# Patient Record
Sex: FEMALE | Race: ASIAN | NOT HISPANIC OR LATINO
[De-identification: names, ages, dates, MRNs, and addresses within clinical notes are randomized per-mention and may not be internally consistent; named-entity substitution may affect disease eponyms.]

---

## 2024-01-17 ENCOUNTER — NON-APPOINTMENT (OUTPATIENT)
Age: 74
End: 2024-01-17

## 2024-01-18 ENCOUNTER — APPOINTMENT (OUTPATIENT)
Dept: NEUROLOGY | Facility: CLINIC | Age: 74
End: 2024-01-18
Payer: MEDICARE

## 2024-01-18 VITALS
WEIGHT: 96 LBS | BODY MASS INDEX: 18.85 KG/M2 | SYSTOLIC BLOOD PRESSURE: 137 MMHG | HEIGHT: 60 IN | DIASTOLIC BLOOD PRESSURE: 79 MMHG | HEART RATE: 72 BPM

## 2024-01-18 DIAGNOSIS — I67.1 CEREBRAL ANEURYSM, NONRUPTURED: ICD-10-CM

## 2024-01-18 PROBLEM — Z00.00 ENCOUNTER FOR PREVENTIVE HEALTH EXAMINATION: Status: ACTIVE | Noted: 2024-01-18

## 2024-01-18 PROCEDURE — 99204 OFFICE O/P NEW MOD 45 MIN: CPT

## 2024-01-18 NOTE — HISTORY OF PRESENT ILLNESS
[FreeTextEntry1] : 73 year old woman with acom aneurysm, recently discovered, not yet secured, HLD, and osteoporosis, presenting to stroke neurology for initial evaluation after recent discovery of the cerebral aneurysm.    Patient had a workup performed for numbness in the L arm, which prompted imaging, including MRI brain, MRA head and neck.  The discovery of the acomm aneurysm prompted a cerebral angiogram, performed in December 2023 by Dr. José Manuel Villaseñor, at [facility].  The aneurysm is described as a 5.5x4.6x3.6mm wide necked acom aneurysm that projects medio-superiorly.  The plan was to undergo repeat angio with intervention on 1/22.  Patient was instructed to start taking a 'blood thinner' this week (?planning for pipeline stent), but she does not wish to have the procedure performed in [place], as she and her family live in [place].    Patient denies any ongoing neurological deficits, including visual disturbance, headache (although she does have occasional unilateral headache, treated conservatively with rest), no weakness of arms or legs, occasionally, she does have sensory paresthesias of the L arm, no gait instability or imbalance.    No recent medical illness, including chest pain, shortness of breath, or fevers, chills, weight loss.  No history of smoking.  NO family history of aneurysm.    PMHx HLD: takes a statin, cannot give the name Osteoporosis: receives monthly injections  SHx:  Non smoker, no ETOH, no drugs.  Lives with .   INdependent  ON exam:  Awake, alert, attending, normal speech fluency.  Pupils 3-2mm, symemtric, full VF's ,normal EOM, conjugate gaze, no facial weakness, no dysarthria.  MOTOR: normal bulk and tone, no drift.  COORDINATION: no ataxia.   MRI brain report reviewed: white matter disease.  Cerebral angiogram report reviewed: acom aneurysm, superomedially projecting, measuring 5.5x4.7x3.6mm, wide neck.

## 2024-01-18 NOTE — ASSESSMENT
[FreeTextEntry1] : 73 year old woman with acom aneurysm, HLD, osteoporosis, presenting for evaluation after discovery of the aneurysm.  No focal neurological complaints at this time.  No findings on neuro exam.  Imaging showing the aneurysm, also has white matter disease.   Requires neurovascular evaluation, possible repeat diagnostic angio, and intervention.   Patient was instructed to retrieve imaging CD's from [employer].  Discussed with Dr. Dowd.  Will schedule her for consultation.

## 2024-01-23 ENCOUNTER — APPOINTMENT (OUTPATIENT)
Dept: NEUROLOGY | Facility: CLINIC | Age: 74
End: 2024-01-23
Payer: MEDICARE

## 2024-01-23 VITALS
WEIGHT: 96 LBS | HEART RATE: 75 BPM | HEIGHT: 60 IN | SYSTOLIC BLOOD PRESSURE: 153 MMHG | DIASTOLIC BLOOD PRESSURE: 74 MMHG | BODY MASS INDEX: 18.85 KG/M2

## 2024-01-23 DIAGNOSIS — Z78.9 OTHER SPECIFIED HEALTH STATUS: ICD-10-CM

## 2024-01-23 PROCEDURE — 99205 OFFICE O/P NEW HI 60 MIN: CPT

## 2024-01-23 RX ORDER — DENOSUMAB 60 MG/ML
60 INJECTION SUBCUTANEOUS
Qty: 1 | Refills: 0 | Status: ACTIVE | COMMUNITY
Start: 2023-12-27

## 2024-01-23 NOTE — CONSULT LETTER
[Dear  ___] : Dear  [unfilled], [Consult Letter:] : I had the pleasure of evaluating your patient, [unfilled]. [Consult Closing:] : Thank you very much for allowing me to participate in the care of this patient.  If you have any questions, please do not hesitate to contact me. [Please see my note below.] : Please see my note below. [Sincerely,] : Sincerely, [FreeTextEntry3] : Tommy Dowd MD Chief, Vascular Neurology and Neurology Service , NeuroEndovascular Surgery Professor of Neurology and Radiology [employer] at [facility] Director, NeuroEndovascular Surgery [facility]

## 2024-01-23 NOTE — REVIEW OF SYSTEMS
[Feeling Poorly] : not feeling poorly [Feeling Tired] : not feeling tired [Confused or Disoriented] : no confusion [Memory Lapses or Loss] : no memory loss [Decr. Concentrating Ability] : no decrease in concentrating ability [Difficulty with Language] : no ~M difficulty with language [Changed Thought Patterns] : no change in thought patterns [Repeating Questions] : no repeated questioning about recent events [Facial Weakness] : no facial weakness [Arm Weakness] : no arm weakness [Hand Weakness] : no hand weakness [Leg Weakness] : no leg weakness [Poor Coordination] : good coordination [Difficulty Writing] : no difficulty writing [Difficulties in Speech] : no speech difficulties [Numbness] : no numbness [Dizziness] : no dizziness [Seizures] : no convulsions [Fainting] : no fainting [Lightheadedness] : no lightheadedness [Vertigo] : no vertigo [Tension Headache] : no tension-type headache [Difficulty Walking] : no difficulty walking [Anxiety] : no anxiety [Depression] : no depression [Eyesight Problems] : no eyesight problems [Loss Of Hearing] : no hearing loss [Chest Pain] : no chest pain [Palpitations] : no palpitations [Shortness Of Breath] : no shortness of breath [Wheezing] : no wheezing [Vomiting] : no vomiting [Easy Bleeding] : no tendency for easy bleeding [Easy Bruising] : no tendency for easy bruising

## 2024-01-23 NOTE — DISCUSSION/SUMMARY
[FreeTextEntry1] : Damian Gonzalez is a 73 year old woman with a PMHX of HLD, osteoporosis referred by Dr. Deleon for an incidental aneurysm found while being worked up for left arm pain. She underwent a cerebral angiogram at [facility] which showed a 5 mm ACOMM aneurysm. No imaging was brought in for me to review. She does not want to have the aneurysm treated at [facility]. As I explained, if I am going to treat her aneurysm, then I need to perform my own angiogram to make sure that I have all the projections and imaging I need to make the best decision on treatment technique. Given her age, endovascular treatment will be preferred over surgery. Both options were discussed and a final decision will be made after the angiogram. The procedure, risks, benefits, and alternatives were discussed with the patient and family in detail and they would like to proceed. All of their questions and concerns were addressed.

## 2024-01-23 NOTE — REASON FOR VISIT
[Initial Evaluation] : an initial evaluation [[employer] ] : provided by [employer]   [Interpreters_IDNumber] : [id number] [[id number]] : Jorge

## 2024-01-23 NOTE — PHYSICAL EXAM
[General Appearance - Alert] : alert [General Appearance - Well Nourished] : well nourished [General Appearance - Well Developed] : well developed [Oriented To Time, Place, And Person] : oriented to person, place, and time [Affect] : the affect was normal [Mood] : the mood was normal [Person] : oriented to person [Place] : oriented to place [Time] : oriented to time [Concentration Intact] : normal concentrating ability [Visual Intact] : visual attention was ~T not ~L decreased [Naming Objects] : no difficulty naming common objects [Repeating Phrases] : no difficulty repeating a phrase [Writing A Sentence] : no difficulty writing a sentence [Fluency] : fluency intact [Comprehension] : comprehension intact [Reading] : reading intact [Past History] : adequate knowledge of personal past history [Cranial Nerves Optic (II)] : visual acuity intact bilaterally,  visual fields full to confrontation, pupils equal round and reactive to light [Cranial Nerves Oculomotor (III)] : extraocular motion intact [Cranial Nerves Trigeminal (V)] : facial sensation intact symmetrically [Cranial Nerves Facial (VII)] : face symmetrical [Cranial Nerves Vestibulocochlear (VIII)] : hearing was intact bilaterally [Cranial Nerves Glossopharyngeal (IX)] : tongue and palate midline [Cranial Nerves Accessory (XI - Cranial And Spinal)] : head turning and shoulder shrug symmetric [Cranial Nerves Hypoglossal (XII)] : there was no tongue deviation with protrusion [Motor Tone] : muscle tone was normal in all four extremities [Motor Strength] : muscle strength was normal in all four extremities [No Muscle Atrophy] : normal bulk in all four extremities [Sensation Tactile Decrease] : light touch was intact [Balance] : balance was intact [Full Visual Field] : full visual field [Hearing Threshold Finger Rub Not Terrell] : hearing was normal [Neck Appearance] : the appearance of the neck was normal [Heart Rate And Rhythm] : heart rate was normal and rhythm regular [Edema] : there was no peripheral edema [Abnormal Walk] : normal gait

## 2024-01-23 NOTE — HISTORY OF PRESENT ILLNESS
[FreeTextEntry1] : Damian Gonzalez is a 73 year old woman with a PMHX of HLD, osteoporosis referred by Dr. Deleon for an incidental aneurysm. She had imaging done for numbness in the L arm including MRI brain, MRA head and neck. MRA showed an ACOMM aneurysm. She underwent a cerebral angiogram by José Manuel Villaseñor at [facility] on December 7th, 2023, with the intent to treat this month. The aneurysm is described as a 5.5x4.6x3.6mm wide necked acomm aneurysm that projects medio-superiorly. No imaging was brought in for me to review.

## 2024-02-09 ENCOUNTER — RESULT REVIEW (OUTPATIENT)
Age: 74
End: 2024-02-09

## 2024-02-09 ENCOUNTER — OUTPATIENT (OUTPATIENT)
Dept: OUTPATIENT SERVICES | Facility: HOSPITAL | Age: 74
LOS: 1 days | End: 2024-02-09
Payer: COMMERCIAL

## 2024-02-09 VITALS
RESPIRATION RATE: 18 BRPM | TEMPERATURE: 97 F | WEIGHT: 99.65 LBS | SYSTOLIC BLOOD PRESSURE: 120 MMHG | HEIGHT: 60 IN | DIASTOLIC BLOOD PRESSURE: 70 MMHG | HEART RATE: 62 BPM | OXYGEN SATURATION: 98 %

## 2024-02-09 DIAGNOSIS — I72.9 ANEURYSM OF UNSPECIFIED SITE: ICD-10-CM

## 2024-02-09 DIAGNOSIS — I67.1 CEREBRAL ANEURYSM, NONRUPTURED: ICD-10-CM

## 2024-02-09 DIAGNOSIS — Z91.89 OTHER SPECIFIED PERSONAL RISK FACTORS, NOT ELSEWHERE CLASSIFIED: ICD-10-CM

## 2024-02-09 DIAGNOSIS — Z01.818 ENCOUNTER FOR OTHER PREPROCEDURAL EXAMINATION: ICD-10-CM

## 2024-02-09 DIAGNOSIS — Z98.890 OTHER SPECIFIED POSTPROCEDURAL STATES: Chronic | ICD-10-CM

## 2024-02-09 LAB
A1C WITH ESTIMATED AVERAGE GLUCOSE RESULT: 5.8 % — HIGH (ref 4–5.6)
ALBUMIN SERPL ELPH-MCNC: 4.4 G/DL — SIGNIFICANT CHANGE UP (ref 3.3–5.2)
ALP SERPL-CCNC: 34 U/L — LOW (ref 40–120)
ALT FLD-CCNC: 15 U/L — SIGNIFICANT CHANGE UP
ANION GAP SERPL CALC-SCNC: 8 MMOL/L — SIGNIFICANT CHANGE UP (ref 5–17)
APTT BLD: 33 SEC — SIGNIFICANT CHANGE UP (ref 24.5–35.6)
AST SERPL-CCNC: 30 U/L — SIGNIFICANT CHANGE UP
BASOPHILS # BLD AUTO: 0.03 K/UL — SIGNIFICANT CHANGE UP (ref 0–0.2)
BASOPHILS NFR BLD AUTO: 0.5 % — SIGNIFICANT CHANGE UP (ref 0–2)
BILIRUB SERPL-MCNC: 0.3 MG/DL — LOW (ref 0.4–2)
BLD GP AB SCN SERPL QL: SIGNIFICANT CHANGE UP
BUN SERPL-MCNC: 12.6 MG/DL — SIGNIFICANT CHANGE UP (ref 8–20)
CALCIUM SERPL-MCNC: 9.3 MG/DL — SIGNIFICANT CHANGE UP (ref 8.4–10.5)
CHLORIDE SERPL-SCNC: 105 MMOL/L — SIGNIFICANT CHANGE UP (ref 96–108)
CO2 SERPL-SCNC: 28 MMOL/L — SIGNIFICANT CHANGE UP (ref 22–29)
CREAT SERPL-MCNC: 0.71 MG/DL — SIGNIFICANT CHANGE UP (ref 0.5–1.3)
EGFR: 91 ML/MIN/1.73M2 — SIGNIFICANT CHANGE UP
EOSINOPHIL # BLD AUTO: 0.07 K/UL — SIGNIFICANT CHANGE UP (ref 0–0.5)
EOSINOPHIL NFR BLD AUTO: 1.2 % — SIGNIFICANT CHANGE UP (ref 0–6)
ESTIMATED AVERAGE GLUCOSE: 120 MG/DL — HIGH (ref 68–114)
GLUCOSE SERPL-MCNC: 83 MG/DL — SIGNIFICANT CHANGE UP (ref 70–99)
HCT VFR BLD CALC: 42 % — SIGNIFICANT CHANGE UP (ref 34.5–45)
HGB BLD-MCNC: 13.6 G/DL — SIGNIFICANT CHANGE UP (ref 11.5–15.5)
IMM GRANULOCYTES NFR BLD AUTO: 0.2 % — SIGNIFICANT CHANGE UP (ref 0–0.9)
INR BLD: 1 RATIO — SIGNIFICANT CHANGE UP (ref 0.85–1.18)
LYMPHOCYTES # BLD AUTO: 2.57 K/UL — SIGNIFICANT CHANGE UP (ref 1–3.3)
LYMPHOCYTES # BLD AUTO: 42.3 % — SIGNIFICANT CHANGE UP (ref 13–44)
MAGNESIUM SERPL-MCNC: 2.3 MG/DL — SIGNIFICANT CHANGE UP (ref 1.6–2.6)
MCHC RBC-ENTMCNC: 30.4 PG — SIGNIFICANT CHANGE UP (ref 27–34)
MCHC RBC-ENTMCNC: 32.4 GM/DL — SIGNIFICANT CHANGE UP (ref 32–36)
MCV RBC AUTO: 93.8 FL — SIGNIFICANT CHANGE UP (ref 80–100)
MONOCYTES # BLD AUTO: 0.46 K/UL — SIGNIFICANT CHANGE UP (ref 0–0.9)
MONOCYTES NFR BLD AUTO: 7.6 % — SIGNIFICANT CHANGE UP (ref 2–14)
MRSA PCR RESULT.: SIGNIFICANT CHANGE UP
NEUTROPHILS # BLD AUTO: 2.93 K/UL — SIGNIFICANT CHANGE UP (ref 1.8–7.4)
NEUTROPHILS NFR BLD AUTO: 48.2 % — SIGNIFICANT CHANGE UP (ref 43–77)
PLATELET # BLD AUTO: 256 K/UL — SIGNIFICANT CHANGE UP (ref 150–400)
POTASSIUM SERPL-MCNC: 4.1 MMOL/L — SIGNIFICANT CHANGE UP (ref 3.5–5.3)
POTASSIUM SERPL-SCNC: 4.1 MMOL/L — SIGNIFICANT CHANGE UP (ref 3.5–5.3)
PROT SERPL-MCNC: 7.5 G/DL — SIGNIFICANT CHANGE UP (ref 6.6–8.7)
PROTHROM AB SERPL-ACNC: 11.1 SEC — SIGNIFICANT CHANGE UP (ref 9.5–13)
RBC # BLD: 4.48 M/UL — SIGNIFICANT CHANGE UP (ref 3.8–5.2)
RBC # FLD: 12.2 % — SIGNIFICANT CHANGE UP (ref 10.3–14.5)
S AUREUS DNA NOSE QL NAA+PROBE: SIGNIFICANT CHANGE UP
SODIUM SERPL-SCNC: 141 MMOL/L — SIGNIFICANT CHANGE UP (ref 135–145)
WBC # BLD: 6.07 K/UL — SIGNIFICANT CHANGE UP (ref 3.8–10.5)
WBC # FLD AUTO: 6.07 K/UL — SIGNIFICANT CHANGE UP (ref 3.8–10.5)

## 2024-02-09 PROCEDURE — 71046 X-RAY EXAM CHEST 2 VIEWS: CPT | Mod: 26

## 2024-02-09 PROCEDURE — 93010 ELECTROCARDIOGRAM REPORT: CPT

## 2024-02-09 PROCEDURE — 93005 ELECTROCARDIOGRAM TRACING: CPT

## 2024-02-09 PROCEDURE — 71046 X-RAY EXAM CHEST 2 VIEWS: CPT

## 2024-02-09 PROCEDURE — G0463: CPT

## 2024-02-09 RX ORDER — SODIUM CHLORIDE 9 MG/ML
3 INJECTION INTRAMUSCULAR; INTRAVENOUS; SUBCUTANEOUS ONCE
Refills: 0 | Status: DISCONTINUED | OUTPATIENT
Start: 2024-02-14 | End: 2024-02-28

## 2024-02-09 NOTE — H&P PST ADULT - HISTORY OF PRESENT ILLNESS
74 y/o mandarin speaking female with PMH of     Patient is scheduled for cerebral angiogram on 2/14/2024 with Dr. Dowd.  72 y/o mandarin speaking female with PMH of HLD and osteoporosis presents to [facility] with complaints of having cerebral aneurysm. States in 10/2023 she started to have B/L hand numbness.   Reports occasional headaches, described as numbness, 5/10 in severity, worse with laying down, relieved with sitting up or standing.   Denies chest pain, shortness of breath, visual changes or stroke like symptoms.     Patient is scheduled for cerebral angiogram on 2/14/2024 with Dr. Dowd.  74 y/o mandarin speaking female with PMH of HLD and osteoporosis presents to [facility] with complaints of having cerebral aneurysm. States in 10/2023 she started to have B/L hand numbness. At that time she had MRI brain, MRA head and neck. MRA showed an ACOMM aneurysm. She underwent a cerebral angiogram by José Manuel Villaseñor at [facility] on December 7th, 2023, with the intent to treat this month. The aneurysm is described as a 5.5x4.6x3.6mm wide necked acomm aneurysm that projects medio-superiorly. Reports occasional headaches, described as numbness, 5/10 in severity, worse with laying down, relieved with sitting up or standing. Denies chest pain, shortness of breath, visual changes or stroke like symptoms. Patient is scheduled for cerebral angiogram on 2/14/2024 with Dr. Dowd.

## 2024-02-09 NOTE — H&P PST ADULT - ASSESSMENT
Patient educated on surgical scrub, preadmission instructions, medical clearance and day of procedure medications, verbalizes understanding. Pt instructed to stop vitamins/supplements/herbal medications/ASA/NSAIDS for one week prior to surgery and discuss with PMD.      CAPRINI SCORE    AGE RELATED RISK FACTORS                                                             [ ] Age 41-60 years                                            (1 Point)  [ ] Age: 61-74 years                                           (2 Points)                 [ ] Age= 75 years                                                (3 Points)             DISEASE RELATED RISK FACTORS                                                       [ ] Edema in the lower extremities                 (1 Point)                     [ ] Varicose veins                                               (1 Point)                                 [ ] BMI > 25 Kg/m2                                            (1 Point)                                  [ ] Serious infection (ie PNA)                            (1 Point)                     [ ] Lung disease ( COPD, Emphysema)            (1 Point)                                                                          [ ] Acute myocardial infarction                         (1 Point)                  [ ] Congestive heart failure (in the previous month)  (1 Point)         [ ] Inflammatory bowel disease                            (1 Point)                  [ ] Central venous access, PICC or Port               (2 points)       (within the last month)                                                                [ ] Stroke (in the previous month)                        (5 Points)    [ ] Previous or present malignancy                       (2 points)                                                                                                                                                         HEMATOLOGY RELATED FACTORS                                                         [ ] Prior episodes of VTE                                     (3 Points)                     [ ] Positive family history for VTE                      (3 Points)                  [ ] Prothrombin 99692 A                                     (3 Points)                     [ ] Factor V Leiden                                                (3 Points)                        [ ] Lupus anticoagulants                                      (3 Points)                                                           [ ] Anticardiolipin antibodies                              (3 Points)                                                       [ ] High homocysteine in the blood                   (3 Points)                                             [ ] Other congenital or acquired thrombophilia      (3 Points)                                                [ ] Heparin induced thrombocytopenia                  (3 Points)                                        MOBILITY RELATED FACTORS  [ ] Bed rest                                                         (1 Point)  [ ] Plaster cast                                                    (2 points)  [ ] Bed bound for more than 72 hours           (2 Points)    GENDER SPECIFIC FACTORS  [ ] Pregnancy or had a baby within the last month   (1 Point)  [ ] Post-partum < 6 weeks                                   (1 Point)  [ ] Hormonal therapy  or oral contraception   (1 Point)  [ ] History of pregnancy complications              (1 point)  [ ] Unexplained or recurrent              (1 Point)    OTHER RISK FACTORS                                           (1 Point)  [ ] BMI >40, smoking, diabetes requiring insulin, chemotherapy  blood transfusions and length of surgery over 2 hours    SURGERY RELATED RISK FACTORS  [ ]  Section within the last month     (1 Point)  [ ] Minor surgery                                                  (1 Point)  [ ] Arthroscopic surgery                                       (2 Points)  [ ] Planned major surgery lasting more            (2 Points)      than 45 minutes     [ ] Elective hip or knee joint replacement       (5 points)       surgery                                                TRAUMA RELATED RISK FACTORS  [ ] Fracture of the hip, pelvis, or leg                       (5 Points)  [ ] Spinal cord injury resulting in paralysis             (5 points)       (in the previous month)    [ ] Paralysis  (less than 1 month)                             (5 Points)  [ ] Multiple Trauma within 1 month                        (5 Points)    Total Score [        ]    Caprini Score 0-2: Low Risk, NO VTE prophylaxis required for most patients, encourage ambulation  Caprini Score 3-6: Moderate Risk , pharmacologic VTE prophylaxis is indicated for most patients (in the absence of contraindications)  Caprini Score Greater than or =7: High risk, pharmocologic VTE prophylaxis indicated for most patients (in the absence of contraindications)    OPIOID RISK TOOL    TRISHA EACH BOX THAT APPLIES AND ADD TOTALS AT THE END    FAMILY HISTORY OF SUBSTANCE ABUSE                 FEMALE         MALE                                                Alcohol                             [  ]1 pt          [  ]3pts                                               Illegal Durgs                     [  ]2 pts        [  ]3pts                                               Rx Drugs                           [  ]4 pts        [  ]4 pts    PERSONAL HISTORY OF SUBSTANCE ABUSE                                                                                          Alcohol                             [  ]3 pts       [  ]3 pts                                               Illegal Drugs                     [  ]4 pts        [  ]4 pts                                               Rx Drugs                           [  ]5 pts        [  ]5 pts    AGE BETWEEN 16-45 YEARS                                      [  ]1 pt         [  ]1 pt    HISTORY OF PREADOLESCENT   SEXUAL ABUSE                                                             [  ]3 pts        [  ]0pts    PSYCHOLOGICAL DISEASE                     ADD, OCD, Bipolar, Schizophrenia        [  ]2 pts         [  ]2 pts                      Depression                                               [  ]1 pt           [  ]1 pt           SCORING TOTAL   (add numbers and type here)              (***)                                     A score of 3 or lower indicated LOW risk for future opioid abuse  A score of 4 to 7 indicated moderate risk for future opioid abuse  A score of 8 or higher indicates a high risk for opioid abuse     Patient educated on surgical scrub, preadmission instructions and day of procedure medications, verbalizes understanding. Pt instructed to stop vitamins/supplements/herbal medications/ASA/NSAIDS for one week prior to surgery and discuss with PMD.      CAPRINI SCORE    AGE RELATED RISK FACTORS                                                             [ ] Age 41-60 years                                            (1 Point)  [ ] Age: 61-74 years                                           (2 Points)                 [ ] Age= 75 years                                                (3 Points)             DISEASE RELATED RISK FACTORS                                                       [ ] Edema in the lower extremities                 (1 Point)                     [ ] Varicose veins                                               (1 Point)                                 [ ] BMI > 25 Kg/m2                                            (1 Point)                                  [ ] Serious infection (ie PNA)                            (1 Point)                     [ ] Lung disease ( COPD, Emphysema)            (1 Point)                                                                          [ ] Acute myocardial infarction                         (1 Point)                  [ ] Congestive heart failure (in the previous month)  (1 Point)         [ ] Inflammatory bowel disease                            (1 Point)                  [ ] Central venous access, PICC or Port               (2 points)       (within the last month)                                                                [ ] Stroke (in the previous month)                        (5 Points)    [ ] Previous or present malignancy                       (2 points)                                                                                                                                                         HEMATOLOGY RELATED FACTORS                                                         [ ] Prior episodes of VTE                                     (3 Points)                     [ ] Positive family history for VTE                      (3 Points)                  [ ] Prothrombin 18118 A                                     (3 Points)                     [ ] Factor V Leiden                                                (3 Points)                        [ ] Lupus anticoagulants                                      (3 Points)                                                           [ ] Anticardiolipin antibodies                              (3 Points)                                                       [ ] High homocysteine in the blood                   (3 Points)                                             [ ] Other congenital or acquired thrombophilia      (3 Points)                                                [ ] Heparin induced thrombocytopenia                  (3 Points)                                        MOBILITY RELATED FACTORS  [ ] Bed rest                                                         (1 Point)  [ ] Plaster cast                                                    (2 points)  [ ] Bed bound for more than 72 hours           (2 Points)    GENDER SPECIFIC FACTORS  [ ] Pregnancy or had a baby within the last month   (1 Point)  [ ] Post-partum < 6 weeks                                   (1 Point)  [ ] Hormonal therapy  or oral contraception   (1 Point)  [ ] History of pregnancy complications              (1 point)  [ ] Unexplained or recurrent              (1 Point)    OTHER RISK FACTORS                                           (1 Point)  [ ] BMI >40, smoking, diabetes requiring insulin, chemotherapy  blood transfusions and length of surgery over 2 hours    SURGERY RELATED RISK FACTORS  [ ]  Section within the last month     (1 Point)  [ ] Minor surgery                                                  (1 Point)  [ ] Arthroscopic surgery                                       (2 Points)  [ ] Planned major surgery lasting more            (2 Points)      than 45 minutes     [ ] Elective hip or knee joint replacement       (5 points)       surgery                                                TRAUMA RELATED RISK FACTORS  [ ] Fracture of the hip, pelvis, or leg                       (5 Points)  [ ] Spinal cord injury resulting in paralysis             (5 points)       (in the previous month)    [ ] Paralysis  (less than 1 month)                             (5 Points)  [ ] Multiple Trauma within 1 month                        (5 Points)    Total Score [        ]    Caprini Score 0-2: Low Risk, NO VTE prophylaxis required for most patients, encourage ambulation  Caprini Score 3-6: Moderate Risk , pharmacologic VTE prophylaxis is indicated for most patients (in the absence of contraindications)  Caprini Score Greater than or =7: High risk, pharmocologic VTE prophylaxis indicated for most patients (in the absence of contraindications)    OPIOID RISK TOOL    TRISHA EACH BOX THAT APPLIES AND ADD TOTALS AT THE END    FAMILY HISTORY OF SUBSTANCE ABUSE                 FEMALE         MALE                                                Alcohol                             [  ]1 pt          [  ]3pts                                               Illegal Durgs                     [  ]2 pts        [  ]3pts                                               Rx Drugs                           [  ]4 pts        [  ]4 pts    PERSONAL HISTORY OF SUBSTANCE ABUSE                                                                                          Alcohol                             [  ]3 pts       [  ]3 pts                                               Illegal Drugs                     [  ]4 pts        [  ]4 pts                                               Rx Drugs                           [  ]5 pts        [  ]5 pts    AGE BETWEEN 16-45 YEARS                                      [  ]1 pt         [  ]1 pt    HISTORY OF PREADOLESCENT   SEXUAL ABUSE                                                             [  ]3 pts        [  ]0pts    PSYCHOLOGICAL DISEASE                     ADD, OCD, Bipolar, Schizophrenia        [  ]2 pts         [  ]2 pts                      Depression                                               [  ]1 pt           [  ]1 pt           SCORING TOTAL   (add numbers and type here)              (***)                                     A score of 3 or lower indicated LOW risk for future opioid abuse  A score of 4 to 7 indicated moderate risk for future opioid abuse  A score of 8 or higher indicates a high risk for opioid abuse   74 y/o mandarin speaking female with PMH of HLD and osteoporosis presents to [facility] with complaints of having cerebral aneurysm. States in 10/2023 she started to have B/L hand numbness. At that time she had MRI brain, MRA head and neck. MRA showed an ACOMM aneurysm. She underwent a cerebral angiogram by José Manuel Villaseñor at [facility] on 2023, with the intent to treat this month. The aneurysm is described as a 5.5x4.6x3.6mm wide necked acomm aneurysm that projects medio-superiorly. Reports occasional headaches, described as numbness, 5/10 in severity, worse with laying down, relieved with sitting up or standing. Denies chest pain, shortness of breath, visual changes or stroke like symptoms. Patient is scheduled for cerebral angiogram on 2024 with Dr. Dowd. Patient educated on surgical scrub, preadmission instructions and day of procedure medications, verbalizes understanding. Pt instructed to stop vitamins/supplements/herbal medications/NSAIDS for one week prior to surgery and discuss with PMD.      CAPRINI SCORE    AGE RELATED RISK FACTORS                                                             [ ] Age 41-60 years                                            (1 Point)  [x ] Age: 61-74 years                                           (2 Points)                 [ ] Age= 75 years                                                (3 Points)             DISEASE RELATED RISK FACTORS                                                       [ ] Edema in the lower extremities                 (1 Point)                     [ ] Varicose veins                                               (1 Point)                                 [ ] BMI > 25 Kg/m2                                            (1 Point)                                  [ ] Serious infection (ie PNA)                            (1 Point)                     [ ] Lung disease ( COPD, Emphysema)            (1 Point)                                                                          [ ] Acute myocardial infarction                         (1 Point)                  [ ] Congestive heart failure (in the previous month)  (1 Point)         [ ] Inflammatory bowel disease                            (1 Point)                  [ ] Central venous access, PICC or Port               (2 points)       (within the last month)                                                                [ ] Stroke (in the previous month)                        (5 Points)    [ ] Previous or present malignancy                       (2 points)                                                                                                                                                         HEMATOLOGY RELATED FACTORS                                                         [ ] Prior episodes of VTE                                     (3 Points)                     [ ] Positive family history for VTE                      (3 Points)                  [ ] Prothrombin 92294 A                                     (3 Points)                     [ ] Factor V Leiden                                                (3 Points)                        [ ] Lupus anticoagulants                                      (3 Points)                                                           [ ] Anticardiolipin antibodies                              (3 Points)                                                       [ ] High homocysteine in the blood                   (3 Points)                                             [ ] Other congenital or acquired thrombophilia      (3 Points)                                                [ ] Heparin induced thrombocytopenia                  (3 Points)                                        MOBILITY RELATED FACTORS  [ ] Bed rest                                                         (1 Point)  [ ] Plaster cast                                                    (2 points)  [ ] Bed bound for more than 72 hours           (2 Points)    GENDER SPECIFIC FACTORS  [ ] Pregnancy or had a baby within the last month   (1 Point)  [ ] Post-partum < 6 weeks                                   (1 Point)  [ ] Hormonal therapy  or oral contraception   (1 Point)  [ ] History of pregnancy complications              (1 point)  [ ] Unexplained or recurrent              (1 Point)    OTHER RISK FACTORS                                           (1 Point)  [ ] BMI >40, smoking, diabetes requiring insulin, chemotherapy  blood transfusions and length of surgery over 2 hours    SURGERY RELATED RISK FACTORS  [ ]  Section within the last month     (1 Point)  [ ] Minor surgery                                                  (1 Point)  [ ] Arthroscopic surgery                                       (2 Points)  [x ] Planned major surgery lasting more            (2 Points)      than 45 minutes     [ ] Elective hip or knee joint replacement       (5 points)       surgery                                                TRAUMA RELATED RISK FACTORS  [ ] Fracture of the hip, pelvis, or leg                       (5 Points)  [ ] Spinal cord injury resulting in paralysis             (5 points)       (in the previous month)    [ ] Paralysis  (less than 1 month)                             (5 Points)  [ ] Multiple Trauma within 1 month                        (5 Points)    Total Score [    4    ]    Caprini Score 0-2: Low Risk, NO VTE prophylaxis required for most patients, encourage ambulation  Caprini Score 3-6: Moderate Risk , pharmacologic VTE prophylaxis is indicated for most patients (in the absence of contraindications)  Caprini Score Greater than or =7: High risk, pharmocologic VTE prophylaxis indicated for most patients (in the absence of contraindications)    OPIOID RISK TOOL    TRISHA EACH BOX THAT APPLIES AND ADD TOTALS AT THE END    FAMILY HISTORY OF SUBSTANCE ABUSE                 FEMALE         MALE                                                Alcohol                             [  ]1 pt          [  ]3pts                                               Illegal Durgs                     [  ]2 pts        [  ]3pts                                               Rx Drugs                           [  ]4 pts        [  ]4 pts    PERSONAL HISTORY OF SUBSTANCE ABUSE                                                                                          Alcohol                             [  ]3 pts       [  ]3 pts                                               Illegal Drugs                     [  ]4 pts        [  ]4 pts                                               Rx Drugs                           [  ]5 pts        [  ]5 pts    AGE BETWEEN 16-45 YEARS                                      [  ]1 pt         [  ]1 pt    HISTORY OF PREADOLESCENT   SEXUAL ABUSE                                                             [  ]3 pts        [  ]0pts    PSYCHOLOGICAL DISEASE                     ADD, OCD, Bipolar, Schizophrenia        [  ]2 pts         [  ]2 pts                      Depression                                               [  ]1 pt           [  ]1 pt           SCORING TOTAL   (add numbers and type here)              (**0*)                                     A score of 3 or lower indicated LOW risk for future opioid abuse  A score of 4 to 7 indicated moderate risk for future opioid abuse  A score of 8 or higher indicates a high risk for opioid abuse

## 2024-02-09 NOTE — H&P PST ADULT - NSANTHOSAYNRD_GEN_A_CORE
No. MCKENZIE screening performed.  STOP BANG Legend: 0-2 = LOW Risk; 3-4 = INTERMEDIATE Risk; 5-8 = HIGH Risk

## 2024-02-14 ENCOUNTER — OUTPATIENT (OUTPATIENT)
Dept: OUTPATIENT SERVICES | Facility: HOSPITAL | Age: 74
LOS: 1 days | End: 2024-02-14
Payer: COMMERCIAL

## 2024-02-14 ENCOUNTER — TRANSCRIPTION ENCOUNTER (OUTPATIENT)
Age: 74
End: 2024-02-14

## 2024-02-14 ENCOUNTER — APPOINTMENT (OUTPATIENT)
Dept: NEUROLOGY | Facility: HOSPITAL | Age: 74
End: 2024-02-14

## 2024-02-14 VITALS
RESPIRATION RATE: 21 BRPM | SYSTOLIC BLOOD PRESSURE: 128 MMHG | OXYGEN SATURATION: 96 % | HEART RATE: 79 BPM | DIASTOLIC BLOOD PRESSURE: 62 MMHG

## 2024-02-14 VITALS
RESPIRATION RATE: 16 BRPM | DIASTOLIC BLOOD PRESSURE: 61 MMHG | TEMPERATURE: 98 F | HEART RATE: 66 BPM | SYSTOLIC BLOOD PRESSURE: 129 MMHG | OXYGEN SATURATION: 98 %

## 2024-02-14 DIAGNOSIS — R55 SYNCOPE AND COLLAPSE: ICD-10-CM

## 2024-02-14 DIAGNOSIS — I72.9 ANEURYSM OF UNSPECIFIED SITE: ICD-10-CM

## 2024-02-14 DIAGNOSIS — Z98.890 OTHER SPECIFIED POSTPROCEDURAL STATES: Chronic | ICD-10-CM

## 2024-02-14 LAB — ABO RH CONFIRMATION: SIGNIFICANT CHANGE UP

## 2024-02-14 PROCEDURE — 36227 PLACE CATH XTRNL CAROTID: CPT

## 2024-02-14 PROCEDURE — C1887: CPT

## 2024-02-14 PROCEDURE — C1769: CPT

## 2024-02-14 PROCEDURE — 36415 COLL VENOUS BLD VENIPUNCTURE: CPT

## 2024-02-14 PROCEDURE — C1894: CPT

## 2024-02-14 PROCEDURE — 36227 PLACE CATH XTRNL CAROTID: CPT | Mod: 50

## 2024-02-14 PROCEDURE — 36224 PLACE CATH CAROTD ART: CPT

## 2024-02-14 PROCEDURE — 36224 PLACE CATH CAROTD ART: CPT | Mod: 50

## 2024-02-14 PROCEDURE — 36226 PLACE CATH VERTEBRAL ART: CPT

## 2024-02-14 PROCEDURE — 76377 3D RENDER W/INTRP POSTPROCES: CPT | Mod: 26

## 2024-02-14 PROCEDURE — 76377 3D RENDER W/INTRP POSTPROCES: CPT

## 2024-02-14 PROCEDURE — 36226 PLACE CATH VERTEBRAL ART: CPT | Mod: LT

## 2024-02-14 RX ORDER — SODIUM CHLORIDE 9 MG/ML
1000 INJECTION INTRAMUSCULAR; INTRAVENOUS; SUBCUTANEOUS
Refills: 0 | Status: DISCONTINUED | OUTPATIENT
Start: 2024-02-14 | End: 2024-02-28

## 2024-02-14 NOTE — CHART NOTE - NSCHARTNOTEFT_GEN_A_CORE
Neurointerventional Surgery  Pre-Procedure Note     HPI:  74 y/o mandarin speaking F w/  PMH of HLD and osteoporosis presents today 2/14/24 to Neuro IR for cerebral angiogram with Dr. Dowd. In 10/2023 she started to have B/L hand numbness and at that time she had MRI brain, MRA head and neck which showed, ACOMM aneurysm. She underwent a cerebral angiogram by José Manuel Villaseñor at [facility] on December 7th, 2023, with the intent to treat this month. The aneurysm is described as a 5.5x4.6x3.6mm wide necked acomm aneurysm that projects medio-superiorly. Reports occasional headaches, described as numbness, 5/10 in severity, worse with laying down, relieved with sitting up or standing. Denies any weakness, tingling, h/a, dizziness, nausea/vomiting, CP/ SOB.     Allergies: No Known Allergies    PMH/PSH:  PAST MEDICAL & SURGICAL HISTORY:  HLD (hyperlipidemia)  Cerebral aneurysm  Osteoporosis  History of cerebral angiography    FAMILY HISTORY:  Family history of CVA    Current Medications:   sodium chloride 0.9% lock flush 3 milliLiter(s) IV Push Once    Physical Exam:  Constitutional: NAD, lying in bed  Neuro  * Mental Status:  GCS 15: Awake, alert, oriented to conversation. No aphasia or difficulty speaking. No dysarthria. Able to name objects and their function.  * Cranial Nerves: Cnii-Cnxii grossly intact. PERRL, EOMI, tongue midline, no gaze deviation  * Motor: RUE 5/5, LUE 5/5, RLE 5/5, LLE 5/5, no drift or dysmetria  * Sensory: Sensation intact to light touch  * Reflexes: not assessed   Cardiovascular: Regular rate and rhythm.  Eyes: See neurologic examination with detailed examination of eyes.  ENT: No JVD, Trachea Midline  Respiratory: non labored breathing   Gastrointestinal: Soft, nontender, nondistended.  Genitourinary: [ ] Dangelo, [ x ] No Dangelo.   Musculoskeletal: No muscle wasting noted, (See neurologic assessment for full muscle strength assessment)   Skin:  no wounds, no redness, no abrasions noted  Hematologic / Lymph / Immunologic: No bleeding from IV sites or wounds    NIH SS:  DATE: 2/14/24  TIME:  1A: Level of consciousness (0-3): 0  1B: Questions (0-2): 0    1C: Commands (0-2): 0  2: Gaze (0-2): 0  3: Visual fields (0-3): 0  4: Facial palsy (0-3): 0  MOTOR:  5A: Left arm motor drift (0-4): 0  5B: Right arm motor drift (0-4): 0  6A: Left leg motor drift (0-4): 0  6B: Right leg motor drift (0-4): 0  7: Limb ataxia (0-2): 0  SENSORY:  8: Sensation (0-2): 0  SPEECH:  9: Language (0-3): 0  10: Dysarthria (0-2): 0  EXTINCTION:  11: Extinction/inattention (0-2): 0    TOTAL SCORE: 0    Labs:   2/9/24: WBC 6.07/ Hg 13.6/ Hct 42.0/ Plt 256  2/9/24: Ptt 33/ Pt 11/ INR 1.00  2/9/24: Na 141/ K 4.1/ Cl 105/ CO2 28/ BUN 12.6/ Cr 0.71/ Gl 83/     Assessment/Plan:   This is a 73y  year old Female presents with findings of Acomm aneurysm. Patient presents to neuro-IR for selective cerebral angiography.     Procedure, goals, risks, benefits and alternatives  were discussed with patient and (patient's family).  All questions were answered.  Risks include but are not limited to stroke, vessel injury, hemorrhage, and or groin hematoma.  Patient demonstrates understanding  of all risks involved with this procedure and wishes to continue.   Appropriate  consent was obtained from patient and consent is in the patient's chart. Neurointerventional Surgery  Pre-Procedure Note     HPI:  72 y/o mandarin speaking F w/  PMH of HLD and osteoporosis presents today 2/14/24 to Neuro IR for cerebral angiogram with Dr. Dowd. In 10/2023 she started to have B/L hand numbness and at that time she had MRI brain, MRA head and neck which showed, ACOMM aneurysm. She underwent a cerebral angiogram by José Manuel Villaseñor at [facility] on December 7th, 2023, with the intent to treat this month. The aneurysm is described as a 5.5x4.6x3.6mm wide necked acomm aneurysm that projects medio-superiorly. Reports occasional headaches, described as numbness, 5/10 in severity, worse with laying down, relieved with sitting up or standing. Denies any weakness, tingling, h/a, dizziness, nausea/vomiting, CP/ SOB.     Allergies: No Known Allergies    PMH/PSH:  PAST MEDICAL & SURGICAL HISTORY:  HLD (hyperlipidemia)  Cerebral aneurysm  Osteoporosis  History of cerebral angiography    FAMILY HISTORY:  Family history of CVA    Current Medications:   sodium chloride 0.9% lock flush 3 milliLiter(s) IV Push Once    Physical Exam:  Constitutional: NAD, lying in bed  Neuro  * Mental Status:  GCS 15: Awake, alert, oriented to conversation. No aphasia or difficulty speaking. No dysarthria. Able to name objects and their function.  * Cranial Nerves: Cnii-Cnxii grossly intact. PERRL, EOMI, tongue midline, no gaze deviation  * Motor: RUE 5/5, LUE 5/5, RLE 5/5, LLE 5/5, no drift or dysmetria  * Sensory: Sensation intact to light touch  * Reflexes: not assessed   Cardiovascular: Regular rate and rhythm.  Eyes: See neurologic examination with detailed examination of eyes.  ENT: No JVD, Trachea Midline  Respiratory: non labored breathing   Gastrointestinal: Soft, nontender, nondistended.  Genitourinary: [ ] Dangelo, [ x ] No Dangelo.   Musculoskeletal: No muscle wasting noted, (See neurologic assessment for full muscle strength assessment)   Skin:  no wounds, no redness, no abrasions noted  Hematologic / Lymph / Immunologic: No bleeding from IV sites or wounds    NIH SS:  DATE: 2/14/24  TIME: 07:54  1A: Level of consciousness (0-3): 0  1B: Questions (0-2): 0    1C: Commands (0-2): 0  2: Gaze (0-2): 0  3: Visual fields (0-3): 0  4: Facial palsy (0-3): 0  MOTOR:  5A: Left arm motor drift (0-4): 0  5B: Right arm motor drift (0-4): 0  6A: Left leg motor drift (0-4): 0  6B: Right leg motor drift (0-4): 0  7: Limb ataxia (0-2): 0  SENSORY:  8: Sensation (0-2): 0  SPEECH:  9: Language (0-3): 0  10: Dysarthria (0-2): 0  EXTINCTION:  11: Extinction/inattention (0-2): 0    TOTAL SCORE: 0    Labs:   2/9/24: WBC 6.07/ Hg 13.6/ Hct 42.0/ Plt 256  2/9/24: Ptt 33/ Pt 11/ INR 1.00  2/9/24: Na 141/ K 4.1/ Cl 105/ CO2 28/ BUN 12.6/ Cr 0.71/ Gl 83/     Assessment/Plan:   This is a 73y  year old Female presents with findings of Acomm aneurysm. Patient presents to neuro-IR for selective cerebral angiography.     Procedure, goals, risks, benefits and alternatives  were discussed with patient.  All questions were answered.  Risks include but are not limited to stroke, vessel injury, hemorrhage, and or groin hematoma.  Patient demonstrates understanding  of all risks involved with this procedure and wishes to continue.   Appropriate  consent was obtained from patient and consent is in the patient's chart.

## 2024-02-14 NOTE — DISCHARGE NOTE NURSING/CASE MANAGEMENT/SOCIAL WORK - PATIENT PORTAL LINK FT
You can access the FollowMyHealth Patient Portal offered by [facility] by registering at the following website: [email or web address]. By joining [facility]’s FollowMyHealth portal, you will also be able to view your health information using other applications (apps) compatible with our system.

## 2024-02-14 NOTE — CHART NOTE - NSCHARTNOTEFT_GEN_A_CORE
Neurointerventional Surgery Post Procedure Note    Procedure: Selective Cerebral Angiography     Pre- Procedure Diagnosis: Acomm Aneurysm   Post- Procedure Diagnosis: 6.9 x 3.9 x 4.2 acomm multilobular aneurysm with neck measuring 2.4     : Dr. Noemí MD  Nurse Practitioner: Nadja Hodge NP  Nurse: DANYEL Shirley   Anesthesiologist: Bess HANEY                                       Radiology Tech: Tobin HARTMANN   Fellow: Mark Loja MD     Sheath:  4 Puerto Rican Sheath    I/Os: estimated blood loss less than 10cc,  IV fluids 150 cc, Urine output 0 cc, Contrast: Omnipaque 240 107  cc,    Vitals:  /64  HR 71   Spo2 100 %    Preliminary Report:  Under a 4 [place]ican short sheath via the right groin under MAC sedation via left vertebral artery, left internal carotid artery, left external carotid artery, right internal carotid artery, a selective cerebral angiography  was performed and reveals 6.9 x 3.9 x 4.2 A comm multilobular aneurysm with neck measuring 2.4 . ( Official note to follow).    Patient tolerated procedure well.  Patient remains hemodynamically stable, no change in neurological status compared to baseline.  Results were discussed with patient, patient's family and Neurosurgery.  Right groin sheath  was discontinued at 0859. Hemostasis was obtained with approximately 11 minutes of manual compression.     No active bleeding, no hematoma, no ecchymosis.   Quick clot and safeguard balloon dressing applied at 0910  Patient transferred to recovery room in stable condition.

## 2024-02-14 NOTE — ASU DISCHARGE PLAN (ADULT/PEDIATRIC) - CARE PROVIDER_API CALL
Tommy Dowd.  Vascular Neurology  [address] [place] [id number]  Phone: [phone]  Fax: [phone]  Follow Up Time: 2 weeks

## 2024-02-17 ENCOUNTER — APPOINTMENT (OUTPATIENT)
Dept: CT IMAGING | Facility: IMAGING CENTER | Age: 74
End: 2024-02-17

## 2024-02-27 ENCOUNTER — APPOINTMENT (OUTPATIENT)
Dept: NEUROLOGY | Facility: CLINIC | Age: 74
End: 2024-02-27
Payer: MEDICARE

## 2024-02-27 VITALS
HEIGHT: 60 IN | DIASTOLIC BLOOD PRESSURE: 71 MMHG | HEART RATE: 67 BPM | WEIGHT: 96 LBS | BODY MASS INDEX: 18.85 KG/M2 | SYSTOLIC BLOOD PRESSURE: 113 MMHG

## 2024-02-27 PROBLEM — M81.0 AGE-RELATED OSTEOPOROSIS WITHOUT CURRENT PATHOLOGICAL FRACTURE: Chronic | Status: ACTIVE | Noted: 2024-02-09

## 2024-02-27 PROBLEM — I67.1 CEREBRAL ANEURYSM, NONRUPTURED: Chronic | Status: ACTIVE | Noted: 2024-02-09

## 2024-02-27 PROBLEM — E78.5 HYPERLIPIDEMIA, UNSPECIFIED: Chronic | Status: ACTIVE | Noted: 2024-02-09

## 2024-02-27 PROCEDURE — 99215 OFFICE O/P EST HI 40 MIN: CPT

## 2024-02-27 NOTE — DISCUSSION/SUMMARY
[FreeTextEntry1] : Damian Gonzalez is a 73 year old woman with a PMHX of HLD, osteoporosis referred by Dr. Deleon for an incidental aneurysm found while being worked up for left arm pain. Initially, she underwent a cerebral angiogram at [facility] which showed a 5 mm ACOMM aneurysm but did not want to be treated at [facility]. She is 2 weeks s/p repeat cerebral angiogram which showed a multilobular 6.9 mm x 3.9 mm x 4.2 mm A-comm aneurysm with a 2.4 mm neck. The angiogram results discussed in detail with patient and her family. Plan for aneurysm treatment with balloon assisted coil embolization, scheduled for Wednesday 03/06/24. Goals, benefits, alternatives, including clipping, and risks, including, but not limited to SAH, stroke, death, dissection, hematoma, were discussed with the patient and her family using Mandarin . Plan to start her on ASA 81 mg today. All of their questions and concerns were addressed.

## 2024-02-27 NOTE — HISTORY OF PRESENT ILLNESS
[FreeTextEntry1] : Damian Gonzalez is a 73 year old woman with a PMHX of HLD, osteoporosis referred by Dr. Deleon for an incidental aneurysm. She had imaging done for numbness in the L arm including MRI brain, MRA head and neck. MRA showed an ACOMM aneurysm. She underwent a cerebral angiogram by José Manuel Villaseñor at [facility] on December 7th, 2023, with the intent to treat last month. The aneurysm is described as a 5.5x4.6x3.6mm wide necked acomm aneurysm that projects medio-superiorly. She did not want to be treated at [facility]. Patient underwent a cerebral angiogram 2/14/24 which showed Incidental multilobular 6.9 mm x 3.9 mm x 4.2 mm A-comm aneurysm with a 2.4 mm neck that arises from a proximal median artery of the corpus callosum which fills predominantly from the left FABIENNE. The aneurysm projects laterally from this bifurcation and only fills from the left internal carotid artery injection. She comes in today for a follow up visit. She is doing well, denies any stroke/TIA symptoms.

## 2024-02-27 NOTE — CONSULT LETTER
[Dear  ___] : Dear  [unfilled], [Consult Closing:] : Thank you very much for allowing me to participate in the care of this patient.  If you have any questions, please do not hesitate to contact me. [Consult Letter:] : I had the pleasure of evaluating your patient, [unfilled]. [Please see my note below.] : Please see my note below. [Sincerely,] : Sincerely, [FreeTextEntry3] : Tommy Dowd MD Chief, Vascular Neurology and Neurology Service , NeuroEndovascular Surgery Professor of Neurology and Radiology [facility] [employer] at [facility] Director, NeuroEndovascular Surgery [facility]

## 2024-02-27 NOTE — REASON FOR VISIT
[Follow-Up: _____] : a [unfilled] follow-up visit [Family Member] : family member [[employer] ] : provided by [employer]   [Interpreters_IDNumber] : [id number] [FreeTextEntry3] : mandarin [TWNoteComboBox1] : Other

## 2024-02-27 NOTE — PHYSICAL EXAM
[General Appearance - Alert] : alert [General Appearance - Well Nourished] : well nourished [General Appearance - Well Developed] : well developed [Oriented To Time, Place, And Person] : oriented to person, place, and time [Affect] : the affect was normal [Mood] : the mood was normal [Person] : oriented to person [Time] : oriented to time [Place] : oriented to place [Concentration Intact] : normal concentrating ability [Visual Intact] : visual attention was ~T not ~L decreased [Naming Objects] : no difficulty naming common objects [Repeating Phrases] : no difficulty repeating a phrase [Writing A Sentence] : no difficulty writing a sentence [Fluency] : fluency intact [Comprehension] : comprehension intact [Reading] : reading intact [Past History] : adequate knowledge of personal past history [Cranial Nerves Optic (II)] : visual acuity intact bilaterally,  visual fields full to confrontation, pupils equal round and reactive to light [Cranial Nerves Oculomotor (III)] : extraocular motion intact [Cranial Nerves Trigeminal (V)] : facial sensation intact symmetrically [Cranial Nerves Facial (VII)] : face symmetrical [Cranial Nerves Vestibulocochlear (VIII)] : hearing was intact bilaterally [Cranial Nerves Glossopharyngeal (IX)] : tongue and palate midline [Cranial Nerves Accessory (XI - Cranial And Spinal)] : head turning and shoulder shrug symmetric [Motor Tone] : muscle tone was normal in all four extremities [Cranial Nerves Hypoglossal (XII)] : there was no tongue deviation with protrusion [Motor Strength] : muscle strength was normal in all four extremities [No Muscle Atrophy] : normal bulk in all four extremities [Motor Handedness Right-Handed] : the patient is right hand dominant [Sensation Tactile Decrease] : light touch was intact [Balance] : balance was intact [Full Visual Field] : full visual field [Hearing Threshold Finger Rub Not Larue] : hearing was normal [] : no respiratory distress [Neck Appearance] : the appearance of the neck was normal [Heart Rate And Rhythm] : heart rate was normal and rhythm regular [Abnormal Walk] : normal gait [Edema] : there was no peripheral edema

## 2024-02-27 NOTE — REVIEW OF SYSTEMS
[Feeling Poorly] : not feeling poorly [Feeling Tired] : not feeling tired [Confused or Disoriented] : no confusion [Memory Lapses or Loss] : no memory loss [Decr. Concentrating Ability] : no decrease in concentrating ability [Difficulty with Language] : no ~M difficulty with language [Changed Thought Patterns] : no change in thought patterns [Repeating Questions] : no repeated questioning about recent events [Facial Weakness] : no facial weakness [Arm Weakness] : no arm weakness [Hand Weakness] : no hand weakness [Leg Weakness] : no leg weakness [Difficulty Writing] : no difficulty writing [Poor Coordination] : good coordination [Difficulties in Speech] : no speech difficulties [Numbness] : no numbness [Tingling] : no tingling [Seizures] : no convulsions [Dizziness] : no dizziness [Lightheadedness] : no lightheadedness [Fainting] : no fainting [Vertigo] : no vertigo [Tension Headache] : no tension-type headache [Anxiety] : no anxiety [Eyesight Problems] : no eyesight problems [Loss Of Hearing] : no hearing loss [Chest Pain] : no chest pain [Wheezing] : no wheezing [Vomiting] : no vomiting [Easy Bleeding] : no tendency for easy bleeding [Easy Bruising] : no tendency for easy bruising

## 2024-03-06 ENCOUNTER — INPATIENT (INPATIENT)
Facility: HOSPITAL | Age: 74
LOS: 20 days | Discharge: HOME | DRG: 25 | End: 2024-03-27
Attending: PSYCHIATRY & NEUROLOGY | Admitting: PSYCHIATRY & NEUROLOGY
Payer: COMMERCIAL

## 2024-03-06 ENCOUNTER — APPOINTMENT (OUTPATIENT)
Dept: NEUROLOGY | Facility: HOSPITAL | Age: 74
End: 2024-03-06

## 2024-03-06 VITALS
RESPIRATION RATE: 16 BRPM | DIASTOLIC BLOOD PRESSURE: 57 MMHG | TEMPERATURE: 98 F | OXYGEN SATURATION: 98 % | SYSTOLIC BLOOD PRESSURE: 123 MMHG | HEART RATE: 74 BPM

## 2024-03-06 DIAGNOSIS — Z98.890 OTHER SPECIFIED POSTPROCEDURAL STATES: Chronic | ICD-10-CM

## 2024-03-06 DIAGNOSIS — I72.9 ANEURYSM OF UNSPECIFIED SITE: ICD-10-CM

## 2024-03-06 LAB
ANION GAP SERPL CALC-SCNC: 13 MMOL/L — SIGNIFICANT CHANGE UP (ref 5–17)
APTT BLD: 34.6 SEC — SIGNIFICANT CHANGE UP (ref 24.5–35.6)
BLD GP AB SCN SERPL QL: SIGNIFICANT CHANGE UP
BUN SERPL-MCNC: 14.4 MG/DL — SIGNIFICANT CHANGE UP (ref 8–20)
CALCIUM SERPL-MCNC: 9.4 MG/DL — SIGNIFICANT CHANGE UP (ref 8.4–10.5)
CHLORIDE SERPL-SCNC: 100 MMOL/L — SIGNIFICANT CHANGE UP (ref 96–108)
CO2 SERPL-SCNC: 28 MMOL/L — SIGNIFICANT CHANGE UP (ref 22–29)
CREAT SERPL-MCNC: 0.69 MG/DL — SIGNIFICANT CHANGE UP (ref 0.5–1.3)
EGFR: 93 ML/MIN/1.73M2 — SIGNIFICANT CHANGE UP
GAS PNL BLDA: SIGNIFICANT CHANGE UP
GLUCOSE SERPL-MCNC: 86 MG/DL — SIGNIFICANT CHANGE UP (ref 70–99)
HCT VFR BLD CALC: 41.7 % — SIGNIFICANT CHANGE UP (ref 34.5–45)
HGB BLD-MCNC: 13.8 G/DL — SIGNIFICANT CHANGE UP (ref 11.5–15.5)
INR BLD: 1.03 RATIO — SIGNIFICANT CHANGE UP (ref 0.85–1.18)
MCHC RBC-ENTMCNC: 30.5 PG — SIGNIFICANT CHANGE UP (ref 27–34)
MCHC RBC-ENTMCNC: 33.1 GM/DL — SIGNIFICANT CHANGE UP (ref 32–36)
MCV RBC AUTO: 92.3 FL — SIGNIFICANT CHANGE UP (ref 80–100)
PLATELET # BLD AUTO: 297 K/UL — SIGNIFICANT CHANGE UP (ref 150–400)
POTASSIUM SERPL-MCNC: 3.9 MMOL/L — SIGNIFICANT CHANGE UP (ref 3.5–5.3)
POTASSIUM SERPL-SCNC: 3.9 MMOL/L — SIGNIFICANT CHANGE UP (ref 3.5–5.3)
PROTHROM AB SERPL-ACNC: 11.4 SEC — SIGNIFICANT CHANGE UP (ref 9.5–13)
RBC # BLD: 4.52 M/UL — SIGNIFICANT CHANGE UP (ref 3.8–5.2)
RBC # FLD: 12.2 % — SIGNIFICANT CHANGE UP (ref 10.3–14.5)
SODIUM SERPL-SCNC: 141 MMOL/L — SIGNIFICANT CHANGE UP (ref 135–145)
WBC # BLD: 6.73 K/UL — SIGNIFICANT CHANGE UP (ref 3.8–10.5)
WBC # FLD AUTO: 6.73 K/UL — SIGNIFICANT CHANGE UP (ref 3.8–10.5)

## 2024-03-06 PROCEDURE — 76377 3D RENDER W/INTRP POSTPROCES: CPT | Mod: 26

## 2024-03-06 PROCEDURE — 99222 1ST HOSP IP/OBS MODERATE 55: CPT | Mod: 25

## 2024-03-06 PROCEDURE — 36556 INSERT NON-TUNNEL CV CATH: CPT

## 2024-03-06 PROCEDURE — 99291 CRITICAL CARE FIRST HOUR: CPT | Mod: 25

## 2024-03-06 PROCEDURE — 36224 PLACE CATH CAROTD ART: CPT | Mod: LT

## 2024-03-06 PROCEDURE — 70460 CT HEAD/BRAIN W/DYE: CPT | Mod: 26

## 2024-03-06 PROCEDURE — 61624 TCAT PERM OCCLS/EMBOLJ CNS: CPT

## 2024-03-06 PROCEDURE — 75898 FOLLOW-UP ANGIOGRAPHY: CPT | Mod: 26

## 2024-03-06 PROCEDURE — 75894 X-RAYS TRANSCATH THERAPY: CPT | Mod: 26

## 2024-03-06 PROCEDURE — 61107 TDH PNXR IMPLT VENTR CATH: CPT

## 2024-03-06 PROCEDURE — 36620 INSERTION CATHETER ARTERY: CPT

## 2024-03-06 PROCEDURE — 71045 X-RAY EXAM CHEST 1 VIEW: CPT | Mod: 26

## 2024-03-06 PROCEDURE — 99223 1ST HOSP IP/OBS HIGH 75: CPT | Mod: 25

## 2024-03-06 PROCEDURE — 70450 CT HEAD/BRAIN W/O DYE: CPT | Mod: 26

## 2024-03-06 RX ORDER — POLYETHYLENE GLYCOL 3350 17 G/17G
17 POWDER, FOR SOLUTION ORAL DAILY
Refills: 0 | Status: DISCONTINUED | OUTPATIENT
Start: 2024-03-06 | End: 2024-03-06

## 2024-03-06 RX ORDER — ONDANSETRON 8 MG/1
4 TABLET, FILM COATED ORAL EVERY 6 HOURS
Refills: 0 | Status: DISCONTINUED | OUTPATIENT
Start: 2024-03-06 | End: 2024-03-27

## 2024-03-06 RX ORDER — SIMVASTATIN 20 MG/1
10 TABLET, FILM COATED ORAL AT BEDTIME
Refills: 0 | Status: DISCONTINUED | OUTPATIENT
Start: 2024-03-06 | End: 2024-03-06

## 2024-03-06 RX ORDER — FENTANYL CITRATE 50 UG/ML
25 INJECTION INTRAVENOUS
Refills: 0 | Status: DISCONTINUED | OUTPATIENT
Start: 2024-03-06 | End: 2024-03-07

## 2024-03-06 RX ORDER — HYDRALAZINE HCL 50 MG
10 TABLET ORAL
Refills: 0 | Status: DISCONTINUED | OUTPATIENT
Start: 2024-03-06 | End: 2024-03-08

## 2024-03-06 RX ORDER — NICARDIPINE HYDROCHLORIDE 30 MG/1
5 CAPSULE, EXTENDED RELEASE ORAL
Qty: 40 | Refills: 0 | Status: DISCONTINUED | OUTPATIENT
Start: 2024-03-06 | End: 2024-03-07

## 2024-03-06 RX ORDER — NIMODIPINE 60 MG/10ML
60 SOLUTION ORAL EVERY 4 HOURS
Refills: 0 | Status: DISCONTINUED | OUTPATIENT
Start: 2024-03-06 | End: 2024-03-07

## 2024-03-06 RX ORDER — LABETALOL HCL 100 MG
10 TABLET ORAL
Refills: 0 | Status: DISCONTINUED | OUTPATIENT
Start: 2024-03-06 | End: 2024-03-06

## 2024-03-06 RX ORDER — NOREPINEPHRINE BITARTRATE/D5W 8 MG/250ML
0.05 PLASTIC BAG, INJECTION (ML) INTRAVENOUS
Qty: 8 | Refills: 0 | Status: DISCONTINUED | OUTPATIENT
Start: 2024-03-06 | End: 2024-03-06

## 2024-03-06 RX ORDER — NIMODIPINE 60 MG/10ML
60 SOLUTION ORAL EVERY 4 HOURS
Refills: 0 | Status: DISCONTINUED | OUTPATIENT
Start: 2024-03-06 | End: 2024-03-06

## 2024-03-06 RX ORDER — SODIUM CHLORIDE 9 MG/ML
1000 INJECTION INTRAMUSCULAR; INTRAVENOUS; SUBCUTANEOUS
Refills: 0 | Status: DISCONTINUED | OUTPATIENT
Start: 2024-03-06 | End: 2024-03-08

## 2024-03-06 RX ORDER — SIMVASTATIN 20 MG/1
10 TABLET, FILM COATED ORAL AT BEDTIME
Refills: 0 | Status: DISCONTINUED | OUTPATIENT
Start: 2024-03-06 | End: 2024-03-07

## 2024-03-06 RX ORDER — POLYETHYLENE GLYCOL 3350 17 G/17G
17 POWDER, FOR SOLUTION ORAL DAILY
Refills: 0 | Status: DISCONTINUED | OUTPATIENT
Start: 2024-03-06 | End: 2024-03-07

## 2024-03-06 RX ORDER — HYDRALAZINE HCL 50 MG
10 TABLET ORAL
Refills: 0 | Status: DISCONTINUED | OUTPATIENT
Start: 2024-03-06 | End: 2024-03-06

## 2024-03-06 RX ORDER — ACETAMINOPHEN 500 MG
650 TABLET ORAL ONCE
Refills: 0 | Status: COMPLETED | OUTPATIENT
Start: 2024-03-06 | End: 2024-03-07

## 2024-03-06 RX ORDER — SENNA PLUS 8.6 MG/1
2 TABLET ORAL AT BEDTIME
Refills: 0 | Status: DISCONTINUED | OUTPATIENT
Start: 2024-03-06 | End: 2024-03-06

## 2024-03-06 RX ORDER — PHENYLEPHRINE HYDROCHLORIDE 10 MG/ML
0.1 INJECTION INTRAVENOUS
Qty: 40 | Refills: 0 | Status: DISCONTINUED | OUTPATIENT
Start: 2024-03-06 | End: 2024-03-06

## 2024-03-06 RX ORDER — CHLORHEXIDINE GLUCONATE 213 G/1000ML
15 SOLUTION TOPICAL EVERY 12 HOURS
Refills: 0 | Status: DISCONTINUED | OUTPATIENT
Start: 2024-03-06 | End: 2024-03-08

## 2024-03-06 RX ORDER — CEFAZOLIN SODIUM 1 G
2000 VIAL (EA) INJECTION ONCE
Refills: 0 | Status: COMPLETED | OUTPATIENT
Start: 2024-03-06 | End: 2024-03-06

## 2024-03-06 RX ORDER — PROPOFOL 10 MG/ML
25 INJECTION, EMULSION INTRAVENOUS
Qty: 500 | Refills: 0 | Status: DISCONTINUED | OUTPATIENT
Start: 2024-03-06 | End: 2024-03-07

## 2024-03-06 RX ORDER — CHLORHEXIDINE GLUCONATE 213 G/1000ML
1 SOLUTION TOPICAL DAILY
Refills: 0 | Status: DISCONTINUED | OUTPATIENT
Start: 2024-03-06 | End: 2024-03-22

## 2024-03-06 RX ORDER — FENTANYL CITRATE 50 UG/ML
1 INJECTION INTRAVENOUS
Qty: 2500 | Refills: 0 | Status: DISCONTINUED | OUTPATIENT
Start: 2024-03-06 | End: 2024-03-07

## 2024-03-06 RX ORDER — PANTOPRAZOLE SODIUM 20 MG/1
40 TABLET, DELAYED RELEASE ORAL DAILY
Refills: 0 | Status: DISCONTINUED | OUTPATIENT
Start: 2024-03-06 | End: 2024-03-07

## 2024-03-06 RX ORDER — FENTANYL CITRATE 50 UG/ML
50 INJECTION INTRAVENOUS ONCE
Refills: 0 | Status: DISCONTINUED | OUTPATIENT
Start: 2024-03-06 | End: 2024-03-06

## 2024-03-06 RX ORDER — SODIUM CHLORIDE 9 MG/ML
1000 INJECTION INTRAMUSCULAR; INTRAVENOUS; SUBCUTANEOUS ONCE
Refills: 0 | Status: COMPLETED | OUTPATIENT
Start: 2024-03-06 | End: 2024-03-06

## 2024-03-06 RX ORDER — FENTANYL CITRATE 50 UG/ML
0.5 INJECTION INTRAVENOUS
Qty: 5000 | Refills: 0 | Status: DISCONTINUED | OUTPATIENT
Start: 2024-03-06 | End: 2024-03-06

## 2024-03-06 RX ORDER — ASPIRIN/CALCIUM CARB/MAGNESIUM 324 MG
81 TABLET ORAL DAILY
Refills: 0 | Status: DISCONTINUED | OUTPATIENT
Start: 2024-03-06 | End: 2024-03-06

## 2024-03-06 RX ORDER — NICARDIPINE HYDROCHLORIDE 30 MG/1
5 CAPSULE, EXTENDED RELEASE ORAL
Qty: 40 | Refills: 0 | Status: DISCONTINUED | OUTPATIENT
Start: 2024-03-06 | End: 2024-03-06

## 2024-03-06 RX ORDER — FENTANYL CITRATE 50 UG/ML
100 INJECTION INTRAVENOUS ONCE
Refills: 0 | Status: DISCONTINUED | OUTPATIENT
Start: 2024-03-06 | End: 2024-03-06

## 2024-03-06 RX ORDER — LABETALOL HCL 100 MG
10 TABLET ORAL
Refills: 0 | Status: DISCONTINUED | OUTPATIENT
Start: 2024-03-06 | End: 2024-03-08

## 2024-03-06 RX ORDER — FENTANYL CITRATE 50 UG/ML
0.5 INJECTION INTRAVENOUS
Qty: 2500 | Refills: 0 | Status: DISCONTINUED | OUTPATIENT
Start: 2024-03-06 | End: 2024-03-06

## 2024-03-06 RX ORDER — LEVETIRACETAM 250 MG/1
500 TABLET, FILM COATED ORAL EVERY 12 HOURS
Refills: 0 | Status: DISCONTINUED | OUTPATIENT
Start: 2024-03-06 | End: 2024-03-08

## 2024-03-06 RX ORDER — DESMOPRESSIN ACETATE 0.1 MG/1
13.5 TABLET ORAL ONCE
Refills: 0 | Status: COMPLETED | OUTPATIENT
Start: 2024-03-06 | End: 2024-03-06

## 2024-03-06 RX ORDER — ACETAMINOPHEN 500 MG
650 TABLET ORAL ONCE
Refills: 0 | Status: DISCONTINUED | OUTPATIENT
Start: 2024-03-06 | End: 2024-03-06

## 2024-03-06 RX ORDER — SENNA PLUS 8.6 MG/1
2 TABLET ORAL AT BEDTIME
Refills: 0 | Status: DISCONTINUED | OUTPATIENT
Start: 2024-03-06 | End: 2024-03-07

## 2024-03-06 RX ADMIN — SENNA PLUS 2 TABLET(S): 8.6 TABLET ORAL at 22:58

## 2024-03-06 RX ADMIN — FENTANYL CITRATE 4.49 MICROGRAM(S)/KG/HR: 50 INJECTION INTRAVENOUS at 17:34

## 2024-03-06 RX ADMIN — SODIUM CHLORIDE 60 MILLILITER(S): 9 INJECTION INTRAMUSCULAR; INTRAVENOUS; SUBCUTANEOUS at 14:44

## 2024-03-06 RX ADMIN — Medication 2000 MILLIGRAM(S): at 17:41

## 2024-03-06 RX ADMIN — SODIUM CHLORIDE 1000 MILLILITER(S): 9 INJECTION INTRAMUSCULAR; INTRAVENOUS; SUBCUTANEOUS at 17:35

## 2024-03-06 RX ADMIN — Medication 10 MILLIGRAM(S): at 18:47

## 2024-03-06 RX ADMIN — PROPOFOL 6.74 MICROGRAM(S)/KG/MIN: 10 INJECTION, EMULSION INTRAVENOUS at 22:18

## 2024-03-06 RX ADMIN — NICARDIPINE HYDROCHLORIDE 25 MG/HR: 30 CAPSULE, EXTENDED RELEASE ORAL at 14:44

## 2024-03-06 RX ADMIN — FENTANYL CITRATE 100 MICROGRAM(S): 50 INJECTION INTRAVENOUS at 18:00

## 2024-03-06 RX ADMIN — SIMVASTATIN 10 MILLIGRAM(S): 20 TABLET, FILM COATED ORAL at 23:03

## 2024-03-06 RX ADMIN — FENTANYL CITRATE 100 MICROGRAM(S): 50 INJECTION INTRAVENOUS at 16:59

## 2024-03-06 RX ADMIN — DESMOPRESSIN ACETATE 213.52 MICROGRAM(S): 0.1 TABLET ORAL at 17:35

## 2024-03-06 RX ADMIN — NIMODIPINE 60 MILLIGRAM(S): 60 SOLUTION ORAL at 22:18

## 2024-03-06 RX ADMIN — FENTANYL CITRATE 50 MICROGRAM(S): 50 INJECTION INTRAVENOUS at 17:56

## 2024-03-06 RX ADMIN — LEVETIRACETAM 500 MILLIGRAM(S): 250 TABLET, FILM COATED ORAL at 17:40

## 2024-03-06 RX ADMIN — FENTANYL CITRATE 50 MICROGRAM(S): 50 INJECTION INTRAVENOUS at 18:00

## 2024-03-06 RX ADMIN — FENTANYL CITRATE 50 MICROGRAM(S): 50 INJECTION INTRAVENOUS at 17:34

## 2024-03-06 RX ADMIN — CHLORHEXIDINE GLUCONATE 1 APPLICATION(S): 213 SOLUTION TOPICAL at 17:36

## 2024-03-06 RX ADMIN — PROPOFOL 6.74 MICROGRAM(S)/KG/MIN: 10 INJECTION, EMULSION INTRAVENOUS at 16:31

## 2024-03-06 NOTE — CONSULT NOTE ADULT - ASSESSMENT
Assessment:  73F with PMH HLD who presented for elective acomm aneurysm embolization with balloon-assisted coiling, complicated by aneurysm rupture controlled with coils and balloon tamponade. Neurosurgery consulted for SAH.      Plan:  - Discussed and examined with Dr. Campuzano  - Q1 hour neuro checks  - Strict SBP goal   - Repeat CTH 4 hours (1600)  - AED: Keppra 500 BID  - Hold AC/AP   - Maintain normothermia  - Maintain euvolemia, strict I&Os, supplement for net negative PRN  - Nimodipine 60q4 as tolerated by HR and BP   - TCDs daily as able  - EVD watch  - Stat CTH if change in mental status / neuro exam  - Further care per NSICU team

## 2024-03-06 NOTE — CONSULT NOTE ADULT - ASSESSMENT
Assessment:  73F with PMH HLD who presented for elective acomm aneurysm embolization with balloon-assisted coiling, complicated by aneurysm rupture controlled with coils and balloon tamponade. Admitted to NSICU for post op cares.       Plan:  Neuro:  - Q1 hour Neuro checks, Q1 hour Vitals  - HOB 30 degrees, Neck midline position  - Maintain normothermia, PO acetaminophen for temp>38 C or pain  - Nimodipine for prevention of vasospasm  - CTH in 4 hours   - AED: keppra 500 BID  - Monitor groin   - Pain management & Sedation: tylenol PRN   - Turn and Position Q2  /  Activity ad yahir, with assistance  - EVD watch   	  CV:  - SBP Goal   - BP regimen: PRN hydralazine / labetalol   - Simvastatin 10     Pulm:  - Supplemental O2 PRN to maintain Spo2>92%  - Chest PT, OOB, Pulmonary Toilet    GI:  - Nutrition: NPO   - Bowel regimen: senna / miralax when taking PO   	  Gu:  - Dangelo   - Fluids: NS @ 60   - I&O Q1 hour  - Monitor Electrolytes & Renal Function    Heme:  - Monitor H&H  - Hold AC/AP 2/2 SAH   - Chemical DVT prophylaxis: * Chemical DVT prophylaxis is contraindicated due to risk of bleeding  - Mechanical DVT Prophylaxis: Maintain B/L LE sequential compression devices  	  ID:  - Monitor WBC and Temperature  		  Endo  - Monitor BGL, maintain <180

## 2024-03-06 NOTE — PROCEDURE NOTE - NSEVD_ADDPROCDETAILS_GEN_ALL_CORE
The patient's hair was shaved and the site prepped and draped in sterile fashion .5 cc of 2% lidocaine was used for local anesthesia. An incision was made 11 cm posterior to the nasion and 3 cm lateral to each side of the midline. A hand twist drill was used for the creation of a latsaha hole. The dura was pierced with an 11 blade. The ventriculostomy catheter with the stylet was passed towards the ipsilateral medial canthus and tragus until it reached 6.5 cm at the scalp. The catheter was lowered and flow of blood-tinged CSF was observed. The catheter was tunneled to emerge from the scalp posteriomedially to the respective incision. The catheter was then connected to external drainage systems and placed 20 cm above the tragus. The incision was closed with sutures and the catheter was secured with sutures to the scalp. Patient tolerated procedure well without complication.

## 2024-03-06 NOTE — CHART NOTE - NSCHARTNOTEFT_GEN_A_CORE
Neurointerventional Surgery Post Procedure Note    Procedure: Selective Cerebral Angiography     Pre- Procedure Diagnosis: acomm aneurysm   Post- Procedure Diagnosis: acomm aneurysm s/p embolization with 9 coils complicated by aneurysm rupture tamponaded with coils and ballooning, NISHANT CT with no evidence of hydrocephalus      : Dr. Noemí DIAS  Fellow: Mark Loja  Physician Assistant: Vinita Wu   Nurse: Maury Jordan  Anesthesiologist: Dr. Lee            Radiology Tech: Mark Solares Michael Berger     Sheath:  5 Haitian Sheath    I/Os: estimated blood loss less than 50cc,  IV fluids 600cc, Urine output 1300cc, Contrast: Omnipaque 240  105 cc, ABX 2g ancef, decadron 10mg, milrinone 2mg LICA, heparin 3000 units, protamine 30    Vitals:  /68  HR 61  Spo2 100 %    Preliminary Report:  Under a 5 Haitian [place] sheath via the right groin under general anesthesia via left vertebral artery, left internal carotid artery, a selective cerebral angiography  was performed and reveals acomm aneurysm s/p embolization with 9 coils complicated by aneurysm rupture tamponaded with coils and ballooning. ( Official note to follow).    Patient tolerated procedure well.  Patient remains hemodynamically stable.  Results were discussed with patient, patient's family and Neurosurgery.  Right groin sheath  was discontinued using Star Close device at 1200. Hemostasis was obtained with approximately 10 minutes of manual compression.     No active bleeding, no hematoma, no ecchymosis.   Quick clot and safeguard balloon dressing applied at 1210  Patient transferred to neuro ICU in stable condition. Neurointerventional Surgery Post Procedure Note    Procedure: Selective Cerebral Angiography     Pre- Procedure Diagnosis: acomm aneurysm   Post- Procedure Diagnosis: acomm aneurysm s/p embolization with 9 coils complicated by aneurysm rupture tamponaded with coils and ballooning, NISHANT CT with no evidence of hydrocephalus      : Dr. Noemí DIAS  Fellow: Mark Loja  Physician Assistant: Vinita Wu   Nurse: Maury Jordan  Anesthesiologist: Dr. Lee            Radiology Tech: Mark Solares Michael Berger     Sheath:  5 Eritrean Sheath    I/Os: estimated blood loss less than 50cc,  IV fluids 600cc, Urine output 1300cc, Contrast: Omnipaque 240  105 cc, ABX 2g ancef, decadron 10mg, milrinone 2mg LICA, heparin 3000 units, protamine 30    Vitals:  /68  HR 61  Spo2 100 %    Preliminary Report:  Under a 5 [place]an [place] sheath via the right groin under general anesthesia via left vertebral artery, left internal carotid artery, a selective cerebral angiography  was performed and reveals acomm aneurysm s/p embolization with 9 coils complicated by aneurysm rupture tamponaded with coils and ballooning. ( Official note to follow).    Patient tolerated procedure well.  Patient remains hemodynamically stable.  Results were discussed with patient, patient's family and Neurosurgery.  Right groin sheath  was discontinued using Star Close device at 1200. Hemostasis was obtained with approximately 10 minutes of manual compression.     No active bleeding, no hematoma, no ecchymosis.   Quick clot and safeguard balloon dressing applied at 1210  Patient transferred to neuro ICU in stable condition.    I spoke to the patient's  and daughter in detail regarding the complication and our management and her current condition. We will repeat the head CT in 4 hours and follow her closely clinically to see if an EVD is needed.

## 2024-03-06 NOTE — PROGRESS NOTE ADULT - SUBJECTIVE AND OBJECTIVE BOX
NIGHTTIME ROUNDS    Recent events:  Available labs, vitals, imaging reviewed.    Exam:  AO x3, FC, EO spont, face symmetric, tongue midline, EOMI, PERRLA, motor - VALERO  Sensation intact to LT.  CTAB  S1S2 present  Abd soft, NT, ND  No peripheral edema    Assessment:    Plan:    No changes made to the current management.      NIGHTTIME ROUNDS    Recent events: acomm aneurysm s/p embolization c/b rupture - tamponaded with coils and ballooning.  IVH with obstructive hydro - EVD placed.    Available labs, vitals, imaging reviewed.    Exam: recovering from sedation received for procedures.  no EO, gaze midline, not FC, PERRL, strong cough, motor - URE brisk loc, LUE wdrl, BL LE wdrl.  CTAB  S1S2 present  Abd soft, NT, ND  No peripheral edema, R groin with no signs of apparent bleeding.  Pedal pulses present.

## 2024-03-06 NOTE — PROGRESS NOTE ADULT - ASSESSMENT
Assessment:  74 yo F with acomm aneurysm s/p embolization c/b rupture, tamponaded with coils.  ICH, IVH, SAH, obstructive hydrocephalus, EVD in place.  Acute resp failure due to neurologic condition.    Plan:  neurochecks  sedation with Propofol ggt - plan to switch to Precedex ggt, Fentanyl ggt - switch to PRN boluses; aim for RASS 0 to -2  EVD @20, monitor output and ICP  plan to repeat CTh in am  TCDs, Nimodipine, euvolemia  sz ppx with KEppra  maintain -140  keep NPO for now, IV hydration, PPI for ulcer ppx  monitor UOP and e-lytes  SCDs, avoid antithrombotics as fresh IVH/ICH     No changes made to the current management.

## 2024-03-06 NOTE — PROGRESS NOTE ADULT - ASSESSMENT
Assessment:  73F with PMH HLD who presented for elective acomm aneurysm embolization with balloon-assisted coiling, complicated by aneurysm rupture controlled with coils and balloon tamponade.   - Repeat CTH showed worsening SAH, IVH, and hydrocephalus now s/p intubation and EVD placement by neurosurgery      Plan:   - Discussed with Dr. Dowd  - Q1 hour neuro checks  - SBP   - AED: Keppra 500 BID  - EVD management per neurosurgery team  - Maintain normothermia  - Maintain euvolemia, strict I&Os, supplement for net negative PRN  - Nimodipine 60q4 as tolerated by HR and BP   - TCDs daily as able  - CTH in am   - Further care per NSICU team

## 2024-03-06 NOTE — CONSULT NOTE ADULT - SUBJECTIVE AND OBJECTIVE BOX
HPI:  73F with PMH HLD, osteoporosis who presents for cerebral angiogram with aneurysm embolization. Patient had MRI / MRA brain 2/2 hand numbness which had incidental finding of acomm aneurysm. Patient underwent diagnostic angiogram 2/14/24 which confirmed acomm aneurysm. Patient presents today for cerebral angiogram with aneurysm embolization via balloon assisted coiling. Patient started on ASA 81 in preparation for the procedure. Patient underwent aneurysm embolization with coiling complicated by aneurysm rupture, controlled with coils and balloon tamponade. NISHANT CT showed SAH b/l frontal lobes and R sylvian fissure along with contrast staining. Patient extubated and admitted to NSICU for post op cares. Neurosurgery consulted.       PAST MEDICAL & SURGICAL HISTORY:  HLD (hyperlipidemia)  Cerebral aneurysm  Osteoporosis  History of cerebral angiography      FAMILY HISTORY:  Family history of CVA      Allergies  No Known Allergies      REVIEW OF SYSTEMS  Negative except as noted in HPI  CONSTITUTIONAL: No fever, weight loss, or fatigue  EYES: No eye pain, visual disturbances, or discharge  ENMT:  No difficulty hearing, tinnitus, vertigo; No sinus or throat pain  RESPIRATORY: No cough; No shortness of breath  CARDIOVASCULAR: No chest pain  GASTROINTESTINAL: No abdominal pain, no N/V  NEUROLOGICAL: No headaches, memory loss, loss of strength, numbness, or tremors  MUSCULOSKELETAL: No joint pain or swelling; No muscle, back, or extremity pain      HOME MEDICATIONS:  Home Medications:  simvastatin 10 mg oral tablet: 1 tab(s) orally once a day (at bedtime) (06 Mar 2024 08:30)  Vascepa 1 g oral capsule: 1 cap(s) orally once a day (06 Mar 2024 08:30)  Vitamin B, C, D, calcium: once a day (06 Mar 2024 08:30)      MEDICATIONS:  MEDICATIONS  (STANDING):  chlorhexidine 2% Cloths 1 Application(s) Topical daily  levETIRAcetam   Injectable 500 milliGRAM(s) IV Push every 12 hours  niCARdipine Infusion 5 mG/Hr (25 mL/Hr) IV Continuous <Continuous>  niMODipine Oral Solution 60 milliGRAM(s) Oral every 4 hours  polyethylene glycol 3350 17 Gram(s) Oral daily  senna 2 Tablet(s) Oral at bedtime  simvastatin 10 milliGRAM(s) Oral at bedtime  sodium chloride 0.9%. 1000 milliLiter(s) (60 mL/Hr) IV Continuous <Continuous>    MEDICATIONS  (PRN):  acetaminophen     Tablet .. 650 milliGRAM(s) Oral once PRN Mild Pain (1 - 3)  hydrALAZINE Injectable 10 milliGRAM(s) IV Push every 2 hours PRN SBP >140  labetalol Injectable 10 milliGRAM(s) IV Push every 2 hours PRN SBP >140  ondansetron Injectable 4 milliGRAM(s) IV Push every 6 hours PRN Nausea and/or Vomiting      Vital Signs Last 24 Hrs  T(C): 36.7 (06 Mar 2024 08:32), Max: 36.7 (06 Mar 2024 08:32)  T(F): 98 (06 Mar 2024 08:32), Max: 98 (06 Mar 2024 08:32)  HR: 74 (06 Mar 2024 08:32) (74 - 74)  BP: 123/57 (06 Mar 2024 08:32) (123/57 - 123/57)  RR: 16 (06 Mar 2024 08:32) (16 - 16)  SpO2: 98% (06 Mar 2024 08:32) (98% - 98%)    Parameters below as of 06 Mar 2024 08:32  Patient On (Oxygen Delivery Method): room air      Physical Exam:   Constitutional: NAD, lying in bed  Neuro  * Mental Status:  GCS 15: Awake, alert, oriented to conversation. No aphasia or difficulty speaking. No dysarthria.  * Cranial Nerves: Cnii-Cnxii grossly intact. PERRL, EOMI, tongue midline, no gaze deviation  * Motor: RUE 5/5, LUE 5/5, RLE 5/5, LLE 5/5, no drift or dysmetria  * Sensory: Sensation intact to light touch  * Reflexes: not assessed       LABS:                        13.8   6.73  )-----------( 297      ( 06 Mar 2024 08:10 )             41.7     03-06    141  |  100  |  14.4  ----------------------------<  86  3.9   |  28.0  |  0.69    Ca    9.4      06 Mar 2024 08:10      PT/INR - ( 06 Mar 2024 08:10 )   PT: 11.4 sec;   INR: 1.03 ratio    PTT - ( 06 Mar 2024 08:10 )  PTT:34.6 sec      RADIOLOGY & ADDITIONAL STUDIES:  No new imaging to review

## 2024-03-06 NOTE — PROGRESS NOTE ADULT - SUBJECTIVE AND OBJECTIVE BOX
HPI:  73F with PMH HLD, osteoporosis who presents for cerebral angiogram with aneurysm embolization. Patient had MRI / MRA brain 2/2 hand numbness which had incidental finding of acomm aneurysm. Patient underwent diagnostic angiogram 2/14/24 which confirmed acomm aneurysm. Patient presents today for cerebral angiogram with aneurysm embolization via balloon assisted coiling. Patient started on ASA 81 in preparation for the procedure. Patient underwent aneurysm embolization with coiling complicated by aneurysm rupture, controlled with coils and balloon tamponade. NISHANT CT showed SAH b/l frontal lobes and R sylvian fissure along with contrast staining. Patient extubated and admitted to NSICU for post op cares.       Interval history:  Patient seen and examined by neuro IR team. Patient underwent repeat CTH which showed worsening SAH, IVH, and hydrocephalus along with increased lethargy on exam. Received 2U platelets and DDAVP 2/2 aspirin use. Patient underwent intubation and EVD placement by neurosurgery.       Vital Signs Last 24 Hrs  T(C): 36.6 (06 Mar 2024 18:30), Max: 36.8 (06 Mar 2024 17:15)  T(F): 97.9 (06 Mar 2024 18:30), Max: 98.2 (06 Mar 2024 17:15)  HR: 76 (06 Mar 2024 18:30) (65 - 87)  BP: 106/55 (06 Mar 2024 18:30) (85/65 - 146/68)  BP(mean): 71 (06 Mar 2024 18:30) (69 - 93)  RR: 14 (06 Mar 2024 18:30) (13 - 20)  SpO2: 100% (06 Mar 2024 18:30) (97% - 100%)    Parameters below as of 06 Mar 2024 17:15  Patient On (Oxygen Delivery Method): ventilator    O2 Concentration (%): 100      Physical Exam: prior to intubation/EVD   Constitutional: lying in bed  Neuro  * Mental Status: EO to voice vs noxious, intermittently following commands, moving UE spontaneously   * Cranial Nerves: PERRL, no gaze deviation, no facial droop  * Motor: b/l UE without drift, b/l LE weakly AG but cannot sustain   * Sensory: Sensation grossly intact to light noxious   * Reflexes: not assessed   Groin: soft, nontender, no hematoma, no ecchymoses      LABS:                        13.8   6.73  )-----------( 297      ( 06 Mar 2024 08:10 )             41.7     03-06    141  |  100  |  14.4  ----------------------------<  86  3.9   |  28.0  |  0.69    Ca    9.4      06 Mar 2024 08:10    PT/INR - ( 06 Mar 2024 08:10 )   PT: 11.4 sec;   INR: 1.03 ratio    PTT - ( 06 Mar 2024 08:10 )  PTT:34.6 sec      Medications:  MEDICATIONS  (STANDING):  chlorhexidine 0.12% Liquid 15 milliLiter(s) Oral Mucosa every 12 hours  chlorhexidine 2% Cloths 1 Application(s) Topical daily  fentaNYL   Infusion. 1 MICROgram(s)/kG/Hr (4.49 mL/Hr) IV Continuous <Continuous>  levETIRAcetam   Injectable 500 milliGRAM(s) IV Push every 12 hours  niCARdipine Infusion 5 mG/Hr (25 mL/Hr) IV Continuous <Continuous>  niMODipine Oral Solution 60 milliGRAM(s) Oral every 4 hours  norepinephrine Infusion 0.05 MICROgram(s)/kG/Min (4.21 mL/Hr) IV Continuous <Continuous>  pantoprazole   Suspension 40 milliGRAM(s) Oral daily  polyethylene glycol 3350 17 Gram(s) Oral daily  propofol Infusion 25 MICROgram(s)/kG/Min (6.74 mL/Hr) IV Continuous <Continuous>  senna 2 Tablet(s) Oral at bedtime  simvastatin 10 milliGRAM(s) Oral at bedtime  sodium chloride 0.9%. 1000 milliLiter(s) (60 mL/Hr) IV Continuous <Continuous>    MEDICATIONS  (PRN):  acetaminophen     Tablet .. 650 milliGRAM(s) Oral once PRN Mild Pain (1 - 3)  hydrALAZINE Injectable 10 milliGRAM(s) IV Push every 2 hours PRN SBP >140  labetalol Injectable 10 milliGRAM(s) IV Push every 2 hours PRN SBP >140  ondansetron Injectable 4 milliGRAM(s) IV Push every 6 hours PRN Nausea and/or Vomiting      RADIOLOGY & ADDITIONAL STUDIES:  Post angio and post EVD CTHs reviewed

## 2024-03-06 NOTE — PATIENT PROFILE ADULT - FALL HARM RISK - HARM RISK INTERVENTIONS

## 2024-03-06 NOTE — CHART NOTE - NSCHARTNOTEFT_GEN_A_CORE
Neurointerventional Surgery  Pre-Procedure Note       HPI:  73F with PMH HLD, osteoporosis who presents for cerebral angiogram with aneurysm embolization. Patient had MRI / MRA brain 2/2 hand numbness which had incidental finding of acomm aneurysm. Patient underwent diagnostic angiogram 2/14/24 which confirmed acomm aneurysm. Patient presents today for cerebral angiogram with aneurysm embolization via balloon assisted coiling. Patient started on ASA 81 in preparation for the procedure. Patient reports she feels well, denies HA, weakness, numbness, tingling, CP, SOB, N/V.       Allergies: No Known Allergies      PAST MEDICAL & SURGICAL HISTORY:  HLD (hyperlipidemia)  Cerebral aneurysm  Osteoporosis  History of cerebral angiography        FAMILY HISTORY:  Family history of CVA      Physical Exam:  Constitutional: NAD, lying in bed  Neuro  * Mental Status:  GCS 15: Awake, alert, oriented to conversation. No aphasia or difficulty speaking. No dysarthria. Able to name objects and their function.  * Cranial Nerves: Cnii-Cnxii grossly intact. PERRL, EOMI, tongue midline, no gaze deviation  * Motor: RUE 5/5, LUE 5/5, RLE 5/5, LLE 5/5, no drift or dysmetria  * Sensory: Sensation intact to light touch  * Reflexes: not assessed   Cardiovascular: Regular rate and rhythm.  Eyes: See neurologic examination with detailed examination of eyes.  ENT: No JVD, Trachea Midline  Respiratory: non labored breathing   Gastrointestinal: Soft, nontender, nondistended.  Genitourinary: [ ] Dnagelo, [ x ] No Dangelo.   Musculoskeletal: No muscle wasting noted, (See neurologic assessment for full muscle strength assessment)   Skin:  no wounds, no redness, no abrasions noted  Hematologic / Lymph / Immunologic: No bleeding from IV sites or wounds      NIH SS:  DATE: 3/6/24   TIME:  1A: Level of consciousness (0-3): 0  1B: Questions (0-2): 0    1C: Commands (0-2): 0  2: Gaze (0-2): 0  3: Visual fields (0-3): 0  4: Facial palsy (0-3): 0  MOTOR:  5A: Left arm motor drift (0-4): 0  5B: Right arm motor drift (0-4): 0  6A: Left leg motor drift (0-4): 0  6B: Right leg motor drift (0-4): 0  7: Limb ataxia (0-2): 0  SENSORY:  8: Sensation (0-2): 0  SPEECH:  9: Language (0-3): 0  10: Dysarthria (0-2): 0  EXTINCTION:  11: Extinction/inattention (0-2): 0    TOTAL SCORE:       Labs:                         Assessment/Plan:   This is a 73y  year old Female  presents with acomm aneurysm. Patient presents to neuro-IR for selective cerebral angiography with aneurysm embolization.    Procedure, goals, risks, benefits and alternatives  were discussed with patient using ipad Mandarin .  All questions were answered.  Risks include but are not limited to stroke, vessel injury, hemorrhage, and or groin hematoma.  Patient demonstrates understanding  of all risks involved with this procedure and wishes to continue.   Appropriate  consent was obtained from patient and consent is in the patient's chart. Neurointerventional Surgery  Pre-Procedure Note       HPI:  73F with PMH HLD, osteoporosis who presents for cerebral angiogram with aneurysm embolization. Patient had MRI / MRA brain 2/2 hand numbness which had incidental finding of acomm aneurysm. Patient underwent diagnostic angiogram 2/14/24 which confirmed acomm aneurysm. Patient presents today for cerebral angiogram with aneurysm embolization via balloon assisted coiling. Patient started on ASA 81 in preparation for the procedure. Patient reports she feels well, denies HA, weakness, numbness, tingling, CP, SOB, N/V.       Allergies: No Known Allergies      PAST MEDICAL & SURGICAL HISTORY:  HLD (hyperlipidemia)  Cerebral aneurysm  Osteoporosis  History of cerebral angiography        FAMILY HISTORY:  Family history of CVA      Physical Exam:  Constitutional: NAD, lying in bed  Neuro  * Mental Status:  GCS 15: Awake, alert, oriented to conversation. No aphasia or difficulty speaking. No dysarthria. Able to name objects and their function.  * Cranial Nerves: Cnii-Cnxii grossly intact. PERRL, EOMI, tongue midline, no gaze deviation  * Motor: RUE 5/5, LUE 5/5, RLE 5/5, LLE 5/5, no drift or dysmetria  * Sensory: Sensation intact to light touch  * Reflexes: not assessed   Cardiovascular: Regular rate and rhythm.  Eyes: See neurologic examination with detailed examination of eyes.  ENT: No JVD, Trachea Midline  Respiratory: non labored breathing   Gastrointestinal: Soft, nontender, nondistended.  Genitourinary: [ ] Dangelo, [ x ] No Dangelo.   Musculoskeletal: No muscle wasting noted, (See neurologic assessment for full muscle strength assessment)   Skin:  no wounds, no redness, no abrasions noted  Hematologic / Lymph / Immunologic: No bleeding from IV sites or wounds      [facility] SS:  DATE: 3/6/24   TIME: 0900  1A: Level of consciousness (0-3): 0  1B: Questions (0-2): 0    1C: Commands (0-2): 0  2: Gaze (0-2): 0  3: Visual fields (0-3): 0  4: Facial palsy (0-3): 0  MOTOR:  5A: Left arm motor drift (0-4): 0  5B: Right arm motor drift (0-4): 0  6A: Left leg motor drift (0-4): 0  6B: Right leg motor drift (0-4): 0  7: Limb ataxia (0-2): 0  SENSORY:  8: Sensation (0-2): 0  SPEECH:  9: Language (0-3): 0  10: Dysarthria (0-2): 0  EXTINCTION:  11: Extinction/inattention (0-2): 0    TOTAL SCORE: 0      Labs:                         13.8   6.73  )-----------( 297      ( 06 Mar 2024 08:10 )             41.7     03-06    141  |  100  |  14.4  ----------------------------<  86  3.9   |  28.0  |  0.69    Ca    9.4      06 Mar 2024 08:10    PT/INR - ( 06 Mar 2024 08:10 )   PT: 11.4 sec;   INR: 1.03 ratio    PTT - ( 06 Mar 2024 08:10 )  PTT:34.6 sec      Assessment/Plan:   This is a 73y  year old Female  presents with acomm aneurysm. Patient presents to neuro-IR for selective cerebral angiography with aneurysm embolization.    Procedure, goals, risks, benefits and alternatives  were discussed with patient using ipad Mandarin .  All questions were answered.  Risks include but are not limited to stroke, vessel injury, hemorrhage, and or groin hematoma.  Patient demonstrates understanding  of all risks involved with this procedure and wishes to continue.   Appropriate  consent was obtained from patient and consent is in the patient's chart.

## 2024-03-06 NOTE — CHART NOTE - NSCHARTNOTEFT_GEN_A_CORE
Repeat head CT performed and reviewed by me. It shows a recurrent bleed into the ventricular system since the intra-op [place] head CT with associated hydrocephalus. Her exam had been improving, though she did become more lethargic when she came back from CT scan likely due to hydrocephalus. Discussed with neuro ICU and neurosurgery. She will need intubation then emergent EVD after platelet transfusion (was taking ASA). I spoke to the patient's daughter about the CT results and plan. She will be monitored closely in the NSCU. I spoke to Dr. Campuzano who is consulting from neurosurgery. We agree that the aneurysm looks secured on the angiogram and that there is no role at this point to do anything more for the aneurysm, like attempt additional coils into neck remnant or microsurgical clipping of the aneurysm which would be very difficult given the coil mass and the fact that it essentially comes up to the parent artery. She will have another CT head performed post EVD and we will see if any further bleeding has occured.

## 2024-03-06 NOTE — CONSULT NOTE ADULT - SUBJECTIVE AND OBJECTIVE BOX
HPI:  73F with PMH HLD, osteoporosis who presents for cerebral angiogram with aneurysm embolization. Patient had MRI / MRA brain 2/2 hand numbness which had incidental finding of acomm aneurysm. Patient underwent diagnostic angiogram 2/14/24 which confirmed acomm aneurysm. Patient presents today for cerebral angiogram with aneurysm embolization via balloon assisted coiling. Patient started on ASA 81 in preparation for the procedure. Patient underwent aneurysm embolization with coiling. Patient admitted to NSICU for post op cares.       PAST MEDICAL & SURGICAL HISTORY:  HLD (hyperlipidemia)  Cerebral aneurysm  Osteoporosis  History of cerebral angiography      FAMILY HISTORY:  Family history of CVA      Allergies  No Known Allergies      REVIEW OF SYSTEMS  Negative except as noted in HPI  CONSTITUTIONAL: No fever, weight loss, or fatigue  EYES: No eye pain, visual disturbances, or discharge  ENMT:  No difficulty hearing, tinnitus, vertigo; No sinus or throat pain  RESPIRATORY: No cough; No shortness of breath  CARDIOVASCULAR: No chest pain  GASTROINTESTINAL: No abdominal pain, no N/V  NEUROLOGICAL: No headaches, memory loss, loss of strength, numbness, or tremors  MUSCULOSKELETAL: No joint pain or swelling; No muscle, back, or extremity pain      HOME MEDICATIONS:  Home Medications:  simvastatin 10 mg oral tablet: 1 tab(s) orally once a day (at bedtime) (06 Mar 2024 08:30)  Vascepa 1 g oral capsule: 1 cap(s) orally once a day (06 Mar 2024 08:30)  Vitamin B, C, D, calcium: once a day (06 Mar 2024 08:30)      MEDICATIONS:  Antibiotics:    Neuro:  acetaminophen     Tablet .. 650 milliGRAM(s) Oral once PRN  ondansetron Injectable 4 milliGRAM(s) IV Push every 6 hours PRN    Anticoagulation:  aspirin  chewable 81 milliGRAM(s) Oral daily    OTHER:  hydrALAZINE Injectable 10 milliGRAM(s) IV Push every 2 hours PRN  labetalol Injectable 10 milliGRAM(s) IV Push every 2 hours PRN  polyethylene glycol 3350 17 Gram(s) Oral daily  senna 2 Tablet(s) Oral at bedtime  simvastatin 10 milliGRAM(s) Oral at bedtime    IVF:  sodium chloride 0.9%. 1000 milliLiter(s) IV Continuous <Continuous>      Vital Signs Last 24 Hrs  T(C): 36.7 (06 Mar 2024 08:32), Max: 36.7 (06 Mar 2024 08:32)  T(F): 98 (06 Mar 2024 08:32), Max: 98 (06 Mar 2024 08:32)  HR: 74 (06 Mar 2024 08:32) (74 - 74)  BP: 123/57 (06 Mar 2024 08:32) (123/57 - 123/57)  RR: 16 (06 Mar 2024 08:32) (16 - 16)  SpO2: 98% (06 Mar 2024 08:32) (98% - 98%)    Parameters below as of 06 Mar 2024 08:32  Patient On (Oxygen Delivery Method): room air      Physical Exam:  Constitutional: NAD, lying in bed  Neuro  * Mental Status:  GCS 15: Awake, alert, oriented to conversation. No aphasia or difficulty speaking. No dysarthria.  * Cranial Nerves: Cnii-Cnxii grossly intact. PERRL, EOMI, tongue midline, no gaze deviation  * Motor: RUE 5/5, LUE 5/5, RLE 5/5, LLE 5/5, no drift or dysmetria  * Sensory: Sensation intact to light touch  * Reflexes: not assessed   Cardiovascular: Regular rate and rhythm.  Eyes: See neurologic examination with detailed examination of eyes.  ENT: No JVD, Trachea Midline  Respiratory: non labored breathing   Gastrointestinal: Soft, nontender, nondistended.  Genitourinary: [ ] Dangelo, [ x ] No Dangelo.   Musculoskeletal: No muscle wasting noted, (See neurologic assessment for full muscle strength assessment)   Skin:  no wounds, no redness, no abrasions noted  Hematologic / Lymph / Immunologic: No bleeding from IV sites or wounds      LABS:                        13.8   6.73  )-----------( 297      ( 06 Mar 2024 08:10 )             41.7     03-06    141  |  100  |  14.4  ----------------------------<  86  3.9   |  28.0  |  0.69    Ca    9.4      06 Mar 2024 08:10      PT/INR - ( 06 Mar 2024 08:10 )   PT: 11.4 sec;   INR: 1.03 ratio    PTT - ( 06 Mar 2024 08:10 )  PTT:34.6 sec      RADIOLOGY & ADDITIONAL STUDIES:  No new imaging to review HPI:  73F with PMH HLD, osteoporosis who presents for cerebral angiogram with aneurysm embolization. Patient had MRI / MRA brain 2/2 hand numbness which had incidental finding of acomm aneurysm. Patient underwent diagnostic angiogram 2/14/24 which confirmed acomm aneurysm. Patient presents today for cerebral angiogram with aneurysm embolization via balloon assisted coiling. Patient started on ASA 81 in preparation for the procedure. Patient underwent aneurysm embolization with coiling complicated by aneurysm rupture, controlled with coils and balloon tamponade. Patient extubated and admitted to NSICU for post op cares.       PAST MEDICAL & SURGICAL HISTORY:  HLD (hyperlipidemia)  Cerebral aneurysm  Osteoporosis  History of cerebral angiography      FAMILY HISTORY:  Family history of CVA      Allergies  No Known Allergies      REVIEW OF SYSTEMS  Negative except as noted in HPI  CONSTITUTIONAL: No fever, weight loss, or fatigue  EYES: No eye pain, visual disturbances, or discharge  ENMT:  No difficulty hearing, tinnitus, vertigo; No sinus or throat pain  RESPIRATORY: No cough; No shortness of breath  CARDIOVASCULAR: No chest pain  GASTROINTESTINAL: No abdominal pain, no N/V  NEUROLOGICAL: No headaches, memory loss, loss of strength, numbness, or tremors  MUSCULOSKELETAL: No joint pain or swelling; No muscle, back, or extremity pain      HOME MEDICATIONS:  Home Medications:  simvastatin 10 mg oral tablet: 1 tab(s) orally once a day (at bedtime) (06 Mar 2024 08:30)  Vascepa 1 g oral capsule: 1 cap(s) orally once a day (06 Mar 2024 08:30)  Vitamin B, C, D, calcium: once a day (06 Mar 2024 08:30)      MEDICATIONS:  Antibiotics:    Neuro:  acetaminophen     Tablet .. 650 milliGRAM(s) Oral once PRN  ondansetron Injectable 4 milliGRAM(s) IV Push every 6 hours PRN    OTHER:  hydrALAZINE Injectable 10 milliGRAM(s) IV Push every 2 hours PRN  labetalol Injectable 10 milliGRAM(s) IV Push every 2 hours PRN  polyethylene glycol 3350 17 Gram(s) Oral daily  senna 2 Tablet(s) Oral at bedtime  simvastatin 10 milliGRAM(s) Oral at bedtime    IVF:  sodium chloride 0.9%. 1000 milliLiter(s) IV Continuous <Continuous>      Vital Signs Last 24 Hrs  T(C): 36.7 (06 Mar 2024 08:32), Max: 36.7 (06 Mar 2024 08:32)  T(F): 98 (06 Mar 2024 08:32), Max: 98 (06 Mar 2024 08:32)  HR: 74 (06 Mar 2024 08:32) (74 - 74)  BP: 123/57 (06 Mar 2024 08:32) (123/57 - 123/57)  RR: 16 (06 Mar 2024 08:32) (16 - 16)  SpO2: 98% (06 Mar 2024 08:32) (98% - 98%)    Parameters below as of 06 Mar 2024 08:32  Patient On (Oxygen Delivery Method): room air      Physical Exam: pre procedure   Constitutional: NAD, lying in bed  Neuro  * Mental Status:  GCS 15: Awake, alert, oriented to conversation. No aphasia or difficulty speaking. No dysarthria.  * Cranial Nerves: Cnii-Cnxii grossly intact. PERRL, EOMI, tongue midline, no gaze deviation  * Motor: RUE 5/5, LUE 5/5, RLE 5/5, LLE 5/5, no drift or dysmetria  * Sensory: Sensation intact to light touch  * Reflexes: not assessed   Cardiovascular: Regular rate and rhythm.  Eyes: See neurologic examination with detailed examination of eyes.  ENT: No JVD, Trachea Midline  Respiratory: non labored breathing   Gastrointestinal: Soft, nontender, nondistended.  Genitourinary: [ ] Dangelo, [ x ] No Dangelo.   Musculoskeletal: No muscle wasting noted, (See neurologic assessment for full muscle strength assessment)   Skin:  no wounds, no redness, no abrasions noted  Hematologic / Lymph / Immunologic: No bleeding from IV sites or wounds      LABS:                        13.8   6.73  )-----------( 297      ( 06 Mar 2024 08:10 )             41.7     03-06    141  |  100  |  14.4  ----------------------------<  86  3.9   |  28.0  |  0.69    Ca    9.4      06 Mar 2024 08:10      PT/INR - ( 06 Mar 2024 08:10 )   PT: 11.4 sec;   INR: 1.03 ratio    PTT - ( 06 Mar 2024 08:10 )  PTT:34.6 sec      RADIOLOGY & ADDITIONAL STUDIES:  No new imaging to review

## 2024-03-07 LAB
ALBUMIN SERPL ELPH-MCNC: 3.2 G/DL — LOW (ref 3.3–5.2)
ALP SERPL-CCNC: 40 U/L — SIGNIFICANT CHANGE UP (ref 40–120)
ALT FLD-CCNC: 23 U/L — SIGNIFICANT CHANGE UP
ANION GAP SERPL CALC-SCNC: 12 MMOL/L — SIGNIFICANT CHANGE UP (ref 5–17)
ANION GAP SERPL CALC-SCNC: 13 MMOL/L — SIGNIFICANT CHANGE UP (ref 5–17)
AST SERPL-CCNC: 33 U/L — HIGH
BILIRUB SERPL-MCNC: 0.4 MG/DL — SIGNIFICANT CHANGE UP (ref 0.4–2)
BUN SERPL-MCNC: 11.9 MG/DL — SIGNIFICANT CHANGE UP (ref 8–20)
BUN SERPL-MCNC: 8.9 MG/DL — SIGNIFICANT CHANGE UP (ref 8–20)
CALCIUM SERPL-MCNC: 7.1 MG/DL — LOW (ref 8.4–10.5)
CALCIUM SERPL-MCNC: 7.8 MG/DL — LOW (ref 8.4–10.5)
CHLORIDE SERPL-SCNC: 108 MMOL/L — SIGNIFICANT CHANGE UP (ref 96–108)
CHLORIDE SERPL-SCNC: 108 MMOL/L — SIGNIFICANT CHANGE UP (ref 96–108)
CO2 SERPL-SCNC: 18 MMOL/L — LOW (ref 22–29)
CO2 SERPL-SCNC: 22 MMOL/L — SIGNIFICANT CHANGE UP (ref 22–29)
CREAT SERPL-MCNC: 0.45 MG/DL — LOW (ref 0.5–1.3)
CREAT SERPL-MCNC: 0.53 MG/DL — SIGNIFICANT CHANGE UP (ref 0.5–1.3)
EGFR: 103 ML/MIN/1.73M2 — SIGNIFICANT CHANGE UP
EGFR: 99 ML/MIN/1.73M2 — SIGNIFICANT CHANGE UP
GLUCOSE SERPL-MCNC: 135 MG/DL — HIGH (ref 70–99)
GLUCOSE SERPL-MCNC: 141 MG/DL — HIGH (ref 70–99)
HCT VFR BLD CALC: 25.4 % — LOW (ref 34.5–45)
HCT VFR BLD CALC: 27.4 % — LOW (ref 34.5–45)
HCT VFR BLD CALC: 28.3 % — LOW (ref 34.5–45)
HGB BLD-MCNC: 8.5 G/DL — LOW (ref 11.5–15.5)
HGB BLD-MCNC: 9.2 G/DL — LOW (ref 11.5–15.5)
HGB BLD-MCNC: 9.4 G/DL — LOW (ref 11.5–15.5)
MAGNESIUM SERPL-MCNC: 1.8 MG/DL — SIGNIFICANT CHANGE UP (ref 1.6–2.6)
MCHC RBC-ENTMCNC: 30.9 PG — SIGNIFICANT CHANGE UP (ref 27–34)
MCHC RBC-ENTMCNC: 31.2 PG — SIGNIFICANT CHANGE UP (ref 27–34)
MCHC RBC-ENTMCNC: 31.7 PG — SIGNIFICANT CHANGE UP (ref 27–34)
MCHC RBC-ENTMCNC: 33.2 GM/DL — SIGNIFICANT CHANGE UP (ref 32–36)
MCHC RBC-ENTMCNC: 33.5 GM/DL — SIGNIFICANT CHANGE UP (ref 32–36)
MCHC RBC-ENTMCNC: 33.6 GM/DL — SIGNIFICANT CHANGE UP (ref 32–36)
MCV RBC AUTO: 91.9 FL — SIGNIFICANT CHANGE UP (ref 80–100)
MCV RBC AUTO: 94 FL — SIGNIFICANT CHANGE UP (ref 80–100)
MCV RBC AUTO: 94.8 FL — SIGNIFICANT CHANGE UP (ref 80–100)
MRSA PCR RESULT.: SIGNIFICANT CHANGE UP
PHOSPHATE SERPL-MCNC: 2.4 MG/DL — SIGNIFICANT CHANGE UP (ref 2.4–4.7)
PLATELET # BLD AUTO: 300 K/UL — SIGNIFICANT CHANGE UP (ref 150–400)
PLATELET # BLD AUTO: 315 K/UL — SIGNIFICANT CHANGE UP (ref 150–400)
PLATELET # BLD AUTO: 342 K/UL — SIGNIFICANT CHANGE UP (ref 150–400)
POTASSIUM SERPL-MCNC: 3.4 MMOL/L — LOW (ref 3.5–5.3)
POTASSIUM SERPL-MCNC: 4.1 MMOL/L — SIGNIFICANT CHANGE UP (ref 3.5–5.3)
POTASSIUM SERPL-SCNC: 3.4 MMOL/L — LOW (ref 3.5–5.3)
POTASSIUM SERPL-SCNC: 4.1 MMOL/L — SIGNIFICANT CHANGE UP (ref 3.5–5.3)
PROT SERPL-MCNC: 5.8 G/DL — LOW (ref 6.6–8.7)
RBC # BLD: 2.68 M/UL — LOW (ref 3.8–5.2)
RBC # BLD: 2.98 M/UL — LOW (ref 3.8–5.2)
RBC # BLD: 3.01 M/UL — LOW (ref 3.8–5.2)
RBC # FLD: 12.2 % — SIGNIFICANT CHANGE UP (ref 10.3–14.5)
RBC # FLD: 12.5 % — SIGNIFICANT CHANGE UP (ref 10.3–14.5)
RBC # FLD: 12.7 % — SIGNIFICANT CHANGE UP (ref 10.3–14.5)
S AUREUS DNA NOSE QL NAA+PROBE: SIGNIFICANT CHANGE UP
SODIUM SERPL-SCNC: 139 MMOL/L — SIGNIFICANT CHANGE UP (ref 135–145)
SODIUM SERPL-SCNC: 142 MMOL/L — SIGNIFICANT CHANGE UP (ref 135–145)
WBC # BLD: 11.83 K/UL — HIGH (ref 3.8–10.5)
WBC # BLD: 12.12 K/UL — HIGH (ref 3.8–10.5)
WBC # BLD: 12.61 K/UL — HIGH (ref 3.8–10.5)
WBC # FLD AUTO: 11.83 K/UL — HIGH (ref 3.8–10.5)
WBC # FLD AUTO: 12.12 K/UL — HIGH (ref 3.8–10.5)
WBC # FLD AUTO: 12.61 K/UL — HIGH (ref 3.8–10.5)

## 2024-03-07 PROCEDURE — 70450 CT HEAD/BRAIN W/O DYE: CPT | Mod: 26

## 2024-03-07 PROCEDURE — 99233 SBSQ HOSP IP/OBS HIGH 50: CPT

## 2024-03-07 PROCEDURE — 99291 CRITICAL CARE FIRST HOUR: CPT

## 2024-03-07 PROCEDURE — 74178 CT ABD&PLV WO CNTR FLWD CNTR: CPT | Mod: 26

## 2024-03-07 PROCEDURE — 93886 INTRACRANIAL COMPLETE STUDY: CPT | Mod: 26

## 2024-03-07 RX ORDER — DEXMEDETOMIDINE HYDROCHLORIDE IN 0.9% SODIUM CHLORIDE 4 UG/ML
0.02 INJECTION INTRAVENOUS
Qty: 200 | Refills: 0 | Status: DISCONTINUED | OUTPATIENT
Start: 2024-03-07 | End: 2024-03-08

## 2024-03-07 RX ORDER — SENNA PLUS 8.6 MG/1
2 TABLET ORAL AT BEDTIME
Refills: 0 | Status: DISCONTINUED | OUTPATIENT
Start: 2024-03-07 | End: 2024-03-12

## 2024-03-07 RX ORDER — NIMODIPINE 60 MG/10ML
30 SOLUTION ORAL
Refills: 0 | Status: DISCONTINUED | OUTPATIENT
Start: 2024-03-07 | End: 2024-03-07

## 2024-03-07 RX ORDER — POTASSIUM CHLORIDE 20 MEQ
40 PACKET (EA) ORAL ONCE
Refills: 0 | Status: COMPLETED | OUTPATIENT
Start: 2024-03-07 | End: 2024-03-07

## 2024-03-07 RX ORDER — SODIUM CHLORIDE 9 MG/ML
500 INJECTION INTRAMUSCULAR; INTRAVENOUS; SUBCUTANEOUS ONCE
Refills: 0 | Status: COMPLETED | OUTPATIENT
Start: 2024-03-07 | End: 2024-03-07

## 2024-03-07 RX ORDER — ACETAMINOPHEN 500 MG
675 TABLET ORAL ONCE
Refills: 0 | Status: COMPLETED | OUTPATIENT
Start: 2024-03-07 | End: 2024-03-07

## 2024-03-07 RX ORDER — NIMODIPINE 60 MG/10ML
30 SOLUTION ORAL
Refills: 0 | Status: DISCONTINUED | OUTPATIENT
Start: 2024-03-07 | End: 2024-03-15

## 2024-03-07 RX ORDER — NOREPINEPHRINE BITARTRATE/D5W 8 MG/250ML
0.05 PLASTIC BAG, INJECTION (ML) INTRAVENOUS
Qty: 8 | Refills: 0 | Status: DISCONTINUED | OUTPATIENT
Start: 2024-03-07 | End: 2024-03-08

## 2024-03-07 RX ORDER — ALBUTEROL 90 UG/1
2.5 AEROSOL, METERED ORAL ONCE
Refills: 0 | Status: DISCONTINUED | OUTPATIENT
Start: 2024-03-07 | End: 2024-03-14

## 2024-03-07 RX ORDER — MAGNESIUM SULFATE 500 MG/ML
2 VIAL (ML) INJECTION ONCE
Refills: 0 | Status: COMPLETED | OUTPATIENT
Start: 2024-03-07 | End: 2024-03-07

## 2024-03-07 RX ORDER — SIMVASTATIN 20 MG/1
10 TABLET, FILM COATED ORAL AT BEDTIME
Refills: 0 | Status: DISCONTINUED | OUTPATIENT
Start: 2024-03-07 | End: 2024-03-27

## 2024-03-07 RX ORDER — PANTOPRAZOLE SODIUM 20 MG/1
40 TABLET, DELAYED RELEASE ORAL DAILY
Refills: 0 | Status: DISCONTINUED | OUTPATIENT
Start: 2024-03-07 | End: 2024-03-08

## 2024-03-07 RX ORDER — ICOSAPENT ETHYL 500 MG/1
1 CAPSULE, LIQUID FILLED ORAL
Refills: 0 | DISCHARGE

## 2024-03-07 RX ORDER — POLYETHYLENE GLYCOL 3350 17 G/17G
17 POWDER, FOR SOLUTION ORAL DAILY
Refills: 0 | Status: DISCONTINUED | OUTPATIENT
Start: 2024-03-07 | End: 2024-03-12

## 2024-03-07 RX ADMIN — NIMODIPINE 60 MILLIGRAM(S): 60 SOLUTION ORAL at 06:15

## 2024-03-07 RX ADMIN — Medication 25 GRAM(S): at 06:06

## 2024-03-07 RX ADMIN — Medication 650 MILLIGRAM(S): at 11:15

## 2024-03-07 RX ADMIN — LEVETIRACETAM 500 MILLIGRAM(S): 250 TABLET, FILM COATED ORAL at 17:48

## 2024-03-07 RX ADMIN — PANTOPRAZOLE SODIUM 40 MILLIGRAM(S): 20 TABLET, DELAYED RELEASE ORAL at 11:15

## 2024-03-07 RX ADMIN — CHLORHEXIDINE GLUCONATE 1 APPLICATION(S): 213 SOLUTION TOPICAL at 11:16

## 2024-03-07 RX ADMIN — NIMODIPINE 30 MILLIGRAM(S): 60 SOLUTION ORAL at 20:57

## 2024-03-07 RX ADMIN — SODIUM CHLORIDE 500 MILLILITER(S): 9 INJECTION INTRAMUSCULAR; INTRAVENOUS; SUBCUTANEOUS at 14:51

## 2024-03-07 RX ADMIN — ONDANSETRON 4 MILLIGRAM(S): 8 TABLET, FILM COATED ORAL at 17:15

## 2024-03-07 RX ADMIN — POLYETHYLENE GLYCOL 3350 17 GRAM(S): 17 POWDER, FOR SOLUTION ORAL at 11:15

## 2024-03-07 RX ADMIN — SIMVASTATIN 10 MILLIGRAM(S): 20 TABLET, FILM COATED ORAL at 22:55

## 2024-03-07 RX ADMIN — NIMODIPINE 30 MILLIGRAM(S): 60 SOLUTION ORAL at 22:55

## 2024-03-07 RX ADMIN — Medication 270 MILLIGRAM(S): at 20:37

## 2024-03-07 RX ADMIN — FENTANYL CITRATE 25 MICROGRAM(S): 50 INJECTION INTRAVENOUS at 03:50

## 2024-03-07 RX ADMIN — Medication 10 MILLIGRAM(S): at 17:50

## 2024-03-07 RX ADMIN — Medication 650 MILLIGRAM(S): at 12:00

## 2024-03-07 RX ADMIN — FENTANYL CITRATE 25 MICROGRAM(S): 50 INJECTION INTRAVENOUS at 04:10

## 2024-03-07 RX ADMIN — CHLORHEXIDINE GLUCONATE 15 MILLILITER(S): 213 SOLUTION TOPICAL at 17:50

## 2024-03-07 RX ADMIN — LEVETIRACETAM 500 MILLIGRAM(S): 250 TABLET, FILM COATED ORAL at 06:07

## 2024-03-07 RX ADMIN — CHLORHEXIDINE GLUCONATE 15 MILLILITER(S): 213 SOLUTION TOPICAL at 06:16

## 2024-03-07 RX ADMIN — SENNA PLUS 2 TABLET(S): 8.6 TABLET ORAL at 22:55

## 2024-03-07 RX ADMIN — NIMODIPINE 60 MILLIGRAM(S): 60 SOLUTION ORAL at 01:58

## 2024-03-07 RX ADMIN — Medication 10 MILLIGRAM(S): at 05:14

## 2024-03-07 RX ADMIN — NIMODIPINE 60 MILLIGRAM(S): 60 SOLUTION ORAL at 10:47

## 2024-03-07 RX ADMIN — Medication 40 MILLIEQUIVALENT(S): at 06:06

## 2024-03-07 RX ADMIN — Medication 675 MILLIGRAM(S): at 21:00

## 2024-03-07 RX ADMIN — SODIUM CHLORIDE 60 MILLILITER(S): 9 INJECTION INTRAMUSCULAR; INTRAVENOUS; SUBCUTANEOUS at 12:14

## 2024-03-07 RX ADMIN — SODIUM CHLORIDE 1000 MILLILITER(S): 9 INJECTION INTRAMUSCULAR; INTRAVENOUS; SUBCUTANEOUS at 12:00

## 2024-03-07 RX ADMIN — Medication 63.75 MILLIMOLE(S): at 06:06

## 2024-03-07 RX ADMIN — DEXMEDETOMIDINE HYDROCHLORIDE IN 0.9% SODIUM CHLORIDE 0.23 MICROGRAM(S)/KG/HR: 4 INJECTION INTRAVENOUS at 03:56

## 2024-03-07 NOTE — AIRWAY REMOVAL NOTE  ADULT & PEDS - ARTIFICAL AIRWAY REMOVAL COMMENTS
[FreeTextEntry1] : Patient presents for a yearly comprehensive physical exam.  [de-identified] : The patient feels relatively well at this time. He has a history of COPD and is maintained on Pulmicort 180 mcg/act taking 2 puffs BID and Incruse 62.5 once per day. He is also on albuterol tablets taking 4 mg BID. He notes that his SOB has worsened. He denies any cough, or wheeze. He has an albuterol rescue inhaler for emergency usage. He does have dyspnea on exertion. \par \par He has a history of HTN and HLD and is maintained on losartan 50 mg once per day and atorvastatin 20 once per day. He denies any chest pain or palpitations. \par \par He complains of rhinitis and nasal congestion.  He does not think that it is related to his allergies.  Previously, he was on inhaled nasal corticosteroids which caused epistaxis.  He notes that the secretions from his nose are clear.  There may be a component of postnasal drip and that the secretions sometimes get into his chest and they are usually clear when he expectorates them as well.\par \par He reports that he is struggling with appetite loss. He denies any dysphagia and says that he doesn't eat lunch. He is losing weight without intending to. Of note is that he had a hernia and it was repaired on an unspecified date. \par \par He denies any changes in bowel habits or blood in the stool. The patient denies any other constitutional symptoms at this time. He now comes in for this assessment.  77/100 with 2LPMNC

## 2024-03-07 NOTE — PHARMACOTHERAPY INTERVENTION NOTE - COMMENTS
Spoke with patient's daughter over the phone in regards to patient's home medication list. Patient's daughter reports, patient only takes cholesterol medication and vitamins at home

## 2024-03-07 NOTE — PROGRESS NOTE ADULT - SUBJECTIVE AND OBJECTIVE BOX
Patient was seen and examined by me. Report is that she was stable overnight. Sedation weaned and she was switched to Precedex. ICP normal and EVD was draining., now stopped draining at 10.    ICU Vital Signs Last 24 Hrs  T(C): 37.4 (07 Mar 2024 04:00), Max: 37.6 (06 Mar 2024 22:30)  T(F): 99.3 (07 Mar 2024 04:00), Max: 99.7 (06 Mar 2024 22:30)  HR: 63 (07 Mar 2024 04:00) (63 - 102)  BP: 117/64 (07 Mar 2024 04:00) (85/65 - 146/68)  BP(mean): 78 (07 Mar 2024 04:00) (69 - 93)  ABP: 124/53 (07 Mar 2024 04:00) (114/72 - 151/70)  ABP(mean): 81 (07 Mar 2024 04:00) (72 - 105)  RR: 14 (07 Mar 2024 04:00) (13 - 20)  SpO2: 100% (07 Mar 2024 04:00) (97% - 100%)    O2 Parameters below as of 07 Mar 2024 04:00  Patient On (Oxygen Delivery Method): ventilator, AC    O2 Concentration (%): 50    On exam: Her eyes are closed but she is following simple commands. Still lethargic,. Pupils are equal and reactive. She lifts both arms up symmetrically and keeps them up without drift. She moves both arms and legs spontaneously and purposefully. No groin hematoma.     Head CT looks stable compared to the last 2 head CTs. Continued hydrocephalus and IVH, unchanged. EVD in good position. Probable bilateral orbital frontal/collosal hematoma. diffuse SAH. but mostly in interhemispheric fissure.                          9.2    11.83 )-----------( 342      ( 07 Mar 2024 03:00 )             27.4   03-07    142  |  108  |  11.9  ----------------------------<  141<H>  3.4<L>   |  22.0  |  0.53    Ca    7.8<L>      07 Mar 2024 03:00  Phos  2.4     03-07  Mg     1.8     03-07

## 2024-03-07 NOTE — PROGRESS NOTE ADULT - SUBJECTIVE AND OBJECTIVE BOX
Chief complaint:   73F PMH HLD, osteoporosis presented for elective complex Acomm aneurysm embolization via balloon assisted coiling. Patient started on ASA 81 in preparation for the procedure. Patient underwent aneurysm embolization with coiling complicated by aneurysm rupture, controlled with coils and balloon tamponade. NISHANT CT showed SAH b/l frontal lobes and thick R SAH, subsequent stability CTH with worsening ICH and IVH. Intubated in ICU with EVD placement.    24hr EVENTS:  3/6 - Intubated. EVD placed, set to 20. Dropped to 10 overnight.  3/7 - Exam improved as below      -----------------------------------------------------------------------------------------------------------------------------------------------------------------------------------    PHYSICAL EXAM:  General: Calm, intubated  HEENT: MMM  Neuro:  -Mental status- No acute distress. Follows commands  -CN- PERRL 3mm, EOMI, tongue midline, face symmetric  -B/l UE AG, LE farrukhgishly AG     CV: RRR  Pulm: clear to auscultation  Abd: Soft, nontender, nondistended  Ext: no noted edema in lower ext  Skin: warm, dry    ROS: [ ]  Unable to assess due to mental status   All other systems negative    -----------------------------------------------------------------------------------------------------------------------------------------------------------------------------------  ICU Vital Signs Last 24 Hrs  T(C): 37 (07 Mar 2024 14:00), Max: 37.9 (07 Mar 2024 08:30)  T(F): 98.6 (07 Mar 2024 14:00), Max: 100.2 (07 Mar 2024 08:30)  HR: 71 (07 Mar 2024 14:00) (63 - 102)  BP: 113/59 (07 Mar 2024 14:00) (83/52 - 146/68)  BP(mean): 76 (07 Mar 2024 14:00) (52 - 92)  ABP: 113/47 (07 Mar 2024 14:00) (82/37 - 151/70)  ABP(mean): 74 (07 Mar 2024 14:00) (56 - 105)  RR: 20 (07 Mar 2024 14:00) (10 - 20)  SpO2: 100% (07 Mar 2024 14:00) (97% - 100%)    O2 Parameters below as of 07 Mar 2024 13:59    O2 Flow (L/min): 2          I&O's Summary    06 Mar 2024 07:01  -  07 Mar 2024 07:00  --------------------------------------------------------  IN: 3306.1 mL / OUT: 1981 mL / NET: 1325.1 mL    07 Mar 2024 07:01  -  07 Mar 2024 14:34  --------------------------------------------------------  IN: 1631.1 mL / OUT: 349 mL / NET: 1282.1 mL        MEDICATIONS  (STANDING):  chlorhexidine 0.12% Liquid 15 milliLiter(s) Oral Mucosa every 12 hours  chlorhexidine 2% Cloths 1 Application(s) Topical daily  dexMEDEtomidine Infusion 0.02 MICROgram(s)/kG/Hr (0.23 mL/Hr) IV Continuous <Continuous>  levETIRAcetam   Injectable 500 milliGRAM(s) IV Push every 12 hours  niCARdipine Infusion 5 mG/Hr (25 mL/Hr) IV Continuous <Continuous>  niMODipine Oral Solution 30 milliGRAM(s) Enteral Tube every 2 hours  norepinephrine Infusion 0.05 MICROgram(s)/kG/Min (4.21 mL/Hr) IV Continuous <Continuous>  pantoprazole   Suspension 40 milliGRAM(s) Oral daily  polyethylene glycol 3350 17 Gram(s) Oral daily  senna 2 Tablet(s) Oral at bedtime  simvastatin 10 milliGRAM(s) Oral at bedtime  sodium chloride 0.9% Bolus 500 milliLiter(s) IV Bolus once  sodium chloride 0.9%. 1000 milliLiter(s) (60 mL/Hr) IV Continuous <Continuous>      RESPIRATORY:  Mode: CPAP with PS  FiO2: 30  PEEP: 6  PS: 8  MAP: 9  PIP: 17        IMAGING:   Recent imaging studies were reviewed.    LAB RESULTS:                          8.5    12.61 )-----------( 300      ( 07 Mar 2024 12:30 )             25.4       PT/INR - ( 06 Mar 2024 08:10 )   PT: 11.4 sec;   INR: 1.03 ratio         PTT - ( 06 Mar 2024 08:10 )  PTT:34.6 sec    03-07    142  |  108  |  11.9  ----------------------------<  141<H>  3.4<L>   |  22.0  |  0.53    Ca    7.8<L>      07 Mar 2024 03:00  Phos  2.4     03-07  Mg     1.8     03-07            ABG - ( 06 Mar 2024 17:56 )  pH, Arterial: 7.370 pH, Blood: x     /  pCO2: 48    /  pO2: 399   / HCO3: 28    / Base Excess: 2.4   /  SaO2: 100.0

## 2024-03-07 NOTE — PROVIDER CONTACT NOTE (EICU) - SITUATION
eAlerted by bedside staff requesting change from current meds route via OGT to PO s/p passed dysphagia screen.

## 2024-03-07 NOTE — PROGRESS NOTE ADULT - SUBJECTIVE AND OBJECTIVE BOX
NIGHTTIME ROUNDS    Recent events: extubated this am.  Acute anemia noted, stable now. CT  with no signs active bleeding.    Available labs, vitals, imaging reviewed.    Exam:   EO spont, gaze midline, FC in Cantonese, PERRL, PERRLA, EOMI, motor - VALERO strongly, no drift noted.     CTAB  S1S2 present  Abd soft, NT, ND  No peripheral edema, R groin with no signs of apparent bleeding.  Pedal pulses present.

## 2024-03-07 NOTE — PROGRESS NOTE ADULT - SUBJECTIVE AND OBJECTIVE BOX
HPI:  HPI:      73F with PMH HLD, osteoporosis who presents for cerebral angiogram with aneurysm embolization. Patient had MRI / MRA brain 2/2 hand numbness which had incidental finding of acomm aneurysm. Patient underwent diagnostic angiogram 2/14/24 which confirmed acomm aneurysm. Patient presents today for cerebral angiogram with aneurysm embolization via balloon assisted coiling. Patient started on ASA 81 in preparation for the procedure. Patient underwent aneurysm embolization with coiling complicated by aneurysm rupture, controlled with coils and balloon tamponade. NISHANT CT showed SAH b/l frontal lobes and R sylvian fissure along with contrast staining. Patient extubated and admitted to NSICU for post op cares. Neurosurgery consulted.   INTERVAL HPI/OVERNIGHT EVENTS:  73y Female seen and examined at bedside. s/p EVD placement and patent, draining. ICP within normal limits.     Vital Signs Last 24 Hrs  T(C): 37.4 (06 Mar 2024 21:30), Max: 37.4 (06 Mar 2024 21:30)  T(F): 99.3 (06 Mar 2024 21:30), Max: 99.3 (06 Mar 2024 21:30)  HR: 84 (06 Mar 2024 21:30) (65 - 102)  BP: 110/59 (06 Mar 2024 20:00) (85/65 - 146/68)  BP(mean): 74 (06 Mar 2024 20:00) (69 - 93)  RR: 14 (06 Mar 2024 21:30) (13 - 20)  SpO2: 100% (06 Mar 2024 21:30) (97% - 100%)    Parameters below as of 06 Mar 2024 20:00  Patient On (Oxygen Delivery Method): ventilator, AC    O2 Concentration (%): 50    Physical Exam:     Constitutional: NAD, s/p EVD placement, weaning fentanyl and propofol sedation ggt.   Neuro  * Mental Status: NO EO to noxious stimuli + cough, +gag.   * Cranial Nerves: Cnii-Cnxii grossly intact. PERRL, EOMI, tongue midline, no gaze deviation  * Motor: RUE Localize, LUE WD , RLE WD,  LLE WD, no drift or dysmetria  * Sensory: Sensation intact to light touch  * Reflexes: not assessed         LABS:                        13.8   6.73  )-----------( 297      ( 06 Mar 2024 08:10 )             41.7     03-06    141  |  100  |  14.4  ----------------------------<  86  3.9   |  28.0  |  0.69    Ca    9.4      06 Mar 2024 08:10      PT/INR - ( 06 Mar 2024 08:10 )   PT: 11.4 sec;   INR: 1.03 ratio         PTT - ( 06 Mar 2024 08:10 )  PTT:34.6 sec  Urinalysis Basic - ( 06 Mar 2024 08:10 )    Color: x / Appearance: x / SG: x / pH: x  Gluc: 86 mg/dL / Ketone: x  / Bili: x / Urobili: x   Blood: x / Protein: x / Nitrite: x   Leuk Esterase: x / RBC: x / WBC x   Sq Epi: x / Non Sq Epi: x / Bacteria: x        03-06 @ 07:01  -  03-07 @ 00:21  --------------------------------------------------------  IN: 2335.4 mL / OUT: 1222.5 mL / NET: 1112.9 mL        RADIOLOGY & ADDITIONAL TESTS:  < from: Xray Chest 1 View-PORTABLE IMMEDIATE (Xray Chest 1 View-PORTABLE IMMEDIATE .) (03.06.24 @ 17:38) >  IMPRESSION:  1. Right-sided central line, endotracheal tube and nasogastric tube as   outlined above.  2. Suggestion of a right-sided PIC line terminating in the right axilla.  3. No evidence of pneumothorax on either side with clear lungs   bilaterally.    --- End of Report ---    < end of copied text >

## 2024-03-07 NOTE — PROGRESS NOTE ADULT - ASSESSMENT
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 Aneurysm appeared occluded angiographically other than a neck remnant. She likely rebled at some point given difference between intra-op CT and 4 hour follow up CT, though her exam was non focal upon awakening and did not change until after this second CT.  She was also on ASA, this was reversed with platelet transfusion and ddAVP prior to EVD. Now slowly waking up. Lethargic, but motor exam and cooperation with exam is reassuring. I spoke to her daughter this morning by telephone updating her on her mothers condition.     Plan: Continue EVD drainage and management as per neurosurgery and ICU teams. Serial neurological exams every 1 hour, CT head if exam deteriorates. SBP goal 100-140 mmHg. Replete electrolytes. Na wnl, keep 135-145. Mildly elevated WBC, but did receive decadron yesterday. DVT prophylaxis with just Venodyne No LMW heparin for now. Check TCDs.

## 2024-03-07 NOTE — PROGRESS NOTE ADULT - ASSESSMENT
73F multilobular Acomm az initially for elective coil c/b intraoperative rupture    Patient is critically ill due to the following diagnoses:  Encephalopathy  Intracranial Hemorrhage  Hydrocephalus  Shock  Respiratory Failure      Neuro:  #ICH/SAH  Partial trace residual at base of az, following discussion with NSGY will treat as unsecured for 2-3 days and liberalize hemodynamic parameters in preparation of vasospasm  - Neurochecks  - TCD, Nimodipine   - Keppra Ppx for 7 days    #Hydrocephalus/IVH  - EVD at 10, CSF diversion per NSGY  	  CV:  SBP Goal <140, liberalize to 100-200 at PBD3  MAP >65 with Levo PRN  Simvastatin 10     Pulm:  Extubate today  SpO2 > 94%    GI:  Advance diet following extubation  Bowel regimen: senna / miralax when taking PO   	  Gu:  TOV  K>4 Mg>2    Heme:  Hold AC/AP 2/2 SAH   DVT Ppx; SCD, SQL per ARMANDO/NSGY  	  ID:  Monitor WBC and Temperature  		  Endo  Monitor BGL, maintain <180

## 2024-03-07 NOTE — PROGRESS NOTE ADULT - ASSESSMENT
Assessment:  73F with PMH HLD who presented for elective acomm aneurysm embolization with balloon-assisted coiling, complicated by aneurysm rupture controlled with coils and balloon tamponade. Neurosurgery consulted for SAH.  Plan:  - Discussed and examined with Dr. Campuzano  - Q1 hour neuro checks  - Strict SBP goal   - SAH protocol   - Repeat CTH IN AM   - AED: Keppra 500 BID  - Hold AC/AP   - Maintain normothermia  - Maintain euvolemia, strict I&Os, supplement for net negative PRN  - Nimodipine 60q4 as tolerated by HR and BP   - TCDs daily as able  - EVD placed.   - Stat CTH if change in mental status / neuro exam  - Further care per NSICU team

## 2024-03-07 NOTE — PROGRESS NOTE ADULT - ASSESSMENT
Assessment:  72 yo F with acomm aneurysm s/p embolization c/b rupture, tamponaded with coils.  ICH, IVH, SAH, obstructive hydrocephalus, EVD in place.  Acute resp failure due to neurologic condition, extubated this am.  Acute normocytic anemia, expected postop; stable now, no signs of apparent bleeding.    Plan:  neurochecks  EVD @10, monitor output and ICP  TCDs, Nimodipine, euvolemia  sz ppx with KEppra  maintain -140  passed dysphagia screen, advance diet as tolerated  cont IVf for now, until full PO  monitor UOP and e-lytes  SCDs, avoid antithrombotics as fresh IVH/ICH, re-eval in am

## 2024-03-08 ENCOUNTER — TRANSCRIPTION ENCOUNTER (OUTPATIENT)
Age: 74
End: 2024-03-08

## 2024-03-08 LAB
ANION GAP SERPL CALC-SCNC: 11 MMOL/L — SIGNIFICANT CHANGE UP (ref 5–17)
APPEARANCE UR: CLEAR — SIGNIFICANT CHANGE UP
BACTERIA # UR AUTO: NEGATIVE /HPF — SIGNIFICANT CHANGE UP
BILIRUB UR-MCNC: NEGATIVE — SIGNIFICANT CHANGE UP
BUN SERPL-MCNC: 8.6 MG/DL — SIGNIFICANT CHANGE UP (ref 8–20)
CALCIUM SERPL-MCNC: 7.6 MG/DL — LOW (ref 8.4–10.5)
CAST: 0 /LPF — SIGNIFICANT CHANGE UP (ref 0–4)
CHLORIDE SERPL-SCNC: 112 MMOL/L — HIGH (ref 96–108)
CO2 SERPL-SCNC: 18 MMOL/L — LOW (ref 22–29)
COLOR SPEC: YELLOW — SIGNIFICANT CHANGE UP
CREAT SERPL-MCNC: 0.49 MG/DL — LOW (ref 0.5–1.3)
DIFF PNL FLD: ABNORMAL
EGFR: 101 ML/MIN/1.73M2 — SIGNIFICANT CHANGE UP
GLUCOSE SERPL-MCNC: 163 MG/DL — HIGH (ref 70–99)
GLUCOSE UR QL: NEGATIVE MG/DL — SIGNIFICANT CHANGE UP
HCT VFR BLD CALC: 28.9 % — LOW (ref 34.5–45)
HGB BLD-MCNC: 9.5 G/DL — LOW (ref 11.5–15.5)
KETONES UR-MCNC: 15 MG/DL
LACTATE SERPL-SCNC: 2 MMOL/L — SIGNIFICANT CHANGE UP (ref 0.5–2)
LEUKOCYTE ESTERASE UR-ACNC: NEGATIVE — SIGNIFICANT CHANGE UP
MAGNESIUM SERPL-MCNC: 2.6 MG/DL — SIGNIFICANT CHANGE UP (ref 1.6–2.6)
MCHC RBC-ENTMCNC: 30.8 PG — SIGNIFICANT CHANGE UP (ref 27–34)
MCHC RBC-ENTMCNC: 32.9 GM/DL — SIGNIFICANT CHANGE UP (ref 32–36)
MCV RBC AUTO: 93.8 FL — SIGNIFICANT CHANGE UP (ref 80–100)
NITRITE UR-MCNC: NEGATIVE — SIGNIFICANT CHANGE UP
PH UR: 6.5 — SIGNIFICANT CHANGE UP (ref 5–8)
PHOSPHATE SERPL-MCNC: 1.6 MG/DL — LOW (ref 2.4–4.7)
PLATELET # BLD AUTO: 336 K/UL — SIGNIFICANT CHANGE UP (ref 150–400)
POTASSIUM SERPL-MCNC: 3.6 MMOL/L — SIGNIFICANT CHANGE UP (ref 3.5–5.3)
POTASSIUM SERPL-SCNC: 3.6 MMOL/L — SIGNIFICANT CHANGE UP (ref 3.5–5.3)
PROCALCITONIN SERPL-MCNC: 0.28 NG/ML — HIGH (ref 0.02–0.1)
PROT UR-MCNC: SIGNIFICANT CHANGE UP MG/DL
RBC # BLD: 3.08 M/UL — LOW (ref 3.8–5.2)
RBC # FLD: 12.6 % — SIGNIFICANT CHANGE UP (ref 10.3–14.5)
RBC CASTS # UR COMP ASSIST: 43 /HPF — HIGH (ref 0–4)
SODIUM SERPL-SCNC: 141 MMOL/L — SIGNIFICANT CHANGE UP (ref 135–145)
SP GR SPEC: 1.02 — SIGNIFICANT CHANGE UP (ref 1–1.03)
SQUAMOUS # UR AUTO: 2 /HPF — SIGNIFICANT CHANGE UP (ref 0–5)
UROBILINOGEN FLD QL: 0.2 MG/DL — SIGNIFICANT CHANGE UP (ref 0.2–1)
WBC # BLD: 13.7 K/UL — HIGH (ref 3.8–10.5)
WBC # FLD AUTO: 13.7 K/UL — HIGH (ref 3.8–10.5)
WBC UR QL: 5 /HPF — SIGNIFICANT CHANGE UP (ref 0–5)

## 2024-03-08 PROCEDURE — 99233 SBSQ HOSP IP/OBS HIGH 50: CPT

## 2024-03-08 PROCEDURE — 99291 CRITICAL CARE FIRST HOUR: CPT

## 2024-03-08 PROCEDURE — 36569 INSJ PICC 5 YR+ W/O IMAGING: CPT

## 2024-03-08 PROCEDURE — 71045 X-RAY EXAM CHEST 1 VIEW: CPT | Mod: 26

## 2024-03-08 PROCEDURE — 73521 X-RAY EXAM HIPS BI 2 VIEWS: CPT | Mod: 26

## 2024-03-08 RX ORDER — POTASSIUM PHOSPHATE, MONOBASIC POTASSIUM PHOSPHATE, DIBASIC 236; 224 MG/ML; MG/ML
30 INJECTION, SOLUTION INTRAVENOUS ONCE
Refills: 0 | Status: COMPLETED | OUTPATIENT
Start: 2024-03-08 | End: 2024-03-08

## 2024-03-08 RX ORDER — HYDRALAZINE HCL 50 MG
10 TABLET ORAL
Refills: 0 | Status: DISCONTINUED | OUTPATIENT
Start: 2024-03-08 | End: 2024-03-08

## 2024-03-08 RX ORDER — DEXMEDETOMIDINE HYDROCHLORIDE IN 0.9% SODIUM CHLORIDE 4 UG/ML
0.2 INJECTION INTRAVENOUS
Qty: 200 | Refills: 0 | Status: DISCONTINUED | OUTPATIENT
Start: 2024-03-08 | End: 2024-03-08

## 2024-03-08 RX ORDER — POTASSIUM CHLORIDE 20 MEQ
40 PACKET (EA) ORAL ONCE
Refills: 0 | Status: COMPLETED | OUTPATIENT
Start: 2024-03-08 | End: 2024-03-08

## 2024-03-08 RX ORDER — ACETAMINOPHEN 500 MG
675 TABLET ORAL ONCE
Refills: 0 | Status: COMPLETED | OUTPATIENT
Start: 2024-03-08

## 2024-03-08 RX ORDER — LABETALOL HCL 100 MG
10 TABLET ORAL
Refills: 0 | Status: DISCONTINUED | OUTPATIENT
Start: 2024-03-08 | End: 2024-03-08

## 2024-03-08 RX ORDER — ACETAMINOPHEN 500 MG
675 TABLET ORAL ONCE
Refills: 0 | Status: COMPLETED | OUTPATIENT
Start: 2024-03-08 | End: 2024-03-08

## 2024-03-08 RX ORDER — SODIUM CHLORIDE 9 MG/ML
1000 INJECTION, SOLUTION INTRAVENOUS
Refills: 0 | Status: DISCONTINUED | OUTPATIENT
Start: 2024-03-08 | End: 2024-03-13

## 2024-03-08 RX ORDER — LEVETIRACETAM 250 MG/1
500 TABLET, FILM COATED ORAL
Refills: 0 | Status: DISCONTINUED | OUTPATIENT
Start: 2024-03-08 | End: 2024-03-11

## 2024-03-08 RX ORDER — BROMOCRIPTINE MESYLATE 5 MG/1
5 CAPSULE ORAL THREE TIMES A DAY
Refills: 0 | Status: DISCONTINUED | OUTPATIENT
Start: 2024-03-08 | End: 2024-03-11

## 2024-03-08 RX ORDER — LABETALOL HCL 100 MG
10 TABLET ORAL
Refills: 0 | Status: DISCONTINUED | OUTPATIENT
Start: 2024-03-08 | End: 2024-03-11

## 2024-03-08 RX ORDER — HYDRALAZINE HCL 50 MG
10 TABLET ORAL
Refills: 0 | Status: DISCONTINUED | OUTPATIENT
Start: 2024-03-08 | End: 2024-03-11

## 2024-03-08 RX ADMIN — Medication 270 MILLIGRAM(S): at 22:00

## 2024-03-08 RX ADMIN — NIMODIPINE 30 MILLIGRAM(S): 60 SOLUTION ORAL at 05:13

## 2024-03-08 RX ADMIN — Medication 675 MILLIGRAM(S): at 16:15

## 2024-03-08 RX ADMIN — SIMVASTATIN 10 MILLIGRAM(S): 20 TABLET, FILM COATED ORAL at 22:30

## 2024-03-08 RX ADMIN — BROMOCRIPTINE MESYLATE 5 MILLIGRAM(S): 5 CAPSULE ORAL at 23:06

## 2024-03-08 RX ADMIN — DEXMEDETOMIDINE HYDROCHLORIDE IN 0.9% SODIUM CHLORIDE 0.23 MICROGRAM(S)/KG/HR: 4 INJECTION INTRAVENOUS at 03:12

## 2024-03-08 RX ADMIN — POLYETHYLENE GLYCOL 3350 17 GRAM(S): 17 POWDER, FOR SOLUTION ORAL at 14:31

## 2024-03-08 RX ADMIN — NIMODIPINE 30 MILLIGRAM(S): 60 SOLUTION ORAL at 23:58

## 2024-03-08 RX ADMIN — NIMODIPINE 30 MILLIGRAM(S): 60 SOLUTION ORAL at 16:15

## 2024-03-08 RX ADMIN — Medication 270 MILLIGRAM(S): at 07:07

## 2024-03-08 RX ADMIN — NIMODIPINE 30 MILLIGRAM(S): 60 SOLUTION ORAL at 17:28

## 2024-03-08 RX ADMIN — Medication 40 MILLIEQUIVALENT(S): at 07:10

## 2024-03-08 RX ADMIN — Medication 270 MILLIGRAM(S): at 15:17

## 2024-03-08 RX ADMIN — POTASSIUM PHOSPHATE, MONOBASIC POTASSIUM PHOSPHATE, DIBASIC 83.33 MILLIMOLE(S): 236; 224 INJECTION, SOLUTION INTRAVENOUS at 07:10

## 2024-03-08 RX ADMIN — LEVETIRACETAM 400 MILLIGRAM(S): 250 TABLET, FILM COATED ORAL at 17:28

## 2024-03-08 RX ADMIN — NIMODIPINE 30 MILLIGRAM(S): 60 SOLUTION ORAL at 20:10

## 2024-03-08 RX ADMIN — NIMODIPINE 30 MILLIGRAM(S): 60 SOLUTION ORAL at 10:27

## 2024-03-08 RX ADMIN — DEXMEDETOMIDINE HYDROCHLORIDE IN 0.9% SODIUM CHLORIDE 2.25 MICROGRAM(S)/KG/HR: 4 INJECTION INTRAVENOUS at 06:24

## 2024-03-08 RX ADMIN — NIMODIPINE 30 MILLIGRAM(S): 60 SOLUTION ORAL at 02:31

## 2024-03-08 RX ADMIN — Medication 675 MILLIGRAM(S): at 22:00

## 2024-03-08 RX ADMIN — Medication 675 MILLIGRAM(S): at 07:30

## 2024-03-08 RX ADMIN — NIMODIPINE 30 MILLIGRAM(S): 60 SOLUTION ORAL at 07:11

## 2024-03-08 RX ADMIN — SODIUM CHLORIDE 50 MILLILITER(S): 9 INJECTION, SOLUTION INTRAVENOUS at 15:17

## 2024-03-08 RX ADMIN — CHLORHEXIDINE GLUCONATE 1 APPLICATION(S): 213 SOLUTION TOPICAL at 14:31

## 2024-03-08 RX ADMIN — NIMODIPINE 30 MILLIGRAM(S): 60 SOLUTION ORAL at 12:07

## 2024-03-08 RX ADMIN — NIMODIPINE 30 MILLIGRAM(S): 60 SOLUTION ORAL at 00:51

## 2024-03-08 RX ADMIN — CHLORHEXIDINE GLUCONATE 15 MILLILITER(S): 213 SOLUTION TOPICAL at 05:13

## 2024-03-08 RX ADMIN — NIMODIPINE 30 MILLIGRAM(S): 60 SOLUTION ORAL at 14:31

## 2024-03-08 RX ADMIN — LEVETIRACETAM 400 MILLIGRAM(S): 250 TABLET, FILM COATED ORAL at 05:13

## 2024-03-08 NOTE — OCCUPATIONAL THERAPY INITIAL EVALUATION ADULT - LIVES WITH, PROFILE
Pt lives with son, grandson and spouse in a 2 level home, 6 SHANTEL, full flight of stairs to bed/bath

## 2024-03-08 NOTE — SWALLOW BEDSIDE ASSESSMENT ADULT - ORAL PREPARATORY PHASE
Within functional limits Decreased mastication ability increased time required for mastication however, remained functional/Within functional limits

## 2024-03-08 NOTE — CHART NOTE - NSCHARTNOTEFT_GEN_A_CORE
Daughter Siri asking to speak with Dr. Dowd about Mothers care and post procedure weakness. Daughter spoken to at length about intraprocedural complications and post procedure expectations regarding ICU stay. Pt was seen by PT today and was able to sit in chair for 2 hours, recommendations made by PT are for acute inpatient rehab and this was relayed to the daughter. Daughter in law and  at bedside and were told that Dr. Dowd was reaching out to Siri and they expressed that they are all in communication.

## 2024-03-08 NOTE — PHYSICAL THERAPY INITIAL EVALUATION ADULT - NEUROVASCULAR ASSESSMENT RLE
Agree with triage note.  3 days of worsening cough and congestion, today L sided non-radiating CP, n/v that is mostly post-tussive. Denies fever, chills.   Pt's young child was sick with URI recently.    ERP at bedside  
Discharge instructions reviewed with patient. Patient indicates understanding of discharge instructions, follow-up instructions, prescriptions x3, and reasons to return to ER. Patient denies needs or additional questions at this time, no PIV in place at this time. Pt ambulatory without difficulty out of ER in NAD with self.    
ERP at bedside  
ERP called regarding orders for pt. Pt remains in NAD.  
Pt remains resting in NAD. Awaiting orders.  
XR at bedside  
no numbness

## 2024-03-08 NOTE — PHYSICAL THERAPY INITIAL EVALUATION ADULT - CRITERIA FOR SKILLED THERAPEUTIC INTERVENTIONS
Acute Rehab/impairments found/rehab potential/anticipated equipment needs at discharge/anticipated discharge recommendation

## 2024-03-08 NOTE — OCCUPATIONAL THERAPY INITIAL EVALUATION ADULT - PERTINENT HX OF CURRENT PROBLEM, REHAB EVAL
As per MD note: 73F POD#2 status post incidental small multilobular Acomm aneurysm coil embolization complicated by SAH from coil perforation. Unfortunately, she has no TCD windows to evaluate for vasospasm. Neuro exam significant for mild left hemiparesis, plus probable right leg weakness, all mild, but she is much improved compared to yesterday in terms of her mental status. She remains disoriented and had some nocturnal delirium overnight.

## 2024-03-08 NOTE — DIETITIAN INITIAL EVALUATION ADULT - ORAL INTAKE PTA/DIET HISTORY
Interview conducted with Pts daughter at bedside who reports Pt generally has a good appetite at home PTA. UBW 98lbs has remained consistent, height reported 62." SLP filomenaal recommended diet advancement to Soft & Bite Sized/thin. Discussed importance of adequate protein-energy intake, agreeable to trial Glucerna per Pt/family preference. RD to remain available.  Interview conducted with Pts daughter at bedside who reports Pt generally has a good appetite at home PTA. UBW 98lbs has remained consistent, height reported 62." Pt is OOB to chair unable to obtain weight at this time. SLP rod recommended diet advancement to Soft & Bite Sized/thin. Discussed importance of adequate protein-energy intake, agreeable to trial Glucerna per Pt/family preference. RD to remain available.

## 2024-03-08 NOTE — DIETITIAN INITIAL EVALUATION ADULT - PERTINENT LABORATORY DATA
03-08    141  |  112<H>  |  8.6  ----------------------------<  163<H>  3.6   |  18.0<L>  |  0.49<L>    Ca    7.6<L>      08 Mar 2024 02:00  Phos  1.6     03-08  Mg     2.6     03-08    TPro  5.8<L>  /  Alb  3.2<L>  /  TBili  0.4  /  DBili  x   /  AST  33<H>  /  ALT  23  /  AlkPhos  40  03-07  A1C with Estimated Average Glucose Result: 5.8 % (02-09-24 @ 10:57)

## 2024-03-08 NOTE — DIETITIAN INITIAL EVALUATION ADULT - PERTINENT MEDS FT
MEDICATIONS  (STANDING):  albuterol    0.083%. 2.5 milliGRAM(s) Nebulizer once  chlorhexidine 2% Cloths 1 Application(s) Topical daily  lactated ringers. 1000 milliLiter(s) (50 mL/Hr) IV Continuous <Continuous>  levETIRAcetam  IVPB 500 milliGRAM(s) IV Intermittent two times a day  niMODipine Oral Solution 30 milliGRAM(s) Oral every 2 hours  polyethylene glycol 3350 17 Gram(s) Oral daily  senna 2 Tablet(s) Oral at bedtime  simvastatin 10 milliGRAM(s) Oral at bedtime    MEDICATIONS  (PRN):  hydrALAZINE Injectable 10 milliGRAM(s) IV Push every 2 hours PRN SBP>160  labetalol Injectable 10 milliGRAM(s) IV Push every 2 hours PRN Systolic blood pressure >160  ondansetron Injectable 4 milliGRAM(s) IV Push every 6 hours PRN Nausea and/or Vomiting

## 2024-03-08 NOTE — DIETITIAN INITIAL EVALUATION ADULT - ETIOLOGY
related to inability to meet sufficient protein-energy needs in setting of s/p aneurysm embolization, advanced age

## 2024-03-08 NOTE — PROGRESS NOTE ADULT - ASSESSMENT
Assessment:  73F with PMH HLD who presented for elective acomm aneurysm embolization with balloon-assisted coiling, complicated by aneurysm rupture controlled with coils and balloon tamponade. Neurosurgery consulted for SAH.    Plan:  - Q1 hour neuro checks  - Strict SBP goal   - SAH protocol   - AED: Keppra 500 BID  - Hold AC/AP   - Maintain normothermia  - Maintain euvolemia, strict I&Os, supplement for net negative PRN  - Nimodipine 60q4 as tolerated by HR and BP   - TCDs daily as able  - EVD @ 10. Monitor drainage and ICPs  - Consider resuming SQL  - Stat CTH if change in mental status / neuro exam  - Supportive care/further medical management per NSICU  - Final plan pending AM rounds and discussion with attending

## 2024-03-08 NOTE — PROGRESS NOTE ADULT - ASSESSMENT
A/P 73F POD#2 status post incidental small multilobular Acomm aneurysm coil embolization complicated by SAH from coil perforation. Unfortunately, she has no TCD windows to evaluate for vasospasm. Neuro exam significant for mild left hemiparesis, plus probable right leg weakness, all mild, but she is much improved compared to yesterday in terms of her mental status. She remains disoriented and had some nocturnal delirium overnight.  -Please investigate why her abdomen is distended, ? constipation. CT abdomen/pelvis yesterday looked good.  -Continue q1 hr neuro checks, EVD draining, ICP monitoring.  -Check MRI brain, MRA head with contrast when convenient per ICU staff. If not today, then please repeat head CT.  --160 mmHg  -continue to hold LMW heparin DVT prophylaxis and continue with just Venodynes.  -PT/OT evals. Try to get OOB to chair. See if she is weight bearing or not.  -PO diet as tolerated.

## 2024-03-08 NOTE — PROCEDURE NOTE - ESTIMATED BLOOD LOSS
Minimal
Minimal
Spine appears normal, range of motion is not limited, no muscle or joint tenderness
Minimal

## 2024-03-08 NOTE — PHYSICAL THERAPY INITIAL EVALUATION ADULT - GENERAL OBSERVATIONS, REHAB EVAL
Pt received supine in bed + telemetry//BP + Right IJ triple lumen + feliciano + O2nc 1L + [place]. Pt c/o 0/10 pain, pt agreeable to PT

## 2024-03-08 NOTE — DIETITIAN NUTRITION RISK NOTIFICATION - ADDITIONAL COMMENTS/DIETITIAN RECOMMENDATIONS
1) Add Ensure HP/Enlive BID  2) Rx MVI and vit C 500mg daily   3) Monitor weights daily for trend/accuracy   4) Encourage HBV protein sources

## 2024-03-08 NOTE — PHYSICAL THERAPY INITIAL EVALUATION ADULT - ADDITIONAL COMMENTS
Hx taken from pt's daughter in law Dominick 2*2 pt speaks Cantonese. Pt lives in a private home with her spouse, son and grandson (Will have family home 24/7 if needed) 6 steps to enter with handrails, 12 steps inside with handrails. Pt was independent PTA without an assist device. Pt owns no DME. (-)

## 2024-03-08 NOTE — PROCEDURE NOTE - NSINDICATIONS_GEN_A_CORE
critical illness/hemodynamic monitoring/hypertonic/irritant infusion/venous access
airway protection/critical patient
venous access
critical patient/monitoring purposes

## 2024-03-08 NOTE — SWALLOW BEDSIDE ASSESSMENT ADULT - SLP PERTINENT HISTORY OF CURRENT PROBLEM
As per MD note: "73F with PMH HLD, osteoporosis who presents for cerebral angiogram with aneurysm embolization. Patient had MRI / MRA brain 2/2 hand numbness which had incidental finding of acomm aneurysm. Patient underwent diagnostic angiogram 2/14/24 which confirmed acomm aneurysm. Patient presents today for cerebral angiogram with aneurysm embolization via balloon assisted coiling. Patient started on ASA 81 in preparation for the procedure. Patient underwent aneurysm embolization with coiling complicated by aneurysm rupture, controlled with coils and balloon tamponade. NISHANT CT showed SAH b/l frontal lobes and R sylvian fissure along with contrast staining. Patient extubated and admitted to NSICU for post op cares. Neurosurgery consulted"

## 2024-03-08 NOTE — DIETITIAN INITIAL EVALUATION ADULT - OTHER INFO
73F with PMH HLD, osteoporosis who presents for cerebral angiogram with aneurysm embolization. Patient had MRI / MRA brain 2/2 hand numbness which had incidental finding of acomm aneurysm. Patient underwent diagnostic angiogram 2/14/24 which confirmed acomm aneurysm. Patient presents today for cerebral angiogram with aneurysm embolization via balloon assisted coiling. Patient started on ASA 81 in preparation for the procedure. Patient underwent aneurysm embolization with coiling complicated by aneurysm rupture, controlled with coils and balloon tamponade. NISHANT CT showed SAH b/l frontal lobes and R sylvian fissure along with contrast staining. Patient extubated and admitted to NSICU for post op cares. Neurosurgery consulted.

## 2024-03-08 NOTE — PROGRESS NOTE ADULT - SUBJECTIVE AND OBJECTIVE BOX
HPI:  73F with PMH HLD, osteoporosis who presents for cerebral angiogram with aneurysm embolization. Patient had MRI / MRA brain 2/ hand numbness which had incidental finding of acomm aneurysm. Patient underwent diagnostic angiogram 24 which confirmed acomm aneurysm. Patient presents today for cerebral angiogram with aneurysm embolization via balloon assisted coiling. Patient started on ASA 81 in preparation for the procedure. Patient underwent aneurysm embolization with coiling complicated by aneurysm rupture, controlled with coils and balloon tamponade. NISHNAT CT showed SAH b/l frontal lobes and R sylvian fissure along with contrast staining. Patient extubated and admitted to NSICU for post op cares. Neurosurgery consulted.       INTERVAL HPI/OVERNIGHT EVENTS:  73y Female seen and examined at bedside. Pt had a drop in H/H and was hypotensive, recieved 1L bolus and was temporarily started on levo. Levo now off and BP and h/h improved. CT A/P negative for RP bleed. Pt now extubated and satting well on 2L NC. EVD at 68ijC80 and patent. ICP within normal limits.     Vital Signs Last 24 Hrs  T(C): 37.2 (07 Mar 2024 23:00), Max: 38.3 (07 Mar 2024 20:00)  T(F): 99 (07 Mar 2024 23:00), Max: 100.9 (07 Mar 2024 20:00)  HR: 78 (07 Mar 2024 23:00) (63 - 91)  BP: 116/59 (07 Mar 2024 23:00) (83/52 - 128/63)  BP(mean): 76 (07 Mar 2024 23:00) (52 - 85)  RR: 20 (07 Mar 2024 23:00) (10 - 25)  SpO2: 99% (07 Mar 2024 23:00) (89% - 100%)    Parameters below as of 07 Mar 2024 20:00  Patient On (Oxygen Delivery Method): nasal cannula    O2 Concentration (%): 2    PHYSICAL EXAM:  GENERAL: NAD  HEAD:  s/p EVD placement  NEURO: Awake, EO spontaneously, conversant but confused. AOx1 (self). PERRL. Following commands.   MOTOR: RHG 4+/5 otherwise 5/5   SENSATION: grossly intact to light touch all extremities  CHEST/LUNL NC  SKIN: Warm, dry    LABS:                        9.4    12.12 )-----------( 315      ( 07 Mar 2024 17:45 )             28.3     03-    139  |  108  |  8.9  ----------------------------<  135<H>  4.1   |  18.0<L>  |  0.45<L>    Ca    7.1<L>      07 Mar 2024 17:45  Phos  2.4       Mg     1.8         TPro  5.8<L>  /  Alb  3.2<L>  /  TBili  0.4  /  DBili  x   /  AST  33<H>  /  ALT  23  /  AlkPhos  40      PT/INR - ( 06 Mar 2024 08:10 )   PT: 11.4 sec;   INR: 1.03 ratio         PTT - ( 06 Mar 2024 08:10 )  PTT:34.6 sec  Urinalysis Basic - ( 07 Mar 2024 17:45 )    Color: x / Appearance: x / SG: x / pH: x  Gluc: 135 mg/dL / Ketone: x  / Bili: x / Urobili: x   Blood: x / Protein: x / Nitrite: x   Leuk Esterase: x / RBC: x / WBC x   Sq Epi: x / Non Sq Epi: x / Bacteria: x         @ 07: @ 07:00  --------------------------------------------------------  IN: 3306.1 mL / OUT: 1981 mL / NET: 1325.1 mL     @ 07:  -   @ 00:20  --------------------------------------------------------  IN: 2238.2 mL / OUT: 1271 mL / NET: 967.2 mL        RADIOLOGY & ADDITIONAL TESTS:  < from: CT Head No Cont (24 @ 05:56) >  IMPRESSION:   persistent intracranial hemorrhage within the BILATERAL   lateral and third ventricles, LEFT greater than RIGHT, with mild   obstructive hydrocephalus slightly increased. RIGHT frontal shunt   catheter tip seen in body of RIGHT lateral ventricle unchanged.   Subarachnoid hemorrhage again noted in the BILATERAL medial frontal lobes   with hemorrhage in the anterior corpus callosum, LEFT greaterthan RIGHT.    < end of copied text >

## 2024-03-08 NOTE — DIETITIAN NUTRITION RISK NOTIFICATION - MALNUTRITION EVALUATION AS DEMONSTRATED BY (ADULTS > 20 YEARS OF AGE)
Loss of subcutaneous fat.../Loss of muscle... Loss of subcutaneous fat.../Loss of muscle.../Fluid accumulation...

## 2024-03-08 NOTE — PHYSICAL THERAPY INITIAL EVALUATION ADULT - PERTINENT HX OF CURRENT PROBLEM, REHAB EVAL
73F PMH HLD, osteoporosis presented for elective complex Acomm aneurysm embolization via balloon assisted coiling. Patient started on ASA 81 in preparation for the procedure. Patient underwent aneurysm embolization with coiling complicated by aneurysm rupture, controlled with coils and balloon tamponade. NISHANT CT showed SAH b/l frontal lobes and thick Right SAH, subsequent stability CTH with worsening ICH and IVH. Intubated in ICU with EVD placement. Pt extubated on 3/7, remains in NSICU.

## 2024-03-08 NOTE — PHYSICAL THERAPY INITIAL EVALUATION ADULT - DIAGNOSIS, PT EVAL
functional debility 2*2 impaired midline orientation, BLE weakness, impaired coordination, abnormal tone and left sided inattention s/p neuro event

## 2024-03-08 NOTE — PHYSICAL THERAPY INITIAL EVALUATION ADULT - IMPAIRED TRANSFERS: BED/CHAIR, REHAB EVAL
Pt limited by bowel incontinence/impaired balance/cognition/impaired coordination/impaired postural control/decreased strength

## 2024-03-08 NOTE — SWALLOW BEDSIDE ASSESSMENT ADULT - SWALLOW EVAL: DIAGNOSIS
Oral phase of swallow characterized by prolonged mastication of easy to chew solids w/ delayed A-P transit, overall increased fatigue noted w/ mastication. Functional for puree, soft/bite sized trials and liquids. Pharyngeal phase of swallow deemed functional for all consistencies trialed w/ no overt s/s of penetration/aspiration observed.

## 2024-03-08 NOTE — PHYSICAL THERAPY INITIAL EVALUATION ADULT - ASSISTIVE DEVICE FOR TRANSFER: SIT/STAND, REHAB EVAL
hand held assist
Verbalized Understanding/Simple: Patient demonstrates quick and easy understanding/Returned Demonstration

## 2024-03-08 NOTE — PROCEDURE NOTE - NSPROCDETAILS_GEN_ALL_CORE
location identified, draped/prepped, sterile technique used, needle inserted/introduced/positive blood return obtained via catheter/connected to a pressurized flush line/sutured in place/Seldinger technique/all materials/supplies accounted for at end of procedure
guidewire recovered/lumen(s) aspirated and flushed/sterile dressing applied/sterile technique, catheter placed/ultrasound guidance with use of sterile gel and probe cove
patient pre-oxygenated, tube inserted, placement confirmed
location identified, draped/prepped, sterile technique used/sterile dressing applied/sterile technique, catheter placed/supine position/ultrasound guidance

## 2024-03-08 NOTE — PROGRESS NOTE ADULT - ASSESSMENT
73F multilobular Acomm az initially for elective coil c/b intraoperative rupture    Patient is critically ill due to the following diagnoses:  Encephalopathy  Intracranial Hemorrhage  Hydrocephalus    Neuro:  #ICH/SAH  Partial trace residual at base of az, following discussion with NSGY will treat as unsecured for 2-3 days and liberalize hemodynamic parameters in preparation of vasospasm  - Neurochecks  - TCD, Nimodipine   - Keppra Ppx for 7 days    #Hydrocephalus/IVH  - EVD at 10, CSF diversion per NSGY  	  CV:  SBP Goal <140->160, liberalize to 100-200 at PBD3  MAP >65 with Levo PRN  Simvastatin 10     Pulm:  SpO2 > 94%    GI:  Advance diet  Bowel regimen: senna / miralax when taking PO   	  Gu:  TOV  K>4 Mg>2    Heme:  Hold AC/AP 2/2 SAH   DVT Ppx; SCD, SQL per ARMANDO/NSGY  	  ID:  Monitor WBC and Temperature  		  Endo  Monitor BGL, maintain <180    My full attention was spent providing medically necessary critical care to the patient with details documented in my note above.   Critical care time spent examining patient, reviewing vitals, labs, medications, imaging and discussing with the team goals of care   The combined critical care time provided to the patient was 60 minutes  This time does not include bedside procedures that are documented separately.

## 2024-03-08 NOTE — PROGRESS NOTE ADULT - SUBJECTIVE AND OBJECTIVE BOX
Patient seen and examined by me. Extubated yesterday. No new neurological events noted. Spoke with patient using a . She denies pain except for in the right hip when she moves her leg. No headache.    ICU Vital Signs Last 24 Hrs  T(C): 38.9 (08 Mar 2024 07:15), Max: 38.9 (08 Mar 2024 07:15)  T(F): 102 (08 Mar 2024 07:15), Max: 102 (08 Mar 2024 07:15)  HR: 78 (08 Mar 2024 07:15) (64 - 91)  BP: 116/70 (08 Mar 2024 07:00) (83/52 - 128/63)  BP(mean): 83 (08 Mar 2024 07:00) (52 - 85)  ABP: 129/56 (08 Mar 2024 07:15) (82/37 - 149/60)  ABP(mean): 83 (08 Mar 2024 07:15) (17 - 98)  RR: 20 (08 Mar 2024 07:15) (10 - 25)  SpO2: 95% (08 Mar 2024 07:15) (89% - 100%)    O2 Parameters below as of 08 Mar 2024 04:00  Patient On (Oxygen Delivery Method): nasal cannula  O2 Flow (L/min): 2    On exam:  Abd: Distended.  Ext: No groin hematoma or ecchymosis. Excellent pulses.  Neuro: Awake and alert. eyes open spontaneously. Oriented to self and hospital, not year (1953), or situation. Can name and follow complex commands.  No dysarthria. EOMI, VFF, left facial droop, tongue midline. UEs- at least 4+/5 power, no pronator drift. LEs: 4-4+/5, slightly weaker on the left than the right.    CT A/P 3/7/24: No retroperitoneal bleed. No bowel obstruction.                          9.5    13.70 )-----------( 336      ( 08 Mar 2024 02:00 )             28.9      03-08    141  |  112<H>  |  8.6  ----------------------------<  163<H>  3.6   |  18.0<L>  |  0.49<L>    Ca    7.6<L>      08 Mar 2024 02:00  Phos  1.6     03-08  Mg     2.6     03-08    TPro  5.8<L>  /  Alb  3.2<L>  /  TBili  0.4  /  DBili  x   /  AST  33<H>  /  ALT  23  /  AlkPhos  40  03-07

## 2024-03-08 NOTE — PROCEDURE NOTE - NSPROCNAME_GEN_A_CORE
Arterial Puncture/Cannulation
Central Line Insertion
Tracheal Intubation
Midline Insertion
External Ventricular Drain Insertion

## 2024-03-09 LAB
ANION GAP SERPL CALC-SCNC: 13 MMOL/L — SIGNIFICANT CHANGE UP (ref 5–17)
BUN SERPL-MCNC: 11.5 MG/DL — SIGNIFICANT CHANGE UP (ref 8–20)
CALCIUM SERPL-MCNC: 7.6 MG/DL — LOW (ref 8.4–10.5)
CHLORIDE SERPL-SCNC: 112 MMOL/L — HIGH (ref 96–108)
CO2 SERPL-SCNC: 18 MMOL/L — LOW (ref 22–29)
CREAT SERPL-MCNC: 0.45 MG/DL — LOW (ref 0.5–1.3)
EGFR: 103 ML/MIN/1.73M2 — SIGNIFICANT CHANGE UP
GLUCOSE SERPL-MCNC: 145 MG/DL — HIGH (ref 70–99)
GRAM STN FLD: ABNORMAL
HCT VFR BLD CALC: 27.5 % — LOW (ref 34.5–45)
HGB BLD-MCNC: 9.4 G/DL — LOW (ref 11.5–15.5)
MAGNESIUM SERPL-MCNC: 2.1 MG/DL — SIGNIFICANT CHANGE UP (ref 1.6–2.6)
MCHC RBC-ENTMCNC: 31.3 PG — SIGNIFICANT CHANGE UP (ref 27–34)
MCHC RBC-ENTMCNC: 34.2 GM/DL — SIGNIFICANT CHANGE UP (ref 32–36)
MCV RBC AUTO: 91.7 FL — SIGNIFICANT CHANGE UP (ref 80–100)
PHOSPHATE SERPL-MCNC: 1.1 MG/DL — LOW (ref 2.4–4.7)
PLATELET # BLD AUTO: 310 K/UL — SIGNIFICANT CHANGE UP (ref 150–400)
POTASSIUM SERPL-MCNC: 3.4 MMOL/L — LOW (ref 3.5–5.3)
POTASSIUM SERPL-SCNC: 3.4 MMOL/L — LOW (ref 3.5–5.3)
RAPID RVP RESULT: SIGNIFICANT CHANGE UP
RBC # BLD: 3 M/UL — LOW (ref 3.8–5.2)
RBC # FLD: 12.5 % — SIGNIFICANT CHANGE UP (ref 10.3–14.5)
SARS-COV-2 RNA SPEC QL NAA+PROBE: SIGNIFICANT CHANGE UP
SODIUM SERPL-SCNC: 143 MMOL/L — SIGNIFICANT CHANGE UP (ref 135–145)
SPECIMEN SOURCE: SIGNIFICANT CHANGE UP
WBC # BLD: 15.11 K/UL — HIGH (ref 3.8–10.5)
WBC # FLD AUTO: 15.11 K/UL — HIGH (ref 3.8–10.5)

## 2024-03-09 PROCEDURE — 74018 RADEX ABDOMEN 1 VIEW: CPT | Mod: 26

## 2024-03-09 PROCEDURE — 99232 SBSQ HOSP IP/OBS MODERATE 35: CPT

## 2024-03-09 PROCEDURE — 70545 MR ANGIOGRAPHY HEAD W/DYE: CPT | Mod: 26,XU

## 2024-03-09 PROCEDURE — 99291 CRITICAL CARE FIRST HOUR: CPT

## 2024-03-09 PROCEDURE — 70552 MRI BRAIN STEM W/DYE: CPT | Mod: 26

## 2024-03-09 PROCEDURE — 71045 X-RAY EXAM CHEST 1 VIEW: CPT | Mod: 26

## 2024-03-09 RX ORDER — PIPERACILLIN AND TAZOBACTAM 4; .5 G/20ML; G/20ML
3.38 INJECTION, POWDER, LYOPHILIZED, FOR SOLUTION INTRAVENOUS ONCE
Refills: 0 | Status: COMPLETED | OUTPATIENT
Start: 2024-03-09 | End: 2024-03-09

## 2024-03-09 RX ORDER — ACETAMINOPHEN 500 MG
675 TABLET ORAL ONCE
Refills: 0 | Status: COMPLETED | OUTPATIENT
Start: 2024-03-09 | End: 2024-03-09

## 2024-03-09 RX ORDER — PIPERACILLIN AND TAZOBACTAM 4; .5 G/20ML; G/20ML
3.38 INJECTION, POWDER, LYOPHILIZED, FOR SOLUTION INTRAVENOUS EVERY 8 HOURS
Refills: 0 | Status: COMPLETED | OUTPATIENT
Start: 2024-03-09 | End: 2024-03-16

## 2024-03-09 RX ORDER — POTASSIUM PHOSPHATE, MONOBASIC POTASSIUM PHOSPHATE, DIBASIC 236; 224 MG/ML; MG/ML
30 INJECTION, SOLUTION INTRAVENOUS ONCE
Refills: 0 | Status: COMPLETED | OUTPATIENT
Start: 2024-03-09 | End: 2024-03-09

## 2024-03-09 RX ADMIN — BROMOCRIPTINE MESYLATE 5 MILLIGRAM(S): 5 CAPSULE ORAL at 05:04

## 2024-03-09 RX ADMIN — Medication 675 MILLIGRAM(S): at 11:56

## 2024-03-09 RX ADMIN — LEVETIRACETAM 400 MILLIGRAM(S): 250 TABLET, FILM COATED ORAL at 16:34

## 2024-03-09 RX ADMIN — NIMODIPINE 30 MILLIGRAM(S): 60 SOLUTION ORAL at 04:06

## 2024-03-09 RX ADMIN — NIMODIPINE 30 MILLIGRAM(S): 60 SOLUTION ORAL at 14:52

## 2024-03-09 RX ADMIN — CHLORHEXIDINE GLUCONATE 1 APPLICATION(S): 213 SOLUTION TOPICAL at 13:04

## 2024-03-09 RX ADMIN — POTASSIUM PHOSPHATE, MONOBASIC POTASSIUM PHOSPHATE, DIBASIC 83.33 MILLIMOLE(S): 236; 224 INJECTION, SOLUTION INTRAVENOUS at 09:14

## 2024-03-09 RX ADMIN — NIMODIPINE 30 MILLIGRAM(S): 60 SOLUTION ORAL at 02:11

## 2024-03-09 RX ADMIN — NIMODIPINE 30 MILLIGRAM(S): 60 SOLUTION ORAL at 10:41

## 2024-03-09 RX ADMIN — Medication 63.75 MILLIMOLE(S): at 06:50

## 2024-03-09 RX ADMIN — BROMOCRIPTINE MESYLATE 5 MILLIGRAM(S): 5 CAPSULE ORAL at 13:03

## 2024-03-09 RX ADMIN — LEVETIRACETAM 400 MILLIGRAM(S): 250 TABLET, FILM COATED ORAL at 05:04

## 2024-03-09 RX ADMIN — Medication 270 MILLIGRAM(S): at 19:00

## 2024-03-09 RX ADMIN — NIMODIPINE 30 MILLIGRAM(S): 60 SOLUTION ORAL at 08:31

## 2024-03-09 RX ADMIN — BROMOCRIPTINE MESYLATE 5 MILLIGRAM(S): 5 CAPSULE ORAL at 22:28

## 2024-03-09 RX ADMIN — POLYETHYLENE GLYCOL 3350 17 GRAM(S): 17 POWDER, FOR SOLUTION ORAL at 13:03

## 2024-03-09 RX ADMIN — PIPERACILLIN AND TAZOBACTAM 25 GRAM(S): 4; .5 INJECTION, POWDER, LYOPHILIZED, FOR SOLUTION INTRAVENOUS at 22:27

## 2024-03-09 RX ADMIN — NIMODIPINE 30 MILLIGRAM(S): 60 SOLUTION ORAL at 19:02

## 2024-03-09 RX ADMIN — Medication 270 MILLIGRAM(S): at 11:00

## 2024-03-09 RX ADMIN — SIMVASTATIN 10 MILLIGRAM(S): 20 TABLET, FILM COATED ORAL at 22:28

## 2024-03-09 RX ADMIN — PIPERACILLIN AND TAZOBACTAM 200 GRAM(S): 4; .5 INJECTION, POWDER, LYOPHILIZED, FOR SOLUTION INTRAVENOUS at 09:16

## 2024-03-09 RX ADMIN — NIMODIPINE 30 MILLIGRAM(S): 60 SOLUTION ORAL at 05:04

## 2024-03-09 RX ADMIN — NIMODIPINE 30 MILLIGRAM(S): 60 SOLUTION ORAL at 16:34

## 2024-03-09 RX ADMIN — PIPERACILLIN AND TAZOBACTAM 25 GRAM(S): 4; .5 INJECTION, POWDER, LYOPHILIZED, FOR SOLUTION INTRAVENOUS at 13:04

## 2024-03-09 RX ADMIN — NIMODIPINE 30 MILLIGRAM(S): 60 SOLUTION ORAL at 13:03

## 2024-03-09 RX ADMIN — Medication 675 MILLIGRAM(S): at 19:30

## 2024-03-09 RX ADMIN — NIMODIPINE 30 MILLIGRAM(S): 60 SOLUTION ORAL at 22:28

## 2024-03-09 RX ADMIN — SODIUM CHLORIDE 50 MILLILITER(S): 9 INJECTION, SOLUTION INTRAVENOUS at 10:41

## 2024-03-09 NOTE — PROGRESS NOTE ADULT - ASSESSMENT
Assessment:   73F with PMH HLD who presented for elective acomm aneurysm embolization with balloon-assisted coiling, complicated by aneurysm rupture controlled with coils and balloon tamponade. Neurosurgery consulted for SAH.        Plan:   - Q2 hour neuro checks   - Strict SBP goal    - SAH protocol    - AED: Keppra 500 BID   - Hold AC/AP due to ICH    - Maintan normothermia   - Maintain euvolemia, strict I&Os, supplement for net negative PRN   - Nimodipine 60q4 - TCDs daily as able   - EVD @ 10. Monitor drainage and ICPs   - Stat CTH if change in mental status / neuro exam   - Supportive care/further medical management per NSICU   - consider sending CSF cultures if persistently febrile.    - Final plan pending AM rounds and discussion with attending

## 2024-03-09 NOTE — DISCHARGE NOTE NURSING/CASE MANAGEMENT/SOCIAL WORK - PATIENT PORTAL LINK FT
You can access the FollowMyHealth Patient Portal offered by [facility] by registering at the following website: [email or web address]. By joining [employer]’s FollowMyHealth portal, you will also be able to view your health information using other applications (apps) compatible with our system.

## 2024-03-09 NOTE — PROGRESS NOTE ADULT - SUBJECTIVE AND OBJECTIVE BOX
Chief complaint:   73F PMH HLD, osteoporosis presented for elective complex Acomm aneurysm embolization via balloon assisted coiling. Patient started on ASA 81 in preparation for the procedure. Patient underwent aneurysm embolization with coiling complicated by aneurysm rupture, controlled with coils and balloon tamponade. NISHANT CT showed SAH b/l frontal lobes and thick R SAH, subsequent stability CTH with worsening ICH and IVH. Intubated in ICU with EVD placement.    24hr EVENTS:  3/6 - Intubated. EVD placed, set to 20. Dropped to 10 overnight.  3/7 - Exam improved, Extubated  3/8 - Febrile with RLL consolidation  3/9 - Worsening secreations      -----------------------------------------------------------------------------------------------------------------------------------------------------------------------------------    PHYSICAL EXAM:  General: Calm  HEENT: MMM  Neuro:  -Mental status- No acute distress. Follows commands  -CN- PERRL 3mm, EOMI, tongue midline, face symmetric  - LLE 4-/5 otherwise full strength    CV: RRR  Pulm: clear to auscultation  Abd: Soft, nontender, nondistended  Ext: no noted edema in lower ext  Skin: warm, dry    ROS: [ ]  Unable to assess due to mental status   All other systems negative    ------------------------------------------------------------------------------------------------------  ICU Vital Signs Last 24 Hrs  T(C): 36.9 (09 Mar 2024 06:00), Max: 38.7 (08 Mar 2024 15:00)  T(F): 98.4 (09 Mar 2024 06:00), Max: 101.7 (08 Mar 2024 15:00)  HR: 89 (09 Mar 2024 07:00) (81 - 112)  BP: 107/84 (09 Mar 2024 07:00) (107/84 - 150/82)  BP(mean): 92 (09 Mar 2024 07:00) (69 - 132)  ABP: 131/59 (09 Mar 2024 07:00) (116/53 - 154/55)  ABP(mean): 87 (09 Mar 2024 07:00) (68 - 99)  RR: 13 (09 Mar 2024 07:00) (13 - 30)  SpO2: 96% (09 Mar 2024 07:00) (90% - 100%)    O2 Parameters below as of 08 Mar 2024 20:00  Patient On (Oxygen Delivery Method): nasal cannula  O2 Flow (L/min): 1          I&O's Summary    08 Mar 2024 07:01  -  09 Mar 2024 07:00  --------------------------------------------------------  IN: 2671 mL / OUT: 2529 mL / NET: 142 mL        MEDICATIONS  (STANDING):  acetaminophen   IVPB .. 675 milliGRAM(s) IV Intermittent once  albuterol    0.083%. 2.5 milliGRAM(s) Nebulizer once  bromocriptine Capsule 5 milliGRAM(s) Oral three times a day  chlorhexidine 2% Cloths 1 Application(s) Topical daily  lactated ringers. 1000 milliLiter(s) (50 mL/Hr) IV Continuous <Continuous>  levETIRAcetam  IVPB 500 milliGRAM(s) IV Intermittent two times a day  niMODipine Oral Solution 30 milliGRAM(s) Oral every 2 hours  piperacillin/tazobactam IVPB.- 3.375 Gram(s) IV Intermittent once  piperacillin/tazobactam IVPB.. 3.375 Gram(s) IV Intermittent every 8 hours  polyethylene glycol 3350 17 Gram(s) Oral daily  senna 2 Tablet(s) Oral at bedtime  simvastatin 10 milliGRAM(s) Oral at bedtime      RESPIRATORY:      IMAGING:   Recent imaging studies were reviewed.    LAB RESULTS:                          9.4    15.11 )-----------( 310      ( 09 Mar 2024 03:15 )             27.5           03-09    143  |  112<H>  |  11.5  ----------------------------<  145<H>  3.4<L>   |  18.0<L>  |  0.45<L>    Ca    7.6<L>      09 Mar 2024 03:15  Phos  1.1     03-09  Mg     2.1     03-09    TPro  5.8<L>  /  Alb  3.2<L>  /  TBili  0.4  /  DBili  x   /  AST  33<H>  /  ALT  23  /  AlkPhos  40  03-07

## 2024-03-09 NOTE — DISCHARGE NOTE NURSING/CASE MANAGEMENT/SOCIAL WORK - NSTRANSFERBELONGINGSDISPO_GEN_A_NUR
advised to utilize Tylenol or OTC NSAIDS as long as it is not medically contraindicated. Return to Office: Follow-up and Dispositions    Return for SCHEDULE FOR SURGERY. Scribed by Siria Moss LPN as dictated by [facility] DANIELLE Beltran MD.  Documentation, performed by, True and Accepted Oscar Beltran MD
with patient

## 2024-03-09 NOTE — DISCHARGE NOTE NURSING/CASE MANAGEMENT/SOCIAL WORK - NSDCVIVACCINE_GEN_ALL_CORE_FT
Next appt 9-21-21  Last appt 5-21-21    Refill request for   Disp Refills Start End    allopurinol (ZYLOPRIM) 100 MG tablet 90 tablet 1 3/16/2021     Sig - Route: TAKE 1 TABLET BY MOUTH  DAILY - Oral      PSA 1-29-19 - LABS DUE     No Vaccines Administered.

## 2024-03-09 NOTE — PROGRESS NOTE ADULT - ASSESSMENT
73F multilobular Acomm az initially for elective coil c/b intraoperative rupture    Patient is critically ill due to the following diagnoses:  Encephalopathy  Intracranial Hemorrhage  Hydrocephalus    Neuro:  #ICH/SAH  Partial trace residual at base of az, following discussion with NSGY will treat as unsecured for 2-3 days and liberalize hemodynamic parameters in preparation of vasospasm  - Neurochecks  - TCD, Nimodipine   - Keppra Ppx for 7 days    #Hydrocephalus/IVH  - EVD at 10, CSF diversion per NSGY  	  CV:  SBP Goal <160, liberalize to 100-200 since in vasospasm window when OK per ARMANDO  MAP >65 with Levo PRN  Simvastatin 10    Pulm:  SpO2 > 94%    GI:  Advance diet  Bowel regimen: senna / miralax when taking PO   	  Gu:  TOV  K>4 Mg>2    Heme:  Hold AC/AP 2/2 SAH   DVT Ppx; SCD, SQL per ARMANDO/NSGY  	  ID:  Febrile with worsening resp and RLL Consilidation, Start Zosyn for aspiration for 7-10 days. Send Cx  		  Endo  Monitor BGL, maintain <180    My full attention was spent providing medically necessary critical care to the patient with details documented in my note above.   Critical care time spent examining patient, reviewing vitals, labs, medications, imaging and discussing with the team goals of care   The combined critical care time provided to the patient was 60 minutes  This time does not include bedside procedures that are documented separately.

## 2024-03-09 NOTE — PROGRESS NOTE ADULT - SUBJECTIVE AND OBJECTIVE BOX
HPI:  HPI:  73F PMH HLD, osteoporosis presented for elective complex Acomm aneurysm embolization via balloon assisted coiling. Patient started on ASA 81 in preparation for the procedure. Patient underwent aneurysm embolization with coiling complicated by aneurysm rupture, controlled with coils and balloon tamponade. NISHANT CT showed SAH b/l frontal lobes and thick R SAH, subsequent stability CTH with worsening ICH and IVH. Intubated in ICU with EVD placement.      Vital Signs Last 24 Hrs  T(C): 37.2 (09 Mar 2024 00:00), Max: 38.9 (08 Mar 2024 07:15)  T(F): 99 (09 Mar 2024 00:00), Max: 102 (08 Mar 2024 07:15)  HR: 84 (09 Mar 2024 00:00) (64 - 107)  BP: 145/58 (09 Mar 2024 00:00) (108/62 - 150/79)  BP(mean): 82 (09 Mar 2024 00:00) (70 - 132)  RR: 20 (09 Mar 2024 00:00) (14 - 36)  SpO2: 99% (09 Mar 2024 00:00) (90% - 100%)    Parameters below as of 08 Mar 2024 20:00  Patient On (Oxygen Delivery Method): nasal cannula  O2 Flow (L/min): 1    Interval Events:     patient seeen and examined at bedside. Patient is awake and alert, A &ox1 to name,  Following commands. patient noted to be speaking in incomprehensible language,  unable to understand her speech. Strength 5/5 UE and Lowers. EVD @ 10 and patent.       LABS:                        9.5    13.70 )-----------( 336      ( 08 Mar 2024 02:00 )             28.9     03-08    141  |  112<H>  |  8.6  ----------------------------<  163<H>  3.6   |  18.0<L>  |  0.49<L>    Ca    7.6<L>      08 Mar 2024 02:00  Phos  1.6     03-08  Mg     2.6     03-08    TPro  5.8<L>  /  Alb  3.2<L>  /  TBili  0.4  /  DBili  x   /  AST  33<H>  /  ALT  23  /  AlkPhos  40        Urinalysis Basic - ( 08 Mar 2024 07:22 )    Color: Yellow / Appearance: Clear / S.021 / pH: x  Gluc: x / Ketone: 15 mg/dL  / Bili: Negative / Urobili: 0.2 mg/dL   Blood: x / Protein: Trace mg/dL / Nitrite: Negative   Leuk Esterase: Negative / RBC: 43 /HPF / WBC 5 /HPF   Sq Epi: x / Non Sq Epi: 2 /HPF / Bacteria: Negative /HPF          Physical exam:      PHYSICAL EXAM:     GENERAL: NAD     HEAD:  s/p EVD placement     NEURO: Awake, EO spontaneously, conversant but confused. AOx1 (self). PERRL. Following commands.      MOTOR:strength 5/5      SENSATION: intact      CHEST/LUN L NC      SKIN: warm dry around EVD Site.             @ 07:  -   @ 07:00  --------------------------------------------------------  IN: 2987.8 mL / OUT: 2107 mL / NET: 880.8 mL     @ 07:  -  09 @ 00:29  --------------------------------------------------------  IN: 2251 mL / OUT: 1659 mL / NET: 592 mL

## 2024-03-10 LAB
ANION GAP SERPL CALC-SCNC: 11 MMOL/L — SIGNIFICANT CHANGE UP (ref 5–17)
BUN SERPL-MCNC: 12.3 MG/DL — SIGNIFICANT CHANGE UP (ref 8–20)
CALCIUM SERPL-MCNC: 8.3 MG/DL — LOW (ref 8.4–10.5)
CHLORIDE SERPL-SCNC: 113 MMOL/L — HIGH (ref 96–108)
CO2 SERPL-SCNC: 22 MMOL/L — SIGNIFICANT CHANGE UP (ref 22–29)
CREAT SERPL-MCNC: 0.45 MG/DL — LOW (ref 0.5–1.3)
CULTURE RESULTS: ABNORMAL
EGFR: 103 ML/MIN/1.73M2 — SIGNIFICANT CHANGE UP
GLUCOSE SERPL-MCNC: 118 MG/DL — HIGH (ref 70–99)
HCT VFR BLD CALC: 26 % — LOW (ref 34.5–45)
HGB BLD-MCNC: 9 G/DL — LOW (ref 11.5–15.5)
MAGNESIUM SERPL-MCNC: 2.2 MG/DL — SIGNIFICANT CHANGE UP (ref 1.6–2.6)
MCHC RBC-ENTMCNC: 31.3 PG — SIGNIFICANT CHANGE UP (ref 27–34)
MCHC RBC-ENTMCNC: 34.6 GM/DL — SIGNIFICANT CHANGE UP (ref 32–36)
MCV RBC AUTO: 90.3 FL — SIGNIFICANT CHANGE UP (ref 80–100)
PHOSPHATE SERPL-MCNC: 1.9 MG/DL — LOW (ref 2.4–4.7)
PLATELET # BLD AUTO: 299 K/UL — SIGNIFICANT CHANGE UP (ref 150–400)
POTASSIUM SERPL-MCNC: 3.7 MMOL/L — SIGNIFICANT CHANGE UP (ref 3.5–5.3)
POTASSIUM SERPL-SCNC: 3.7 MMOL/L — SIGNIFICANT CHANGE UP (ref 3.5–5.3)
RBC # BLD: 2.88 M/UL — LOW (ref 3.8–5.2)
RBC # FLD: 12.5 % — SIGNIFICANT CHANGE UP (ref 10.3–14.5)
SODIUM SERPL-SCNC: 146 MMOL/L — HIGH (ref 135–145)
SPECIMEN SOURCE: SIGNIFICANT CHANGE UP
WBC # BLD: 8.72 K/UL — SIGNIFICANT CHANGE UP (ref 3.8–10.5)
WBC # FLD AUTO: 8.72 K/UL — SIGNIFICANT CHANGE UP (ref 3.8–10.5)

## 2024-03-10 PROCEDURE — 70450 CT HEAD/BRAIN W/O DYE: CPT | Mod: 26

## 2024-03-10 PROCEDURE — 99291 CRITICAL CARE FIRST HOUR: CPT

## 2024-03-10 PROCEDURE — 99232 SBSQ HOSP IP/OBS MODERATE 35: CPT

## 2024-03-10 RX ORDER — POTASSIUM CHLORIDE 20 MEQ
20 PACKET (EA) ORAL ONCE
Refills: 0 | Status: COMPLETED | OUTPATIENT
Start: 2024-03-10 | End: 2024-03-10

## 2024-03-10 RX ORDER — TRAMADOL HYDROCHLORIDE 50 MG/1
25 TABLET ORAL ONCE
Refills: 0 | Status: DISCONTINUED | OUTPATIENT
Start: 2024-03-10 | End: 2024-03-10

## 2024-03-10 RX ORDER — TAMSULOSIN HYDROCHLORIDE 0.4 MG/1
0.4 CAPSULE ORAL AT BEDTIME
Refills: 0 | Status: DISCONTINUED | OUTPATIENT
Start: 2024-03-10 | End: 2024-03-16

## 2024-03-10 RX ORDER — POTASSIUM CHLORIDE 20 MEQ
20 PACKET (EA) ORAL ONCE
Refills: 0 | Status: DISCONTINUED | OUTPATIENT
Start: 2024-03-10 | End: 2024-03-10

## 2024-03-10 RX ORDER — ACETAMINOPHEN 500 MG
675 TABLET ORAL ONCE
Refills: 0 | Status: COMPLETED | OUTPATIENT
Start: 2024-03-10 | End: 2024-03-10

## 2024-03-10 RX ORDER — POTASSIUM PHOSPHATE, MONOBASIC POTASSIUM PHOSPHATE, DIBASIC 236; 224 MG/ML; MG/ML
30 INJECTION, SOLUTION INTRAVENOUS ONCE
Refills: 0 | Status: COMPLETED | OUTPATIENT
Start: 2024-03-10 | End: 2024-03-10

## 2024-03-10 RX ADMIN — Medication 20 MILLIEQUIVALENT(S): at 08:24

## 2024-03-10 RX ADMIN — LEVETIRACETAM 400 MILLIGRAM(S): 250 TABLET, FILM COATED ORAL at 05:04

## 2024-03-10 RX ADMIN — BROMOCRIPTINE MESYLATE 5 MILLIGRAM(S): 5 CAPSULE ORAL at 22:09

## 2024-03-10 RX ADMIN — NIMODIPINE 30 MILLIGRAM(S): 60 SOLUTION ORAL at 22:08

## 2024-03-10 RX ADMIN — NIMODIPINE 30 MILLIGRAM(S): 60 SOLUTION ORAL at 00:04

## 2024-03-10 RX ADMIN — Medication 675 MILLIGRAM(S): at 09:52

## 2024-03-10 RX ADMIN — Medication 675 MILLIGRAM(S): at 23:00

## 2024-03-10 RX ADMIN — NIMODIPINE 30 MILLIGRAM(S): 60 SOLUTION ORAL at 16:05

## 2024-03-10 RX ADMIN — BROMOCRIPTINE MESYLATE 5 MILLIGRAM(S): 5 CAPSULE ORAL at 14:14

## 2024-03-10 RX ADMIN — CHLORHEXIDINE GLUCONATE 1 APPLICATION(S): 213 SOLUTION TOPICAL at 11:58

## 2024-03-10 RX ADMIN — NIMODIPINE 30 MILLIGRAM(S): 60 SOLUTION ORAL at 01:25

## 2024-03-10 RX ADMIN — POLYETHYLENE GLYCOL 3350 17 GRAM(S): 17 POWDER, FOR SOLUTION ORAL at 11:58

## 2024-03-10 RX ADMIN — NIMODIPINE 30 MILLIGRAM(S): 60 SOLUTION ORAL at 20:49

## 2024-03-10 RX ADMIN — PIPERACILLIN AND TAZOBACTAM 25 GRAM(S): 4; .5 INJECTION, POWDER, LYOPHILIZED, FOR SOLUTION INTRAVENOUS at 05:04

## 2024-03-10 RX ADMIN — NIMODIPINE 30 MILLIGRAM(S): 60 SOLUTION ORAL at 17:39

## 2024-03-10 RX ADMIN — NIMODIPINE 30 MILLIGRAM(S): 60 SOLUTION ORAL at 08:10

## 2024-03-10 RX ADMIN — TRAMADOL HYDROCHLORIDE 25 MILLIGRAM(S): 50 TABLET ORAL at 22:03

## 2024-03-10 RX ADMIN — LEVETIRACETAM 400 MILLIGRAM(S): 250 TABLET, FILM COATED ORAL at 17:38

## 2024-03-10 RX ADMIN — PIPERACILLIN AND TAZOBACTAM 25 GRAM(S): 4; .5 INJECTION, POWDER, LYOPHILIZED, FOR SOLUTION INTRAVENOUS at 22:10

## 2024-03-10 RX ADMIN — SODIUM CHLORIDE 50 MILLILITER(S): 9 INJECTION, SOLUTION INTRAVENOUS at 05:05

## 2024-03-10 RX ADMIN — POTASSIUM PHOSPHATE, MONOBASIC POTASSIUM PHOSPHATE, DIBASIC 83.33 MILLIMOLE(S): 236; 224 INJECTION, SOLUTION INTRAVENOUS at 08:10

## 2024-03-10 RX ADMIN — SIMVASTATIN 10 MILLIGRAM(S): 20 TABLET, FILM COATED ORAL at 22:09

## 2024-03-10 RX ADMIN — BROMOCRIPTINE MESYLATE 5 MILLIGRAM(S): 5 CAPSULE ORAL at 05:04

## 2024-03-10 RX ADMIN — NIMODIPINE 30 MILLIGRAM(S): 60 SOLUTION ORAL at 05:04

## 2024-03-10 RX ADMIN — Medication 675 MILLIGRAM(S): at 16:09

## 2024-03-10 RX ADMIN — Medication 270 MILLIGRAM(S): at 08:10

## 2024-03-10 RX ADMIN — Medication 270 MILLIGRAM(S): at 22:13

## 2024-03-10 RX ADMIN — NIMODIPINE 30 MILLIGRAM(S): 60 SOLUTION ORAL at 10:15

## 2024-03-10 RX ADMIN — NIMODIPINE 30 MILLIGRAM(S): 60 SOLUTION ORAL at 14:14

## 2024-03-10 RX ADMIN — PIPERACILLIN AND TAZOBACTAM 25 GRAM(S): 4; .5 INJECTION, POWDER, LYOPHILIZED, FOR SOLUTION INTRAVENOUS at 14:14

## 2024-03-10 RX ADMIN — Medication 270 MILLIGRAM(S): at 16:09

## 2024-03-10 RX ADMIN — TRAMADOL HYDROCHLORIDE 25 MILLIGRAM(S): 50 TABLET ORAL at 20:48

## 2024-03-10 RX ADMIN — NIMODIPINE 30 MILLIGRAM(S): 60 SOLUTION ORAL at 11:58

## 2024-03-10 NOTE — PROGRESS NOTE ADULT - SUBJECTIVE AND OBJECTIVE BOX
HPI:  73F PMH HLD, osteoporosis presented for elective complex Acomm aneurysm embolization via balloon assisted coiling. Patient started on ASA 81 in preparation for the procedure. Patient underwent aneurysm embolization with coiling complicated by aneurysm rupture, controlled with coils and balloon tamponade. NISHANT CT showed SAH b/l frontal lobes and thick R SAH, subsequent stability CTH with worsening ICH and IVH. Intubated in ICU with EVD placement.    INTERVAL HPI/OVERNIGHT EVENTS:  - CXR shows RLL infiltrate consistent with aspiration pneumonia  - Zosyn started  - RVP neg  - Abdominal x-ray for distention --> unremarkable  - MRI/MRA brain completed, pending read    Vital Signs Last 24 Hrs  T(C): 36.8 (10 Mar 2024 00:00), Max: 38.7 (09 Mar 2024 19:00)  T(F): 98.2 (10 Mar 2024 00:00), Max: 101.7 (09 Mar 2024 19:00)  HR: 86 (10 Mar 2024 00:00) (81 - 112)  BP: 122/100 (10 Mar 2024 00:00) (106/90 - 155/58)  BP(mean): 116 (10 Mar 2024 00:00) (67 - 124)  RR: 17 (10 Mar 2024 00:00) (13 - 28)  SpO2: 95% (10 Mar 2024 00:00) (92% - 100%)    Parameters below as of 09 Mar 2024 18:00  Patient On (Oxygen Delivery Method): nasal cannula    O2 Concentration (%): 1    PHYSICAL EXAM:  GENERAL: NAD, well-groomed  HEAD:  Atraumatic, normocephalic  DRAINS: EVD @ 10 draining well  WOUND: Dressing clean dry intact  MENTAL STATUS: AAO x2; a little lethargic but awake; Opens eyes spontaneously; Appropriately conversant; following simple commands  CRANIAL NERVES: Visual acuity normal for age, visual fields full to confrontation, PERRL. EOMI without nystagmus.  REFLEXES: PERRL. Corneals intact b/l. Gag intact. Cough intact. Oculocephalic reflex intact (Doll's eye).   MOTOR: LLE HF 4/5, everything else 5/5  CHEST/LUNG: equal rise and fall of chest bilaterally  HEART: +S1/+S2  ABDOMEN: Soft, nontender, mildly distended  EXTREMITIES:  2+ peripheral pulses, no clubbing, cyanosis, or edema  SKIN: Warm, dry; no rashes or lesions    LABS:                        9.4    15.11 )-----------( 310      ( 09 Mar 2024 03:15 )             27.5     03-09    143  |  112<H>  |  11.5  ----------------------------<  145<H>  3.4<L>   |  18.0<L>  |  0.45<L>    Ca    7.6<L>      09 Mar 2024 03:15  Phos  1.1     03-09  Mg     2.1     03-09        Urinalysis Basic - ( 09 Mar 2024 03:15 )    Color: x / Appearance: x / SG: x / pH: x  Gluc: 145 mg/dL / Ketone: x  / Bili: x / Urobili: x   Blood: x / Protein: x / Nitrite: x   Leuk Esterase: x / RBC: x / WBC x   Sq Epi: x / Non Sq Epi: x / Bacteria: x        03-08 @ 07:01  -  03-09 @ 07:00  --------------------------------------------------------  IN: 2671 mL / OUT: 2529 mL / NET: 142 mL    03-09 @ 06:01  -  03-10 @ 01:02  --------------------------------------------------------  IN: 2713.1 mL / OUT: 2091 mL / NET: 622.1 mL        RADIOLOGY & ADDITIONAL TESTS:  ACC: [id number] EXAM:  XR CHEST PORTABLE IMMED 1V   ORDERED BY: ALEXI KIRBY     PROCEDURE DATE:  03/09/2024          INTERPRETATION:  TECHNIQUE: A single AP view of the chest was obtained.   Ordered time:   3/9/2024 9:20 AM    COMPARISON: 3/8/2024    CLINICAL INFORMATION: Fever and cough    FINDINGS:  Right IJ catheter was removed.  The heart is not enlarged.  There is increased hazy opacity at the right lung base when compared with   the prior study.  The left lung is clear.  There is no pneumothorax.    IMPRESSION:    Right lower lung hazy opacity which may represent atelectasis, pneumonia   and/or effusion.    --- End of Report ---

## 2024-03-10 NOTE — CHART NOTE - NSCHARTNOTEFT_GEN_A_CORE
called to bed side by nurse about 8 am.  It was noticed that the patient had a large collection of fluid by the site of the proximal port of evd.   4x 4 placed and it was saturated.  On investigation it was found that the proximal port was cracked.  A clamp was placed.  The whole collection system was changed under sterile condition and csf was flowing. Will continue to monitor. called to bed side by nurse about 8 am.  It was noticed that the patient had a large collection of fluid by the site of the proximal port of evd.   4x 4 placed and it was saturated.  On investigation it was found that the proximal port was cracked.  A clamp was placed.  The whole collection system was changed under sterile condition and csf was flowing. Will continue to monitor.    neuro exam unchanged

## 2024-03-10 NOTE — PROVIDER CONTACT NOTE (OTHER) - SITUATION
On assessment proximal stopcock leaking CSF; pt neuro exam unchanged' neuro icu CRISTIANE Rogers and Trini made aware and at bedside

## 2024-03-10 NOTE — PROGRESS NOTE ADULT - SUBJECTIVE AND OBJECTIVE BOX
Chief complaint:   73F PMH HLD, osteoporosis presented for elective complex Acomm aneurysm embolization via balloon assisted coiling. Patient started on ASA 81 in preparation for the procedure. Patient underwent aneurysm embolization with coiling complicated by aneurysm rupture, controlled with coils and balloon tamponade. NISHANT CT showed SAH b/l frontal lobes and thick R SAH, subsequent stability CTH with worsening ICH and IVH. Intubated in ICU with EVD placement.    EVENTS:  3/6 - Intubated. EVD placed, set to 20. Dropped to 10 overnight.  3/7 - Exam improved, Extubated  3/8 - Febrile with RLL consolidation    O/n events: EVD prox port damage/leaking noted, replaced.      -----------------------------------------------------------------------------------------------------------------------------------------------------------------------------------    ICU Vital Signs Last 24 Hrs  T(C): 37.8 (10 Mar 2024 17:00), Max: 38.7 (09 Mar 2024 19:00)  T(F): 100 (10 Mar 2024 17:00), Max: 101.7 (09 Mar 2024 19:00)  HR: 90 (10 Mar 2024 17:00) (73 - 109)  BP: 134/89 (10 Mar 2024 17:00) (107/65 - 162/131)  BP(mean): 102 (10 Mar 2024 17:00) (74 - 148)  ABP: 139/53 (10 Mar 2024 17:00) (106/39 - 157/61)  ABP(mean): 81 (10 Mar 2024 17:00) (63 - 99)  RR: 22 (10 Mar 2024 17:00) (13 - 28)  SpO2: 98% (10 Mar 2024 17:00) (92% - 100%)    O2 Parameters below as of 10 Mar 2024 16:00  Patient On (Oxygen Delivery Method): nasal cannula  O2 Flow (L/min): 3    -------------------------------------  PHYSICAL EXAM:  General: Calm, cooperative  Neuro:  -Mental status- No acute distress. Follows commands, spont EO  -CN- PERRL 3mm, EOMI, tongue midline, face symmetric  - LLE 4-/5 otherwise full strength    CV: RRR  Pulm: clear to auscultation  Abd: Soft, nontender, nondistended  Ext: no noted edema in lower ext  Skin: warm, dry

## 2024-03-10 NOTE — PROGRESS NOTE ADULT - ASSESSMENT
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  ICH, IVH, SAH, obstructive hydrocephalus, EVD in place.  Fever, suspected aspiration PNA.    Neuro:  - Neurochecks  - TCD, Nimodipine, cont  - angio in am   - Keppra Ppx for 7 days  - EVD at 10, CSF diversion per NSGY;   - SBP Goal 100-160,  - maintain Osats>92%, incentive spirometry  - diet as tolerated; bowel regimen  - monitor UOP and e-lytes  - DVT Ppx: SCD, SQL  - cont Zosyn for suspected aspiration for 7 days  - Monitor BGL, maintain 120-180

## 2024-03-10 NOTE — PROGRESS NOTE ADULT - ASSESSMENT
Assessment:   73F with PMH HLD who presented for elective acomm aneurysm embolization with balloon-assisted coiling, complicated by aneurysm rupture controlled with coils and balloon tamponade. Neurosurgery consulted for SAH.        Plan:   - Q2 hour neuro checks   - Strict SBP goal    - SAH protocol    - AED: Keppra 500 BID   - Hold AC/AP due to ICH    - Maintan normothermia   - Maintain euvolemia, strict I&Os, supplement for net negative PRN   - Nimodipine 30q2 - TCDs daily as able   - EVD @ 10. Monitor drainage and ICPs   - Stat CTH if change in mental status / neuro exam   - Supportive care/further medical management per NSICU   - consider sending CSF cultures if persistently febrile.    - Final plan pending AM rounds and discussion with attending

## 2024-03-11 ENCOUNTER — APPOINTMENT (OUTPATIENT)
Dept: NEUROLOGY | Facility: HOSPITAL | Age: 74
End: 2024-03-11

## 2024-03-11 LAB
ALBUMIN SERPL ELPH-MCNC: 3.4 G/DL — SIGNIFICANT CHANGE UP (ref 3.3–5.2)
ALP SERPL-CCNC: 54 U/L — SIGNIFICANT CHANGE UP (ref 40–120)
ALT FLD-CCNC: 27 U/L — SIGNIFICANT CHANGE UP
ANION GAP SERPL CALC-SCNC: 12 MMOL/L — SIGNIFICANT CHANGE UP (ref 5–17)
APTT BLD: 27.9 SEC — SIGNIFICANT CHANGE UP (ref 24.5–35.6)
AST SERPL-CCNC: 32 U/L — HIGH
BILIRUB SERPL-MCNC: 0.4 MG/DL — SIGNIFICANT CHANGE UP (ref 0.4–2)
BLD GP AB SCN SERPL QL: SIGNIFICANT CHANGE UP
BUN SERPL-MCNC: 13.6 MG/DL — SIGNIFICANT CHANGE UP (ref 8–20)
CALCIUM SERPL-MCNC: 8.7 MG/DL — SIGNIFICANT CHANGE UP (ref 8.4–10.5)
CHLORIDE SERPL-SCNC: 107 MMOL/L — SIGNIFICANT CHANGE UP (ref 96–108)
CO2 SERPL-SCNC: 24 MMOL/L — SIGNIFICANT CHANGE UP (ref 22–29)
CREAT SERPL-MCNC: 0.43 MG/DL — LOW (ref 0.5–1.3)
EGFR: 104 ML/MIN/1.73M2 — SIGNIFICANT CHANGE UP
GLUCOSE SERPL-MCNC: 124 MG/DL — HIGH (ref 70–99)
HCT VFR BLD CALC: 27.5 % — LOW (ref 34.5–45)
HGB BLD-MCNC: 9.4 G/DL — LOW (ref 11.5–15.5)
INR BLD: 1.15 RATIO — SIGNIFICANT CHANGE UP (ref 0.85–1.18)
MAGNESIUM SERPL-MCNC: 1.9 MG/DL — SIGNIFICANT CHANGE UP (ref 1.6–2.6)
MCHC RBC-ENTMCNC: 30.8 PG — SIGNIFICANT CHANGE UP (ref 27–34)
MCHC RBC-ENTMCNC: 34.2 GM/DL — SIGNIFICANT CHANGE UP (ref 32–36)
MCV RBC AUTO: 90.2 FL — SIGNIFICANT CHANGE UP (ref 80–100)
PHOSPHATE SERPL-MCNC: 2.6 MG/DL — SIGNIFICANT CHANGE UP (ref 2.4–4.7)
PLATELET # BLD AUTO: 306 K/UL — SIGNIFICANT CHANGE UP (ref 150–400)
POTASSIUM SERPL-MCNC: 3.5 MMOL/L — SIGNIFICANT CHANGE UP (ref 3.5–5.3)
POTASSIUM SERPL-SCNC: 3.5 MMOL/L — SIGNIFICANT CHANGE UP (ref 3.5–5.3)
PROT SERPL-MCNC: 6.1 G/DL — LOW (ref 6.6–8.7)
PROTHROM AB SERPL-ACNC: 12.7 SEC — SIGNIFICANT CHANGE UP (ref 9.5–13)
RBC # BLD: 3.05 M/UL — LOW (ref 3.8–5.2)
RBC # FLD: 12.6 % — SIGNIFICANT CHANGE UP (ref 10.3–14.5)
SODIUM SERPL-SCNC: 143 MMOL/L — SIGNIFICANT CHANGE UP (ref 135–145)
WBC # BLD: 7.41 K/UL — SIGNIFICANT CHANGE UP (ref 3.8–10.5)
WBC # FLD AUTO: 7.41 K/UL — SIGNIFICANT CHANGE UP (ref 3.8–10.5)

## 2024-03-11 PROCEDURE — 36224 PLACE CATH CAROTD ART: CPT | Mod: 50

## 2024-03-11 PROCEDURE — 99232 SBSQ HOSP IP/OBS MODERATE 35: CPT

## 2024-03-11 PROCEDURE — 76377 3D RENDER W/INTRP POSTPROCES: CPT | Mod: 26

## 2024-03-11 PROCEDURE — 99291 CRITICAL CARE FIRST HOUR: CPT

## 2024-03-11 PROCEDURE — 36226 PLACE CATH VERTEBRAL ART: CPT | Mod: LT

## 2024-03-11 RX ORDER — MAGNESIUM SULFATE 500 MG/ML
1 VIAL (ML) INJECTION ONCE
Refills: 0 | Status: COMPLETED | OUTPATIENT
Start: 2024-03-11 | End: 2024-03-11

## 2024-03-11 RX ORDER — ACETAMINOPHEN 500 MG
675 TABLET ORAL ONCE
Refills: 0 | Status: COMPLETED | OUTPATIENT
Start: 2024-03-11 | End: 2024-03-11

## 2024-03-11 RX ORDER — SODIUM,POTASSIUM PHOSPHATES 278-250MG
1 POWDER IN PACKET (EA) ORAL ONCE
Refills: 0 | Status: COMPLETED | OUTPATIENT
Start: 2024-03-11 | End: 2024-03-11

## 2024-03-11 RX ORDER — POTASSIUM CHLORIDE 20 MEQ
40 PACKET (EA) ORAL ONCE
Refills: 0 | Status: COMPLETED | OUTPATIENT
Start: 2024-03-11 | End: 2024-03-11

## 2024-03-11 RX ORDER — SODIUM CHLORIDE 9 MG/ML
1000 INJECTION, SOLUTION INTRAVENOUS ONCE
Refills: 0 | Status: COMPLETED | OUTPATIENT
Start: 2024-03-11 | End: 2024-03-11

## 2024-03-11 RX ORDER — BROMOCRIPTINE MESYLATE 5 MG/1
10 CAPSULE ORAL THREE TIMES A DAY
Refills: 0 | Status: DISCONTINUED | OUTPATIENT
Start: 2024-03-11 | End: 2024-03-20

## 2024-03-11 RX ORDER — ENOXAPARIN SODIUM 100 MG/ML
40 INJECTION SUBCUTANEOUS EVERY 24 HOURS
Refills: 0 | Status: DISCONTINUED | OUTPATIENT
Start: 2024-03-11 | End: 2024-03-11

## 2024-03-11 RX ORDER — ENOXAPARIN SODIUM 100 MG/ML
30 INJECTION SUBCUTANEOUS
Refills: 0 | Status: DISCONTINUED | OUTPATIENT
Start: 2024-03-11 | End: 2024-03-14

## 2024-03-11 RX ORDER — DEXMEDETOMIDINE HYDROCHLORIDE IN 0.9% SODIUM CHLORIDE 4 UG/ML
0.2 INJECTION INTRAVENOUS
Qty: 200 | Refills: 0 | Status: ACTIVE | OUTPATIENT
Start: 2024-03-11 | End: 2025-02-07

## 2024-03-11 RX ADMIN — NIMODIPINE 30 MILLIGRAM(S): 60 SOLUTION ORAL at 00:21

## 2024-03-11 RX ADMIN — Medication 270 MILLIGRAM(S): at 10:52

## 2024-03-11 RX ADMIN — Medication 675 MILLIGRAM(S): at 18:30

## 2024-03-11 RX ADMIN — TAMSULOSIN HYDROCHLORIDE 0.4 MILLIGRAM(S): 0.4 CAPSULE ORAL at 21:53

## 2024-03-11 RX ADMIN — BROMOCRIPTINE MESYLATE 10 MILLIGRAM(S): 5 CAPSULE ORAL at 13:54

## 2024-03-11 RX ADMIN — BROMOCRIPTINE MESYLATE 10 MILLIGRAM(S): 5 CAPSULE ORAL at 21:52

## 2024-03-11 RX ADMIN — Medication 270 MILLIGRAM(S): at 21:52

## 2024-03-11 RX ADMIN — Medication 100 GRAM(S): at 04:09

## 2024-03-11 RX ADMIN — Medication 1 PACKET(S): at 04:09

## 2024-03-11 RX ADMIN — NIMODIPINE 30 MILLIGRAM(S): 60 SOLUTION ORAL at 17:33

## 2024-03-11 RX ADMIN — SODIUM CHLORIDE 1000 MILLILITER(S): 9 INJECTION, SOLUTION INTRAVENOUS at 06:00

## 2024-03-11 RX ADMIN — BROMOCRIPTINE MESYLATE 5 MILLIGRAM(S): 5 CAPSULE ORAL at 05:20

## 2024-03-11 RX ADMIN — NIMODIPINE 30 MILLIGRAM(S): 60 SOLUTION ORAL at 13:54

## 2024-03-11 RX ADMIN — NIMODIPINE 30 MILLIGRAM(S): 60 SOLUTION ORAL at 04:09

## 2024-03-11 RX ADMIN — Medication 270 MILLIGRAM(S): at 04:15

## 2024-03-11 RX ADMIN — NIMODIPINE 30 MILLIGRAM(S): 60 SOLUTION ORAL at 10:07

## 2024-03-11 RX ADMIN — Medication 40 MILLIEQUIVALENT(S): at 04:08

## 2024-03-11 RX ADMIN — NIMODIPINE 30 MILLIGRAM(S): 60 SOLUTION ORAL at 11:59

## 2024-03-11 RX ADMIN — Medication 270 MILLIGRAM(S): at 17:48

## 2024-03-11 RX ADMIN — PIPERACILLIN AND TAZOBACTAM 25 GRAM(S): 4; .5 INJECTION, POWDER, LYOPHILIZED, FOR SOLUTION INTRAVENOUS at 05:20

## 2024-03-11 RX ADMIN — SODIUM CHLORIDE 50 MILLILITER(S): 9 INJECTION, SOLUTION INTRAVENOUS at 20:48

## 2024-03-11 RX ADMIN — NIMODIPINE 30 MILLIGRAM(S): 60 SOLUTION ORAL at 05:21

## 2024-03-11 RX ADMIN — Medication 675 MILLIGRAM(S): at 04:20

## 2024-03-11 RX ADMIN — LEVETIRACETAM 400 MILLIGRAM(S): 250 TABLET, FILM COATED ORAL at 05:21

## 2024-03-11 RX ADMIN — NIMODIPINE 30 MILLIGRAM(S): 60 SOLUTION ORAL at 21:53

## 2024-03-11 RX ADMIN — PIPERACILLIN AND TAZOBACTAM 25 GRAM(S): 4; .5 INJECTION, POWDER, LYOPHILIZED, FOR SOLUTION INTRAVENOUS at 13:54

## 2024-03-11 RX ADMIN — Medication 675 MILLIGRAM(S): at 22:30

## 2024-03-11 RX ADMIN — NIMODIPINE 30 MILLIGRAM(S): 60 SOLUTION ORAL at 20:48

## 2024-03-11 RX ADMIN — SIMVASTATIN 10 MILLIGRAM(S): 20 TABLET, FILM COATED ORAL at 21:52

## 2024-03-11 RX ADMIN — DEXMEDETOMIDINE HYDROCHLORIDE IN 0.9% SODIUM CHLORIDE 2.25 MICROGRAM(S)/KG/HR: 4 INJECTION INTRAVENOUS at 18:45

## 2024-03-11 RX ADMIN — Medication 675 MILLIGRAM(S): at 10:52

## 2024-03-11 RX ADMIN — ENOXAPARIN SODIUM 30 MILLIGRAM(S): 100 INJECTION SUBCUTANEOUS at 21:53

## 2024-03-11 RX ADMIN — SENNA PLUS 2 TABLET(S): 8.6 TABLET ORAL at 21:53

## 2024-03-11 RX ADMIN — SODIUM CHLORIDE 50 MILLILITER(S): 9 INJECTION, SOLUTION INTRAVENOUS at 04:15

## 2024-03-11 RX ADMIN — NIMODIPINE 30 MILLIGRAM(S): 60 SOLUTION ORAL at 16:48

## 2024-03-11 RX ADMIN — PIPERACILLIN AND TAZOBACTAM 25 GRAM(S): 4; .5 INJECTION, POWDER, LYOPHILIZED, FOR SOLUTION INTRAVENOUS at 21:52

## 2024-03-11 RX ADMIN — CHLORHEXIDINE GLUCONATE 1 APPLICATION(S): 213 SOLUTION TOPICAL at 12:05

## 2024-03-11 NOTE — PROGRESS NOTE ADULT - SUBJECTIVE AND OBJECTIVE BOX
Chief complaint:   73F PMH HLD, osteoporosis presented for elective complex Acomm aneurysm embolization via balloon assisted coiling. Patient started on ASA 81 in preparation for the procedure. Patient underwent aneurysm embolization with coiling complicated by aneurysm rupture, controlled with coils and balloon tamponade. NISHANT CT showed SAH b/l frontal lobes and thick R SAH, subsequent stability CTH with worsening ICH and IVH. Intubated in ICU with EVD placement.    EVENTS:  3/6 - Intubated. EVD placed, set to 20. Dropped to 10 overnight.  3/7 - Exam improved, Extubated  3/8 - Febrile with RLL consolidation  3/10 EVD prox port of collection system damage/leaking noted, replaced.    O/n events: none reported.  Underwent angio this am, which confirmed Acomm aneurysm occlusion with small neck remnant , no vasospasm noted.      ----------------------------------------------------------------------------------------------------------------------------------------------------------------------------------    ICU Vital Signs Last 24 Hrs  T(C): 37.6 (11 Mar 2024 13:00), Max: 38.4 (11 Mar 2024 10:50)  T(F): 99.7 (11 Mar 2024 13:00), Max: 101.1 (11 Mar 2024 10:50)  HR: 78 (11 Mar 2024 13:00) (64 - 95)  BP: 158/87 (11 Mar 2024 07:00) (85/74 - 162/131)  BP(mean): 108 (11 Mar 2024 07:00) (79 - 142)  ABP: 135/55 (11 Mar 2024 13:00) (128/52 - 162/66)  ABP(mean): 87 (11 Mar 2024 13:00) (78 - 108)  RR: 18 (11 Mar 2024 13:00) (12 - 22)  SpO2: 100% (11 Mar 2024 13:00) (94% - 100%)    O2 Parameters below as of 11 Mar 2024 11:50  Patient On (Oxygen Delivery Method): nasal cannula  O2 Flow (L/min): 2      -------------------------------------    PHYSICAL EXAM: stable.  General: Calm, cooperative  Neuro:  -Mental status- No acute distress. Follows commands, spont EO  -CN- PERRL 3mm, EOMI, tongue midline, face symmetric  - LLE 4-/5 otherwise full strength    CV: RRR  Pulm: clear to auscultation  Abd: Soft, nontender, nondistended  Ext: no noted edema in lower ext  Skin: warm, dry

## 2024-03-11 NOTE — CHART NOTE - NSCHARTNOTEFT_GEN_A_CORE
Neurointerventional Surgery  Pre-Procedure Note     This is a 73y ____ hand dominant Female    HPI: 73F with PMH HLD, osteoporosis who presents for cerebral angiogram with aneurysm embolization. Patient had MRI / MRA brain 2/2 hand numbness which had incidental finding of acomm aneurysm. Patient underwent diagnostic angiogram 2/14/24 which confirmed acomm aneurysm. Patient presents today for cerebral angiogram with aneurysm embolization via balloon assisted coiling. Patient started on ASA 81 in preparation for the procedure. Patient underwent aneurysm embolization with coiling complicated by aneurysm rupture, controlled with coils and balloon tamponade. NISHANT CT showed SAH b/l frontal lobes and R sylvian fissure along with contrast staining.     POD 5: Angio embo of acomm aneurysm, s/p EVD placement, pt now scheduled for follow up cerebral angiogram today 3/11/24 with Dr. Dowd.       Allergies: No Known Allergies      PMH/PSH:  PAST MEDICAL & SURGICAL HISTORY:  HLD (hyperlipidemia)  Cerebral aneurysm  Osteoporosis  History of cerebral angiography      FAMILY HISTORY:  Family history of CVA    Current Medications:   albuterol    0.083%. 2.5 milliGRAM(s) Nebulizer once  bromocriptine Capsule 5 milliGRAM(s) Oral three times a day  chlorhexidine 2% Cloths 1 Application(s) Topical daily  hydrALAZINE Injectable 10 milliGRAM(s) IV Push every 2 hours PRN  labetalol Injectable 10 milliGRAM(s) IV Push every 2 hours PRN  lactated ringers Bolus 1000 milliLiter(s) IV Bolus once  lactated ringers. 1000 milliLiter(s) IV Continuous <Continuous>  levETIRAcetam  IVPB 500 milliGRAM(s) IV Intermittent two times a day  niMODipine Oral Solution 30 milliGRAM(s) Oral every 2 hours  ondansetron Injectable 4 milliGRAM(s) IV Push every 6 hours PRN  piperacillin/tazobactam IVPB.. 3.375 Gram(s) IV Intermittent every 8 hours  polyethylene glycol 3350 17 Gram(s) Oral daily  senna 2 Tablet(s) Oral at bedtime  simvastatin 10 milliGRAM(s) Oral at bedtime  tamsulosin 0.4 milliGRAM(s) Oral at bedtime      Physical Exam:  Constitutional: NAD, lying in bed  Neuro  * Mental Status:  GCS 15: Awake, alert, oriented to conversation. No aphasia or difficulty speaking. No dysarthria. Able to name objects and their function.  * Cranial Nerves: Cnii-Cnxii grossly intact. PERRL, EOMI, tongue midline, no gaze deviation  * Motor: RUE 5/5, LUE 5/5, RLE 5/5, LLE 5/5, no drift or dysmetria  * Sensory: Sensation intact to light touch  * Reflexes: not assessed   Cardiovascular: Regular rate and rhythm.  Eyes: See neurologic examination with detailed examination of eyes.  ENT: No JVD, Trachea Midline  Respiratory: non labored breathing   Gastrointestinal: Soft, nontender, nondistended.  Genitourinary: [ ] Dangelo, [ x ] No Dangelo.   Musculoskeletal: No muscle wasting noted, (See neurologic assessment for full muscle strength assessment) No pretibial edema appreciated, no appreciable calf tenderness.  Skin:  no wounds, no redness, no abrasions noted  Hematologic / Lymph / Immunologic: No bleeding from IV sites or wounds, No lymphadenopathy, No Hives or allergic type skin lesions    [facility] SS:  DATE: 3/11/24  TIME: 06:35   1A: Level of consciousness (0-3): 0  1B: Questions (0-2): 0    1C: Commands (0-2): 0  2: Gaze (0-2): 0  3: Visual fields (0-3): 0  4: Facial palsy (0-3): 0  MOTOR:  5A: Left arm motor drift (0-4): 0  5B: Right arm motor drift (0-4): 0  6A: Left leg motor drift (0-4): 0  6B: Right leg motor drift (0-4): 0  7: Limb ataxia (0-2): 0  SENSORY:  8: Sensation (0-2): 0  SPEECH:  9: Language (0-3): 0  10: Dysarthria (0-2): 0  EXTINCTION:  11: Extinction/inattention (0-2): 0    TOTAL SCORE:     Labs:                         9.4    7.41  )-----------( 306      ( 11 Mar 2024 03:14 )             27.5       03-11    143  |  107  |  13.6  ----------------------------<  124<H>  3.5   |  24.0  |  0.43<L>    Ca    8.7      11 Mar 2024 03:14  Phos  2.6     03-11  Mg     1.9     03-11    TPro  6.1<L>  /  Alb  3.4  /  TBili  0.4  /  DBili  x   /  AST  32<H>  /  ALT  27  /  AlkPhos  54  03-11    PT/INR - ( 11 Mar 2024 03:14 )   PT: 12.7 sec;   INR: 1.15 ratio    PTT - ( 11 Mar 2024 03:14 )  PTT:27.9 sec    Assessment/Plan:   This is a 73y  year old right / left hand dominant Female  presents with   Patient presents to neuro-IR for selective cerebral angiography.     Procedure, goals, risks, benefits and alternatives  were discussed with patient and (patient's family).  All questions were answered.  Risks include but are not limited to stroke, vessel injury, hemorrhage, and or groin hematoma.  Patient demonstrates understanding  of all risks involved with this procedure and wishes to continue.   Appropriate  consent was obtained from patient and consent is in the patient's chart. Neurointerventional Surgery  Pre-Procedure Note     This is a 73y ____ hand dominant Female    HPI: 73F with PMH HLD, osteoporosis who presents for cerebral angiogram with aneurysm embolization. Patient had MRI / MRA brain 2/2 hand numbness which had incidental finding of acomm aneurysm. Patient underwent diagnostic angiogram 2/14/24 which confirmed acomm aneurysm. Patient presents today for cerebral angiogram with aneurysm embolization via balloon assisted coiling. Patient started on ASA 81 in preparation for the procedure. Patient underwent aneurysm embolization with coiling complicated by aneurysm rupture, controlled with coils and balloon tamponade. NISH[facility] CT showed SAH b/l frontal lobes and R sylvian fissure along with contrast staining.     POD 5: Angio embo of acomm aneurysm, s/p EVD placement, pt now scheduled for follow up cerebral angiogram today 3/11/24 with Dr. Dowd.       Allergies: No Known Allergies      PMH/PSH:  PAST MEDICAL & SURGICAL HISTORY:  HLD (hyperlipidemia)  Cerebral aneurysm  Osteoporosis  History of cerebral angiography      FAMILY HISTORY:  Family history of CVA    Current Medications:   albuterol    0.083%. 2.5 milliGRAM(s) Nebulizer once  bromocriptine Capsule 5 milliGRAM(s) Oral three times a day  chlorhexidine 2% Cloths 1 Application(s) Topical daily  hydrALAZINE Injectable 10 milliGRAM(s) IV Push every 2 hours PRN  labetalol Injectable 10 milliGRAM(s) IV Push every 2 hours PRN  lactated ringers Bolus 1000 milliLiter(s) IV Bolus once  lactated ringers. 1000 milliLiter(s) IV Continuous <Continuous>  levETIRAcetam  IVPB 500 milliGRAM(s) IV Intermittent two times a day  niMODipine Oral Solution 30 milliGRAM(s) Oral every 2 hours  ondansetron Injectable 4 milliGRAM(s) IV Push every 6 hours PRN  piperacillin/tazobactam IVPB.. 3.375 Gram(s) IV Intermittent every 8 hours  polyethylene glycol 3350 17 Gram(s) Oral daily  senna 2 Tablet(s) Oral at bedtime  simvastatin 10 milliGRAM(s) Oral at bedtime  tamsulosin 0.4 milliGRAM(s) Oral at bedtime      Physical Exam:  Constitutional: NAD, lying in bed  Neuro  * Mental Status:  GCS 15: Awake, alert, oriented to conversation. No aphasia or difficulty speaking. No dysarthria. Able to name objects and their function.  * Cranial Nerves: Cnii-Cnxii grossly intact. PERRL, EOMI, tongue midline, no gaze deviation  * Motor: RUE 5/5, LUE 5/5, RLE 5/5, LLE 5/5, no drift or dysmetria  * Sensory: Sensation intact to light touch  * Reflexes: not assessed   Cardiovascular: Regular rate and rhythm.  Eyes: See neurologic examination with detailed examination of eyes.  ENT: No JVD, Trachea Midline  Respiratory: non labored breathing   Gastrointestinal: Soft, nontender, nondistended.  Genitourinary: [ ] Dangelo, [ x ] No Dangelo.   Musculoskeletal: No muscle wasting noted, (See neurologic assessment for full muscle strength assessment) No pretibial edema appreciated, no appreciable calf tenderness.  Skin:  no wounds, no redness, no abrasions noted  Hematologic / Lymph / Immunologic: No bleeding from IV sites or wounds, No lymphadenopathy, No Hives or allergic type skin lesions    [facility] SS:  DATE: 3/11/24  TIME: 06:35   1A: Level of consciousness (0-3): 0  1B: Questions (0-2): 0    1C: Commands (0-2): 0  2: Gaze (0-2): 0  3: Visual fields (0-3): 0  4: Facial palsy (0-3): 0  MOTOR:  5A: Left arm motor drift (0-4): 0  5B: Right arm motor drift (0-4): 0  6A: Left leg motor drift (0-4): 0  6B: Right leg motor drift (0-4): 0  7: Limb ataxia (0-2): 0  SENSORY:  8: Sensation (0-2): 0  SPEECH:  9: Language (0-3): 0  10: Dysarthria (0-2): 0  EXTINCTION:  11: Extinction/inattention (0-2): 0    TOTAL SCORE:     Labs:                         9.4    7.41  )-----------( 306      ( 11 Mar 2024 03:14 )             27.5       03-11    143  |  107  |  13.6  ----------------------------<  124<H>  3.5   |  24.0  |  0.43<L>    Ca    8.7      11 Mar 2024 03:14  Phos  2.6     03-11  Mg     1.9     03-11    TPro  6.1<L>  /  Alb  3.4  /  TBili  0.4  /  DBili  x   /  AST  32<H>  /  ALT  27  /  AlkPhos  54  03-11    PT/INR - ( 11 Mar 2024 03:14 )   PT: 12.7 sec;   INR: 1.15 ratio    PTT - ( 11 Mar 2024 03:14 )  PTT:27.9 sec    Assessment/Plan:   This is a 73y  year old Female  presents with Acomm aneurysm embolization complicated by intra op rupture. Patient presents to neuro-IR for selective cerebral angiography.     Procedure, goals, risks, benefits and alternatives  were discussed with patients daughter Siri.  All questions were answered.  Risks include but are not limited to stroke, vessel injury, hemorrhage, and or groin hematoma.  Patient demonstrates understanding  of all risks involved with this procedure and wishes to continue.   Appropriate  consent was obtained from patient and consent is in the patient's chart. Neurointerventional Surgery  Pre-Procedure Note     This is a 73y ____ hand dominant Female    HPI: 73F with PMH HLD, osteoporosis who presents for cerebral angiogram with aneurysm embolization. Patient had MRI / MRA brain 2/2 hand numbness which had incidental finding of acomm aneurysm. Patient underwent diagnostic angiogram 2/14/24 which confirmed acomm aneurysm. Patient presents today for cerebral angiogram with aneurysm embolization via balloon assisted coiling. Patient started on ASA 81 in preparation for the procedure. Patient underwent aneurysm embolization with coiling complicated by aneurysm rupture, controlled with coils and balloon tamponade. NISH[facility] CT showed SAH b/l frontal lobes and R sylvian fissure along with contrast staining.     POD 5: Angio embo of acomm aneurysm, s/p EVD placement, pt now scheduled for follow up cerebral angiogram today 3/11/24 with Dr. Dowd.       Allergies: No Known Allergies      PMH/PSH:  PAST MEDICAL & SURGICAL HISTORY:  HLD (hyperlipidemia)  Cerebral aneurysm  Osteoporosis  History of cerebral angiography      FAMILY HISTORY:  Family history of CVA    Current Medications:   albuterol    0.083%. 2.5 milliGRAM(s) Nebulizer once  bromocriptine Capsule 5 milliGRAM(s) Oral three times a day  chlorhexidine 2% Cloths 1 Application(s) Topical daily  hydrALAZINE Injectable 10 milliGRAM(s) IV Push every 2 hours PRN  labetalol Injectable 10 milliGRAM(s) IV Push every 2 hours PRN  lactated ringers Bolus 1000 milliLiter(s) IV Bolus once  lactated ringers. 1000 milliLiter(s) IV Continuous <Continuous>  levETIRAcetam  IVPB 500 milliGRAM(s) IV Intermittent two times a day  niMODipine Oral Solution 30 milliGRAM(s) Oral every 2 hours  ondansetron Injectable 4 milliGRAM(s) IV Push every 6 hours PRN  piperacillin/tazobactam IVPB.. 3.375 Gram(s) IV Intermittent every 8 hours  polyethylene glycol 3350 17 Gram(s) Oral daily  senna 2 Tablet(s) Oral at bedtime  simvastatin 10 milliGRAM(s) Oral at bedtime  tamsulosin 0.4 milliGRAM(s) Oral at bedtime      Physical Exam:  Constitutional: NAD, lying in bed  Neuro  * Mental Status:  GCS 15: Awake, alert, oriented to name and place, No aphasia or difficulty speaking. No dysarthria. Able to name objects and their function.  * Cranial Nerves: Cnii-Cnxii grossly intact. PERRL, EOMI, tongue midline, no gaze deviation, Left facial droop   * Motor: RUE 5/5, LUE 5/5, RLE 5/5, LLE 5/5, no drift   * Sensory: Sensation intact to light touch  * Reflexes: not assessed   Cardiovascular: Regular rate and rhythm.  Eyes: See neurologic examination with detailed examination of eyes.  ENT: No JVD, Trachea Midline  Respiratory: non labored breathing   Gastrointestinal: Soft, nontender, nondistended.  Genitourinary: [ X ] Dangelo, [  ] No Dangelo.   Musculoskeletal: No muscle wasting noted, (See neurologic assessment for full muscle strength assessment)   Skin:  no wounds, no redness, no abrasions noted  Hematologic / Lymph / Immunologic: No bleeding from IV sites or wounds    [facility] SS:  DATE: 3/11/24  TIME: 06:35   1A: Level of consciousness (0-3): 0  1B: Questions (0-2): 1  1C: Commands (0-2): 0  2: Gaze (0-2): 0  3: Visual fields (0-3): 0  4: Facial palsy (0-3): 1  MOTOR:  5A: Left arm motor drift (0-4): 0  5B: Right arm motor drift (0-4): 0  6A: Left leg motor drift (0-4): 0  6B: Right leg motor drift (0-4): 0  7: Limb ataxia (0-2): 0  SENSORY:  8: Sensation (0-2): 0  SPEECH:  9: Language (0-3): 0  10: Dysarthria (0-2): 0  EXTINCTION:  11: Extinction/inattention (0-2): 0    TOTAL SCORE: 2    Labs:                         9.4    7.41  )-----------( 306      ( 11 Mar 2024 03:14 )             27.5       03-11    143  |  107  |  13.6  ----------------------------<  124<H>  3.5   |  24.0  |  0.43<L>    Ca    8.7      11 Mar 2024 03:14  Phos  2.6     03-11  Mg     1.9     03-11    TPro  6.1<L>  /  Alb  3.4  /  TBili  0.4  /  DBili  x   /  AST  32<H>  /  ALT  27  /  AlkPhos  54  03-11    PT/INR - ( 11 Mar 2024 03:14 )   PT: 12.7 sec;   INR: 1.15 ratio    PTT - ( 11 Mar 2024 03:14 )  PTT:27.9 sec    Assessment/Plan:   This is a 73y  year old Female  presents with Acomm aneurysm embolization complicated by intra op rupture. Patient presents to neuro-IR for selective cerebral angiography.     Procedure, goals, risks, benefits and alternatives  were discussed with patients daughter Siri.  All questions were answered.  Risks include but are not limited to stroke, vessel injury, hemorrhage, and or groin hematoma.  Patient demonstrates understanding  of all risks involved with this procedure and wishes to continue.   Appropriate  consent was obtained from patient and consent is in the patient's chart. Neurointerventional Surgery  Pre-Procedure Note     This is a 73y ____ hand dominant Female    HPI: 73F with PMH HLD, osteoporosis who presents for cerebral angiogram with aneurysm embolization. Patient had MRI / MRA brain 2/2 hand numbness which had incidental finding of acomm aneurysm. Patient underwent diagnostic angiogram 2/14/24 which confirmed acomm aneurysm. Patient presents today for cerebral angiogram with aneurysm embolization via balloon assisted coiling. Patient started on ASA 81 in preparation for the procedure. Patient underwent aneurysm embolization with coiling complicated by aneurysm rupture, controlled with coils and balloon tamponade. NISH[facility] CT showed SAH b/l frontal lobes and R sylvian fissure along with contrast staining.     POD #5 Angio embo of acomm aneurysm, s/p EVD placement, pt now scheduled for follow up cerebral angiogram today 3/11/24 with Dr. Dowd.       Allergies: No Known Allergies      PMH/PSH:  PAST MEDICAL & SURGICAL HISTORY:  HLD (hyperlipidemia)  Cerebral aneurysm  Osteoporosis  History of cerebral angiography      FAMILY HISTORY:  Family history of CVA    Current Medications:   albuterol    0.083%. 2.5 milliGRAM(s) Nebulizer once  bromocriptine Capsule 5 milliGRAM(s) Oral three times a day  chlorhexidine 2% Cloths 1 Application(s) Topical daily  hydrALAZINE Injectable 10 milliGRAM(s) IV Push every 2 hours PRN  labetalol Injectable 10 milliGRAM(s) IV Push every 2 hours PRN  lactated ringers Bolus 1000 milliLiter(s) IV Bolus once  lactated ringers. 1000 milliLiter(s) IV Continuous <Continuous>  levETIRAcetam  IVPB 500 milliGRAM(s) IV Intermittent two times a day  niMODipine Oral Solution 30 milliGRAM(s) Oral every 2 hours  ondansetron Injectable 4 milliGRAM(s) IV Push every 6 hours PRN  piperacillin/tazobactam IVPB.. 3.375 Gram(s) IV Intermittent every 8 hours  polyethylene glycol 3350 17 Gram(s) Oral daily  senna 2 Tablet(s) Oral at bedtime  simvastatin 10 milliGRAM(s) Oral at bedtime  tamsulosin 0.4 milliGRAM(s) Oral at bedtime      Physical Exam:  Constitutional: NAD, lying in bed  Neuro  * Mental Status:  GCS 15: Awake, alert, oriented to name and place, No aphasia or difficulty speaking. No dysarthria. Able to name objects and their function.  * Cranial Nerves: Cnii-Cnxii grossly intact. PERRL, EOMI, tongue midline, no gaze deviation, Right facial droop   * Motor: RUE 5/5, LUE 5/5, RLE 5/5, LLE 5/5, no drift   * Sensory: Sensation intact to light touch  * Reflexes: not assessed   Cardiovascular: Regular rate and rhythm.  Eyes: See neurologic examination with detailed examination of eyes.  ENT: No JVD, Trachea Midline  Respiratory: non labored breathing   Gastrointestinal: Soft, nontender, nondistended.  Genitourinary: [ X ] Dangelo, [  ] No Dangelo.   Musculoskeletal: No muscle wasting noted, (See neurologic assessment for full muscle strength assessment)   Skin:  no wounds, no redness, no abrasions noted  Hematologic / Lymph / Immunologic: No bleeding from IV sites or wounds    [facility] SS:  DATE: 3/11/24  TIME: 06:35   1A: Level of consciousness (0-3): 0  1B: Questions (0-2): 1  1C: Commands (0-2): 0  2: Gaze (0-2): 0  3: Visual fields (0-3): 0  4: Facial palsy (0-3): 1  MOTOR:  5A: Left arm motor drift (0-4): 0  5B: Right arm motor drift (0-4): 0  6A: Left leg motor drift (0-4): 0  6B: Right leg motor drift (0-4): 0  7: Limb ataxia (0-2): 0  SENSORY:  8: Sensation (0-2): 0  SPEECH:  9: Language (0-3): 0  10: Dysarthria (0-2): 0  EXTINCTION:  11: Extinction/inattention (0-2): 0    TOTAL SCORE: 2    Labs:                         9.4    7.41  )-----------( 306      ( 11 Mar 2024 03:14 )             27.5       03-11    143  |  107  |  13.6  ----------------------------<  124<H>  3.5   |  24.0  |  0.43<L>    Ca    8.7      11 Mar 2024 03:14  Phos  2.6     03-11  Mg     1.9     03-11    TPro  6.1<L>  /  Alb  3.4  /  TBili  0.4  /  DBili  x   /  AST  32<H>  /  ALT  27  /  AlkPhos  54  03-11    PT/INR - ( 11 Mar 2024 03:14 )   PT: 12.7 sec;   INR: 1.15 ratio    PTT - ( 11 Mar 2024 03:14 )  PTT:27.9 sec    Assessment/Plan:   This is a 73y  year old Female  presents with Acomm aneurysm embolization complicated by intra op rupture. Patient presents to neuro-IR for selective cerebral angiography.     Procedure, goals, risks, benefits and alternatives  were discussed with patients daughter Siri.  All questions were answered.  Risks include but are not limited to stroke, vessel injury, hemorrhage, and or groin hematoma.  Patient demonstrates understanding  of all risks involved with this procedure and wishes to continue.   Appropriate  consent was obtained from patient and consent is in the patient's chart.

## 2024-03-11 NOTE — CHART NOTE - NSCHARTNOTEFT_GEN_A_CORE
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 Noemí MD  Nurse Practitioner: Nadja Hodge NP   Nurse: DANYEL Bautista   Anesthesiologist: Dr. Negrete                                           Radiology Tech: Tobin HARTMANN   Fellow: Mark Loja DO     Sheath:  4 [employer] Sheath    I/Os: estimated blood loss less than 10cc,  IV fluids 200 cc, Urine output 700 cc, Contrast: Omnipaque 240 82  cc     Vitals:  BP  160/73 HR 70  Spo2 100 %    Preliminary Report:  Under a 4 [place] short sheath via the right groin under MAC sedation via left vertebral artery, left internal carotid artery, right vertebral artery, right internal carotid artery, a selective cerebral angiography  was performed and reveals Acomm Cerebral Aneurysm occlusion with small neck remanent , no vasospasm noted. ( Official note to follow).    Patient tolerated procedure well.  Patient remains hemodynamically stable, no change in neurological status compared to baseline.  Results were discussed with patients daughter Siri Rogers and Neurosurgery.  Right groin sheath  was discontinued at 0835. Hemostasis was obtained with approximately 15 minutes of manual compression.     No active bleeding, no hematoma, no ecchymosis.   Quick clot and safeguard balloon dressing applied at 0850  Patient transferred to recovery room in stable condition.

## 2024-03-11 NOTE — PROGRESS NOTE ADULT - SUBJECTIVE AND OBJECTIVE BOX
HPI:  73F PMH HLD, osteoporosis presented for elective complex Acomm aneurysm embolization via balloon assisted coiling. Patient started on ASA 81 in preparation for the procedure. Patient underwent aneurysm embolization with coiling complicated by aneurysm rupture, controlled with coils and balloon tamponade. NISHANT CT showed SAH b/l frontal lobes and thick R SAH, subsequent stability CTH with worsening ICH and IVH. Intubated in ICU with EVD placement.    INTERVAL HPI/OVERNIGHT EVENTS:  - CXR shows RLL infiltrate consistent with aspiration pneumonia  - Zosyn started  - RVP neg  - Abdominal x-ray for distention --> unremarkable  - MRI/MRA brain completed    ICU Vital Signs Last 24 Hrs  T(C): 37.3 (10 Mar 2024 23:00), Max: 38.3 (10 Mar 2024 07:00)  T(F): 99.1 (10 Mar 2024 23:00), Max: 100.9 (10 Mar 2024 07:00)  HR: 67 (11 Mar 2024 01:00) (67 - 97)  BP: 119/86 (11 Mar 2024 01:00) (85/74 - 162/131)  BP(mean): 94 (11 Mar 2024 01:00) (79 - 148)  ABP: 137/60 (11 Mar 2024 01:00) (123/51 - 155/63)  ABP(mean): 91 (11 Mar 2024 01:00) (78 - 99)  RR: 12 (11 Mar 2024 01:00) (12 - 23)  SpO2: 100% (11 Mar 2024 01:00) (96% - 100%)    O2 Parameters below as of 10 Mar 2024 16:00  Patient On (Oxygen Delivery Method): nasal cannula  O2 Flow (L/min): 3    PHYSICAL EXAM:  GENERAL: NAD, well-groomed  HEAD:  Atraumatic, normocephalic  DRAINS: EVD @ 10 draining well  WOUND: Dressing clean dry intact  MENTAL STATUS: AAO x2; a little lethargic but awake; Opens eyes spontaneously; Appropriately conversant; following simple commands  CRANIAL NERVES: Visual acuity normal for age, visual fields full to confrontation, PERRL. EOMI without nystagmus.  REFLEXES: PERRL. Corneals intact b/l. Gag intact. Cough intact. Oculocephalic reflex intact (Doll's eye).   MOTOR: LLE HF 4/5, everything else 5/5  CHEST/LUNG: equal rise and fall of chest bilaterally  HEART: +S1/+S2  ABDOMEN: Soft, nontender, mildly distended  EXTREMITIES:  2+ peripheral pulses, no clubbing, cyanosis, or edema  SKIN: Warm, dry; no rashes or lesions    LABS:                                   9.0    8.72  )-----------( 299      ( 10 Mar 2024 04:00 )             26.0   03-10    146<H>  |  113<H>  |  12.3  ----------------------------<  118<H>  3.7   |  22.0  |  0.45<L>    Ca    8.3<L>      10 Mar 2024 04:00  Phos  1.9     03-10  Mg     2.2     03-10        Urinalysis Basic - ( 09 Mar 2024 03:15 )    Color: x / Appearance: x / SG: x / pH: x  Gluc: 145 mg/dL / Ketone: x  / Bili: x / Urobili: x   Blood: x / Protein: x / Nitrite: x   Leuk Esterase: x / RBC: x / WBC x   Sq Epi: x / Non Sq Epi: x / Bacteria: x      I&O's Summary    09 Mar 2024 06:01  -  10 Mar 2024 07:00  --------------------------------------------------------  IN: 3288.1 mL / OUT: 2701 mL / NET: 587.1 mL    10 Mar 2024 07:01  -  11 Mar 2024 01:36  --------------------------------------------------------  IN: 2303 mL / OUT: 3150 mL / NET: -847 mL      RADIOLOGY & ADDITIONAL TESTS:  ACC: [id number] EXAM:  XR CHEST PORTABLE IMMED 1V   ORDERED BY: ALEXI KIRBY     PROCEDURE DATE:  03/09/2024          INTERPRETATION:  TECHNIQUE: A single AP view of the chest was obtained.   Ordered time:   3/9/2024 9:20 AM    COMPARISON: 3/8/2024    CLINICAL INFORMATION: Fever and cough    FINDINGS:  Right IJ catheter was removed.  The heart is not enlarged.  There is increased hazy opacity at the right lung base when compared with   the prior study.  The left lung is clear.  There is no pneumothorax.    IMPRESSION:    Right lower lung hazy opacity which may represent atelectasis, pneumonia   and/or effusion.    --- End of Report ---

## 2024-03-11 NOTE — PROGRESS NOTE ADULT - ASSESSMENT
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  ICH, IVH, SAH, obstructive hydrocephalus, EVD in place.  Fever, suspected aspiration PNA.    Neuro:  - Neurochecks  - TCD, Nimodipine, cont  - Keppra ppx - d/c as aneurysm is secured  - EVD at 10, CSF diversion per NSGY;   - SBP Goal 100-160,  - maintain Osats>92%, incentive spirometry  - diet as tolerated; bowel regimen  - monitor UOP and e-lytes; failed TOV, add Flomax, Dangelo in place  - DVT Ppx: SCD, SQL  - cont Zosyn for suspected aspiration for 7 days  - Monitor BGL, maintain 120-180

## 2024-03-12 ENCOUNTER — APPOINTMENT (OUTPATIENT)
Dept: NEUROLOGY | Facility: CLINIC | Age: 74
End: 2024-03-12

## 2024-03-12 LAB
ANION GAP SERPL CALC-SCNC: 12 MMOL/L — SIGNIFICANT CHANGE UP (ref 5–17)
APPEARANCE CSF: ABNORMAL
APPEARANCE SPUN FLD: SIGNIFICANT CHANGE UP
APPEARANCE UR: CLEAR — SIGNIFICANT CHANGE UP
BACTERIA # UR AUTO: NEGATIVE /HPF — SIGNIFICANT CHANGE UP
BILIRUB UR-MCNC: NEGATIVE — SIGNIFICANT CHANGE UP
BUN SERPL-MCNC: 14.7 MG/DL — SIGNIFICANT CHANGE UP (ref 8–20)
CALCIUM SERPL-MCNC: 8.4 MG/DL — SIGNIFICANT CHANGE UP (ref 8.4–10.5)
CAST: 1 /LPF — SIGNIFICANT CHANGE UP (ref 0–4)
CHLORIDE SERPL-SCNC: 105 MMOL/L — SIGNIFICANT CHANGE UP (ref 96–108)
CO2 SERPL-SCNC: 25 MMOL/L — SIGNIFICANT CHANGE UP (ref 22–29)
COLOR CSF: ABNORMAL
COLOR SPEC: YELLOW — SIGNIFICANT CHANGE UP
CREAT SERPL-MCNC: 0.45 MG/DL — LOW (ref 0.5–1.3)
DIFF PNL FLD: ABNORMAL
EGFR: 103 ML/MIN/1.73M2 — SIGNIFICANT CHANGE UP
GLUCOSE CSF-MCNC: 70 MG/DLG/24H — SIGNIFICANT CHANGE UP (ref 40–70)
GLUCOSE SERPL-MCNC: 123 MG/DL — HIGH (ref 70–99)
GLUCOSE UR QL: NEGATIVE MG/DL — SIGNIFICANT CHANGE UP
GRAM STN FLD: SIGNIFICANT CHANGE UP
HCT VFR BLD CALC: 27.6 % — LOW (ref 34.5–45)
HGB BLD-MCNC: 9.4 G/DL — LOW (ref 11.5–15.5)
KETONES UR-MCNC: NEGATIVE MG/DL — SIGNIFICANT CHANGE UP
LEUKOCYTE ESTERASE UR-ACNC: NEGATIVE — SIGNIFICANT CHANGE UP
LYMPHOCYTES # CSF: 10 % — LOW (ref 40–80)
MAGNESIUM SERPL-MCNC: 2.1 MG/DL — SIGNIFICANT CHANGE UP (ref 1.8–2.6)
MCHC RBC-ENTMCNC: 30.7 PG — SIGNIFICANT CHANGE UP (ref 27–34)
MCHC RBC-ENTMCNC: 34.1 GM/DL — SIGNIFICANT CHANGE UP (ref 32–36)
MCV RBC AUTO: 90.2 FL — SIGNIFICANT CHANGE UP (ref 80–100)
MONOS+MACROS NFR CSF: 3 % — LOW (ref 15–45)
NEUTROPHILS # CSF: 87 % — HIGH (ref 0–6)
NITRITE UR-MCNC: NEGATIVE — SIGNIFICANT CHANGE UP
NRBC NFR CSF: 100 /UL — HIGH (ref 0–5)
PH UR: 7 — SIGNIFICANT CHANGE UP (ref 5–8)
PHOSPHATE SERPL-MCNC: 3 MG/DL — SIGNIFICANT CHANGE UP (ref 2.4–4.7)
PLATELET # BLD AUTO: 302 K/UL — SIGNIFICANT CHANGE UP (ref 150–400)
POTASSIUM SERPL-MCNC: 3.8 MMOL/L — SIGNIFICANT CHANGE UP (ref 3.5–5.3)
POTASSIUM SERPL-SCNC: 3.8 MMOL/L — SIGNIFICANT CHANGE UP (ref 3.5–5.3)
PROCALCITONIN SERPL-MCNC: 0.18 NG/ML — HIGH (ref 0.02–0.1)
PROT CSF-MCNC: 41 MG/DL — SIGNIFICANT CHANGE UP (ref 15–45)
PROT UR-MCNC: NEGATIVE MG/DL — SIGNIFICANT CHANGE UP
RAPID RVP RESULT: SIGNIFICANT CHANGE UP
RBC # BLD: 3.06 M/UL — LOW (ref 3.8–5.2)
RBC # CSF: HIGH /CMM (ref 0–1)
RBC # FLD: 12.3 % — SIGNIFICANT CHANGE UP (ref 10.3–14.5)
RBC CASTS # UR COMP ASSIST: 13 /HPF — HIGH (ref 0–4)
SARS-COV-2 RNA SPEC QL NAA+PROBE: SIGNIFICANT CHANGE UP
SODIUM SERPL-SCNC: 142 MMOL/L — SIGNIFICANT CHANGE UP (ref 135–145)
SP GR SPEC: 1.02 — SIGNIFICANT CHANGE UP (ref 1–1.03)
SPECIMEN SOURCE: SIGNIFICANT CHANGE UP
SQUAMOUS # UR AUTO: 1 /HPF — SIGNIFICANT CHANGE UP (ref 0–5)
TUBE TYPE: SIGNIFICANT CHANGE UP
UROBILINOGEN FLD QL: 0.2 MG/DL — SIGNIFICANT CHANGE UP (ref 0.2–1)
WBC # BLD: 8.24 K/UL — SIGNIFICANT CHANGE UP (ref 3.8–10.5)
WBC # FLD AUTO: 8.24 K/UL — SIGNIFICANT CHANGE UP (ref 3.8–10.5)
WBC UR QL: 1 /HPF — SIGNIFICANT CHANGE UP (ref 0–5)

## 2024-03-12 PROCEDURE — 71045 X-RAY EXAM CHEST 1 VIEW: CPT | Mod: 26

## 2024-03-12 PROCEDURE — 99233 SBSQ HOSP IP/OBS HIGH 50: CPT

## 2024-03-12 PROCEDURE — 99291 CRITICAL CARE FIRST HOUR: CPT

## 2024-03-12 PROCEDURE — 93970 EXTREMITY STUDY: CPT | Mod: 26

## 2024-03-12 PROCEDURE — 99232 SBSQ HOSP IP/OBS MODERATE 35: CPT

## 2024-03-12 PROCEDURE — 93886 INTRACRANIAL COMPLETE STUDY: CPT | Mod: 26

## 2024-03-12 RX ORDER — ACETAMINOPHEN 500 MG
675 TABLET ORAL ONCE
Refills: 0 | Status: COMPLETED | OUTPATIENT
Start: 2024-03-12 | End: 2024-03-12

## 2024-03-12 RX ORDER — QUETIAPINE FUMARATE 200 MG/1
12.5 TABLET, FILM COATED ORAL ONCE
Refills: 0 | Status: COMPLETED | OUTPATIENT
Start: 2024-03-12 | End: 2024-03-13

## 2024-03-12 RX ORDER — HYDRALAZINE HCL 50 MG
10 TABLET ORAL
Refills: 0 | Status: DISCONTINUED | OUTPATIENT
Start: 2024-03-12 | End: 2024-03-27

## 2024-03-12 RX ORDER — CYCLOBENZAPRINE HYDROCHLORIDE 10 MG/1
5 TABLET, FILM COATED ORAL ONCE
Refills: 0 | Status: COMPLETED | OUTPATIENT
Start: 2024-03-12 | End: 2024-03-12

## 2024-03-12 RX ORDER — POTASSIUM CHLORIDE 20 MEQ
40 PACKET (EA) ORAL ONCE
Refills: 0 | Status: COMPLETED | OUTPATIENT
Start: 2024-03-12 | End: 2024-03-12

## 2024-03-12 RX ORDER — LABETALOL HCL 100 MG
10 TABLET ORAL
Refills: 0 | Status: DISCONTINUED | OUTPATIENT
Start: 2024-03-12 | End: 2024-03-27

## 2024-03-12 RX ADMIN — Medication 675 MILLIGRAM(S): at 22:35

## 2024-03-12 RX ADMIN — Medication 40 MILLIEQUIVALENT(S): at 05:42

## 2024-03-12 RX ADMIN — ENOXAPARIN SODIUM 30 MILLIGRAM(S): 100 INJECTION SUBCUTANEOUS at 22:18

## 2024-03-12 RX ADMIN — NIMODIPINE 30 MILLIGRAM(S): 60 SOLUTION ORAL at 22:34

## 2024-03-12 RX ADMIN — NIMODIPINE 30 MILLIGRAM(S): 60 SOLUTION ORAL at 20:22

## 2024-03-12 RX ADMIN — NIMODIPINE 30 MILLIGRAM(S): 60 SOLUTION ORAL at 08:29

## 2024-03-12 RX ADMIN — Medication 270 MILLIGRAM(S): at 05:16

## 2024-03-12 RX ADMIN — SIMVASTATIN 10 MILLIGRAM(S): 20 TABLET, FILM COATED ORAL at 22:18

## 2024-03-12 RX ADMIN — Medication 10 MILLIGRAM(S): at 21:43

## 2024-03-12 RX ADMIN — PIPERACILLIN AND TAZOBACTAM 25 GRAM(S): 4; .5 INJECTION, POWDER, LYOPHILIZED, FOR SOLUTION INTRAVENOUS at 14:22

## 2024-03-12 RX ADMIN — DEXMEDETOMIDINE HYDROCHLORIDE IN 0.9% SODIUM CHLORIDE 2.25 MICROGRAM(S)/KG/HR: 4 INJECTION INTRAVENOUS at 04:56

## 2024-03-12 RX ADMIN — BROMOCRIPTINE MESYLATE 10 MILLIGRAM(S): 5 CAPSULE ORAL at 14:00

## 2024-03-12 RX ADMIN — NIMODIPINE 30 MILLIGRAM(S): 60 SOLUTION ORAL at 04:56

## 2024-03-12 RX ADMIN — Medication 270 MILLIGRAM(S): at 13:31

## 2024-03-12 RX ADMIN — BROMOCRIPTINE MESYLATE 10 MILLIGRAM(S): 5 CAPSULE ORAL at 05:42

## 2024-03-12 RX ADMIN — PIPERACILLIN AND TAZOBACTAM 25 GRAM(S): 4; .5 INJECTION, POWDER, LYOPHILIZED, FOR SOLUTION INTRAVENOUS at 22:17

## 2024-03-12 RX ADMIN — Medication 675 MILLIGRAM(S): at 05:39

## 2024-03-12 RX ADMIN — NIMODIPINE 30 MILLIGRAM(S): 60 SOLUTION ORAL at 12:50

## 2024-03-12 RX ADMIN — CHLORHEXIDINE GLUCONATE 1 APPLICATION(S): 213 SOLUTION TOPICAL at 13:35

## 2024-03-12 RX ADMIN — NIMODIPINE 30 MILLIGRAM(S): 60 SOLUTION ORAL at 14:17

## 2024-03-12 RX ADMIN — NIMODIPINE 30 MILLIGRAM(S): 60 SOLUTION ORAL at 10:18

## 2024-03-12 RX ADMIN — NIMODIPINE 30 MILLIGRAM(S): 60 SOLUTION ORAL at 06:50

## 2024-03-12 RX ADMIN — PIPERACILLIN AND TAZOBACTAM 25 GRAM(S): 4; .5 INJECTION, POWDER, LYOPHILIZED, FOR SOLUTION INTRAVENOUS at 05:42

## 2024-03-12 RX ADMIN — BROMOCRIPTINE MESYLATE 10 MILLIGRAM(S): 5 CAPSULE ORAL at 22:18

## 2024-03-12 RX ADMIN — Medication 270 MILLIGRAM(S): at 22:23

## 2024-03-12 RX ADMIN — NIMODIPINE 30 MILLIGRAM(S): 60 SOLUTION ORAL at 16:37

## 2024-03-12 RX ADMIN — Medication 675 MILLIGRAM(S): at 14:01

## 2024-03-12 RX ADMIN — NIMODIPINE 30 MILLIGRAM(S): 60 SOLUTION ORAL at 18:54

## 2024-03-12 RX ADMIN — TAMSULOSIN HYDROCHLORIDE 0.4 MILLIGRAM(S): 0.4 CAPSULE ORAL at 22:18

## 2024-03-12 RX ADMIN — SODIUM CHLORIDE 50 MILLILITER(S): 9 INJECTION, SOLUTION INTRAVENOUS at 16:58

## 2024-03-12 RX ADMIN — NIMODIPINE 30 MILLIGRAM(S): 60 SOLUTION ORAL at 02:41

## 2024-03-12 RX ADMIN — CYCLOBENZAPRINE HYDROCHLORIDE 5 MILLIGRAM(S): 10 TABLET, FILM COATED ORAL at 22:18

## 2024-03-12 NOTE — PROGRESS NOTE ADULT - SUBJECTIVE AND OBJECTIVE BOX
NSICU Night Intensivist Note    Historical Review:   73F PMH HLD, osteoporosis presented 3/6 for elective complex Acomm aneurysm embolization via balloon assisted coiling. Patient started on ASA 81 in preparation for the procedure. Patient underwent aneurysm embolization with coiling complicated by aneurysm rupture, controlled with coils and balloon tamponade. N[facility] CT showed SAH b/l frontal lobes and thick R SAH, subsequent stability CTH with worsening ICH and IVH. Intubated in ICU with EVD placement.    12hr EVENTS:  - mild agitation requiring low dose precedex  - pending EVD clamp trial    ----------------------------------------------------------------------------------------------------  PHYSICAL EXAM:  General:   HEENT: MMM  Neuro:  -Mental status- Ox_, [] EO , [] FC  -CN- PERRL 3mm, EOMI, tongue midline, face symmetric  -SensoriMotor- RUE __, RLE __, LUE __, LLE__    CV: regular rate  Pulm: no accessory muscle use  Abd: soft, nontender, nondistended, []TF  Skin: warm, dry, [] incontinence associated skin breakdown           NSICU Night Intensivist Note    Historical Review:   73F PMH HLD, osteoporosis presented 3/6 for elective complex Acomm aneurysm embolization via balloon assisted coiling. Patient started on ASA 81 in preparation for the procedure. Patient underwent aneurysm embolization with coiling complicated by aneurysm rupture, controlled with coils and balloon tamponade. NISHANT CT showed SAH b/l frontal lobes and thick R SAH, subsequent stability CTH with worsening ICH and IVH. Intubated in ICU with EVD placement.    12hr EVENTS:  - mild agitation requiring low dose precedex  - pending EVD clamp trial    ----------------------------------------------------------------------------------------------------  PHYSICAL EXAM:  General: head elevated in bed, pleasant  HEENT: MMM  Neuro: EVD in place, dressing c/d/i  -Mental status- eyes open, follows commands  -CN- PERRL 3mm, EOMI, tongue midline, face symmetric  -SensoriMotor- L side at least 4/5, R side 5/5 (poor cooperation)    CV: regular rate  Pulm: no accessory muscle use  Abd: soft, nontender, nondistended  Skin: warm, dry    -----------------------------------------------------------------------------------------------------  ICU Vital Signs Last 24 Hrs  T(C): 38.9 (12 Mar 2024 22:30), Max: 39 (12 Mar 2024 22:15)  T(F): 102 (12 Mar 2024 22:30), Max: 102.2 (12 Mar 2024 22:15)  HR: 85 (12 Mar 2024 22:30) (54 - 110)  BP: 145/86 (12 Mar 2024 22:30) (92/76 - 168/87)  BP(mean): 100 (12 Mar 2024 22:30) (81 - 141)  ABP: 155/63 (12 Mar 2024 12:45) (107/44 - 162/73)  ABP(mean): 59 (12 Mar 2024 12:45) (59 - 105)  RR: 19 (12 Mar 2024 22:30) (12 - 26)  SpO2: 98% (12 Mar 2024 22:30) (94% - 99%)    O2 Parameters below as of 12 Mar 2024 22:00  Patient On (Oxygen Delivery Method): room air            I&O's Summary    11 Mar 2024 07:01  -  12 Mar 2024 07:00  --------------------------------------------------------  IN: 1666 mL / OUT: 2777 mL / NET: -1111 mL    12 Mar 2024 07:01  -  12 Mar 2024 23:25  --------------------------------------------------------  IN: 2745 mL / OUT: 1308 mL / NET: 1437 mL        MEDICATIONS  (STANDING):  albuterol    0.083%. 2.5 milliGRAM(s) Nebulizer once  bromocriptine Capsule 10 milliGRAM(s) Oral three times a day  chlorhexidine 2% Cloths 1 Application(s) Topical daily  dexMEDEtomidine Infusion 0.2 MICROgram(s)/kG/Hr (2.25 mL/Hr) IV Continuous <Continuous>  enoxaparin Injectable 30 milliGRAM(s) SubCutaneous <User Schedule>  lactated ringers. 1000 milliLiter(s) (50 mL/Hr) IV Continuous <Continuous>  niMODipine Oral Solution 30 milliGRAM(s) Oral every 2 hours  piperacillin/tazobactam IVPB.. 3.375 Gram(s) IV Intermittent every 8 hours  QUEtiapine 12.5 milliGRAM(s) Oral once  simvastatin 10 milliGRAM(s) Oral at bedtime  tamsulosin 0.4 milliGRAM(s) Oral at bedtime    IMAGING:   Recent imaging studies were reviewed.    LAB RESULTS:                          9.4    8.24  )-----------( 302      ( 12 Mar 2024 02:45 )             27.6       PT/INR - ( 11 Mar 2024 03:14 )   PT: 12.7 sec;   INR: 1.15 ratio         PTT - ( 11 Mar 2024 03:14 )  PTT:27.9 sec    03-12    142  |  105  |  14.7  ----------------------------<  123<H>  3.8   |  25.0  |  0.45<L>    Ca    8.4      12 Mar 2024 02:45  Phos  3.0     03-12  Mg     2.1     03-12    TPro  6.1<L>  /  Alb  3.4  /  TBili  0.4  /  DBili  x   /  AST  32<H>  /  ALT  27  /  AlkPhos  54  03-11      Culture - CSF with Gram Stain (collected 03-12-24 @ 12:45)  Source: .CSF CSF  Gram Stain (03-12-24 @ 19:26):    No polymorphonuclear cells seen    No organisms seen    by cytocentrifuge      -----------------------------------------------------------------------------------------------------------------------------------------------------------------------------------

## 2024-03-12 NOTE — PROGRESS NOTE ADULT - ASSESSMENT
72 yo F with acomm aneurysm s/p embolization c/b rupture, tamponaded with coils.  ICH, IVH, SAH, obstructive hydrocephalus, EVD in place.  Fever, suspected aspiration PNA.    Plan: ***  - Neurochecks  - TCD, Nimodipine, cont  - EVD at 10, CSF diversion per NSGY; plan for EVD challenge 03/14  - SBP Goal 100-160, remove [place]  - maintain Osats>92%, incentive spirometry  - diet as tolerated; bowel regimen  - monitor UOP and e-lytes; failed TOV 03/10, cont Flomax, Dangelo in place  - DVT Ppx: SCD, SQL; obtain LE Doppler  - cont Zosyn for suspected aspiration for 7 days; repeat fever w/up in cl. CSF in view of persistent fever  - Monitor BGL, maintain 120-180 72 yo F with acomm aneurysm s/p embolization c/b rupture, tamponaded with coils.  ICH, IVH, SAH, obstructive hydrocephalus, EVD in place.    Plan:   - Neurochecks q2h  - TCDs daily, Nimodipine  - EVD at 10, CSF diversion per NSGY; plan for EVD challenge 03/13  - SBP Goal 100-160  - maintain Osats>92%, incentive spirometry  - diet as tolerated; bowel regimen  - monitor UOP and e-lytes; cont Flomax, Dangelo in place for retention  - cont Zosyn for suspected aspiration, bromocriptine 10mg TID  - Monitor BGL, maintain 120-180  - DVT Ppx: SCD, SQL

## 2024-03-12 NOTE — PROGRESS NOTE ADULT - ASSESSMENT
Assessment:  73F with PMH HLD who presented for elective acomm aneurysm embolization with balloon-assisted coiling, complicated by aneurysm rupture controlled with coils and balloon tamponade.   - PBD 6, POD 6   - Exam grossly stable  - Continues with fevers, fever w/u negative thus far      Plan:  - Discussed with Dr. Dowd  - Q2 hour neuro checks  - SBP goal   - Maintain normothermia  - Maintain euvolemia, strict I&Os, supplement for net negative PRN  - Nimodipine 30q2 as tolerated by HR and BP   - TCDs daily as able  - DVT prophylaxis: SCDs, lovenox  - No AED required  - EVD management per neurosurgery, potential clamp trial tomorrow   - PT/OT/OOB to chair  - Further care per NSICU team

## 2024-03-12 NOTE — PROGRESS NOTE ADULT - SUBJECTIVE AND OBJECTIVE BOX
HPI:  73F PMH HLD, osteoporosis presented for elective complex Acomm aneurysm embolization via balloon assisted coiling. Patient started on ASA 81 in preparation for the procedure. Patient underwent aneurysm embolization with coiling complicated by aneurysm rupture, controlled with coils and balloon tamponade. NISHANT CT showed SAH b/l frontal lobes and thick R SAH, subsequent stability CTH with worsening ICH and IVH. Intubated in ICU with EVD placement.    INTERVAL HPI/OVERNIGHT EVENTS:  -precedex added for agitation    Vital Signs Last 24 Hrs  T(C): 36 (12 Mar 2024 00:00), Max: 38.9 (11 Mar 2024 22:00)  T(F): 96.8 (12 Mar 2024 00:00), Max: 102 (11 Mar 2024 22:00)  HR: 54 (12 Mar 2024 00:30) (54 - 99)  BP: 161/66 (11 Mar 2024 20:30) (135/92 - 161/66)  BP(mean): 91 (11 Mar 2024 20:30) (89 - 108)  RR: 12 (12 Mar 2024 00:30) (12 - 23)  SpO2: 98% (12 Mar 2024 00:30) (94% - 100%)    Parameters below as of 12 Mar 2024 00:00  Patient On (Oxygen Delivery Method): room air          PHYSICAL EXAM:  GENERAL: NAD, well-groomed  HEAD:  Atraumatic, normocephalic  DRAINS: EVD @ 10 draining well  WOUND: Dressing clean dry intact  MENTAL STATUS: AAO x2; a little lethargic but awake; Opens eyes spontaneously; Appropriately conversant; following simple commands  CRANIAL NERVES: Visual acuity normal for age, visual fields full to confrontation, PERRL. EOMI without nystagmus.  REFLEXES: PERRL. Corneals intact b/l. Gag intact. Cough intact. Oculocephalic reflex intact (Doll's eye).   MOTOR: LLE HF 4/5, everything else 5/5  CHEST/LUNG: equal rise and fall of chest bilaterally  HEART: +S1/+S2  ABDOMEN: Soft, nontender, mildly distended  EXTREMITIES:  2+ peripheral pulses, no clubbing, cyanosis, or edema  SKIN: Warm, dry; no rashes or lesions      LABS:                        9.4    7.41  )-----------( 306      ( 11 Mar 2024 03:14 )             27.5     03-11    143  |  107  |  13.6  ----------------------------<  124<H>  3.5   |  24.0  |  0.43<L>    Ca    8.7      11 Mar 2024 03:14  Phos  2.6     03-11  Mg     1.9     03-11    TPro  6.1<L>  /  Alb  3.4  /  TBili  0.4  /  DBili  x   /  AST  32<H>  /  ALT  27  /  AlkPhos  54  03-11    PT/INR - ( 11 Mar 2024 03:14 )   PT: 12.7 sec;   INR: 1.15 ratio         PTT - ( 11 Mar 2024 03:14 )  PTT:27.9 sec  Urinalysis Basic - ( 11 Mar 2024 03:14 )    Color: x / Appearance: x / SG: x / pH: x  Gluc: 124 mg/dL / Ketone: x  / Bili: x / Urobili: x   Blood: x / Protein: x / Nitrite: x   Leuk Esterase: x / RBC: x / WBC x   Sq Epi: x / Non Sq Epi: x / Bacteria: x        03-10 @ 07:01 - 03-11 @ 07:00  --------------------------------------------------------  IN: 3953 mL / OUT: 3758 mL / NET: 195 mL    03-11 @ 07:01 - 03-12 @ 01:15  --------------------------------------------------------  IN: 1113.4 mL / OUT: 2022 mL / NET: -908.6 mL

## 2024-03-12 NOTE — PROGRESS NOTE ADULT - ASSESSMENT
ASSESSMENT/PLAN:    NEURO:  Neuro/Vital checks q 1  -Bromocriptine  -Precedex for agitation and line pulling  -continue nimodipine SAH protocol  -TCDs daily    PULM:   RA  Incentive Spirometry as tolerated    CV:  b/p liberalized  -continue to lipitor    RENAL:  Fluids: LR @ 50cc/hr  -Flomax for urinary retention  -Dangelo replaced 3/11    GI:  Diet: Soft and bite sized  Bowel regimen: [X] senna [X] Miralax    ENDO:   -no active issues    HEME/ONC:  VTE prophylaxis: [X] SCDs [] Lovenox 30mg daily    ID:   Monitor Fever Curve  -zosyn for empiric coverage  - febrile to 102    CODE STATUS:  [X] Full Code    DISPOSITION:  [X] NSICU

## 2024-03-12 NOTE — PROGRESS NOTE ADULT - ASSESSMENT
72 yo F with acomm aneurysm s/p embolization c/b rupture, tamponaded with coils.  ICH, IVH, SAH, obstructive hydrocephalus, EVD in place.  Fever, suspected aspiration PNA.    Neuro:  - Neurochecks  - TCD, Nimodipine, cont  - EVD at 10, CSF diversion per NSGY; plan for EVD challenge 03/14  - SBP Goal 100-160, remove [place]  - maintain Osats>92%, incentive spirometry  - diet as tolerated; bowel regimen  - monitor UOP and e-lytes; failed TOV 03/10, cont Flomax, Dangelo in place  - DVT Ppx: SCD, SQL; obtain LE Doppler  - cont Zosyn for suspected aspiration for 7 days; repeat fever w/up in cl. CSF in view of persistent fever  - Monitor BGL, maintain 120-180

## 2024-03-12 NOTE — CHART NOTE - NSCHARTNOTEFT_GEN_A_CORE
External ventricular drain was accessed under sterile conditions for collection of CSF sample. EVD was clamped distally and approximately 1cc of CSF was sterilely withdrawn and discarded. Another 3cc of CSF was withdrawn and sent to lab in 2 separate vials for culture and analysis. Access port was closed with disinfecting cap then EVD was unclamped. ICPs monitored and patient tolerated procedure well.

## 2024-03-12 NOTE — PROGRESS NOTE ADULT - SUBJECTIVE AND OBJECTIVE BOX
Patient is a 73y old  Female who presents with a chief complaint of ICH, IVH (12 Mar 2024 17:32)    HPI:  73F with PMH HLD, osteoporosis who presents for cerebral angiogram with aneurysm embolization. Patient had MRI / MRA brain 2/ hand numbness which had incidental finding of acomm aneurysm. Patient underwent diagnostic angiogram 24 which confirmed acomm aneurysm. Patient presents today for cerebral angiogram with aneurysm embolization via balloon assisted coiling. Patient started on ASA 81 in preparation for the procedure. Patient underwent aneurysm embolization with coiling complicated by aneurysm rupture, controlled with coils and balloon tamponade. NISHANT CT showed SAH b/l frontal lobes and R sylvian fissure along with contrast staining. Course complicated by worsening SAH, IVH requiring EVD placement.       Interval history:  Patient seen and examined by neuro IR team. Patient PBD6 from SAH. Patient remains with intermittent fevers, fever w/u sent including CSF, thus far negative.       Vital Signs Last 24 Hrs  T(C): 37.2 (12 Mar 2024 17:30), Max: 38.9 (11 Mar 2024 22:00)  T(F): 99 (12 Mar 2024 17:30), Max: 102 (11 Mar 2024 22:00)  HR: 86 (12 Mar 2024 17:30) (54 - 104)  BP: 119/68 (12 Mar 2024 17:30) (92/76 - 161/66)  BP(mean): 82 (12 Mar 2024 17:30) (81 - 108)  RR: 18 (12 Mar 2024 17:30) (12 - 24)  SpO2: 96% (12 Mar 2024 17:30) (94% - 99%)    Parameters below as of 12 Mar 2024 16:00  Patient On (Oxygen Delivery Method): room air      Physical Exam:  Constitutional: NAD, lying in bed  Neuro  * Mental Status: EO to voice, FC, oriented x1 (name)   * Cranial Nerves: PERRL, EOMI, tongue midline, no gaze deviation, R facial droop  * Motor: RUE 5/5, LUE 5/5, b/l LE AG without drift   * Sensory: Sensation grossly intact to light noxious   * Reflexes: not assessed   Groin: small hematoma, non tender, no ecchymoses       LABS:                        9.4    8.24  )-----------( 302      ( 12 Mar 2024 02:45 )             27.6     03-12    142  |  105  |  14.7  ----------------------------<  123<H>  3.8   |  25.0  |  0.45<L>    Ca    8.4      12 Mar 2024 02:45  Phos  3.0     03-12  Mg     2.1     -12    TPro  6.1<L>  /  Alb  3.4  /  TBili  0.4  /  DBili  x   /  AST  32<H>  /  ALT  27  /  AlkPhos  54  03-11    PT/INR - ( 11 Mar 2024 03:14 )   PT: 12.7 sec;   INR: 1.15 ratio    PTT - ( 11 Mar 2024 03:14 )  PTT:27.9 sec    Urinalysis Basic - ( 12 Mar 2024 11:45 )  Color: Yellow / Appearance: Clear / S.016 / pH: x  Gluc: x / Ketone: Negative mg/dL  / Bili: Negative / Urobili: 0.2 mg/dL   Blood: x / Protein: Negative mg/dL / Nitrite: Negative   Leuk Esterase: Negative / RBC: 13 /HPF / WBC 1 /HPF   Sq Epi: x / Non Sq Epi: 1 /HPF / Bacteria: Negative /HPF      I&O:   I&O's Summary    11 Mar 2024 07:  -  12 Mar 2024 07:00  --------------------------------------------------------  IN: 1666 mL / OUT: 2777 mL / NET: -1111 mL    12 Mar 2024 07:01  -  12 Mar 2024 18:33  --------------------------------------------------------  IN: 1434 mL / OUT: 711 mL / NET: 723 mL      Medications:  MEDICATIONS  (STANDING):  albuterol    0.083%. 2.5 milliGRAM(s) Nebulizer once  bromocriptine Capsule 10 milliGRAM(s) Oral three times a day  chlorhexidine 2% Cloths 1 Application(s) Topical daily  dexMEDEtomidine Infusion 0.2 MICROgram(s)/kG/Hr (2.25 mL/Hr) IV Continuous <Continuous>  enoxaparin Injectable 30 milliGRAM(s) SubCutaneous <User Schedule>  lactated ringers. 1000 milliLiter(s) (50 mL/Hr) IV Continuous <Continuous>  niMODipine Oral Solution 30 milliGRAM(s) Oral every 2 hours  piperacillin/tazobactam IVPB.. 3.375 Gram(s) IV Intermittent every 8 hours  simvastatin 10 milliGRAM(s) Oral at bedtime  tamsulosin 0.4 milliGRAM(s) Oral at bedtime    MEDICATIONS  (PRN):  ondansetron Injectable 4 milliGRAM(s) IV Push every 6 hours PRN Nausea and/or Vomiting      RADIOLOGY & ADDITIONAL STUDIES:  No new neuro IR imaging to review     < from: CT Head No Cont (03.10.24 @ 12:37) >  IMPRESSION:    Mildly decreased bilateral mesial frontal parenchymal hemorrhages.    Similar anterior interhemispheric acute subarachnoid hemorrhage.    Mildly decreased intraventricular hemorrhage within the bilateral lateral   and third ventricles.    Decreased ventricular size with near resolution of hydrocephalus.    No large midline shift.    Basal cisterns are more well visualized on the current examination, this   may be due to technique.    --- End of Report ---  ROBERT BOYLE MD; Attending Radiologist  This document has been electronically signed. Mar 10 2024  2:01PM    < end of copied text >

## 2024-03-12 NOTE — PROGRESS NOTE ADULT - SUBJECTIVE AND OBJECTIVE BOX
Chief complaint:   73F PMH HLD, osteoporosis presented for elective complex Acomm aneurysm embolization via balloon assisted coiling. Patient started on ASA 81 in preparation for the procedure. Patient underwent aneurysm embolization with coiling complicated by aneurysm rupture, controlled with coils and balloon tamponade. NISH[facility] CT showed SAH b/l frontal lobes and thick R SAH, subsequent stability CTH with worsening ICH and IVH. Intubated in ICU with EVD placement.    EVENTS:  3/6 - Intubated. EVD placed, set to 20. Dropped to 10 overnight.  3/7 - Exam improved, Extubated  3/8 - Febrile with RLL consolidation  3/10 EVD prox port of collection system damage/leaking noted, replaced.  3/11 underwent angio this am, which confirmed Acomm aneurysm occlusion with small neck remnant , no vasospasm noted.    O/n events: none reported.    ----------------------------------------------------------------------------------------------------------------------------------------------------------------------------------    ICU Vital Signs Last 24 Hrs  T(C): 37.1 (12 Mar 2024 17:00), Max: 38.9 (11 Mar 2024 22:00)  T(F): 98.8 (12 Mar 2024 17:00), Max: 102 (11 Mar 2024 22:00)  HR: 79 (12 Mar 2024 17:00) (54 - 104)  BP: 125/80 (12 Mar 2024 17:00) (92/76 - 161/66)  BP(mean): 94 (12 Mar 2024 17:00) (81 - 108)  ABP: 155/63 (12 Mar 2024 12:45) (107/44 - 162/73)  ABP(mean): 59 (12 Mar 2024 12:45) (59 - 105)  RR: 19 (12 Mar 2024 17:00) (12 - 24)  SpO2: 98% (12 Mar 2024 17:00) (94% - 99%)    O2 Parameters below as of 12 Mar 2024 16:00  Patient On (Oxygen Delivery Method): room air    -------------------------------------    PHYSICAL EXAM: no changes.  General: Calm, cooperative  Neuro:  -Mental status- No acute distress. Follows commands, spont EO  -CN- PERRL 3mm, EOMI, tongue midline, face symmetric  - LLE 4-/5 otherwise full strength    CV: RRR  Pulm: clear to auscultation  Abd: Soft, nontender, nondistended  Ext: no noted edema in lower ext  Skin: warm, dry

## 2024-03-12 NOTE — PROGRESS NOTE ADULT - ASSESSMENT
ASSESSMENT/PLAN: 73F presented for elective complex Acomm aneurysm embolization via balloon assisted coiling, complicated by intraop rupture.    NEURO:  Neuro/Vital checks q 2  -Bromocriptine  -weaning Precedex for agitation and line pulling, started on 12.5 seroquel  -continue nimodipine SAH protocol  -TCDs daily  - Clamp EVD 3/13 AM for 48hrs and then CTH    PULM:   RA  Incentive Spirometry as tolerated    CV:  SBP   -continue lipitor    RENAL:  Fluids: LR @ 50cc/hr with poor PO intake  -Flomax for urinary retention  -Dangelo replaced 3/11    GI:  Diet: Soft and bite sized  Bowel regimen: [X] senna [X] Miralax    ENDO:   -no active issues    HEME/ONC:  VTE prophylaxis: [X] SCDs [] Lovenox 30mg daily    ID:   - zosyn for empiric coverage  - febrile to 102, work up negative.

## 2024-03-12 NOTE — PROGRESS NOTE ADULT - SUBJECTIVE AND OBJECTIVE BOX
HPI:  73F PMH HLD, osteoporosis presented for elective complex Acomm aneurysm embolization via balloon assisted coiling. Patient started on ASA 81 in preparation for the procedure. Patient underwent aneurysm embolization with coiling complicated by aneurysm rupture, controlled with coils and balloon tamponade. NISHANT CT showed SAH b/l frontal lobes and thick R SAH, subsequent stability CTH with worsening ICH and IVH. Intubated in ICU with EVD placement.    INTERVAL HPI/OVERNIGHT EVENTS:  Flexeril 5mg once for quad quivering (resolution), started on seroquel in hopes to wean from precedex. Also febrile, given tylenol    ICU Vital Signs Last 24 Hrs  T(C): 35.8 (13 Mar 2024 02:00), Max: 39 (12 Mar 2024 22:15)  T(F): 96.4 (13 Mar 2024 02:00), Max: 102.2 (12 Mar 2024 22:15)  HR: 67 (13 Mar 2024 02:00) (59 - 110)  BP: 121/66 (13 Mar 2024 02:00) (92/76 - 168/87)  BP(mean): 84 (13 Mar 2024 02:00) (78 - 141)  ABP: 155/63 (12 Mar 2024 12:45) (107/44 - 162/73)  ABP(mean): 59 (12 Mar 2024 12:45) (59 - 105)  RR: 17 (13 Mar 2024 02:00) (13 - 26)  SpO2: 99% (13 Mar 2024 02:00) (94% - 100%)    O2 Parameters below as of 13 Mar 2024 02:00  Patient On (Oxygen Delivery Method): room air      PHYSICAL EXAM:  GENERAL: NAD, well-groomed  HEAD:  Atraumatic, normocephalic  DRAINS: EVD @ 10 draining well  WOUND: Dressing clean dry intact  MENTAL STATUS: AAO x2; a little lethargic but awake; Opens eyes spontaneously; Appropriately conversant; following simple commands  CRANIAL NERVES: Visual acuity normal for age, visual fields full to confrontation, PERRL. EOMI without nystagmus.  REFLEXES: PERRL. Corneals intact b/l. Gag intact. Cough intact. Oculocephalic reflex intact (Doll's eye).   MOTOR: LLE HF 4/5, everything else 5/5  CHEST/LUNG: equal rise and fall of chest bilaterally  HEART: +S1/+S2  ABDOMEN: Soft, nontender, mildly distended  EXTREMITIES:  2+ peripheral pulses, no clubbing, cyanosis, or edema  SKIN: Warm, dry; no rashes or lesions      LABS:                                   9.6    10.02 )-----------( 319      ( 13 Mar 2024 02:30 )             28.2   03-13    137  |  102  |  13.4  ----------------------------<  147<H>  3.3<L>   |  22.0  |  0.40<L>    Ca    7.8<L>      13 Mar 2024 02:30  Phos  2.9     03-13  Mg     2.5     03-13       PT/INR - ( 11 Mar 2024 03:14 )   PT: 12.7 sec;   INR: 1.15 ratio         PTT - ( 11 Mar 2024 03:14 )  PTT:27.9 sec  Urinalysis Basic - ( 11 Mar 2024 03:14 )    Color: x / Appearance: x / SG: x / pH: x  Gluc: 124 mg/dL / Ketone: x  / Bili: x / Urobili: x   Blood: x / Protein: x / Nitrite: x   Leuk Esterase: x / RBC: x / WBC x   Sq Epi: x / Non Sq Epi: x / Bacteria: x        I&O's Summary    11 Mar 2024 07:01  -  12 Mar 2024 07:00  --------------------------------------------------------  IN: 1666 mL / OUT: 2777 mL / NET: -1111 mL    12 Mar 2024 07:01  -  13 Mar 2024 04:02  --------------------------------------------------------  IN: 3699.4 mL / OUT: 1959 mL / NET: 1740.4 mL

## 2024-03-13 ENCOUNTER — RESULT REVIEW (OUTPATIENT)
Age: 74
End: 2024-03-13

## 2024-03-13 LAB
ANION GAP SERPL CALC-SCNC: 13 MMOL/L — SIGNIFICANT CHANGE UP (ref 5–17)
BUN SERPL-MCNC: 13.4 MG/DL — SIGNIFICANT CHANGE UP (ref 8–20)
CALCIUM SERPL-MCNC: 7.8 MG/DL — LOW (ref 8.4–10.5)
CHLORIDE SERPL-SCNC: 102 MMOL/L — SIGNIFICANT CHANGE UP (ref 96–108)
CO2 SERPL-SCNC: 22 MMOL/L — SIGNIFICANT CHANGE UP (ref 22–29)
CREAT SERPL-MCNC: 0.4 MG/DL — LOW (ref 0.5–1.3)
EGFR: 106 ML/MIN/1.73M2 — SIGNIFICANT CHANGE UP
GLUCOSE SERPL-MCNC: 147 MG/DL — HIGH (ref 70–99)
HCT VFR BLD CALC: 28.2 % — LOW (ref 34.5–45)
HGB BLD-MCNC: 9.6 G/DL — LOW (ref 11.5–15.5)
MAGNESIUM SERPL-MCNC: 2.5 MG/DL — SIGNIFICANT CHANGE UP (ref 1.8–2.6)
MCHC RBC-ENTMCNC: 30.8 PG — SIGNIFICANT CHANGE UP (ref 27–34)
MCHC RBC-ENTMCNC: 34 GM/DL — SIGNIFICANT CHANGE UP (ref 32–36)
MCV RBC AUTO: 90.4 FL — SIGNIFICANT CHANGE UP (ref 80–100)
PHOSPHATE SERPL-MCNC: 2.9 MG/DL — SIGNIFICANT CHANGE UP (ref 2.4–4.7)
PLATELET # BLD AUTO: 319 K/UL — SIGNIFICANT CHANGE UP (ref 150–400)
POTASSIUM SERPL-MCNC: 3.3 MMOL/L — LOW (ref 3.5–5.3)
POTASSIUM SERPL-SCNC: 3.3 MMOL/L — LOW (ref 3.5–5.3)
RBC # BLD: 3.12 M/UL — LOW (ref 3.8–5.2)
RBC # FLD: 12.1 % — SIGNIFICANT CHANGE UP (ref 10.3–14.5)
SODIUM SERPL-SCNC: 137 MMOL/L — SIGNIFICANT CHANGE UP (ref 135–145)
WBC # BLD: 10.02 K/UL — SIGNIFICANT CHANGE UP (ref 3.8–10.5)
WBC # FLD AUTO: 10.02 K/UL — SIGNIFICANT CHANGE UP (ref 3.8–10.5)

## 2024-03-13 PROCEDURE — 99291 CRITICAL CARE FIRST HOUR: CPT

## 2024-03-13 PROCEDURE — 99232 SBSQ HOSP IP/OBS MODERATE 35: CPT

## 2024-03-13 PROCEDURE — 74018 RADEX ABDOMEN 1 VIEW: CPT | Mod: 26

## 2024-03-13 PROCEDURE — 93306 TTE W/DOPPLER COMPLETE: CPT | Mod: 26

## 2024-03-13 RX ORDER — MAGNESIUM SULFATE 500 MG/ML
2 VIAL (ML) INJECTION ONCE
Refills: 0 | Status: COMPLETED | OUTPATIENT
Start: 2024-03-13 | End: 2024-03-13

## 2024-03-13 RX ORDER — METOCLOPRAMIDE HCL 10 MG
10 TABLET ORAL ONCE
Refills: 0 | Status: COMPLETED | OUTPATIENT
Start: 2024-03-13 | End: 2024-03-13

## 2024-03-13 RX ORDER — POTASSIUM CHLORIDE 20 MEQ
40 PACKET (EA) ORAL EVERY 4 HOURS
Refills: 0 | Status: COMPLETED | OUTPATIENT
Start: 2024-03-13 | End: 2024-03-13

## 2024-03-13 RX ORDER — NYSTATIN CREAM 100000 [USP'U]/G
1 CREAM TOPICAL
Refills: 0 | Status: DISCONTINUED | OUTPATIENT
Start: 2024-03-13 | End: 2024-03-27

## 2024-03-13 RX ORDER — QUETIAPINE FUMARATE 200 MG/1
25 TABLET, FILM COATED ORAL ONCE
Refills: 0 | Status: COMPLETED | OUTPATIENT
Start: 2024-03-13 | End: 2024-03-13

## 2024-03-13 RX ORDER — ACETAMINOPHEN 500 MG
675 TABLET ORAL ONCE
Refills: 0 | Status: COMPLETED | OUTPATIENT
Start: 2024-03-13 | End: 2024-03-13

## 2024-03-13 RX ORDER — SODIUM CHLORIDE 9 MG/ML
500 INJECTION INTRAMUSCULAR; INTRAVENOUS; SUBCUTANEOUS ONCE
Refills: 0 | Status: COMPLETED | OUTPATIENT
Start: 2024-03-13 | End: 2024-03-13

## 2024-03-13 RX ADMIN — BROMOCRIPTINE MESYLATE 10 MILLIGRAM(S): 5 CAPSULE ORAL at 14:45

## 2024-03-13 RX ADMIN — NIMODIPINE 30 MILLIGRAM(S): 60 SOLUTION ORAL at 02:36

## 2024-03-13 RX ADMIN — QUETIAPINE FUMARATE 25 MILLIGRAM(S): 200 TABLET, FILM COATED ORAL at 21:12

## 2024-03-13 RX ADMIN — Medication 10 MILLIGRAM(S): at 01:01

## 2024-03-13 RX ADMIN — Medication 40 MILLIEQUIVALENT(S): at 06:15

## 2024-03-13 RX ADMIN — NIMODIPINE 30 MILLIGRAM(S): 60 SOLUTION ORAL at 16:44

## 2024-03-13 RX ADMIN — CHLORHEXIDINE GLUCONATE 1 APPLICATION(S): 213 SOLUTION TOPICAL at 13:32

## 2024-03-13 RX ADMIN — Medication 25 GRAM(S): at 01:00

## 2024-03-13 RX ADMIN — TAMSULOSIN HYDROCHLORIDE 0.4 MILLIGRAM(S): 0.4 CAPSULE ORAL at 22:34

## 2024-03-13 RX ADMIN — NIMODIPINE 30 MILLIGRAM(S): 60 SOLUTION ORAL at 00:42

## 2024-03-13 RX ADMIN — BROMOCRIPTINE MESYLATE 10 MILLIGRAM(S): 5 CAPSULE ORAL at 21:12

## 2024-03-13 RX ADMIN — NIMODIPINE 30 MILLIGRAM(S): 60 SOLUTION ORAL at 12:38

## 2024-03-13 RX ADMIN — NIMODIPINE 30 MILLIGRAM(S): 60 SOLUTION ORAL at 07:53

## 2024-03-13 RX ADMIN — SODIUM CHLORIDE 1000 MILLILITER(S): 9 INJECTION INTRAMUSCULAR; INTRAVENOUS; SUBCUTANEOUS at 01:00

## 2024-03-13 RX ADMIN — PIPERACILLIN AND TAZOBACTAM 25 GRAM(S): 4; .5 INJECTION, POWDER, LYOPHILIZED, FOR SOLUTION INTRAVENOUS at 14:45

## 2024-03-13 RX ADMIN — BROMOCRIPTINE MESYLATE 10 MILLIGRAM(S): 5 CAPSULE ORAL at 06:15

## 2024-03-13 RX ADMIN — Medication 675 MILLIGRAM(S): at 13:30

## 2024-03-13 RX ADMIN — SIMVASTATIN 10 MILLIGRAM(S): 20 TABLET, FILM COATED ORAL at 21:12

## 2024-03-13 RX ADMIN — QUETIAPINE FUMARATE 12.5 MILLIGRAM(S): 200 TABLET, FILM COATED ORAL at 00:42

## 2024-03-13 RX ADMIN — NIMODIPINE 30 MILLIGRAM(S): 60 SOLUTION ORAL at 18:50

## 2024-03-13 RX ADMIN — Medication 675 MILLIGRAM(S): at 06:30

## 2024-03-13 RX ADMIN — Medication 40 MILLIEQUIVALENT(S): at 10:31

## 2024-03-13 RX ADMIN — NIMODIPINE 30 MILLIGRAM(S): 60 SOLUTION ORAL at 04:33

## 2024-03-13 RX ADMIN — ENOXAPARIN SODIUM 30 MILLIGRAM(S): 100 INJECTION SUBCUTANEOUS at 21:11

## 2024-03-13 RX ADMIN — PIPERACILLIN AND TAZOBACTAM 25 GRAM(S): 4; .5 INJECTION, POWDER, LYOPHILIZED, FOR SOLUTION INTRAVENOUS at 21:12

## 2024-03-13 RX ADMIN — PIPERACILLIN AND TAZOBACTAM 25 GRAM(S): 4; .5 INJECTION, POWDER, LYOPHILIZED, FOR SOLUTION INTRAVENOUS at 06:15

## 2024-03-13 RX ADMIN — Medication 270 MILLIGRAM(S): at 12:52

## 2024-03-13 RX ADMIN — NIMODIPINE 30 MILLIGRAM(S): 60 SOLUTION ORAL at 20:06

## 2024-03-13 RX ADMIN — NIMODIPINE 30 MILLIGRAM(S): 60 SOLUTION ORAL at 14:45

## 2024-03-13 RX ADMIN — NIMODIPINE 30 MILLIGRAM(S): 60 SOLUTION ORAL at 22:08

## 2024-03-13 RX ADMIN — NIMODIPINE 30 MILLIGRAM(S): 60 SOLUTION ORAL at 06:16

## 2024-03-13 RX ADMIN — Medication 270 MILLIGRAM(S): at 06:14

## 2024-03-13 NOTE — PROGRESS NOTE ADULT - SUBJECTIVE AND OBJECTIVE BOX
Patient is a 73y old  Female who presents with a chief complaint of ICH, IVH (13 Mar 2024 17:24)    HPI:HPI:  73F with PMH HLD, osteoporosis who presents for cerebral angiogram with aneurysm embolization. Patient had MRI / MRA brain 2/2 hand numbness which had incidental finding of acomm aneurysm. Patient underwent diagnostic angiogram 2/14/24 which confirmed acomm aneurysm. Patient presents today for cerebral angiogram with aneurysm embolization via balloon assisted coiling. Patient started on ASA 81 in preparation for the procedure. Patient underwent aneurysm embolization with coiling complicated by aneurysm rupture, controlled with coils and balloon tamponade. NISH[facility] CT showed SAH b/l frontal lobes and R sylvian fissure along with contrast staining. Course complicated by worsening SAH, IVH requiring EVD placement.     Interval history:  Patient seen and examined by neuro IR team. Patient PBD7 from SAH. Patient remains with intermittent fevers. Fever work up sent with CSF negative, RVP negative, Blood CX negative to date, UA negative. Pt remains neurologically intact, EVD clamped today. ICP remain stable.     Vital Signs Last 24 Hrs  T(C): 37.7 (13 Mar 2024 15:00), Max: 39 (12 Mar 2024 22:15)  T(F): 99.9 (13 Mar 2024 15:00), Max: 102.2 (12 Mar 2024 22:15)  HR: 102 (13 Mar 2024 15:00) (67 - 115)  BP: 138/71 (13 Mar 2024 15:00) (102/74 - 168/87)  BP(mean): 84 (13 Mar 2024 15:00) (78 - 141)  RR: 21 (13 Mar 2024 15:00) (13 - 26)  SpO2: 100% (13 Mar 2024 15:00) (94% - 100%)    Parameters below as of 13 Mar 2024 12:00  Patient On (Oxygen Delivery Method): room air    Physical Exam:  Constitutional: NAD, lying in bed  Neuro  * Mental Status:  GCS 15: Awake, alert, oriented to name and place. No aphasia or difficulty speaking. No dysarthria.   * Cranial Nerves: Cnii-Cnxii grossly intact. PERRL, EOMI, tongue midline, no gaze deviation  * Motor: RUE 5/5, LUE 5/5, RLE 5/5, LLE 5/5, no drift   * Sensory: Sensation intact to light touch  * Reflexes: not assessed   Groin: soft, non-tender, no bleeding, no hematoma.       LABS:                        9.6    10.02 )-----------( 319      ( 13 Mar 2024 02:30 )             28.2     03-13    137  |  102  |  13.4  ----------------------------<  147<H>  3.3<L>   |  22.0  |  0.40<L>    Ca    7.8<L>      13 Mar 2024 02:30  Phos  2.9     03-13  Mg     2.5     03-13      Urinalysis Basic - ( 13 Mar 2024 02:30   Color: x / Appearance: x / SG: x / pH: x  Gluc: 147 mg/dL / Ketone: x  / Bili: x / Urobili: x   Blood: x / Protein: x / Nitrite: x   Leuk Esterase: x / RBC: x / WBC x   Sq Epi: x / Non Sq Epi: x / Bacteria: x    CULTURES:  Culture Results:   No growth to date. (03-12 @ 12:45)  Culture Results:   No growth at 24 hours (03-12 @ 12:00)    Culture - CSF with Gram Stain . (03.12.24 @ 12:45)    Gram Stain:   No polymorphonuclear cells seen  No organisms seen  by cytocentrifuge   Specimen Source: .CSF CSF   Culture Results:   No growth to date.    I&O:   I&O's Summary    12 Mar 2024 07:01  -  13 Mar 2024 07:00  --------------------------------------------------------  IN: 4449.4 mL / OUT: 2509 mL / NET: 1940.4 mL    13 Mar 2024 07:01  -  13 Mar 2024 17:48  --------------------------------------------------------  IN: 1422 mL / OUT: 1050 mL / NET: 372 mL    Medications:  MEDICATIONS  (STANDING):  albuterol    0.083%. 2.5 milliGRAM(s) Nebulizer once  bromocriptine Capsule 10 milliGRAM(s) Oral three times a day  chlorhexidine 2% Cloths 1 Application(s) Topical daily  dexMEDEtomidine Infusion 0.2 MICROgram(s)/kG/Hr (2.25 mL/Hr) IV Continuous <Continuous>  enoxaparin Injectable 30 milliGRAM(s) SubCutaneous <User Schedule>  niMODipine Oral Solution 30 milliGRAM(s) Oral every 2 hours  nystatin Powder 1 Application(s) Topical two times a day  piperacillin/tazobactam IVPB.. 3.375 Gram(s) IV Intermittent every 8 hours  simvastatin 10 milliGRAM(s) Oral at bedtime  tamsulosin 0.4 milliGRAM(s) Oral at bedtime    MEDICATIONS  (PRN):  hydrALAZINE Injectable 10 milliGRAM(s) IV Push every 2 hours PRN SBP > 160  labetalol Injectable 10 milliGRAM(s) IV Push every 2 hours PRN Systolic blood pressure > 160  ondansetron Injectable 4 milliGRAM(s) IV Push every 6 hours PRN Nausea and/or Vomiting    RADIOLOGY & ADDITIONAL STUDIES:  No new neuro IR imaging to review     < from: CT Head No Cont (03.10.24 @ 12:37) >  IMPRESSION:    Mildly decreased bilateral mesial frontal parenchymal hemorrhages.    Similar anterior interhemispheric acute subarachnoid hemorrhage.    Mildly decreased intraventricular hemorrhage within the bilateral lateral   and third ventricles.    Decreased ventricular size with near resolution of hydrocephalus.    No large midline shift.    Basal cisterns are more well visualized on the current examination, this   may be due to technique.    --- End of Report ---  ROBERT BOYLE MD; Attending Radiologist  This document has been electronically signed. Mar 10 2024  2:01PM    < end of copied text >

## 2024-03-13 NOTE — PROGRESS NOTE ADULT - ASSESSMENT
73F with PMH HLD who presented for elective acomm aneurysm embolization with balloon-assisted coiling, complicated by aneurysm rupture controlled with coils and balloon tamponade.   - PBD 6, POD 6   - Exam grossly stable  - Continues with fevers, fever w/u negative thus far      Plan:  - Discussed and Seen with Dr. Dowd  - Q2 hour neuro checks  - SBP goal   - Maintain normothermia  - Maintain euvolemia, strict I&Os, supplement for net negative PRN  - Nimodipine 30q2 as tolerated by HR and BP   - TCDs daily as able  - DVT prophylaxis: SCDs, lovenox  - No AED required  - EVD clamped today, ICP's remain stable   - PT/OT/OOB to chair  - Further care per NSICU team

## 2024-03-13 NOTE — PROGRESS NOTE ADULT - SUBJECTIVE AND OBJECTIVE BOX
Chief complaint:   73F PMH HLD, osteoporosis presented for elective complex Acomm aneurysm embolization via balloon assisted coiling. Patient started on ASA 81 in preparation for the procedure. Patient underwent aneurysm embolization with coiling complicated by aneurysm rupture, controlled with coils and balloon tamponade. NISHANT CT showed SAH b/l frontal lobes and thick R SAH, subsequent stability CTH with worsening ICH and IVH. Intubated in ICU with EVD placement.    EVENTS:  3/6 - Intubated. EVD placed, set to 20. Dropped to 10 overnight.  3/7 - Exam improved, Extubated  3/8 - Febrile with RLL consolidation  3/10 EVD prox port of collection system damage/leaking noted, replaced.  3/11 underwent angio this am, which confirmed Acomm aneurysm occlusion with small neck remnant , no vasospasm noted.    O/n events: none reported.    ----------------------------------------------------------------------------------------    ICU Vital Signs Last 24 Hrs  T(C): 37.7 (13 Mar 2024 15:00), Max: 39 (12 Mar 2024 22:15)  T(F): 99.9 (13 Mar 2024 15:00), Max: 102.2 (12 Mar 2024 22:15)  HR: 102 (13 Mar 2024 15:00) (67 - 115)  BP: 138/71 (13 Mar 2024 15:00) (102/74 - 168/87)  BP(mean): 84 (13 Mar 2024 15:00) (78 - 141)  ABP: --  ABP(mean): --  RR: 21 (13 Mar 2024 15:00) (13 - 26)  SpO2: 100% (13 Mar 2024 15:00) (94% - 100%)    O2 Parameters below as of 13 Mar 2024 12:00  Patient On (Oxygen Delivery Method): room air      -------------------------------------      PHYSICAL EXAM: no changes.  General: Calm, cooperative; sitting in a chair.  Neuro:  -Mental status- No acute distress. Follows commands, spont EO  -CN- PERRL 3mm, EOMI, tongue midline, face symmetric  - LLE 4-/5 otherwise full strength    CV: RRR  Pulm: clear to auscultation  Abd: Soft, nontender, nondistended  Ext: no noted edema in lower ext  Skin: warm, dry

## 2024-03-13 NOTE — PROGRESS NOTE ADULT - ASSESSMENT
74 yo F with acomm aneurysm s/p embolization c/b rupture, tamponaded with coils.  ICH, IVH, SAH, obstructive hydrocephalus, EVD in place.    Plan: ***  - Neurochecks q2h  - TCDs daily, Nimodipine  - EVD at 10, CSF diversion per NSGY; plan for EVD challenge 03/13  - SBP Goal 100-160  - maintain Osats>92%, incentive spirometry  - diet as tolerated; bowel regimen  - monitor UOP and e-lytes; cont Flomax, Dangelo in place for retention  - cont Zosyn for suspected aspiration, bromocriptine 10mg TID  - Monitor BGL, maintain 120-180  - DVT Ppx: SCD, SQL 72 yo F with acomm aneurysm s/p embolization c/b rupture, tamponaded with coils.  ICH, IVH, SAH, obstructive hydrocephalus, EVD in place.    Plan:   - Neurochecks q2h  - TCDs daily, Nimodipine  - EVD clamped 03/13 -> 48h clamp trial  - SBP Goal 100-160  - maintain Osats>92%, incentive spirometry  - diet as tolerated; bowel regimen  - monitor UOP and e-lytes; cont Flomax  - retaining urine -> if SC x3 will replace Dangelo  - cont Zosyn for suspected aspiration, bromocriptine 10mg TID  - Monitor BGL, maintain 120-180  - DVT Ppx: SCD, SQL    See day attending not for add'l details

## 2024-03-13 NOTE — PROGRESS NOTE ADULT - ASSESSMENT
72 yo F with acomm aneurysm s/p embolization c/b rupture, tamponaded with coils;  angio 3/11 confirmed aneurysm occlusion with small neck remnant, no vasospasm noted.  ICH, IVH, SAH, obstructive hydrocephalus, EVD in place.  Fever, suspected aspiration PNA, contribution of central.    Neuro:  - Neurochecks  - TCD, Nimodipine, cont  - EVD clamped 3/13, monitor ICP, plan for 48 hrs of observation and CTh 03/15  - SBP Goal 100-160  - maintain Osats>92%, incentive spirometry  - diet as tolerated; bowel regimen  - monitor UOP and e-lytes; failed TOV 03/10, cont Flomax; repeat TOV today  - DVT Ppx: SCD, SQL  - cont Zosyn for suspected aspiration for 7 days; repeat fever w/up - negative so far  - Monitor BGL, maintain 120-180

## 2024-03-14 LAB
ANION GAP SERPL CALC-SCNC: 13 MMOL/L — SIGNIFICANT CHANGE UP (ref 5–17)
BUN SERPL-MCNC: 12.4 MG/DL — SIGNIFICANT CHANGE UP (ref 8–20)
CALCIUM SERPL-MCNC: 7.5 MG/DL — LOW (ref 8.4–10.5)
CHLORIDE SERPL-SCNC: 101 MMOL/L — SIGNIFICANT CHANGE UP (ref 96–108)
CO2 SERPL-SCNC: 22 MMOL/L — SIGNIFICANT CHANGE UP (ref 22–29)
CREAT SERPL-MCNC: 0.44 MG/DL — LOW (ref 0.5–1.3)
EGFR: 103 ML/MIN/1.73M2 — SIGNIFICANT CHANGE UP
GLUCOSE SERPL-MCNC: 112 MG/DL — HIGH (ref 70–99)
HCT VFR BLD CALC: 26.7 % — LOW (ref 34.5–45)
HGB BLD-MCNC: 9.3 G/DL — LOW (ref 11.5–15.5)
MAGNESIUM SERPL-MCNC: 2.2 MG/DL — SIGNIFICANT CHANGE UP (ref 1.6–2.6)
MCHC RBC-ENTMCNC: 31.2 PG — SIGNIFICANT CHANGE UP (ref 27–34)
MCHC RBC-ENTMCNC: 34.8 GM/DL — SIGNIFICANT CHANGE UP (ref 32–36)
MCV RBC AUTO: 89.6 FL — SIGNIFICANT CHANGE UP (ref 80–100)
PHOSPHATE SERPL-MCNC: 2 MG/DL — LOW (ref 2.4–4.7)
PLATELET # BLD AUTO: 332 K/UL — SIGNIFICANT CHANGE UP (ref 150–400)
POTASSIUM SERPL-MCNC: 3.4 MMOL/L — LOW (ref 3.5–5.3)
POTASSIUM SERPL-SCNC: 3.4 MMOL/L — LOW (ref 3.5–5.3)
RBC # BLD: 2.98 M/UL — LOW (ref 3.8–5.2)
RBC # FLD: 12.5 % — SIGNIFICANT CHANGE UP (ref 10.3–14.5)
SODIUM SERPL-SCNC: 136 MMOL/L — SIGNIFICANT CHANGE UP (ref 135–145)
WBC # BLD: 12.82 K/UL — HIGH (ref 3.8–10.5)
WBC # FLD AUTO: 12.82 K/UL — HIGH (ref 3.8–10.5)

## 2024-03-14 PROCEDURE — 99291 CRITICAL CARE FIRST HOUR: CPT

## 2024-03-14 PROCEDURE — 99233 SBSQ HOSP IP/OBS HIGH 50: CPT

## 2024-03-14 PROCEDURE — 99232 SBSQ HOSP IP/OBS MODERATE 35: CPT

## 2024-03-14 RX ORDER — POTASSIUM CHLORIDE 20 MEQ
40 PACKET (EA) ORAL ONCE
Refills: 0 | Status: COMPLETED | OUTPATIENT
Start: 2024-03-14 | End: 2024-03-14

## 2024-03-14 RX ORDER — QUETIAPINE FUMARATE 200 MG/1
12.5 TABLET, FILM COATED ORAL AT BEDTIME
Refills: 0 | Status: DISCONTINUED | OUTPATIENT
Start: 2024-03-14 | End: 2024-03-27

## 2024-03-14 RX ORDER — PSYLLIUM SEED (WITH DEXTROSE)
1 POWDER (GRAM) ORAL
Refills: 0 | Status: DISCONTINUED | OUTPATIENT
Start: 2024-03-14 | End: 2024-03-20

## 2024-03-14 RX ORDER — ACETAMINOPHEN 500 MG
650 TABLET ORAL EVERY 6 HOURS
Refills: 0 | Status: DISCONTINUED | OUTPATIENT
Start: 2024-03-14 | End: 2024-03-27

## 2024-03-14 RX ORDER — SODIUM,POTASSIUM PHOSPHATES 278-250MG
1 POWDER IN PACKET (EA) ORAL ONCE
Refills: 0 | Status: COMPLETED | OUTPATIENT
Start: 2024-03-14 | End: 2024-03-14

## 2024-03-14 RX ADMIN — NIMODIPINE 30 MILLIGRAM(S): 60 SOLUTION ORAL at 00:01

## 2024-03-14 RX ADMIN — PIPERACILLIN AND TAZOBACTAM 25 GRAM(S): 4; .5 INJECTION, POWDER, LYOPHILIZED, FOR SOLUTION INTRAVENOUS at 21:25

## 2024-03-14 RX ADMIN — Medication 40 MILLIEQUIVALENT(S): at 06:43

## 2024-03-14 RX ADMIN — NIMODIPINE 30 MILLIGRAM(S): 60 SOLUTION ORAL at 10:27

## 2024-03-14 RX ADMIN — NIMODIPINE 30 MILLIGRAM(S): 60 SOLUTION ORAL at 02:10

## 2024-03-14 RX ADMIN — NIMODIPINE 30 MILLIGRAM(S): 60 SOLUTION ORAL at 21:26

## 2024-03-14 RX ADMIN — QUETIAPINE FUMARATE 12.5 MILLIGRAM(S): 200 TABLET, FILM COATED ORAL at 21:25

## 2024-03-14 RX ADMIN — BROMOCRIPTINE MESYLATE 10 MILLIGRAM(S): 5 CAPSULE ORAL at 21:25

## 2024-03-14 RX ADMIN — NYSTATIN CREAM 1 APPLICATION(S): 100000 CREAM TOPICAL at 05:58

## 2024-03-14 RX ADMIN — Medication 650 MILLIGRAM(S): at 15:07

## 2024-03-14 RX ADMIN — BROMOCRIPTINE MESYLATE 10 MILLIGRAM(S): 5 CAPSULE ORAL at 14:04

## 2024-03-14 RX ADMIN — NIMODIPINE 30 MILLIGRAM(S): 60 SOLUTION ORAL at 04:23

## 2024-03-14 RX ADMIN — Medication 1 PACKET(S): at 06:43

## 2024-03-14 RX ADMIN — Medication 650 MILLIGRAM(S): at 14:04

## 2024-03-14 RX ADMIN — TAMSULOSIN HYDROCHLORIDE 0.4 MILLIGRAM(S): 0.4 CAPSULE ORAL at 21:25

## 2024-03-14 RX ADMIN — NIMODIPINE 30 MILLIGRAM(S): 60 SOLUTION ORAL at 16:58

## 2024-03-14 RX ADMIN — BROMOCRIPTINE MESYLATE 10 MILLIGRAM(S): 5 CAPSULE ORAL at 05:03

## 2024-03-14 RX ADMIN — NIMODIPINE 30 MILLIGRAM(S): 60 SOLUTION ORAL at 18:33

## 2024-03-14 RX ADMIN — Medication 1 PACKET(S): at 18:33

## 2024-03-14 RX ADMIN — Medication 650 MILLIGRAM(S): at 21:24

## 2024-03-14 RX ADMIN — NIMODIPINE 30 MILLIGRAM(S): 60 SOLUTION ORAL at 14:04

## 2024-03-14 RX ADMIN — NIMODIPINE 30 MILLIGRAM(S): 60 SOLUTION ORAL at 20:24

## 2024-03-14 RX ADMIN — NIMODIPINE 30 MILLIGRAM(S): 60 SOLUTION ORAL at 06:05

## 2024-03-14 RX ADMIN — NIMODIPINE 30 MILLIGRAM(S): 60 SOLUTION ORAL at 08:07

## 2024-03-14 RX ADMIN — PIPERACILLIN AND TAZOBACTAM 25 GRAM(S): 4; .5 INJECTION, POWDER, LYOPHILIZED, FOR SOLUTION INTRAVENOUS at 14:04

## 2024-03-14 RX ADMIN — SIMVASTATIN 10 MILLIGRAM(S): 20 TABLET, FILM COATED ORAL at 21:24

## 2024-03-14 RX ADMIN — NYSTATIN CREAM 1 APPLICATION(S): 100000 CREAM TOPICAL at 18:33

## 2024-03-14 RX ADMIN — PIPERACILLIN AND TAZOBACTAM 25 GRAM(S): 4; .5 INJECTION, POWDER, LYOPHILIZED, FOR SOLUTION INTRAVENOUS at 05:03

## 2024-03-14 RX ADMIN — Medication 650 MILLIGRAM(S): at 22:00

## 2024-03-14 NOTE — PROGRESS NOTE ADULT - ASSESSMENT
Assessment:  73F with PMH HLD who presented for elective acomm aneurysm embolization with balloon-assisted coiling, complicated by aneurysm rupture controlled with coils and balloon tamponade.   - PBD 8, POD 8  - Exam grossly stable  - EVD clamped, tolerating well       Plan:  - Discussed with Dr. Dowd  - Q2 hour neuro checks  - SBP goal   - Maintain normothermia  - Maintain euvolemia, strict I&Os, supplement for net negative PRN  - Nimodipine 30q2 as tolerated by HR and BP   - No longer requiring TCDs 2/2 poor windows   - DVT prophylaxis: SCDs, lovenox  - No AED required  - EVD management per neurosurgery, CTH in am to assess for hydrocephalus and possible EVD removal   - PT/OT/OOB to chair  - Further care per NSICU team

## 2024-03-14 NOTE — PROGRESS NOTE ADULT - SUBJECTIVE AND OBJECTIVE BOX
HPI: 72 y/o mandarin speaking female with PMH of HLD and osteoporosis presents to [facility] with complaints of having cerebral aneurysm. States in 10/2023 she started to have B/L hand numbness. At that time she had MRI brain, MRA head and neck. MRA showed an ACOMM aneurysm. She underwent a cerebral angiogram by José Manuel Villaseñor at [facility] on December 7th, 2023, with the intent to treat this month. The aneurysm is described as a 5.5x4.6x3.6mm wide necked acomm aneurysm that projects medio-superiorly. Reports occasional headaches, described as numbness, 5/10 in severity, worse with laying down, relieved with sitting up or standing. Denies chest pain, shortness of breath, visual changes or stroke like symptoms. Patient is scheduled for cerebral angiogram on 2/14/2024 with Dr. Dowd.     INTERVAL HPI/OVERNIGHT EVENTS:  3/6 - Intubated. EVD placed, set to 20. Dropped to 10 overnight.  3/7 - Exam improved, Extubated  3/8 - Febrile with RLL consolidation  3/10 EVD prox port of collection system damage/leaking noted, replaced.  3/11 underwent angio this am, which confirmed Acomm aneurysm occlusion with small neck remnant , no vasospasm noted.  3/13: EVD clamped; CTH Friday.     Vital Signs Last 24 Hrs  T(C): 37.4 (14 Mar 2024 05:00), Max: 38.9 (13 Mar 2024 13:00)  T(F): 99.3 (14 Mar 2024 05:00), Max: 102 (13 Mar 2024 13:00)  HR: 99 (14 Mar 2024 05:00) (86 - 115)  BP: 100/80 (14 Mar 2024 05:00) (93/82 - 157/78)  BP(mean): 87 (14 Mar 2024 05:00) (82 - 111)  RR: 19 (14 Mar 2024 05:00) (15 - 24)  SpO2: 99% (14 Mar 2024 05:00) (96% - 100%)    Parameters below as of 14 Mar 2024 04:00  Patient On (Oxygen Delivery Method): room air        PHYSICAL EXAM:  General: Calm, cooperative; in bed.  Neuro:  -Mental status- No acute distress. AAO x2, Follows commands, spont EO  -CN- PERRL 3mm, EOMI, tongue midline, face symmetric  - VALERO strong   CV: RRR  Pulm: clear to auscultation  Abd: Soft, nontender, nondistended  Ext: no noted edema in lower ext  Skin: warm, dry  LABS:                        9.3    12.82 )-----------( 332      ( 14 Mar 2024 03:25 )             26.7     03-14    136  |  101  |  12.4  ----------------------------<  112<H>  3.4<L>   |  22.0  |  0.44<L>    Ca    7.5<L>      14 Mar 2024 03:25  Phos  2.0     03-14  Mg     2.2     03-14        Urinalysis Basic - ( 14 Mar 2024 03:25 )    Color: x / Appearance: x / SG: x / pH: x  Gluc: 112 mg/dL / Ketone: x  / Bili: x / Urobili: x   Blood: x / Protein: x / Nitrite: x   Leuk Esterase: x / RBC: x / WBC x   Sq Epi: x / Non Sq Epi: x / Bacteria: x        03-12 @ 07:01 - 03-13 @ 07:00  --------------------------------------------------------  IN: 4449.4 mL / OUT: 2509 mL / NET: 1940.4 mL    03-13 @ 07:01 - 03-14 @ 05:24  --------------------------------------------------------  IN: 1957 mL / OUT: 2905 mL / NET: -948 mL        RADIOLOGY & ADDITIONAL TESTS:  3/10 CTH: Mildly decreased bilateral mesial frontal parenchymal hemorrhages. Similar anterior interhemispheric acute subarachnoid hemorrhage. Mildly decreased intraventricular hemorrhage within the bilateral lateral and third ventricles. Decreased ventricular size with near resolution of hydrocephalus. No large midline shift. Basal cisterns are more well visualized on the current examination, this may be due to technique.    ASSESSMENT AND PLAN:   · Assessment	  ASSESSMENT/PLAN: 73F presented for elective complex Acomm aneurysm embolization via balloon assisted coiling, complicated by intraop rupture.    NEURO:  Neuro/Vital checks q 2  -Bromocriptine 10 TID  -weaning Precedex for agitation and line pulling, started on 12.5 seroquel; given 25 seroquel tonight   -continue nimodipine SAH protocol  -TCDs daily  - Clamp EVD 3/13 AM for 48hrs and then CTH    PULM:   RA  Incentive Spirometry as tolerated    CV:  SBP   -continue lipitor    RENAL:  -Flomax for urinary retention  -Dangelo replaced 3/13    GI:  Diet: Easy to chew   Bowel regimen: [X] senna [X] Miralax    ENDO:   -no active issues    HEME/ONC:  VTE prophylaxis: [X] SCDs [X] Lovenox 30mg daily    ID:   - zosyn for empiric coverage  - febrile to 102, work up negative.

## 2024-03-14 NOTE — PROGRESS NOTE ADULT - SUBJECTIVE AND OBJECTIVE BOX
Patient is a 73y old  Female who presents with a chief complaint of Acomm Aneurysm, SAH/IVH (14 Mar 2024 05:23)    HPI:  73F with PMH HLD, osteoporosis who presents for cerebral angiogram with aneurysm embolization. Patient had MRI / MRA brain 2/2 hand numbness which had incidental finding of acomm aneurysm. Patient underwent diagnostic angiogram 2/14/24 which confirmed acomm aneurysm. Patient presents today for cerebral angiogram with aneurysm embolization via balloon assisted coiling. Patient started on ASA 81 in preparation for the procedure. Patient underwent aneurysm embolization with coiling complicated by aneurysm rupture, controlled with coils and balloon tamponade. NISH[facility] CT showed SAH b/l frontal lobes and R sylvian fissure along with contrast staining. Course complicated by worsening SAH, IVH requiring EVD placement.       Interval history:  Patient seen and examined by neurosurgery team. Patient remains with EVD clamped, tolerating well and ICPs WNL. TCDs again attempted today, poor windows.       Vital Signs Last 24 Hrs  T(C): 38.1 (14 Mar 2024 13:00), Max: 38.7 (13 Mar 2024 20:00)  T(F): 100.6 (14 Mar 2024 13:00), Max: 101.7 (13 Mar 2024 20:00)  HR: 98 (14 Mar 2024 13:00) (86 - 115)  BP: 138/71 (14 Mar 2024 13:00) (93/82 - 154/84)  BP(mean): 92 (14 Mar 2024 13:00) (82 - 123)  RR: 13 (14 Mar 2024 13:00) (13 - 24)  SpO2: 97% (14 Mar 2024 13:00) (96% - 100%)    Parameters below as of 14 Mar 2024 12:00  Patient On (Oxygen Delivery Method): room air      Physical Exam:  Constitutional: NAD, lying in bed  Neuro  * Mental Status: Awake, alert. Speaking appropriately, answering questions, no gross aphasia   * Cranial Nerves: EOMI, mild R facial droop   * Motor: b/l UE AG without drift, b/l LE AG without drift   * Sensory: Sensation grossly intact   * Reflexes: not assessed       LABS:                        9.3    12.82 )-----------( 332      ( 14 Mar 2024 03:25 )             26.7     03-14    136  |  101  |  12.4  ----------------------------<  112<H>  3.4<L>   |  22.0  |  0.44<L>    Ca    7.5<L>      14 Mar 2024 03:25  Phos  2.0     03-14  Mg     2.2     03-14      I&O's Summary    13 Mar 2024 07:01  -  14 Mar 2024 07:00  --------------------------------------------------------  IN: 1982 mL / OUT: 3075 mL / NET: -1093 mL    14 Mar 2024 07:01  -  14 Mar 2024 14:09  --------------------------------------------------------  IN: 505 mL / OUT: 975 mL / NET: -470 mL      Medications:  MEDICATIONS  (STANDING):  albuterol    0.083%. 2.5 milliGRAM(s) Nebulizer once  bromocriptine Capsule 10 milliGRAM(s) Oral three times a day  chlorhexidine 2% Cloths 1 Application(s) Topical daily  niMODipine Oral Solution 30 milliGRAM(s) Oral every 2 hours  nystatin Powder 1 Application(s) Topical two times a day  piperacillin/tazobactam IVPB.. 3.375 Gram(s) IV Intermittent every 8 hours  QUEtiapine 12.5 milliGRAM(s) Oral at bedtime  simvastatin 10 milliGRAM(s) Oral at bedtime  tamsulosin 0.4 milliGRAM(s) Oral at bedtime    MEDICATIONS  (PRN):  acetaminophen     Tablet .. 650 milliGRAM(s) Oral every 6 hours PRN Temp greater or equal to 38C (100.4F)  hydrALAZINE Injectable 10 milliGRAM(s) IV Push every 2 hours PRN SBP > 160  labetalol Injectable 10 milliGRAM(s) IV Push every 2 hours PRN Systolic blood pressure > 160  ondansetron Injectable 4 milliGRAM(s) IV Push every 6 hours PRN Nausea and/or Vomiting      RADIOLOGY & ADDITIONAL STUDIES:  No new imaging to review

## 2024-03-14 NOTE — PROGRESS NOTE ADULT - ASSESSMENT
72 yo F with acomm aneurysm s/p embolization c/b rupture, tamponaded with coils;  angio 3/11 confirmed aneurysm occlusion with small neck remnant, no vasospasm noted.  ICH, IVH, SAH, obstructive hydrocephalus, EVD in place.  Fever, suspected aspiration PNA, contribution of central.    Neuro:  - Neurochecks  - TCD, Nimodipine, cont  - EVD clamped 3/13, monitor ICP, plan for 48 hrs of observation and CTh 3/15  - SBP Goal 100-160  - maintain Osats>92%, incentive spirometry  - diet as tolerated; bowel regimen  - monitor UOP and e-lytes; failed TOV 03/10, cont Flomax; repeat TOV today  - DVT Ppx: SCD, SQL  - cont Zosyn for suspected aspiration for 7 days; repeat fever w/up - negative so far  - Monitor BGL, maintain 120-180   74 yo F with acomm aneurysm s/p embolization c/b rupture, tamponaded with coils;  angio 3/11 confirmed aneurysm occlusion with small neck remnant, no vasospasm noted.  ICH, IVH, SAH, obstructive hydrocephalus, EVD in place.  Fever, suspected aspiration PNA, contribution of central.    Neuro:  - Neurochecks q2h  - TCD, Nimodipine, cont  - EVD clamped 3/13, monitor ICP, plan for 48 hrs of observation and CTh 3/15  - seroquel 12.5mg qhs  - SBP Goal 100-160  - maintain Osats>92%, incentive spirometry  - diet as tolerated; bowel regimen  - LBM 3/14  - monitor UOP and e-lytes; failed TOV 03/10 and 3/13, cont Flomax; feliciano replaced  - DVT Ppx: SCD, SQL  - cont Zosyn for suspected aspiration for 7 days until 3/16; repeat fever w/up - negative so far  - Monitor BGL, maintain 120-180    - midline

## 2024-03-14 NOTE — PROGRESS NOTE ADULT - ASSESSMENT
72 yo F with acomm aneurysm s/p embolization c/b rupture, tamponaded with coils.  ICH, IVH, SAH, obstructive hydrocephalus, EVD in place.    Plan: ***  - Neurochecks q2h  - TCDs daily, Nimodipine  - EVD clamped 03/13 -> 48h clamp trial  - SBP Goal 100-160  - maintain Osats>92%, incentive spirometry  - diet as tolerated; bowel regimen  - monitor UOP and e-lytes; cont Flomax  - retaining urine -> if SC x3 will replace Dangelo  - cont Zosyn for suspected aspiration, bromocriptine 10mg TID  - Monitor BGL, maintain 120-180  - DVT Ppx: SCD, SQL    See day attending not for add'l details 72 yo F with acomm aneurysm s/p embolization c/b rupture, tamponaded with coils.  ICH, IVH, SAH, obstructive hydrocephalus, EVD in place.    Plan:   - Neurochecks q2h  - TCDs daily, Nimodipine  - EVD clamped 03/13 -> 48h clamp trial  - CTH in AM  - re-eval exam once seroquel clears  - SBP Goal 100-160  - maintain Osats>92%, incentive spirometry  - diet as tolerated; bowel regimen  - monitor UOP and e-lytes; cont Flomax  - cont Zosyn for suspected aspiration, bromocriptine 10mg TID  - Monitor BGL, maintain 120-180  - DVT Ppx: SCD, SQL    See day attending not for add'l details

## 2024-03-14 NOTE — PROGRESS NOTE ADULT - SUBJECTIVE AND OBJECTIVE BOX
Chief complaint:   Patient is a 73y old  Female who presents with a chief complaint of Acomm Aneurysm, SAH/IVH (14 Mar 2024 05:23)    HPI:        24hr EVENTS:      ROS: [ ]  Unable to assess due to mental status   All other systems negative    -----------------------------------------------------------------------------------------------------------------------------------------------------------------------------------  ICU Vital Signs Last 24 Hrs  T(C): 37.5 (14 Mar 2024 08:00), Max: 38.9 (13 Mar 2024 13:00)  T(F): 99.5 (14 Mar 2024 08:00), Max: 102 (13 Mar 2024 13:00)  HR: 92 (14 Mar 2024 08:00) (86 - 115)  BP: 145/75 (14 Mar 2024 08:00) (93/82 - 154/84)  BP(mean): 95 (14 Mar 2024 08:00) (82 - 111)  ABP: --  ABP(mean): --  RR: 15 (14 Mar 2024 08:00) (15 - 24)  SpO2: 99% (14 Mar 2024 08:00) (96% - 100%)    O2 Parameters below as of 14 Mar 2024 08:00  Patient On (Oxygen Delivery Method): room air            I&O's Summary    13 Mar 2024 07:01  -  14 Mar 2024 07:00  --------------------------------------------------------  IN: 1982 mL / OUT: 3075 mL / NET: -1093 mL    14 Mar 2024 07:01  -  14 Mar 2024 09:09  --------------------------------------------------------  IN: 265 mL / OUT: 125 mL / NET: 140 mL        MEDICATIONS  (STANDING):  albuterol    0.083%. 2.5 milliGRAM(s) Nebulizer once  bromocriptine Capsule 10 milliGRAM(s) Oral three times a day  chlorhexidine 2% Cloths 1 Application(s) Topical daily  dexMEDEtomidine Infusion 0.2 MICROgram(s)/kG/Hr (2.25 mL/Hr) IV Continuous <Continuous>  niMODipine Oral Solution 30 milliGRAM(s) Oral every 2 hours  nystatin Powder 1 Application(s) Topical two times a day  piperacillin/tazobactam IVPB.. 3.375 Gram(s) IV Intermittent every 8 hours  simvastatin 10 milliGRAM(s) Oral at bedtime  tamsulosin 0.4 milliGRAM(s) Oral at bedtime      RESPIRATORY:        IMAGING:   Recent imaging studies were reviewed.    LAB RESULTS:                          9.3    12.82 )-----------( 332      ( 14 Mar 2024 03:25 )             26.7           03-14    136  |  101  |  12.4  ----------------------------<  112<H>  3.4<L>   |  22.0  |  0.44<L>    Ca    7.5<L>      14 Mar 2024 03:25  Phos  2.0     03-14  Mg     2.2     03-14      -----------------------------------------------------------------------------------------------------------------------------------------------------------------------------------    PHYSICAL EXAM:  General: Calm  HEENT: MMM  Neuro:  -Mental status- No acute distress  -CN- PERRL 3mm, EOMI, tongue midline, face symmetric    CV: RRR  Pulm: clear to auscultation  Abd: Soft, nontender, nondistended  Ext: no noted edema in lower ext  Skin: warm, dry       Chief complaint:   Patient is a 73y old  Female who presents with a chief complaint of Acomm Aneurysm, SAH/IVH (14 Mar 2024 05:23)    24hr EVENTS:  no acute issues    ROS: no complaints  All other systems negative    -----------------------------------------------------------------------------------------------------------------------------------------------------------------------------------  ICU Vital Signs Last 24 Hrs  T(C): 37.5 (14 Mar 2024 08:00), Max: 38.9 (13 Mar 2024 13:00)  T(F): 99.5 (14 Mar 2024 08:00), Max: 102 (13 Mar 2024 13:00)  HR: 92 (14 Mar 2024 08:00) (86 - 115)  BP: 145/75 (14 Mar 2024 08:00) (93/82 - 154/84)  BP(mean): 95 (14 Mar 2024 08:00) (82 - 111)  ABP: --  ABP(mean): --  RR: 15 (14 Mar 2024 08:00) (15 - 24)  SpO2: 99% (14 Mar 2024 08:00) (96% - 100%)    O2 Parameters below as of 14 Mar 2024 08:00  Patient On (Oxygen Delivery Method): room air            I&O's Summary    13 Mar 2024 07:01  -  14 Mar 2024 07:00  --------------------------------------------------------  IN: 1982 mL / OUT: 3075 mL / NET: -1093 mL    14 Mar 2024 07:01  -  14 Mar 2024 09:09  --------------------------------------------------------  IN: 265 mL / OUT: 125 mL / NET: 140 mL        MEDICATIONS  (STANDING):  albuterol    0.083%. 2.5 milliGRAM(s) Nebulizer once  bromocriptine Capsule 10 milliGRAM(s) Oral three times a day  chlorhexidine 2% Cloths 1 Application(s) Topical daily  dexMEDEtomidine Infusion 0.2 MICROgram(s)/kG/Hr (2.25 mL/Hr) IV Continuous <Continuous>  niMODipine Oral Solution 30 milliGRAM(s) Oral every 2 hours  nystatin Powder 1 Application(s) Topical two times a day  piperacillin/tazobactam IVPB.. 3.375 Gram(s) IV Intermittent every 8 hours  simvastatin 10 milliGRAM(s) Oral at bedtime  tamsulosin 0.4 milliGRAM(s) Oral at bedtime      IMAGING:   Recent imaging studies were reviewed.    LAB RESULTS:                          9.3    12.82 )-----------( 332      ( 14 Mar 2024 03:25 )             26.7       03-14    136  |  101  |  12.4  ----------------------------<  112<H>  3.4<L>   |  22.0  |  0.44<L>    Ca    7.5<L>      14 Mar 2024 03:25  Phos  2.0     03-14  Mg     2.2     03-14      -----------------------------------------------------------------------------------------------------------------------------------------------------------------------------------    PHYSICAL EXAM:  General: Calm, laying in bed  HEENT: MMM  Neuro:  -Mental status- No acute distress, AOx2, conversational, following commands  -CN- PERRL 3mm, EOMI, tongue midline, face symmetric  -Motor- full strength in all ext except hip flexion 4/5    -Sensation- intact to LT   -Coordination- no dysmetria noted    CV: RRR  Pulm: Clear to auscultation  Abd: Soft, nontender, nondistended  Ext: No edema  Skin: warm, dry

## 2024-03-14 NOTE — PROGRESS NOTE ADULT - SUBJECTIVE AND OBJECTIVE BOX
NSICU Night Intensivist Note    Historical Review:   73F PMH HLD, osteoporosis presented 3/6 for elective complex Acomm aneurysm embolization via balloon assisted coiling. Patient started on ASA 81 in preparation for the procedure. Patient underwent aneurysm embolization with coiling complicated by aneurysm rupture, controlled with coils and balloon tamponade. NISHANT CT showed SAH b/l frontal lobes and thick R SAH, subsequent stability CTH with worsening ICH and IVH. Intubated in ICU with EVD placement.    12hr EVENTS:  - ordered standing seroquel 12.5 at night, fully oriented during the day  - EVD remains clamped since 3/13    ----------------------------------------------------------------------------------------------------  PHYSICAL EXAM:  General: head elevated in bed, pleasant  HEENT: MMM  Neuro: EVD in place, dressing c/d/i  -Mental status- eyes open, follows commands, Ox3 with cantonese  prompting  -CN- PERRL 3mm, EOMI, tongue midline, face symmetric  -SensoriMotor- maintain all extremities antigravity    CV: regular rate and rhythm  Pulm: no accessory muscle use, normal WOB  Abd: soft, nontender, nondistended  Skin: warm, dry     NSICU Night Intensivist Note    Historical Review:   73F PMH HLD, osteoporosis presented 3/6 for elective complex Acomm aneurysm embolization via balloon assisted coiling. Patient started on ASA 81 in preparation for the procedure. Patient underwent aneurysm embolization with coiling complicated by aneurysm rupture, controlled with coils and balloon tamponade. NISHANT CT showed SAH b/l frontal lobes and thick R SAH, subsequent stability CTH with worsening ICH and IVH. Intubated in ICU with EVD placement.    12hr EVENTS:  - ordered standing seroquel 12.5 at night, fully oriented during the day  - EVD remains clamped since 3/13    ----------------------------------------------------------------------------------------------------  PHYSICAL EXAM: (RN exam prior to seroquel)  General: head elevated in bed, sleeping comfortably  HEENT: MMM  Neuro: EVD in place, dressing c/d/i  -Mental status- opens eyes to voice, follows simple commands, oriented x3  -CN- PERRL 3mm, EOMI, tongue midline, face symmetric  -SensoriMotor- lifts all extremities antigravity 5/5    CV: regular rate and rhythm  Pulm: no accessory muscle use, normal WOB  Abd: soft, nontender, nondistended  Skin: warm, dry    -----------------------------------------------------------------------------------------------------  ICU Vital Signs Last 24 Hrs  T(C): 36.5 (15 Mar 2024 01:00), Max: 38.2 (14 Mar 2024 14:00)  T(F): 97.7 (15 Mar 2024 01:00), Max: 100.8 (14 Mar 2024 14:00)  HR: 78 (15 Mar 2024 01:00) (78 - 101)  BP: 149/74 (15 Mar 2024 01:00) (100/80 - 158/129)  BP(mean): 93 (15 Mar 2024 01:00) (83 - 141)  ABP: --  ABP(mean): --  RR: 13 (15 Mar 2024 01:00) (13 - 20)  SpO2: 99% (15 Mar 2024 01:00) (97% - 100%)    O2 Parameters below as of 14 Mar 2024 20:00  Patient On (Oxygen Delivery Method): room air            I&O's Summary    13 Mar 2024 07:01  -  14 Mar 2024 07:00  --------------------------------------------------------  IN: 1982 mL / OUT: 3075 mL / NET: -1093 mL    14 Mar 2024 07:01  -  15 Mar 2024 01:18  --------------------------------------------------------  IN: 2055 mL / OUT: 2500 mL / NET: -445 mL        MEDICATIONS  (STANDING):  bromocriptine Capsule 10 milliGRAM(s) Oral three times a day  chlorhexidine 2% Cloths 1 Application(s) Topical daily  niMODipine Oral Solution 30 milliGRAM(s) Oral every 2 hours  nystatin Powder 1 Application(s) Topical two times a day  piperacillin/tazobactam IVPB.. 3.375 Gram(s) IV Intermittent every 8 hours  psyllium Powder 1 Packet(s) Oral two times a day  QUEtiapine 12.5 milliGRAM(s) Oral at bedtime  simvastatin 10 milliGRAM(s) Oral at bedtime  tamsulosin 0.4 milliGRAM(s) Oral at bedtime      RESPIRATORY:        IMAGING:   Recent imaging studies were reviewed.    LAB RESULTS:               9.3    12.82 )-----------( 332      ( 14 Mar 2024 03:25 )             26.7     03-14    136  |  101  |  12.4  ----------------------------<  112<H>  3.4<L>   |  22.0  |  0.44<L>    Ca    7.5<L>      14 Mar 2024 03:25  Phos  2.0     03-14  Mg     2.2     03-14  -----------------------------------------------------------------------------------------------------------------------------------------------------------------------------------

## 2024-03-15 LAB
ANION GAP SERPL CALC-SCNC: 11 MMOL/L — SIGNIFICANT CHANGE UP (ref 5–17)
APTT BLD: 28.2 SEC — SIGNIFICANT CHANGE UP (ref 24.5–35.6)
BUN SERPL-MCNC: 9.6 MG/DL — SIGNIFICANT CHANGE UP (ref 8–20)
CALCIUM SERPL-MCNC: 7.9 MG/DL — LOW (ref 8.4–10.5)
CHLORIDE SERPL-SCNC: 102 MMOL/L — SIGNIFICANT CHANGE UP (ref 96–108)
CO2 SERPL-SCNC: 24 MMOL/L — SIGNIFICANT CHANGE UP (ref 22–29)
CREAT SERPL-MCNC: 0.4 MG/DL — LOW (ref 0.5–1.3)
EGFR: 106 ML/MIN/1.73M2 — SIGNIFICANT CHANGE UP
GLUCOSE SERPL-MCNC: 114 MG/DL — HIGH (ref 70–99)
HCT VFR BLD CALC: 27.2 % — LOW (ref 34.5–45)
HGB BLD-MCNC: 9.1 G/DL — LOW (ref 11.5–15.5)
MAGNESIUM SERPL-MCNC: 2.2 MG/DL — SIGNIFICANT CHANGE UP (ref 1.6–2.6)
MCHC RBC-ENTMCNC: 30.2 PG — SIGNIFICANT CHANGE UP (ref 27–34)
MCHC RBC-ENTMCNC: 33.5 GM/DL — SIGNIFICANT CHANGE UP (ref 32–36)
MCV RBC AUTO: 90.4 FL — SIGNIFICANT CHANGE UP (ref 80–100)
OB PNL STL IA: NEGATIVE — SIGNIFICANT CHANGE UP
PHOSPHATE SERPL-MCNC: 1.8 MG/DL — LOW (ref 2.4–4.7)
PLATELET # BLD AUTO: 332 K/UL — SIGNIFICANT CHANGE UP (ref 150–400)
POTASSIUM SERPL-MCNC: 3.5 MMOL/L — SIGNIFICANT CHANGE UP (ref 3.5–5.3)
POTASSIUM SERPL-SCNC: 3.5 MMOL/L — SIGNIFICANT CHANGE UP (ref 3.5–5.3)
RBC # BLD: 3.01 M/UL — LOW (ref 3.8–5.2)
RBC # FLD: 12.7 % — SIGNIFICANT CHANGE UP (ref 10.3–14.5)
SODIUM SERPL-SCNC: 137 MMOL/L — SIGNIFICANT CHANGE UP (ref 135–145)
WBC # BLD: 9.71 K/UL — SIGNIFICANT CHANGE UP (ref 3.8–10.5)
WBC # FLD AUTO: 9.71 K/UL — SIGNIFICANT CHANGE UP (ref 3.8–10.5)

## 2024-03-15 PROCEDURE — 99233 SBSQ HOSP IP/OBS HIGH 50: CPT

## 2024-03-15 PROCEDURE — 99291 CRITICAL CARE FIRST HOUR: CPT

## 2024-03-15 PROCEDURE — 70450 CT HEAD/BRAIN W/O DYE: CPT | Mod: 26,59

## 2024-03-15 PROCEDURE — 99232 SBSQ HOSP IP/OBS MODERATE 35: CPT

## 2024-03-15 RX ORDER — NIMODIPINE 60 MG/10ML
30 SOLUTION ORAL EVERY 4 HOURS
Refills: 0 | Status: DISCONTINUED | OUTPATIENT
Start: 2024-03-15 | End: 2024-03-15

## 2024-03-15 RX ORDER — POTASSIUM CHLORIDE 20 MEQ
40 PACKET (EA) ORAL ONCE
Refills: 0 | Status: COMPLETED | OUTPATIENT
Start: 2024-03-15 | End: 2024-03-15

## 2024-03-15 RX ORDER — SODIUM,POTASSIUM PHOSPHATES 278-250MG
1 POWDER IN PACKET (EA) ORAL ONCE
Refills: 0 | Status: COMPLETED | OUTPATIENT
Start: 2024-03-15 | End: 2024-03-15

## 2024-03-15 RX ORDER — SODIUM CHLORIDE 9 MG/ML
500 INJECTION, SOLUTION INTRAVENOUS ONCE
Refills: 0 | Status: COMPLETED | OUTPATIENT
Start: 2024-03-15 | End: 2024-03-15

## 2024-03-15 RX ORDER — NIMODIPINE 60 MG/10ML
60 SOLUTION ORAL EVERY 4 HOURS
Refills: 0 | Status: DISCONTINUED | OUTPATIENT
Start: 2024-03-15 | End: 2024-03-25

## 2024-03-15 RX ORDER — ENOXAPARIN SODIUM 100 MG/ML
30 INJECTION SUBCUTANEOUS EVERY 24 HOURS
Refills: 0 | Status: DISCONTINUED | OUTPATIENT
Start: 2024-03-15 | End: 2024-03-27

## 2024-03-15 RX ADMIN — NIMODIPINE 60 MILLIGRAM(S): 60 SOLUTION ORAL at 17:00

## 2024-03-15 RX ADMIN — Medication 40 MILLIEQUIVALENT(S): at 05:00

## 2024-03-15 RX ADMIN — BROMOCRIPTINE MESYLATE 10 MILLIGRAM(S): 5 CAPSULE ORAL at 21:51

## 2024-03-15 RX ADMIN — Medication 1 PACKET(S): at 17:00

## 2024-03-15 RX ADMIN — TAMSULOSIN HYDROCHLORIDE 0.4 MILLIGRAM(S): 0.4 CAPSULE ORAL at 21:52

## 2024-03-15 RX ADMIN — Medication 650 MILLIGRAM(S): at 10:55

## 2024-03-15 RX ADMIN — BROMOCRIPTINE MESYLATE 10 MILLIGRAM(S): 5 CAPSULE ORAL at 13:10

## 2024-03-15 RX ADMIN — PIPERACILLIN AND TAZOBACTAM 25 GRAM(S): 4; .5 INJECTION, POWDER, LYOPHILIZED, FOR SOLUTION INTRAVENOUS at 13:10

## 2024-03-15 RX ADMIN — CHLORHEXIDINE GLUCONATE 1 APPLICATION(S): 213 SOLUTION TOPICAL at 11:05

## 2024-03-15 RX ADMIN — Medication 1 TABLET(S): at 13:09

## 2024-03-15 RX ADMIN — NIMODIPINE 30 MILLIGRAM(S): 60 SOLUTION ORAL at 07:31

## 2024-03-15 RX ADMIN — PIPERACILLIN AND TAZOBACTAM 25 GRAM(S): 4; .5 INJECTION, POWDER, LYOPHILIZED, FOR SOLUTION INTRAVENOUS at 21:50

## 2024-03-15 RX ADMIN — Medication 1 PACKET(S): at 05:38

## 2024-03-15 RX ADMIN — PIPERACILLIN AND TAZOBACTAM 25 GRAM(S): 4; .5 INJECTION, POWDER, LYOPHILIZED, FOR SOLUTION INTRAVENOUS at 05:00

## 2024-03-15 RX ADMIN — NIMODIPINE 60 MILLIGRAM(S): 60 SOLUTION ORAL at 21:50

## 2024-03-15 RX ADMIN — Medication 650 MILLIGRAM(S): at 17:01

## 2024-03-15 RX ADMIN — QUETIAPINE FUMARATE 12.5 MILLIGRAM(S): 200 TABLET, FILM COATED ORAL at 21:52

## 2024-03-15 RX ADMIN — Medication 650 MILLIGRAM(S): at 23:14

## 2024-03-15 RX ADMIN — Medication 650 MILLIGRAM(S): at 18:00

## 2024-03-15 RX ADMIN — BROMOCRIPTINE MESYLATE 10 MILLIGRAM(S): 5 CAPSULE ORAL at 05:00

## 2024-03-15 RX ADMIN — NIMODIPINE 30 MILLIGRAM(S): 60 SOLUTION ORAL at 09:04

## 2024-03-15 RX ADMIN — NYSTATIN CREAM 1 APPLICATION(S): 100000 CREAM TOPICAL at 05:05

## 2024-03-15 RX ADMIN — SIMVASTATIN 10 MILLIGRAM(S): 20 TABLET, FILM COATED ORAL at 21:52

## 2024-03-15 RX ADMIN — NIMODIPINE 30 MILLIGRAM(S): 60 SOLUTION ORAL at 04:04

## 2024-03-15 RX ADMIN — NIMODIPINE 30 MILLIGRAM(S): 60 SOLUTION ORAL at 00:57

## 2024-03-15 RX ADMIN — Medication 650 MILLIGRAM(S): at 11:59

## 2024-03-15 RX ADMIN — SODIUM CHLORIDE 1000 MILLILITER(S): 9 INJECTION, SOLUTION INTRAVENOUS at 00:56

## 2024-03-15 RX ADMIN — Medication 1 PACKET(S): at 05:04

## 2024-03-15 RX ADMIN — NYSTATIN CREAM 1 APPLICATION(S): 100000 CREAM TOPICAL at 17:01

## 2024-03-15 RX ADMIN — NIMODIPINE 30 MILLIGRAM(S): 60 SOLUTION ORAL at 05:04

## 2024-03-15 RX ADMIN — ENOXAPARIN SODIUM 30 MILLIGRAM(S): 100 INJECTION SUBCUTANEOUS at 21:52

## 2024-03-15 RX ADMIN — NIMODIPINE 30 MILLIGRAM(S): 60 SOLUTION ORAL at 13:09

## 2024-03-15 NOTE — PROGRESS NOTE ADULT - ASSESSMENT
74 yo F with acomm aneurysm s/p embolization c/b rupture, tamponaded with coils;  angio 3/11 confirmed aneurysm occlusion with small neck remnant, no vasospasm noted.  ICH, IVH, SAH, obstructive hydrocephalus, EVD in place.  Fever, suspected aspiration PNA, contribution of central.    Neuro:  - Neurochecks q2h  - Nimodipine, continue  - stop TCDs due to poor windows  - remove EVD today   - seroquel 12.5mg qhs  - SBP Goal 100-160  - maintain Osats>92%, incentive spirometry  - diet as tolerated; bowel regimen  - LBM 3/14  - monitor UOP and e-lytes; failed TOV 03/10 and 3/13, cont Flomax; feliciano replaced  - DVT Ppx: SCD, SQL  - cont Zosyn for suspected aspiration for 7 days until 3/16; repeat fever w/up - negative so far  - Monitor BGL, maintain 120-180    - midline   72 yo F with acomm aneurysm s/p embolization c/b rupture, tamponaded with coils;  angio 3/11 confirmed aneurysm occlusion with small neck remnant, no vasospasm noted.  ICH, IVH, SAH, obstructive hydrocephalus, EVD in place.  Fever, suspected aspiration PNA, contribution of central.    Neuro:  - Neurochecks q4h  - Nimodipine, trial q4h  - stop TCDs due to poor windows  - remove EVD today   - seroquel 12.5mg qhs  - SBP Goal 100-160  - room air  - maintain Osats>92%, incentive spirometry  - diet as tolerated; bowel regimen  - LBM 3/15- loose stools, rectal tube, metamucil  - monitor UOP and e-lytes; failed TOV 03/10 and 3/13, cont Flomax; feliciano replaced  - DVT Ppx: SCD, SQL  - cont Zosyn for suspected aspiration for 7 days until 3/16   - Monitor BGL, maintain 120-180    - midline

## 2024-03-15 NOTE — CHART NOTE - NSCHARTNOTEFT_GEN_A_CORE
Source: Patient [ ]  Family [ ]   other [x ] EMR and staff     Current Diet: Diet, Easy to Chew (03-13-24 @ 11:29) [Active]    PO intake:  < 50% [ ]   50-75%  [x ]   %  [ ]  other :    Source for PO intake [ ] Patient [ ] family [ ] chart [x ] staff [ ] other    Current Weight:   3/14: 47.9 kg   3/10:  51.1 kg   3/7:  54.5 kg   (Generalized edema)     % Weight Change: Unsure of accuracy of weights; will continue to monitor weights for trends.     Pertinent Medications: MEDICATIONS  (STANDING):  bromocriptine Capsule 10 milliGRAM(s) Oral three times a day  chlorhexidine 2% Cloths 1 Application(s) Topical daily  niMODipine Oral Solution 30 milliGRAM(s) Oral every 2 hours  nystatin Powder 1 Application(s) Topical two times a day  piperacillin/tazobactam IVPB.. 3.375 Gram(s) IV Intermittent every 8 hours  psyllium Powder 1 Packet(s) Oral two times a day  QUEtiapine 12.5 milliGRAM(s) Oral at bedtime  simvastatin 10 milliGRAM(s) Oral at bedtime  tamsulosin 0.4 milliGRAM(s) Oral at bedtime    MEDICATIONS  (PRN):  acetaminophen     Tablet .. 650 milliGRAM(s) Oral every 6 hours PRN Temp greater or equal to 38C (100.4F)  hydrALAZINE Injectable 10 milliGRAM(s) IV Push every 2 hours PRN SBP > 160  labetalol Injectable 10 milliGRAM(s) IV Push every 2 hours PRN Systolic blood pressure > 160  ondansetron Injectable 4 milliGRAM(s) IV Push every 6 hours PRN Nausea and/or Vomiting    Pertinent Labs: CBC Full  -  ( 15 Mar 2024 04:00 )  WBC Count : 9.71 K/uL  RBC Count : 3.01 M/uL  Hemoglobin : 9.1 g/dL  Hematocrit : 27.2 %  Platelet Count - Automated : 332 K/uL  Mean Cell Volume : 90.4 fl  Mean Cell Hemoglobin : 30.2 pg  Mean Cell Hemoglobin Concentration : 33.5 gm/dL      Skin: IAD noted    Nutrition focused physical exam conducted - found signs of malnutrition [ ]absent [ ]present    Subcutaneous fat loss: [x ] Orbital fat pads region, [ x]Buccal fat region, [ ]Triceps region,  [ ]Ribs region    Muscle wasting: [x ]Temples region, [x ]Clavicle region, [ ]Shoulder region, [ ]Scapula region, [ ]Interosseous region,  [ ]thigh region, [ ]Calf region    Estimated Needs:   [x ] no change since previous assessment  [ ] recalculated:     Hospital Course:   Pt is a 73 year old F with PMH HLD, osteoporosis who presents for cerebral angiogram with aneurysm embolization. Patient had MRI / MRA brain 2/2 hand numbness which had incidental finding of acomm aneurysm. Patient underwent diagnostic angiogram 2/14/24 which confirmed acomm aneurysm. Patient presents today for cerebral angiogram with aneurysm embolization via balloon assisted coiling. Patient started on ASA 81 in preparation for the procedure. Patient underwent aneurysm embolization with 9 coils complicated by aneurysm rupture, controlled with coils and balloon tamponade. Patient extubated and admitted to NSICU for post op care.      Current Nutrition Diagnosis:  Pt remains at high nutrition risk secondary to Moderate (acute) protein calorie malnutrition related to inability to meet sufficient protein-energy needs in setting of s/p aneurysm embolization, advanced age  as evidenced by mild/mod muscle/fat loss, 1+ generalized edema. Per nursing staff; pt with fair PO intake; requiring assistance with meals. Pt tolerating easy to chew diet noted. Recommendations below:       Recommendations:   1. MVI daily   2. Check weight daily to monitor trends   3. Assistance with meals.   4. Ensure TID (350kcal, 20gm protein per shake)     Monitoring and Evaluation:   [x ] PO intake [x ] Tolerance to diet prescription [X] Weights  [X] Follow up per protocol [X] Labs: Source: Patient [ ]  Family [ ]   other [x ] EMR and staff     Current Diet: Diet, Easy to Chew (03-13-24 @ 11:29) [Active]    PO intake:  < 50% [ ]   50-75%  [x ]   %  [ ]  other :    Source for PO intake [ ] Patient [ ] family [ ] chart [x ] staff [ ] other    Current Weight:   3/14: 47.9 kg   3/10:  51.1 kg   3/7:  54.5 kg   (Generalized edema)     % Weight Change: Unsure of accuracy of weights; will continue to monitor weights for trends.     Pertinent Medications: MEDICATIONS  (STANDING):  bromocriptine Capsule 10 milliGRAM(s) Oral three times a day  chlorhexidine 2% Cloths 1 Application(s) Topical daily  niMODipine Oral Solution 30 milliGRAM(s) Oral every 2 hours  nystatin Powder 1 Application(s) Topical two times a day  piperacillin/tazobactam IVPB.. 3.375 Gram(s) IV Intermittent every 8 hours  psyllium Powder 1 Packet(s) Oral two times a day  QUEtiapine 12.5 milliGRAM(s) Oral at bedtime  simvastatin 10 milliGRAM(s) Oral at bedtime  tamsulosin 0.4 milliGRAM(s) Oral at bedtime    MEDICATIONS  (PRN):  acetaminophen     Tablet .. 650 milliGRAM(s) Oral every 6 hours PRN Temp greater or equal to 38C (100.4F)  hydrALAZINE Injectable 10 milliGRAM(s) IV Push every 2 hours PRN SBP > 160  labetalol Injectable 10 milliGRAM(s) IV Push every 2 hours PRN Systolic blood pressure > 160  ondansetron Injectable 4 milliGRAM(s) IV Push every 6 hours PRN Nausea and/or Vomiting    Pertinent Labs: CBC Full  -  ( 15 Mar 2024 04:00 )  WBC Count : 9.71 K/uL  RBC Count : 3.01 M/uL  Hemoglobin : 9.1 g/dL  Hematocrit : 27.2 %  Platelet Count - Automated : 332 K/uL  Mean Cell Volume : 90.4 fl  Mean Cell Hemoglobin : 30.2 pg  Mean Cell Hemoglobin Concentration : 33.5 gm/dL      Skin: IAD noted    Nutrition focused physical exam conducted - found signs of malnutrition [ ]absent [ ]present    Subcutaneous fat loss: [x ] Orbital fat pads region, [ x]Buccal fat region, [ ]Triceps region,  [ ]Ribs region    Muscle wasting: [x ]Temples region, [x ]Clavicle region, [ ]Shoulder region, [ ]Scapula region, [ ]Interosseous region,  [ ]thigh region, [ ]Calf region    Estimated Needs:   [x ] no change since previous assessment  [ ] recalculated:     Hospital Course:   Pt is a 73 year old F with PMH HLD, osteoporosis who presents for cerebral angiogram with aneurysm embolization. Patient had MRI / MRA brain 2/2 hand numbness which had incidental finding of acomm aneurysm. Patient underwent diagnostic angiogram 2/14/24 which confirmed acomm aneurysm. Patient presents today for cerebral angiogram with aneurysm embolization via balloon assisted coiling. Patient started on ASA 81 in preparation for the procedure. Patient underwent aneurysm embolization with 9 coils complicated by aneurysm rupture, controlled with coils and balloon tamponade. Patient extubated and admitted to NSICU for post op care.      Current Nutrition Diagnosis:  Pt remains at high nutrition risk secondary to Moderate (acute) protein calorie malnutrition related to inability to meet sufficient protein-energy needs in setting of s/p aneurysm embolization, advanced age  as evidenced by mild/mod muscle/fat loss, 1+ generalized edema. Per nursing staff; pt with fair PO intake; requiring assistance with meals. Pt tolerating easy to chew diet noted. Aware pt with loose stool; metamucil added BID to help bulk stool. Recommendations below:       Recommendations:   1. MVI daily   2. Check weight daily to monitor trends   3. Assistance with meals.   4. Ensure TID (350kcal, 20gm protein per shake)     Monitoring and Evaluation:   [x ] PO intake [x ] Tolerance to diet prescription [X] Weights  [X] Follow up per protocol [X] Labs:

## 2024-03-15 NOTE — PROGRESS NOTE ADULT - ASSESSMENT
ASSESSMENT/PLAN:  73F with PMH HLD, osteoporosis who presents for cerebral angiogram with aneurysm embolization. Patient had MRI / MRA brain 2/2 hand numbness which had incidental finding of acomm aneurysm. Patient underwent diagnostic angiogram 2/14/24 which confirmed acomm aneurysm. Patient presents today for cerebral angiogram with aneurysm embolization via balloon assisted coiling. Patient started on ASA 81 in preparation for the procedure. Patient underwent aneurysm embolization with 9 coils complicated by aneurysm rupture, controlled with coils and balloon tamponade. Patient extubated and admitted to NSICU for post op care.    NEURO:  Neuro/Vital checks q 2  -bromocriptine 10mg TID,  -continue Seroquel 12.5mg nightly  -CTH in am 3/15  -EVD clamped, plan to remove in AM post CT    Pain control- Tylenol      PULM:  -2L NC, titrate for FIO2 >92%      CV:  SBP goal:   -continue nimodipine 30mg q2hrs for SAH protocol  -Simvastatin 10mg daily    RENAL:  -Flomax 0.4mg  for urinary retention  -TOV in am    GI:  Diet: Easy to chew  -continue rectal tube while patient has liquid bowel movements, added metamucil to bulk      ENDO:   -no active issues    HEME/ONC:  VTE prophylaxis: [X] SCD  -lovenox held for evd removal    ID:   -low grade temps, work up NTD  -Mild leukocytosis        CODE STATUS:  [X] Full Code     DISPOSITION:  [X] NSICU

## 2024-03-15 NOTE — PROGRESS NOTE ADULT - SUBJECTIVE AND OBJECTIVE BOX
Patient is a 73y old  Female who presents with a chief complaint of cerebral aneurysm (14 Mar 2024 20:58)    HPI:  73F with PMH HLD, osteoporosis who presents for cerebral angiogram with aneurysm embolization. Patient had MRI / MRA brain 2/2 hand numbness which had incidental finding of acomm aneurysm. Patient underwent diagnostic angiogram 2/14/24 which confirmed acomm aneurysm. Patient presents today for cerebral angiogram with aneurysm embolization via balloon assisted coiling. Patient started on ASA 81 in preparation for the procedure. Patient underwent aneurysm embolization with coiling complicated by aneurysm rupture, controlled with coils and balloon tamponade. NISHANT CT showed SAH b/l frontal lobes and R sylvian fissure along with contrast staining. Course complicated by worsening SAH, IVH requiring EVD placement.       Interval history:      Vital Signs Last 24 Hrs  T(C): 37.7 (15 Mar 2024 15:00), Max: 38.4 (15 Mar 2024 11:00)  T(F): 99.9 (15 Mar 2024 15:00), Max: 101.1 (15 Mar 2024 11:00)  HR: 88 (15 Mar 2024 15:00) (60 - 96)  BP: 143/115 (15 Mar 2024 15:00) (112/89 - 158/129)  BP(mean): 124 (15 Mar 2024 15:00) (83 - 141)  RR: 18 (15 Mar 2024 15:00) (13 - 21)  SpO2: 100% (15 Mar 2024 15:00) (98% - 100%)    Parameters below as of 15 Mar 2024 08:00  Patient On (Oxygen Delivery Method): room air          Physical Exam:  Constitutional: NAD, lying in bed  Neuro  * Mental Status:  GCS 15: Awake, alert, oriented to conversation. No aphasia or difficulty speaking. No dysarthria. Able to name objects and their function.  * Cranial Nerves: Cnii-Cnxii grossly intact. PERRL, EOMI, tongue midline, no gaze deviation  * Motor: RUE 5/5, LUE 5/5, RLE 5/5, LLE 5/5, no drift or dysmetria  * Sensory: Sensation intact to light touch  * Reflexes: not assessed   Cardiovascular: Regular rate and rhythm.  Eyes: See neurologic examination with detailed examination of eyes.  ENT: No JVD, Trachea Midline  Respiratory: non labored breathing   Gastrointestinal: Soft, nontender, nondistended.  Genitourinary: [ ] Dangelo, [ x ] No Dangelo.   Musculoskeletal: No muscle wasting noted, (See neurologic assessment for full muscle strength assessment) No pretibial edema appreciated, no appreciable calf tenderness.  Skin:  no wounds, no redness, no abrasions noted  Hematologic / Lymph / Immunologic: No bleeding from IV sites or wounds, No lymphadenopathy, No Hives or allergic type skin lesions      LABS:                        9.1    9.71  )-----------( 332      ( 15 Mar 2024 04:00 )             27.2     03-15    137  |  102  |  9.6  ----------------------------<  114<H>  3.5   |  24.0  |  0.40<L>    Ca    7.9<L>      15 Mar 2024 04:00  Phos  1.8     03-15  Mg     2.2     03-15      PTT - ( 15 Mar 2024 04:00 )  PTT:28.2 sec  Urinalysis Basic - ( 15 Mar 2024 04:00 )    Color: x / Appearance: x / SG: x / pH: x  Gluc: 114 mg/dL / Ketone: x  / Bili: x / Urobili: x   Blood: x / Protein: x / Nitrite: x   Leuk Esterase: x / RBC: x / WBC x   Sq Epi: x / Non Sq Epi: x / Bacteria: x        CULTURES:  Culture Results:   No growth to date. (03-12 @ 12:45)  Culture Results:   No growth at 48 Hours (03-12 @ 12:00)        I&O:       Medications:    RADIOLOGY & ADDITIONAL STUDIES: Patient is a 73y old  Female who presents with a chief complaint of cerebral aneurysm (14 Mar 2024 20:58)    HPI:  73F with PMH HLD, osteoporosis who presents for cerebral angiogram with aneurysm embolization. Patient had MRI / MRA brain 2/2 hand numbness which had incidental finding of acomm aneurysm. Patient underwent diagnostic angiogram 2/14/24 which confirmed acomm aneurysm. Patient presents today for cerebral angiogram with aneurysm embolization via balloon assisted coiling. Patient started on ASA 81 in preparation for the procedure. Patient underwent aneurysm embolization with coiling complicated by aneurysm rupture, controlled with coils and balloon tamponade. NISHANT CT showed SAH b/l frontal lobes and R sylvian fissure along with contrast staining. Course complicated by worsening SAH, IVH requiring EVD placement.       Interval history:  Patient seen and examined by neuro IR team. Patient tolerated EVD clamp trial, removed this am by neurosurgery. Otherwise no acute events reported.       Vital Signs Last 24 Hrs  T(C): 37.7 (15 Mar 2024 15:00), Max: 38.4 (15 Mar 2024 11:00)  T(F): 99.9 (15 Mar 2024 15:00), Max: 101.1 (15 Mar 2024 11:00)  HR: 88 (15 Mar 2024 15:00) (60 - 96)  BP: 143/115 (15 Mar 2024 15:00) (112/89 - 158/129)  BP(mean): 124 (15 Mar 2024 15:00) (83 - 141)  RR: 18 (15 Mar 2024 15:00) (13 - 21)  SpO2: 100% (15 Mar 2024 15:00) (98% - 100%)    Parameters below as of 15 Mar 2024 08:00  Patient On (Oxygen Delivery Method): room air      Physical Exam:  Constitutional: NAD, lying in bed  Neuro  * Mental Status:  EO spontaneously, follows commands, A&O x1-2  * Cranial Nerves: EOMI, mild R facial corrects with smiling, no gaze deviation  * Motor: b/l UE with mild drift, b/l LE AG L>R  * Sensory: Sensation grossly intact to light noxious   * Reflexes: not assessed       LABS:                        9.1    9.71  )-----------( 332      ( 15 Mar 2024 04:00 )             27.2     03-15    137  |  102  |  9.6  ----------------------------<  114<H>  3.5   |  24.0  |  0.40<L>    Ca    7.9<L>      15 Mar 2024 04:00  Phos  1.8     03-15  Mg     2.2     03-15      PTT - ( 15 Mar 2024 04:00 )  PTT:28.2 sec      I&O:     14 Mar 2024 07:01  -  15 Mar 2024 07:00  --------------------------------------------------------  IN: 3370 mL / OUT: 3625 mL / NET: -255 mL    15 Mar 2024 07:01  -  15 Mar 2024 17:28  --------------------------------------------------------  IN: 1365 mL / OUT: 1700 mL / NET: -335 mL      Medications:  MEDICATIONS  (STANDING):  bromocriptine Capsule 10 milliGRAM(s) Oral three times a day  chlorhexidine 2% Cloths 1 Application(s) Topical daily  enoxaparin Injectable 30 milliGRAM(s) SubCutaneous every 24 hours  multivitamin 1 Tablet(s) Oral daily  niMODipine Oral Solution 60 milliGRAM(s) Oral every 4 hours  nystatin Powder 1 Application(s) Topical two times a day  piperacillin/tazobactam IVPB.. 3.375 Gram(s) IV Intermittent every 8 hours  psyllium Powder 1 Packet(s) Oral two times a day  QUEtiapine 12.5 milliGRAM(s) Oral at bedtime  simvastatin 10 milliGRAM(s) Oral at bedtime  tamsulosin 0.4 milliGRAM(s) Oral at bedtime    MEDICATIONS  (PRN):  acetaminophen     Tablet .. 650 milliGRAM(s) Oral every 6 hours PRN Temp greater or equal to 38C (100.4F)  hydrALAZINE Injectable 10 milliGRAM(s) IV Push every 2 hours PRN SBP > 160  labetalol Injectable 10 milliGRAM(s) IV Push every 2 hours PRN Systolic blood pressure > 160  ondansetron Injectable 4 milliGRAM(s) IV Push every 6 hours PRN Nausea and/or Vomiting      RADIOLOGY & ADDITIONAL STUDIES:  < from: CT Head No Cont (03.15.24 @ 05:38) >  IMPRESSION: Stable follow-up CT study.    --- End of Report ---  KIMMY VILLELA MD; Attending Radiologist  This document has been electronically signed. Mar 15 2024  7:45AM    < end of copied text >

## 2024-03-15 NOTE — PROGRESS NOTE ADULT - SUBJECTIVE AND OBJECTIVE BOX
Chief complaint:   Patient is a 73y old  Female who presents with a chief complaint of cerebral aneurysm (14 Mar 2024 20:58)    HPI:        24hr EVENTS:      ROS: [ ]  Unable to assess due to mental status   All other systems negative    -----------------------------------------------------------------------------------------------------------------------------------------------------------------------------------  ICU Vital Signs Last 24 Hrs  T(C): 37.4 (15 Mar 2024 09:00), Max: 38.2 (14 Mar 2024 14:00)  T(F): 99.3 (15 Mar 2024 09:00), Max: 100.8 (14 Mar 2024 14:00)  HR: 84 (15 Mar 2024 09:00) (60 - 100)  BP: 119/105 (15 Mar 2024 09:00) (112/89 - 158/129)  BP(mean): 112 (15 Mar 2024 09:00) (83 - 141)  ABP: --  ABP(mean): --  RR: 15 (15 Mar 2024 09:00) (13 - 21)  SpO2: 100% (15 Mar 2024 09:00) (97% - 100%)    O2 Parameters below as of 15 Mar 2024 08:00  Patient On (Oxygen Delivery Method): room air            I&O's Summary    14 Mar 2024 07:01  -  15 Mar 2024 07:00  --------------------------------------------------------  IN: 3370 mL / OUT: 3625 mL / NET: -255 mL    15 Mar 2024 07:01  -  15 Mar 2024 09:15  --------------------------------------------------------  IN: 765 mL / OUT: 350 mL / NET: 415 mL        MEDICATIONS  (STANDING):  bromocriptine Capsule 10 milliGRAM(s) Oral three times a day  chlorhexidine 2% Cloths 1 Application(s) Topical daily  niMODipine Oral Solution 30 milliGRAM(s) Oral every 2 hours  nystatin Powder 1 Application(s) Topical two times a day  piperacillin/tazobactam IVPB.. 3.375 Gram(s) IV Intermittent every 8 hours  psyllium Powder 1 Packet(s) Oral two times a day  QUEtiapine 12.5 milliGRAM(s) Oral at bedtime  simvastatin 10 milliGRAM(s) Oral at bedtime  tamsulosin 0.4 milliGRAM(s) Oral at bedtime      RESPIRATORY:        IMAGING:   Recent imaging studies were reviewed.    LAB RESULTS:                          9.1    9.71  )-----------( 332      ( 15 Mar 2024 04:00 )             27.2       PTT - ( 15 Mar 2024 04:00 )  PTT:28.2 sec    03-15    137  |  102  |  9.6  ----------------------------<  114<H>  3.5   |  24.0  |  0.40<L>    Ca    7.9<L>      15 Mar 2024 04:00  Phos  1.8     03-15  Mg     2.2     03-15                  -----------------------------------------------------------------------------------------------------------------------------------------------------------------------------------    PHYSICAL EXAM:  General: Calm, laying in bed  HEENT: MMM  Neuro:  -Mental status- No acute distress, AOx3, conversational, following commands  -CN- PERRL 3mm, EOMI, tongue midline, face symmetric  -Motor- full strength in all ext  -Sensation- intact to LT   -Coordination- no dysmetria noted    CV:   Pulm: Clear to auscultation  Abd: Soft, nontender, nondistended  Ext: No edema  Skin: warm, dry     Chief complaint:   Patient is a 73y old  Female who presents with a chief complaint of cerebral aneurysm (14 Mar 2024 20:58)      24hr EVENTS: remove drain today      ROS: no acute issues  All other systems negative    -----------------------------------------------------------------------------------------------------------------------------------------------------------------------------------  ICU Vital Signs Last 24 Hrs  T(C): 37.4 (15 Mar 2024 09:00), Max: 38.2 (14 Mar 2024 14:00)  T(F): 99.3 (15 Mar 2024 09:00), Max: 100.8 (14 Mar 2024 14:00)  HR: 84 (15 Mar 2024 09:00) (60 - 100)  BP: 119/105 (15 Mar 2024 09:00) (112/89 - 158/129)  BP(mean): 112 (15 Mar 2024 09:00) (83 - 141)  ABP: --  ABP(mean): --  RR: 15 (15 Mar 2024 09:00) (13 - 21)  SpO2: 100% (15 Mar 2024 09:00) (97% - 100%)    O2 Parameters below as of 15 Mar 2024 08:00  Patient On (Oxygen Delivery Method): room air            I&O's Summary    14 Mar 2024 07:01  -  15 Mar 2024 07:00  --------------------------------------------------------  IN: 3370 mL / OUT: 3625 mL / NET: -255 mL    15 Mar 2024 07:01  -  15 Mar 2024 09:15  --------------------------------------------------------  IN: 765 mL / OUT: 350 mL / NET: 415 mL        MEDICATIONS  (STANDING):  bromocriptine Capsule 10 milliGRAM(s) Oral three times a day  chlorhexidine 2% Cloths 1 Application(s) Topical daily  niMODipine Oral Solution 30 milliGRAM(s) Oral every 2 hours  nystatin Powder 1 Application(s) Topical two times a day  piperacillin/tazobactam IVPB.. 3.375 Gram(s) IV Intermittent every 8 hours  psyllium Powder 1 Packet(s) Oral two times a day  QUEtiapine 12.5 milliGRAM(s) Oral at bedtime  simvastatin 10 milliGRAM(s) Oral at bedtime  tamsulosin 0.4 milliGRAM(s) Oral at bedtime      IMAGING:   Recent imaging studies were reviewed.    LAB RESULTS:                          9.1    9.71  )-----------( 332      ( 15 Mar 2024 04:00 )             27.2       PTT - ( 15 Mar 2024 04:00 )  PTT:28.2 sec    03-15    137  |  102  |  9.6  ----------------------------<  114<H>  3.5   |  24.0  |  0.40<L>    Ca    7.9<L>      15 Mar 2024 04:00  Phos  1.8     03-15  Mg     2.2     03-15        -----------------------------------------------------------------------------------------------------------------------------------------------------------------------------------    PHYSICAL EXAM:  General: Calm, laying in bed  HEENT: MMM  Neuro:  -Mental status- No acute distress, AOx3, conversational, following commands  -CN- PERRL 3mm, EOMI, tongue midline, face symmetric  -Motor- full strength in all ext  -Sensation- intact to LT   -Coordination- no dysmetria noted    CV: RRR  Pulm: Clear to auscultation  Abd: Soft, nontender, nondistended  Ext: No edema  Skin: warm, dry

## 2024-03-15 NOTE — CHART NOTE - NSCHARTNOTEFT_GEN_A_CORE
Patient was positioned flat and sterile dressing removed. Ensured EVD was clamped. Incision clean, dry, intact without drainage or dehiscence noted. EVD removed slowly with minimal drainage from site. 1 staple placed to close drain site. Patient tolerated procedure well. RN aware.

## 2024-03-15 NOTE — PROGRESS NOTE ADULT - SUBJECTIVE AND OBJECTIVE BOX
INTERVAL HPI/OVERNIGHT EVENTS:  -500cc bolus to keep net even    Vital Signs Last 24 Hrs  T(C): 36.7 (15 Mar 2024 00:00), Max: 38.2 (14 Mar 2024 14:00)  T(F): 98.1 (15 Mar 2024 00:00), Max: 100.8 (14 Mar 2024 14:00)  HR: 79 (15 Mar 2024 00:00) (79 - 101)  BP: 131/75 (15 Mar 2024 00:00) (100/80 - 158/129)  BP(mean): 92 (15 Mar 2024 00:00) (83 - 141)  RR: 14 (15 Mar 2024 00:00) (13 - 20)  SpO2: 99% (15 Mar 2024 00:00) (97% - 100%)    Parameters below as of 14 Mar 2024 20:00  Patient On (Oxygen Delivery Method): room air          PHYSICAL EXAM:  GENERAL: NAD, well-groomed  HEAD:  Atraumatic, normocephalic  DRAINS: EVD   WOUND: Dressing clean dry intact  MENTAL STATUS: AAO x2; a little lethargic but awake; Opens eyes spontaneously; Appropriately conversant; following simple commands  CRANIAL NERVES: Visual acuity normal for age, visual fields full to confrontation, PERRL. EOMI without nystagmus.  REFLEXES: PERRL. Corneals intact b/l. Gag intact. Cough intact.   MOTOR: LLE HF 4/5, everything else 5/5  CHEST/LUNG: equal rise and fall of chest bilaterally  HEART: +S1/+S2  ABDOMEN: Soft, nontender, mildly distended  EXTREMITIES:  2+ peripheral pulses, no clubbing, cyanosis, or edema  SKIN: Warm, dry; no rashes or lesions        LABS:                        9.3    12.82 )-----------( 332      ( 14 Mar 2024 03:25 )             26.7     03-14    136  |  101  |  12.4  ----------------------------<  112<H>  3.4<L>   |  22.0  |  0.44<L>    Ca    7.5<L>      14 Mar 2024 03:25  Phos  2.0     03-14  Mg     2.2     03-14        Urinalysis Basic - ( 14 Mar 2024 03:25 )    Color: x / Appearance: x / SG: x / pH: x  Gluc: 112 mg/dL / Ketone: x  / Bili: x / Urobili: x   Blood: x / Protein: x / Nitrite: x   Leuk Esterase: x / RBC: x / WBC x   Sq Epi: x / Non Sq Epi: x / Bacteria: x        03-13 @ 07:01  -  03-14 @ 07:00  --------------------------------------------------------  IN: 1982 mL / OUT: 3075 mL / NET: -1093 mL    03-14 @ 07:01  -  03-15 @ 01:02  --------------------------------------------------------  IN: 2055 mL / OUT: 2500 mL / NET: -445 mL

## 2024-03-15 NOTE — PROGRESS NOTE ADULT - CRITICAL CARE ATTENDING COMMENT
Pt is critically ill and at high risk of rapid deterioration/death due to abovementioned conditions.   Still requires critical care interventions - additional time spent for ongoing frequent evaluations, interventions and management adjustment by the Attending and ICU team, - and included review of relevant history, clinical examination, review of data and images, discussion of treatment with the multidisciplinary team and any consultants involved in this patient’s care.
Pt is critically ill and at high risk of rapid deterioration/death due to abovementioned conditions.   Still requires critical care interventions - ongoing frequent evaluations, interventions and management adjustment by the Attending and ICU team, - and included review of relevant history, clinical examination, review of data and images, discussion of treatment with the multidisciplinary team and any consultants involved in this patient’s care as well as family discussion.
I spent 60 minutes of critical care time examining patient, reviewing vitals, labs, medications, imaging and discussing with the team goals of care to prevent life-threatening in this patient who is at high risk for deterioration or death due to:  SAH
I have personally provided the above noted minutes of critical care time including review of laboratory values, imaging, interdisciplinary care coordination, and frequent monitoring for decompensation.    Based on my personal evaluation, this patient has a high probability of imminent or life-threatening deterioration due to the presence of: SAH, hydrocephalus, encephalopathy -  which required my direct attention, intervention, and personal management. Other billable procedures, if performed, are documented separately.
I have personally provided the above noted minutes of critical care time including review of laboratory values, imaging, interdisciplinary care coordination, and frequent monitoring for decompensation.    Based on my personal evaluation, this patient has a high probability of imminent or life-threatening deterioration due to the presence of: SAH, hydrocephalus, encephalopathy -  which required my direct attention, intervention, and personal management. Other billable procedures, if performed, are documented separately.
I spent 60 minutes of critical care time examining patient, reviewing vitals, labs, medications, imaging and discussing with the team goals of care to prevent life-threatening in this patient who is at high risk for deterioration or death due to:  SAH
Pt is critically ill and at high risk of rapid deterioration/death due to abovementioned conditions.   Still requires critical care interventions - ongoing frequent evaluations, interventions and management adjustment by the Attending and ICU team, - and included review of relevant history, clinical examination, review of data and images, discussion of treatment with the multidisciplinary team and any consultants involved in this patient’s care as well as family discussion.
I have personally provided the above noted minutes of critical care time including review of laboratory values, imaging, interdisciplinary care coordination, and frequent monitoring for decompensation.    Based on my personal evaluation, this patient has a high probability of imminent or life-threatening deterioration due to the presence of: SAH, hydrocephalus, encephalopathy -  which required my direct attention, intervention, and personal management. Other billable procedures, if performed, are documented separately.
Pt is critically ill and at high risk of rapid deterioration/death due to abovementioned conditions.   Still requires critical care interventions - additional time spent for ongoing frequent evaluations, interventions and management adjustment by the Attending and ICU team, - and included review of relevant history, clinical examination, review of data and images, discussion of treatment with the multidisciplinary team and any consultants involved in this patient’s care.

## 2024-03-15 NOTE — PROGRESS NOTE ADULT - ASSESSMENT
Assessment:  73F with PMH HLD who presented for elective acomm aneurysm embolization with balloon-assisted coiling, complicated by aneurysm rupture controlled with coils and balloon tamponade.   - PBD 9, POD 9  - EVD removed per neurosurgery       Plan:  - Discussed and examined with Dr. Dowd  - Q2 hour neuro checks  - SBP goal   - Maintain normothermia  - Maintain euvolemia, strict I&Os, supplement for net negative PRN  - Nimodipine 60q4 as tolerated by HR and BP   - No longer requiring TCDs 2/2 poor windows   - DVT prophylaxis: SCDs, lovenox  - No AED required  - PT/OT recommending AR, PM&R consulted   - Possible DG to floor tomorrow  - Further care per NSICU team

## 2024-03-16 LAB
ANION GAP SERPL CALC-SCNC: 10 MMOL/L — SIGNIFICANT CHANGE UP (ref 5–17)
ANION GAP SERPL CALC-SCNC: 12 MMOL/L — SIGNIFICANT CHANGE UP (ref 5–17)
ANION GAP SERPL CALC-SCNC: 13 MMOL/L — SIGNIFICANT CHANGE UP (ref 5–17)
ANION GAP SERPL CALC-SCNC: 15 MMOL/L — SIGNIFICANT CHANGE UP (ref 5–17)
BUN SERPL-MCNC: 6.9 MG/DL — LOW (ref 8–20)
BUN SERPL-MCNC: 7.1 MG/DL — LOW (ref 8–20)
BUN SERPL-MCNC: 7.3 MG/DL — LOW (ref 8–20)
BUN SERPL-MCNC: 7.5 MG/DL — LOW (ref 8–20)
CALCIUM SERPL-MCNC: 7.7 MG/DL — LOW (ref 8.4–10.5)
CALCIUM SERPL-MCNC: 7.7 MG/DL — LOW (ref 8.4–10.5)
CALCIUM SERPL-MCNC: 8.1 MG/DL — LOW (ref 8.4–10.5)
CALCIUM SERPL-MCNC: 8.3 MG/DL — LOW (ref 8.4–10.5)
CHLORIDE SERPL-SCNC: 101 MMOL/L — SIGNIFICANT CHANGE UP (ref 96–108)
CHLORIDE SERPL-SCNC: 96 MMOL/L — SIGNIFICANT CHANGE UP (ref 96–108)
CHLORIDE SERPL-SCNC: 97 MMOL/L — SIGNIFICANT CHANGE UP (ref 96–108)
CHLORIDE SERPL-SCNC: 98 MMOL/L — SIGNIFICANT CHANGE UP (ref 96–108)
CO2 SERPL-SCNC: 22 MMOL/L — SIGNIFICANT CHANGE UP (ref 22–29)
CO2 SERPL-SCNC: 22 MMOL/L — SIGNIFICANT CHANGE UP (ref 22–29)
CO2 SERPL-SCNC: 23 MMOL/L — SIGNIFICANT CHANGE UP (ref 22–29)
CO2 SERPL-SCNC: 23 MMOL/L — SIGNIFICANT CHANGE UP (ref 22–29)
CREAT SERPL-MCNC: 0.35 MG/DL — LOW (ref 0.5–1.3)
CREAT SERPL-MCNC: 0.36 MG/DL — LOW (ref 0.5–1.3)
CREAT SERPL-MCNC: 0.38 MG/DL — LOW (ref 0.5–1.3)
CREAT SERPL-MCNC: 0.41 MG/DL — LOW (ref 0.5–1.3)
EGFR: 105 ML/MIN/1.73M2 — SIGNIFICANT CHANGE UP
EGFR: 107 ML/MIN/1.73M2 — SIGNIFICANT CHANGE UP
EGFR: 108 ML/MIN/1.73M2 — SIGNIFICANT CHANGE UP
EGFR: 109 ML/MIN/1.73M2 — SIGNIFICANT CHANGE UP
GLUCOSE SERPL-MCNC: 113 MG/DL — HIGH (ref 70–99)
GLUCOSE SERPL-MCNC: 125 MG/DL — HIGH (ref 70–99)
GLUCOSE SERPL-MCNC: 128 MG/DL — HIGH (ref 70–99)
GLUCOSE SERPL-MCNC: 137 MG/DL — HIGH (ref 70–99)
HCT VFR BLD CALC: 27.2 % — LOW (ref 34.5–45)
HCT VFR BLD CALC: 27.5 % — LOW (ref 34.5–45)
HCT VFR BLD CALC: 29 % — LOW (ref 34.5–45)
HGB BLD-MCNC: 10.1 G/DL — LOW (ref 11.5–15.5)
HGB BLD-MCNC: 9.6 G/DL — LOW (ref 11.5–15.5)
HGB BLD-MCNC: 9.7 G/DL — LOW (ref 11.5–15.5)
MAGNESIUM SERPL-MCNC: 2.1 MG/DL — SIGNIFICANT CHANGE UP (ref 1.6–2.6)
MAGNESIUM SERPL-MCNC: 2.1 MG/DL — SIGNIFICANT CHANGE UP (ref 1.6–2.6)
MAGNESIUM SERPL-MCNC: 2.2 MG/DL — SIGNIFICANT CHANGE UP (ref 1.6–2.6)
MCHC RBC-ENTMCNC: 31.1 PG — SIGNIFICANT CHANGE UP (ref 27–34)
MCHC RBC-ENTMCNC: 31.2 PG — SIGNIFICANT CHANGE UP (ref 27–34)
MCHC RBC-ENTMCNC: 31.4 PG — SIGNIFICANT CHANGE UP (ref 27–34)
MCHC RBC-ENTMCNC: 34.8 GM/DL — SIGNIFICANT CHANGE UP (ref 32–36)
MCHC RBC-ENTMCNC: 35.3 GM/DL — SIGNIFICANT CHANGE UP (ref 32–36)
MCHC RBC-ENTMCNC: 35.3 GM/DL — SIGNIFICANT CHANGE UP (ref 32–36)
MCV RBC AUTO: 88.1 FL — SIGNIFICANT CHANGE UP (ref 80–100)
MCV RBC AUTO: 88.9 FL — SIGNIFICANT CHANGE UP (ref 80–100)
MCV RBC AUTO: 89.5 FL — SIGNIFICANT CHANGE UP (ref 80–100)
PHOSPHATE SERPL-MCNC: 2 MG/DL — LOW (ref 2.4–4.7)
PHOSPHATE SERPL-MCNC: 2.1 MG/DL — LOW (ref 2.4–4.7)
PHOSPHATE SERPL-MCNC: 2.1 MG/DL — LOW (ref 2.4–4.7)
PLATELET # BLD AUTO: 314 K/UL — SIGNIFICANT CHANGE UP (ref 150–400)
PLATELET # BLD AUTO: 320 K/UL — SIGNIFICANT CHANGE UP (ref 150–400)
PLATELET # BLD AUTO: 395 K/UL — SIGNIFICANT CHANGE UP (ref 150–400)
POTASSIUM SERPL-MCNC: 2.6 MMOL/L — CRITICAL LOW (ref 3.5–5.3)
POTASSIUM SERPL-MCNC: 2.8 MMOL/L — CRITICAL LOW (ref 3.5–5.3)
POTASSIUM SERPL-MCNC: 3.4 MMOL/L — LOW (ref 3.5–5.3)
POTASSIUM SERPL-MCNC: 4 MMOL/L — SIGNIFICANT CHANGE UP (ref 3.5–5.3)
POTASSIUM SERPL-SCNC: 2.6 MMOL/L — CRITICAL LOW (ref 3.5–5.3)
POTASSIUM SERPL-SCNC: 2.8 MMOL/L — CRITICAL LOW (ref 3.5–5.3)
POTASSIUM SERPL-SCNC: 3.4 MMOL/L — LOW (ref 3.5–5.3)
POTASSIUM SERPL-SCNC: 4 MMOL/L — SIGNIFICANT CHANGE UP (ref 3.5–5.3)
RBC # BLD: 3.06 M/UL — LOW (ref 3.8–5.2)
RBC # BLD: 3.12 M/UL — LOW (ref 3.8–5.2)
RBC # BLD: 3.24 M/UL — LOW (ref 3.8–5.2)
RBC # FLD: 12.7 % — SIGNIFICANT CHANGE UP (ref 10.3–14.5)
RBC # FLD: 12.8 % — SIGNIFICANT CHANGE UP (ref 10.3–14.5)
RBC # FLD: 13.2 % — SIGNIFICANT CHANGE UP (ref 10.3–14.5)
SODIUM SERPL-SCNC: 131 MMOL/L — LOW (ref 135–145)
SODIUM SERPL-SCNC: 132 MMOL/L — LOW (ref 135–145)
SODIUM SERPL-SCNC: 134 MMOL/L — LOW (ref 135–145)
SODIUM SERPL-SCNC: 135 MMOL/L — SIGNIFICANT CHANGE UP (ref 135–145)
WBC # BLD: 13.3 K/UL — HIGH (ref 3.8–10.5)
WBC # BLD: 9.66 K/UL — SIGNIFICANT CHANGE UP (ref 3.8–10.5)
WBC # BLD: 9.73 K/UL — SIGNIFICANT CHANGE UP (ref 3.8–10.5)
WBC # FLD AUTO: 13.3 K/UL — HIGH (ref 3.8–10.5)
WBC # FLD AUTO: 9.66 K/UL — SIGNIFICANT CHANGE UP (ref 3.8–10.5)
WBC # FLD AUTO: 9.73 K/UL — SIGNIFICANT CHANGE UP (ref 3.8–10.5)

## 2024-03-16 PROCEDURE — 70450 CT HEAD/BRAIN W/O DYE: CPT | Mod: 26

## 2024-03-16 PROCEDURE — 99223 1ST HOSP IP/OBS HIGH 75: CPT

## 2024-03-16 PROCEDURE — 99233 SBSQ HOSP IP/OBS HIGH 50: CPT

## 2024-03-16 PROCEDURE — 99232 SBSQ HOSP IP/OBS MODERATE 35: CPT

## 2024-03-16 RX ORDER — TAMSULOSIN HYDROCHLORIDE 0.4 MG/1
0.8 CAPSULE ORAL AT BEDTIME
Refills: 0 | Status: DISCONTINUED | OUTPATIENT
Start: 2024-03-16 | End: 2024-03-27

## 2024-03-16 RX ORDER — POTASSIUM CHLORIDE 20 MEQ
10 PACKET (EA) ORAL ONCE
Refills: 0 | Status: COMPLETED | OUTPATIENT
Start: 2024-03-16 | End: 2024-03-16

## 2024-03-16 RX ORDER — TRAMADOL HYDROCHLORIDE 50 MG/1
25 TABLET ORAL ONCE
Refills: 0 | Status: DISCONTINUED | OUTPATIENT
Start: 2024-03-16 | End: 2024-03-16

## 2024-03-16 RX ORDER — SODIUM CHLORIDE 9 MG/ML
500 INJECTION, SOLUTION INTRAVENOUS ONCE
Refills: 0 | Status: COMPLETED | OUTPATIENT
Start: 2024-03-16 | End: 2024-03-16

## 2024-03-16 RX ORDER — LOPERAMIDE HCL 2 MG
2 TABLET ORAL ONCE
Refills: 0 | Status: COMPLETED | OUTPATIENT
Start: 2024-03-16 | End: 2024-03-16

## 2024-03-16 RX ORDER — POTASSIUM CHLORIDE 20 MEQ
40 PACKET (EA) ORAL EVERY 4 HOURS
Refills: 0 | Status: COMPLETED | OUTPATIENT
Start: 2024-03-16 | End: 2024-03-16

## 2024-03-16 RX ORDER — SODIUM CHLORIDE 9 MG/ML
500 INJECTION INTRAMUSCULAR; INTRAVENOUS; SUBCUTANEOUS ONCE
Refills: 0 | Status: COMPLETED | OUTPATIENT
Start: 2024-03-16 | End: 2024-03-16

## 2024-03-16 RX ADMIN — Medication 650 MILLIGRAM(S): at 14:34

## 2024-03-16 RX ADMIN — NIMODIPINE 60 MILLIGRAM(S): 60 SOLUTION ORAL at 21:38

## 2024-03-16 RX ADMIN — Medication 650 MILLIGRAM(S): at 21:41

## 2024-03-16 RX ADMIN — ENOXAPARIN SODIUM 30 MILLIGRAM(S): 100 INJECTION SUBCUTANEOUS at 21:27

## 2024-03-16 RX ADMIN — Medication 40 MILLIEQUIVALENT(S): at 05:12

## 2024-03-16 RX ADMIN — Medication 650 MILLIGRAM(S): at 08:04

## 2024-03-16 RX ADMIN — NYSTATIN CREAM 1 APPLICATION(S): 100000 CREAM TOPICAL at 18:38

## 2024-03-16 RX ADMIN — CHLORHEXIDINE GLUCONATE 1 APPLICATION(S): 213 SOLUTION TOPICAL at 13:18

## 2024-03-16 RX ADMIN — NIMODIPINE 60 MILLIGRAM(S): 60 SOLUTION ORAL at 13:18

## 2024-03-16 RX ADMIN — TAMSULOSIN HYDROCHLORIDE 0.8 MILLIGRAM(S): 0.4 CAPSULE ORAL at 21:27

## 2024-03-16 RX ADMIN — TRAMADOL HYDROCHLORIDE 25 MILLIGRAM(S): 50 TABLET ORAL at 03:47

## 2024-03-16 RX ADMIN — BROMOCRIPTINE MESYLATE 10 MILLIGRAM(S): 5 CAPSULE ORAL at 05:12

## 2024-03-16 RX ADMIN — NIMODIPINE 60 MILLIGRAM(S): 60 SOLUTION ORAL at 05:13

## 2024-03-16 RX ADMIN — Medication 85 MILLIMOLE(S): at 05:12

## 2024-03-16 RX ADMIN — TRAMADOL HYDROCHLORIDE 25 MILLIGRAM(S): 50 TABLET ORAL at 04:40

## 2024-03-16 RX ADMIN — Medication 40 MILLIEQUIVALENT(S): at 09:22

## 2024-03-16 RX ADMIN — BROMOCRIPTINE MESYLATE 10 MILLIGRAM(S): 5 CAPSULE ORAL at 21:27

## 2024-03-16 RX ADMIN — Medication 1 PACKET(S): at 05:13

## 2024-03-16 RX ADMIN — SIMVASTATIN 10 MILLIGRAM(S): 20 TABLET, FILM COATED ORAL at 21:28

## 2024-03-16 RX ADMIN — QUETIAPINE FUMARATE 12.5 MILLIGRAM(S): 200 TABLET, FILM COATED ORAL at 21:28

## 2024-03-16 RX ADMIN — BROMOCRIPTINE MESYLATE 10 MILLIGRAM(S): 5 CAPSULE ORAL at 13:18

## 2024-03-16 RX ADMIN — SODIUM CHLORIDE 1000 MILLILITER(S): 9 INJECTION, SOLUTION INTRAVENOUS at 23:58

## 2024-03-16 RX ADMIN — NYSTATIN CREAM 1 APPLICATION(S): 100000 CREAM TOPICAL at 05:13

## 2024-03-16 RX ADMIN — Medication 40 MILLIEQUIVALENT(S): at 13:18

## 2024-03-16 RX ADMIN — Medication 1 TABLET(S): at 13:18

## 2024-03-16 RX ADMIN — Medication 100 MILLIEQUIVALENT(S): at 03:48

## 2024-03-16 RX ADMIN — PIPERACILLIN AND TAZOBACTAM 25 GRAM(S): 4; .5 INJECTION, POWDER, LYOPHILIZED, FOR SOLUTION INTRAVENOUS at 05:12

## 2024-03-16 RX ADMIN — SODIUM CHLORIDE 500 MILLILITER(S): 9 INJECTION INTRAMUSCULAR; INTRAVENOUS; SUBCUTANEOUS at 03:33

## 2024-03-16 RX ADMIN — NIMODIPINE 60 MILLIGRAM(S): 60 SOLUTION ORAL at 09:23

## 2024-03-16 RX ADMIN — Medication 85 MILLIMOLE(S): at 21:39

## 2024-03-16 RX ADMIN — NIMODIPINE 60 MILLIGRAM(S): 60 SOLUTION ORAL at 02:29

## 2024-03-16 RX ADMIN — Medication 650 MILLIGRAM(S): at 00:00

## 2024-03-16 RX ADMIN — Medication 650 MILLIGRAM(S): at 22:40

## 2024-03-16 RX ADMIN — PIPERACILLIN AND TAZOBACTAM 25 GRAM(S): 4; .5 INJECTION, POWDER, LYOPHILIZED, FOR SOLUTION INTRAVENOUS at 13:19

## 2024-03-16 RX ADMIN — Medication 1 PACKET(S): at 18:38

## 2024-03-16 RX ADMIN — NIMODIPINE 60 MILLIGRAM(S): 60 SOLUTION ORAL at 18:08

## 2024-03-16 RX ADMIN — Medication 2 MILLIGRAM(S): at 21:39

## 2024-03-16 NOTE — PROGRESS NOTE ADULT - SUBJECTIVE AND OBJECTIVE BOX
HISTORY OF PRESENT ILLNESS:  74 y/o mandarin speaking female with PMH of HLD and osteoporosis presents to [facility] with complaints of having cerebral aneurysm. States in 10/2023 she started to have B/L hand numbness. At that time she had MRI brain, MRA head and neck. MRA showed an ACOMM aneurysm. She underwent a cerebral angiogram by José Manuel Villaseñor at [facility] on December 7th, 2023, with the intent to treat this month. The aneurysm is described as a 5.5x4.6x3.6mm wide necked acomm aneurysm that projects medio-superiorly. Reports occasional headaches, described as numbness, 5/10 in severity, worse with laying down, relieved with sitting up or standing. Denies chest pain, shortness of breath, visual changes or stroke like symptoms. Patient is scheduled for cerebral angiogram on 2/14/2024 with Dr. Dowd.     INTERVAL HPI/OVERNIGHT EVENTS:  3/6 - Intubated. EVD placed, set to 20. Dropped to 10 overnight.  3/7 - Exam improved, Extubated  3/8 - Febrile with RLL consolidation  3/10 - EVD prox port of collection system damage/leaking noted, replaced.  3/11 - S/p cerebral angiogram, confirmed Acomm aneurysm occlusion with small neck remnant, no vasospasm noted  3/13 - EVD clamped  3/15 - EVD removed  3/16 - Pending CTH     Vital Signs Last 24 Hrs  T(C): 37.6 (16 Mar 2024 00:00), Max: 38.4 (15 Mar 2024 11:00)  T(F): 99.7 (16 Mar 2024 00:00), Max: 101.1 (15 Mar 2024 11:00)  HR: 90 (16 Mar 2024 00:00) (77 - 96)  BP: 149/78 (16 Mar 2024 00:00) (117/96 - 156/78)  BP(mean): 99 (16 Mar 2024 00:00) (87 - 131)  RR: 14 (16 Mar 2024 00:00) (13 - 21)  SpO2: 98% (16 Mar 2024 00:00) (97% - 100%)  Parameters below as of 16 Mar 2024 00:00  Patient On (Oxygen Delivery Method): room air    PHYSICAL EXAM:  GENERAL: NAD, well-groomed  HEAD: Atraumatic, normocephalic   WOUND: Dressing clean dry intact  MENTAL STATUS: AAOx3; awake; opens eyes spontaneously; appropriately conversant; following simple commands  CRANIAL NERVES: Visual acuity normal for age, visual fields full to confrontation, PERRL. EOMI without nystagmus  MOTOR: LLE HF 4/5, otherwise 5/5  CHEST/LUNG: Equal rise and fall of chest bilaterally  ABDOMEN: Soft, nontender, mildly distended  SKIN: Warm, dry    LABS:                        9.7    9.73  )-----------( 314      ( 16 Mar 2024 02:04 )             27.5     03-16    131<L>  |  98  |  7.5<L>  ----------------------------<  125<H>  2.8<LL>   |  23.0  |  0.36<L>    Ca    7.7<L>      16 Mar 2024 02:04  Phos  2.1     03-16  Mg     2.1     03-16    PTT - ( 15 Mar 2024 04:00 )  PTT:28.2 sec    Urinalysis Basic - ( 16 Mar 2024 02:04 )  Color: x / Appearance: x / SG: x / pH: x  Gluc: 125 mg/dL / Ketone: x  / Bili: x / Urobili: x   Blood: x / Protein: x / Nitrite: x   Leuk Esterase: x / RBC: x / WBC x   Sq Epi: x / Non Sq Epi: x / Bacteria: x    03-14 @ 07:01  -  03-15 @ 07:00  --------------------------------------------------------  IN: 3370 mL / OUT: 3625 mL / NET: -255 mL    03-15 @ 07:01 - 03-16 @ 02:55  --------------------------------------------------------  IN: 2085 mL / OUT: 2800 mL / NET: -715 mL    RADIOLOGY & ADDITIONAL TESTS:    CT Head No Cont (03.15.24 @ 05:38)  IMPRESSION: Stable follow-up CT study    CT Head No Cont (03.10.24 @ 12:37)  IMPRESSION:  Mildly decreased bilateral mesial frontal parenchymal hemorrhages.  Similar anterior interhemispheric acute subarachnoid hemorrhage.  Mildly decreased intraventricular hemorrhage within the bilateral lateral and third ventricles.  Decreased ventricular size with near resolution of hydrocephalus.  No large midline shift.  Basal cisterns are more well visualized on the current examination, this may be due to technique.   HISTORY OF PRESENT ILLNESS:  74 y/o mandarin speaking female with PMH of HLD and osteoporosis presents to [facility] with complaints of having cerebral aneurysm. States in 10/2023 she started to have B/L hand numbness. At that time she had MRI brain, MRA head and neck. MRA showed an ACOMM aneurysm. She underwent a cerebral angiogram by José Manuel Villaseñor at [facility] on December 7th, 2023, with the intent to treat this month. The aneurysm is described as a 5.5x4.6x3.6mm wide necked acomm aneurysm that projects medio-superiorly. Reports occasional headaches, described as numbness, 5/10 in severity, worse with laying down, relieved with sitting up or standing. Denies chest pain, shortness of breath, visual changes or stroke like symptoms. Patient is scheduled for cerebral angiogram on 2/14/2024 with Dr. Dowd.     INTERVAL HPI/OVERNIGHT EVENTS:  3/6 - Intubated. EVD placed, set to 20. Dropped to 10 overnight.  3/7 - Exam improved, Extubated  3/8 - Febrile with RLL consolidation  3/10 - EVD prox port of collection system damage/leaking noted, replaced.  3/11 - S/p cerebral angiogram, confirmed Acomm aneurysm occlusion with small neck remnant, no vasospasm noted  3/13 - EVD clamped  3/15 - EVD removed  3/16 - Pending CTH     Vital Signs Last 24 Hrs  T(C): 37.6 (16 Mar 2024 00:00), Max: 38.4 (15 Mar 2024 11:00)  T(F): 99.7 (16 Mar 2024 00:00), Max: 101.1 (15 Mar 2024 11:00)  HR: 90 (16 Mar 2024 00:00) (77 - 96)  BP: 149/78 (16 Mar 2024 00:00) (117/96 - 156/78)  BP(mean): 99 (16 Mar 2024 00:00) (87 - 131)  RR: 14 (16 Mar 2024 00:00) (13 - 21)  SpO2: 98% (16 Mar 2024 00:00) (97% - 100%)  Parameters below as of 16 Mar 2024 00:00  Patient On (Oxygen Delivery Method): room air    PHYSICAL EXAM:  GENERAL: NAD, well-groomed  HEAD: Atraumatic, normocephalic   WOUND: Dressing clean dry intact  MENTAL STATUS: AAOx3; awake; opens eyes spontaneously; appropriately conversant; following simple commands  CRANIAL NERVES: Visual acuity normal for age, visual fields full to confrontation, PERRL. EOMI without nystagmus  MOTOR: LLE HF 4/5, otherwise 5/5  CHEST/LUNG: Equal rise and fall of chest bilaterally  ABDOMEN: Soft, nontender, mildly distended  SKIN: Warm, dry    LABS:                        9.7    9.73  )-----------( 314      ( 16 Mar 2024 02:04 )             27.5     03-16    131<L>  |  98  |  7.5<L>  ----------------------------<  125<H>  2.8<LL>   |  23.0  |  0.36<L>    Ca    7.7<L>      16 Mar 2024 02:04  Phos  2.1     03-16  Mg     2.1     03-16    PTT - ( 15 Mar 2024 04:00 )  PTT:28.2 sec    Urinalysis Basic - ( 16 Mar 2024 02:04 )  Color: x / Appearance: x / SG: x / pH: x  Gluc: 125 mg/dL / Ketone: x  / Bili: x / Urobili: x   Blood: x / Protein: x / Nitrite: x   Leuk Esterase: x / RBC: x / WBC x   Sq Epi: x / Non Sq Epi: x / Bacteria: x    03-14 @ 07:01  -  03-15 @ 07:00  --------------------------------------------------------  IN: 3370 mL / OUT: 3625 mL / NET: -255 mL    03-15 @ 07:01 - 03-16 @ 02:55  --------------------------------------------------------  IN: 2085 mL / OUT: 2800 mL / NET: -715 mL    RADIOLOGY & ADDITIONAL TESTS:    CT Head No Cont (03.15.24 @ 05:38)  IMPRESSION: Stable follow-up CT study    CT Head No Cont (03.10.24 @ 12:37)  IMPRESSION:  Mildly decreased bilateral mesial frontal parenchymal hemorrhages.  Similar anterior interhemispheric acute subarachnoid hemorrhage.  Mildly decreased intraventricular hemorrhage within the bilateral lateral and third ventricles.  Decreased ventricular size with near resolution of hydrocephalus.  No large midline shift.  Basal cisterns are more well visualized on the current examination, this may be due to technique.

## 2024-03-16 NOTE — PROGRESS NOTE ADULT - ASSESSMENT
73F with PMH HLD, osteoporosis who presents for cerebral angiogram with aneurysm embolization. Patient had MRI / MRA brain 2/2 hand numbness which had incidental finding of acomm aneurysm. Patient underwent diagnostic angiogram 2/14/24 which confirmed acomm aneurysm. Patient presents today for cerebral angiogram with aneurysm embolization via balloon assisted coiling. Patient started on ASA 81 in preparation for the procedure. Patient underwent aneurysm embolization with 9 coils complicated by aneurysm rupture, controlled with coils and balloon tamponade. Patient extubated and admitted to NSICU for post op care.    Plan:    NEURO:  - Neuro checks q2h  - Bromocriptine 10mg TID  - Seroquel 12.5mg nightly  - CTH in AM 3/16  - Pain control- Tylenol, Tramadol    PULM:  -2L NC, titrate for FIO2 >92%    CV:  SBP goal:   - Nimodipine 30mg q2hrs for SAH protocol  - Simvastatin 10mg daily    RENAL:  -Flomax 0.4mg  for urinary retention  -TOV in am    GI:  - Diet: Easy to chew  - Rectal tube while patient has liquid bowel movements  - LBM 3/14   - Metamucil    ENDO:   - No active issues    HEME/ONC:  - SCDs and Lovenox 30mg for VTE prophylaxis    ID:   - Low grade temps, work up NTD  - Mild leukocytosis   73F with PMH HLD, osteoporosis who presents for cerebral angiogram with aneurysm embolization. Patient had MRI / MRA brain 2/2 hand numbness which had incidental finding of acomm aneurysm. Patient underwent diagnostic angiogram 2/14/24 which confirmed acomm aneurysm. Patient presents today for cerebral angiogram with aneurysm embolization via balloon assisted coiling. Patient started on ASA 81 in preparation for the procedure. Patient underwent aneurysm embolization with 9 coils complicated by aneurysm rupture, controlled with coils and balloon tamponade. Patient extubated and admitted to NSICU for post op care.    Plan:    NEURO:  - Neuro checks q2h  - Bromocriptine 10mg TID  - Seroquel 12.5mg nightly  - CTH in AM 3/16  - Pain control- Tylenol, Tramadol    PULM:  -2L NC, titrate for FIO2 >92%    CV:  SBP goal:   - Nimodipine 30mg q2hrs for SAH protocol  - Simvastatin 10mg daily    RENAL:  -Flomax 0.4mg  for urinary retention  -TOV in am  - Repleting all electrolytes    GI:  - Diet: Easy to chew  - Rectal tube while patient has liquid bowel movements  - LBM 3/14   - Metamucil    ENDO:   - No active issues    HEME/ONC:  - SCDs and Lovenox 30mg for VTE prophylaxis    ID:   - Low grade temps, work up NTD  - Mild leukocytosis

## 2024-03-16 NOTE — CONSULT NOTE ADULT - NS ATTEND AMEND GEN_ALL_CORE FT
Neurosurgery consulted after intra-procedural rupture and NISHANT CT demonstrating subarachnoid hemorrhage. Patient seen and examined at bedside after extubation after procedure. Patient awake, alert and following commands immediately after procedure. Routine follow up CT head demonstrated worsening subarachnoid hemorrhage with intraventricular hemorrhage and given the degree of lethargy and likelihood of worsening mental status, ventriculostomy was placed after discussion with the treating physician and family. The risks, benefits, alternative of ventriculostomy were discussed with the patient's family and all questions were answered. They requested to proceed. I spent 75 minutes.
Agree with PA's assessment and plan.

## 2024-03-16 NOTE — CONSULT NOTE ADULT - ASSESSMENT
ASSESSMENT:   74 y/o mandarin speaking female with PMH of HLD and osteoporosis presents to [facility] with complaints of having cerebral aneurysm. States in 10/2023 she started to have B/L hand numbness. At that time she had MRI brain, MRA head and neck. MRA showed an ACOMM aneurysm. She underwent a cerebral angiogram by José Manuel Villaseñor at [facility] on December 7th, 2023, with the intent to treat this month. The aneurysm is described as a 5.5x4.6x3.6mm wide necked acomm aneurysm that projects medio-superiorly. Reports occasional headaches, described as numbness, 5/10 in severity, worse with laying down, relieved with sitting up or standing. Patient underwent diagnostic angiogram 2/14/24 which confirmed acomm aneurysm. On 3/6 patient underwent aneurysm embolization with coiling complicated by aneurysm rupture, controlled with coils and balloon tamponade. NISHANT CT showed SAH b/l frontal lobes and R sylvian fissure along with contrast staining. EVD placed  3/6, removed by neurosurgery 3/15. 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     NEURO:   -Neurologically intact  -Continue close monitoring for neurologic deterioration    - Stroke neuro checks q2hrs   -SBP goal90-160 per neuro IR recommendations, avoiding rapid fluctuations and hypotension    -ANTITHROMBOTIC THERAPY: n/a   -MRI Brain w/o, MRA Head w/o and Neck w/contrast results as above   -Dysphagia screen: pass  -Physical therapy/OT/Speech eval/treatment.            - patient should have all age and risk appropriate malignancy screenings with PCP or sooner if clinically suspected    -DVT ppx: Heparin s.c [] LMWH [x] SCD[x]    -maintain adequate hydration    -Na Goal: 135-145   -monitor for si/sx of infection   -Stroke education     OTHER:    -condition and plan of care d/w patient and NSICU team, questions and concerns addressed.     DISPOSITION:   -Rehab or home depending on PT eval once stable and workup is complete    CORE MEASURES:        Admission NIHSS: n/a      Tenecteplase : [] YES [x] NO      LDL/A1C:     Depression Screen- if depression hx and/or present      Statin Therapy: n/a     Dysphagia Screen: [x] PASS [] FAIL     Smoking [] YES [x] NO      Afib [] YES [x] NO     Stroke Education [] YES [] NO [x] pending

## 2024-03-16 NOTE — CONSULT NOTE ADULT - SUBJECTIVE AND OBJECTIVE BOX
Preliminary note, official note pending attending review/signature.  Seen and examined by Stroke team attending/team, assessment/ plan as discussed with stroke team attending/team as noted.                                [facility] Stroke Team  CC: BOBBY, ACOMM aneurysm w/ rupture  HPI:  74 y/o mandarin speaking female with PMH of HLD and osteoporosis presents to [facility] with complaints of having cerebral aneurysm. States in 10/2023 she started to have B/L hand numbness. At that time she had MRI brain, MRA head and neck. MRA showed an ACOMM aneurysm. She underwent a cerebral angiogram by José Manuel Villaseñor at [facility] on December 7th, 2023, with the intent to treat this month. The aneurysm is described as a 5.5x4.6x3.6mm wide necked acomm aneurysm that projects medio-superiorly. Reports occasional headaches, described as numbness, 5/10 in severity, worse with laying down, relieved with sitting up or standing. Patient underwent diagnostic angiogram 2/14/24 which confirmed acomm aneurysm. On 3/6 patient underwent aneurysm embolization with coiling complicated by aneurysm rupture, controlled with coils and balloon tamponade. NISHANT CT showed SAH b/l frontal lobes and R sylvian fissure along with contrast staining. Patient admitted to NSICU for post op care.. EVD now removed per neurosurgery. Now transferred to neurosurgery service, stroke team asked to consult.     MEDICATIONS  (STANDING):  bromocriptine Capsule 10 milliGRAM(s) Oral three times a day  chlorhexidine 2% Cloths 1 Application(s) Topical daily  enoxaparin Injectable 30 milliGRAM(s) SubCutaneous every 24 hours  multivitamin 1 Tablet(s) Oral daily  niMODipine Oral Solution 60 milliGRAM(s) Oral every 4 hours  nystatin Powder 1 Application(s) Topical two times a day  psyllium Powder 1 Packet(s) Oral two times a day  QUEtiapine 12.5 milliGRAM(s) Oral at bedtime  simvastatin 10 milliGRAM(s) Oral at bedtime  tamsulosin 0.8 milliGRAM(s) Oral at bedtime    MEDICATIONS  (PRN):  acetaminophen     Tablet .. 650 milliGRAM(s) Oral every 6 hours PRN Temp greater or equal to 38C (100.4F)  hydrALAZINE Injectable 10 milliGRAM(s) IV Push every 2 hours PRN SBP > 160  labetalol Injectable 10 milliGRAM(s) IV Push every 2 hours PRN Systolic blood pressure > 160  ondansetron Injectable 4 milliGRAM(s) IV Push every 6 hours PRN Nausea and/or Vomiting      Allergies  No Known Allergies    Intolerances  lactose (Diarrhea)    SOCIAL HISTORY:  no tob,   no alcohol   no drugs    FAMILY HISTORY:  Family history of CVA      ROS: 14 point ROS negative other than what is present in HPI or below    Vital Signs Last 24 Hrs  T(C): 37.9 (16 Mar 2024 14:00), Max: 38.3 (15 Mar 2024 17:00)  T(F): 100.2 (16 Mar 2024 14:00), Max: 100.9 (15 Mar 2024 17:00)  HR: 97 (16 Mar 2024 14:00) (79 - 97)  BP: 105/53 (16 Mar 2024 14:00) (104/61 - 149/86)  BP(mean): 70 (16 Mar 2024 14:00) (70 - 118)  RR: 25 (16 Mar 2024 14:00) (13 - 25)  SpO2: 100% (16 Mar 2024 14:00) (97% - 100%)    Parameters below as of 16 Mar 2024 12:00  Patient On (Oxygen Delivery Method): room air      Physical Exam:  General: No acute distress.     Detailed Neurologic Exam:    Mental status: The patient is awake and alert and has normal attention span.  The patient is fully oriented in 3 spheres. The patient is oriented to current events. The patient is able to name objects, follow commands, repeat sentences.    Cranial nerves: Pupils equal and react symmetrically to light. There is no visual field deficit to confrontation. Extraocular motion is full with no nystagmus. There is no ptosis. Facial sensation is intact. Facial musculature is symmetric. Palate elevates symmetrically. Tongue is midline.    Motor: There is normal bulk and tone.  There is no tremor.  Strenght is 4/5 in all four extremities     Sensation: Intact to light touch  in 4 extremities    Cerebellar: There is no dysmetria on finger to nose testing.    Gait : deferred    NIH SS:  DATE: 3/16/24  TIME: 12:05 pm  1A: Level of consciousness (0-3): 0  1B: Questions (0-2): 0  1C: Commands (0-2): 0  2: Gaze (0-2):   3: Visual fields (0-3): 0  4: Facial palsy (0-3): 0  MOTOR:  5A: Left arm motor drift (0-4): 0  5B: Right arm motor drift (0-4): 0  6A: Left leg motor drift (0-4): 0  6B: Right leg motor drift (0-4): 0  7: Limb ataxia (0-2): 0  SENSORY:  8: Sensation (0-2): 0  SPEECH:  9: Language (0-3): 0  10: Dysarthria (0-2): 0  EXTINCTION:  11: Extinction/inattention (0-2): 0    TOTAL SCORE: 0    prehospital mRS= 0      LABS:                         9.6    9.66  )-----------( 320      ( 16 Mar 2024 03:00 )             27.2       03-16    134<L>  |  97  |  6.9<L>  ----------------------------<  137<H>  3.4<L>   |  22.0  |  0.38<L>    Ca    8.1<L>      16 Mar 2024 12:20  Phos  2.0     03-16  Mg     2.1     03-16        PTT - ( 15 Mar 2024 04:00 )  PTT:28.2 sec      RADIOLOGY & ADDITIONAL STUDIES (independently reviewed unless otherwise noted):  CT Head No Cont (03.16.24 @ 03:22)   IMPRESSION: No change in mild ventricular dilatation with   intraventricular hemorrhage and hemorrhage in the frontal lobe midline   and corpus callosum after extraventricular drain removal since 3/15/2024.    CT Head No Cont (03.15.24 @ 05:38)   IMPRESSION: Stable follow-up CT study.    US Duplex Venous Lower Ext Complete, Bilateral (03.12.24 @ 15:23)   IMPRESSION:  No evidence of deep venous thrombosis in either lower extremity.    CT Head No Cont (03.10.24 @ 12:37)   IMPRESSION:  Mildly decreased bilateral mesial frontal parenchymal hemorrhages.  Similar anterior interhemispheric acute subarachnoid hemorrhage.  Mildly decreased intraventricular hemorrhage within the bilateral lateral   and third ventricles.  Decreased ventricular size with near resolution of hydrocephalus.  No large midline shift.  Basal cisterns are more well visualized on the current examination, this  may be due to technique.    MR Brain, MR Angio Head w/ IV Cont (03.09.24 @ 18:57)   IMPRESSION:  MR brain: Intraparenchymal hemorrhage within the medial anterior frontal   lobes measuring approximately 3.5 cm, unchanged. There is surrounding   edema and extension into the genuine body of corpus callosum, unchanged.   Hemorrhagic clot within the LEFT lateral ventricle and third ventricle is   unchanged. Minimal hemorrhage seen in the dependent portions of the RIGHT   lateral ventricle unchanged. Minimal subarachnoid hemorrhage seen along   the medial sulci of the BILATERAL frontal and parietal lobes as well as   scattered throughout the sulci of the brain. RIGHT frontal   ventriculostomy catheter tip is seen extending toward the body of the   LEFT ventricle and third ventricle.  Coil material is seen in the region   of the anterior communicating artery on the RIGHT.   Blood products are   seen in the region of the known aneurysm possibly indicating partial   patency.    MRA intracranial:   Coil material is seen in the region of the anterior   communicating artery. There is 1 x 4 mm region of signal seen on the   noncontrast but not the postcontrast which may reflect blood products   within the aneurysm indicating patency.    CT Abdomen and Pelvis w/wo IV Cont (03.07.24 @ 17:18)   IMPRESSION: No evidence of intraperitoneal or retroperitoneal hemorrhage.   No evidence of active intraluminal extravasation of contrastinto the   bowel.    US Doppler Transcranial Comp (03.07.24 @ 11:05)   IMPRESSION:    Inadequate insonation windows on both sides  Exam is nondiagnostic    CT Head No Cont (03.07.24 @ 05:56)   IMPRESSION:   persistent intracranial hemorrhage within the BILATERAL   lateral and third ventricles, LEFT greater than RIGHT, with mild   obstructive hydrocephalus slightly increased. RIGHT frontal shunt   catheter tip seen in body of RIGHT lateral ventricle unchanged.   Subarachnoid hemorrhage again noted in the BILATERAL medial frontal lobes   with hemorrhage in the anterior corpus callosum, LEFT greaterthan RIGHT.    CT Head No Cont (03.06.24 @ 19:20)   IMPRESSION:  Interval placement of right frontal approach ventriculostomy catheter   since the prior study performed 3 hours ago, with improved hydrocephalus   and slightly decreased sulcal/cisternal bilateral subarachnoid hemorrhage.    CT Head No Cont (03.06.24 @ 16:27)   IMPRESSION:  Status post coiling/embolization of anterior communicating artery   aneurysm, with postoperative changes including diffuse sulcal/cisternal   pattern of subarachnoid hemorrhage mixed with contrast leakage from   recent procedure.    Intraventricular hemorrhage is present with hydrocephalus.                                                                [facility] [place] Stroke Team  CC: IP, ACOMM aneurysm w/ rupture  HPI:  72 y/o mandarin speaking female with PMH of HLD and osteoporosis presents to [facility] with complaints of having cerebral aneurysm. States in 10/2023 she started to have B/L hand numbness. At that time she had MRI brain, MRA head and neck. MRA showed an ACOMM aneurysm. She underwent a cerebral angiogram by José Manuel Villaseñor at [facility] on December 7th, 2023, with the intent to treat this month. The aneurysm is described as a 5.5x4.6x3.6mm wide necked acomm aneurysm that projects medio-superiorly. Reports occasional headaches, described as numbness, 5/10 in severity, worse with laying down, relieved with sitting up or standing. Patient underwent diagnostic angiogram 2/14/24 which confirmed acomm aneurysm. On 3/6 patient underwent aneurysm embolization with coiling complicated by aneurysm rupture, controlled with coils and balloon tamponade. NISHANT CT showed SAH b/l frontal lobes and R sylvian fissure along with contrast staining. Patient admitted to NSICU for post op care.. EVD now removed per neurosurgery. Now transferred to neurosurgery service, stroke team asked to consult.     MEDICATIONS  (STANDING):  bromocriptine Capsule 10 milliGRAM(s) Oral three times a day  chlorhexidine 2% Cloths 1 Application(s) Topical daily  enoxaparin Injectable 30 milliGRAM(s) SubCutaneous every 24 hours  multivitamin 1 Tablet(s) Oral daily  niMODipine Oral Solution 60 milliGRAM(s) Oral every 4 hours  nystatin Powder 1 Application(s) Topical two times a day  psyllium Powder 1 Packet(s) Oral two times a day  QUEtiapine 12.5 milliGRAM(s) Oral at bedtime  simvastatin 10 milliGRAM(s) Oral at bedtime  tamsulosin 0.8 milliGRAM(s) Oral at bedtime    MEDICATIONS  (PRN):  acetaminophen     Tablet .. 650 milliGRAM(s) Oral every 6 hours PRN Temp greater or equal to 38C (100.4F)  hydrALAZINE Injectable 10 milliGRAM(s) IV Push every 2 hours PRN SBP > 160  labetalol Injectable 10 milliGRAM(s) IV Push every 2 hours PRN Systolic blood pressure > 160  ondansetron Injectable 4 milliGRAM(s) IV Push every 6 hours PRN Nausea and/or Vomiting      Allergies  No Known Allergies    Intolerances  lactose (Diarrhea)    SOCIAL HISTORY:  no tob,   no alcohol   no drugs    FAMILY HISTORY:  Family history of CVA      ROS: 14 point ROS negative other than what is present in HPI or below    Vital Signs Last 24 Hrs  T(C): 37.9 (16 Mar 2024 14:00), Max: 38.3 (15 Mar 2024 17:00)  T(F): 100.2 (16 Mar 2024 14:00), Max: 100.9 (15 Mar 2024 17:00)  HR: 97 (16 Mar 2024 14:00) (79 - 97)  BP: 105/53 (16 Mar 2024 14:00) (104/61 - 149/86)  BP(mean): 70 (16 Mar 2024 14:00) (70 - 118)  RR: 25 (16 Mar 2024 14:00) (13 - 25)  SpO2: 100% (16 Mar 2024 14:00) (97% - 100%)    Parameters below as of 16 Mar 2024 12:00  Patient On (Oxygen Delivery Method): room air      Physical Exam:  General: No acute distress.     Detailed Neurologic Exam:    Mental status: The patient is awake and alert and has normal attention span.  The patient is fully oriented in 3 spheres. The patient is oriented to current events. The patient is able to name objects, follow commands, repeat sentences. Primary language is Cantonese   Cranial nerves: Pupils equal and react symmetrically to light. There is no visual field deficit to confrontation. Extraocular motion is full with no nystagmus. There is no ptosis. Facial sensation is intact. Facial musculature is symmetric. Palate elevates symmetrically. Tongue is midline.  Motor: There is normal bulk and tone.  There is no tremor.  Strenght is 4/5 in all four extremities   Sensation: Intact to light touch  in 4 extremities  Cerebellar: There is no dysmetria on finger to nose testing.  Gait : deferred    NIH SS:  DATE: 3/16/24  TIME: 12:05 pm  1A: Level of consciousness (0-3): 0  1B: Questions (0-2): 0  1C: Commands (0-2): 0  2: Gaze (0-2):   3: Visual fields (0-3): 0  4: Facial palsy (0-3): 0  MOTOR:  5A: Left arm motor drift (0-4): 0  5B: Right arm motor drift (0-4): 0  6A: Left leg motor drift (0-4): 0  6B: Right leg motor drift (0-4): 0  7: Limb ataxia (0-2): 0  SENSORY:  8: Sensation (0-2): 0  SPEECH:  9: Language (0-3): 0  10: Dysarthria (0-2): 0  EXTINCTION:  11: Extinction/inattention (0-2): 0    TOTAL SCORE: 0    prehospital mRS= 0      LABS:                         9.6    9.66  )-----------( 320      ( 16 Mar 2024 03:00 )             27.2       03-16    134<L>  |  97  |  6.9<L>  ----------------------------<  137<H>  3.4<L>   |  22.0  |  0.38<L>    Ca    8.1<L>      16 Mar 2024 12:20  Phos  2.0     03-16  Mg     2.1     03-16        PTT - ( 15 Mar 2024 04:00 )  PTT:28.2 sec      RADIOLOGY & ADDITIONAL STUDIES (independently reviewed unless otherwise noted):  CT Head No Cont (03.16.24 @ 03:22)   IMPRESSION: No change in mild ventricular dilatation with   intraventricular hemorrhage and hemorrhage in the frontal lobe midline   and corpus callosum after extraventricular drain removal since 3/15/2024.    CT Head No Cont (03.15.24 @ 05:38)   IMPRESSION: Stable follow-up CT study.    US Duplex Venous Lower Ext Complete, Bilateral (03.12.24 @ 15:23)   IMPRESSION:  No evidence of deep venous thrombosis in either lower extremity.    CT Head No Cont (03.10.24 @ 12:37)   IMPRESSION:  Mildly decreased bilateral mesial frontal parenchymal hemorrhages.  Similar anterior interhemispheric acute subarachnoid hemorrhage.  Mildly decreased intraventricular hemorrhage within the bilateral lateral   and third ventricles.  Decreased ventricular size with near resolution of hydrocephalus.  No large midline shift.  Basal cisterns are more well visualized on the current examination, this  may be due to technique.    MR Brain, MR Angio Head w/ IV Cont (03.09.24 @ 18:57)   IMPRESSION:  MR brain: Intraparenchymal hemorrhage within the medial anterior frontal   lobes measuring approximately 3.5 cm, unchanged. There is surrounding   edema and extension into the genuine body of corpus callosum, unchanged.   Hemorrhagic clot within the LEFT lateral ventricle and third ventricle is   unchanged. Minimal hemorrhage seen in the dependent portions of the RIGHT   lateral ventricle unchanged. Minimal subarachnoid hemorrhage seen along   the medial sulci of the BILATERAL frontal and parietal lobes as well as   scattered throughout the sulci of the brain. RIGHT frontal   ventriculostomy catheter tip is seen extending toward the body of the   LEFT ventricle and third ventricle.  Coil material is seen in the region   of the anterior communicating artery on the RIGHT.   Blood products are   seen in the region of the known aneurysm possibly indicating partial   patency.    MRA intracranial:   Coil material is seen in the region of the anterior   communicating artery. There is 1 x 4 mm region of signal seen on the   noncontrast but not the postcontrast which may reflect blood products   within the aneurysm indicating patency.    CT Abdomen and Pelvis w/wo IV Cont (03.07.24 @ 17:18)   IMPRESSION: No evidence of intraperitoneal or retroperitoneal hemorrhage.   No evidence of active intraluminal extravasation of contrastinto the   bowel.    US Doppler Transcranial Comp (03.07.24 @ 11:05)   IMPRESSION:    Inadequate insonation windows on both sides  Exam is nondiagnostic    CT Head No Cont (03.07.24 @ 05:56)   IMPRESSION:   persistent intracranial hemorrhage within the BILATERAL   lateral and third ventricles, LEFT greater than RIGHT, with mild   obstructive hydrocephalus slightly increased. RIGHT frontal shunt   catheter tip seen in body of RIGHT lateral ventricle unchanged.   Subarachnoid hemorrhage again noted in the BILATERAL medial frontal lobes   with hemorrhage in the anterior corpus callosum, LEFT greaterthan RIGHT.    CT Head No Cont (03.06.24 @ 19:20)   IMPRESSION:  Interval placement of right frontal approach ventriculostomy catheter   since the prior study performed 3 hours ago, with improved hydrocephalus   and slightly decreased sulcal/cisternal bilateral subarachnoid hemorrhage.    CT Head No Cont (03.06.24 @ 16:27)   IMPRESSION:  Status post coiling/embolization of anterior communicating artery   aneurysm, with postoperative changes including diffuse sulcal/cisternal   pattern of subarachnoid hemorrhage mixed with contrast leakage from   recent procedure.    Intraventricular hemorrhage is present with hydrocephalus.

## 2024-03-16 NOTE — CHART NOTE - NSCHARTNOTEFT_GEN_A_CORE
TRANSFER NOTE    HPI:  74 y/o mandarin speaking female with PMH of HLD and osteoporosis presents to [facility] with complaints of having cerebral aneurysm. States in 10/2023 she started to have B/L hand numbness. At that time she had MRI brain, MRA head and neck. MRA showed an ACOMM aneurysm. She underwent a cerebral angiogram by José Manuel Villaseñor at [facility] on December 7th, 2023, with the intent to treat this month. The aneurysm is described as a 5.5x4.6x3.6mm wide necked acomm aneurysm that projects medio-superiorly. Reports occasional headaches, described as numbness, 5/10 in severity, worse with laying down, relieved with sitting up or standing. Patient underwent diagnostic angiogram 2/14/24 which confirmed acomm aneurysm. On 3/6 patient underwent aneurysm embolization with coiling complicated by aneurysm rupture, controlled with coils and balloon tamponade. NISHANT CT showed SAH b/l frontal lobes and R sylvian fissure along with contrast staining. Patient admitted to NSICU for post op care.      INTERVAL HPI/OVERNIGHT EVENTS:  3/6 - Admitted to NSICU post Angio. Post procedure CT w/ SAH 2nd to rupture intra-op, repaired (was a small bleb next to main aneurysm). Intubated and EVD placed, set to 20. Dropped to 10 overnight.  3/7 - Exam improved, Extubated  3/8 - Febrile with RLL consolidation, started on Zosyn 3/9  3/10 - EVD prox port of collection system damage/leaking noted, replaced.  3/11 - S/p cerebral angiogram, confirmed Acomm aneurysm occlusion with small neck remnant, no vasospasm noted  3/13 - EVD clamped  3/15 - EVD removed  3/16 - Post-pull CTH ____    Vital Signs Last 24 Hrs  T(C): 37.6 (16 Mar 2024 07:00), Max: 38.4 (15 Mar 2024 11:00)  T(F): 99.7 (16 Mar 2024 07:00), Max: 101.1 (15 Mar 2024 11:00)  HR: 90 (16 Mar 2024 07:00) (79 - 96)  BP: 144/94 (16 Mar 2024 07:00) (117/96 - 156/78)  BP(mean): 110 (16 Mar 2024 07:00) (84 - 124)  RR: 14 (16 Mar 2024 07:00) (13 - 20)  SpO2: 99% (16 Mar 2024 07:00) (97% - 100%)    Parameters below as of 16 Mar 2024 06:00  Patient On (Oxygen Delivery Method): room air      PHYSICAL EXAM:  GENERAL: NAD, well-groomed  HEAD:  s/p EVD removal, sutures at incision site, 1 staple at exit site, c/d/i  MENTAL STATUS: AAO x3; Awake; Opens eyes spontaneously; Appropriately conversant without aphasia; follows commands  CRANIAL NERVES: PERRL. EOMI without nystagmus. Face symmetric w/ normal eye closure and smile, tongue midline. Hearing grossly intact. Speech clear.   MOTOR: strength 5/5 b/l upper and lower extremities  SENSATION: grossly intact to light touch all extremities  CHEST/LUNG: Non-labored breathing on room air  EXTREMITIES: no clubbing, cyanosis, or edema  SKIN: Warm, dry    LABS:                        9.6    9.66  )-----------( 320      ( 16 Mar 2024 03:00 )             27.2     03-16    132<L>  |  96  |  7.3<L>  ----------------------------<  113<H>  2.6<LL>   |  23.0  |  0.35<L>    Ca    7.7<L>      16 Mar 2024 03:00  Phos  2.0     03-16  Mg     2.1     03-16      PTT - ( 15 Mar 2024 04:00 )  PTT:28.2 sec  Urinalysis Basic - ( 16 Mar 2024 03:00 )    Color: x / Appearance: x / SG: x / pH: x  Gluc: 113 mg/dL / Ketone: x  / Bili: x / Urobili: x   Blood: x / Protein: x / Nitrite: x   Leuk Esterase: x / RBC: x / WBC x   Sq Epi: x / Non Sq Epi: x / Bacteria: x        03-15 @ 07:01  -  03-16 @ 07:00  --------------------------------------------------------  IN: 3344.9 mL / OUT: 3650 mL / NET: -305.1 mL        RADIOLOGY & ADDITIONAL TESTS:      Assessment:  73F with PMH HLD, osteoporosis, acomm aneurysm presented for cerebral angiogram with aneurysm on 3/6. Patient started on ASA 81 in preparation for the procedure. Patient underwent aneurysm embolization with 9 coils complicated by aneurysm rupture, controlled with coils and balloon tamponade. Patient extubated and admitted to NSICU for post op care.    Plan:  NEURO:  - Neuro checks q2h  - EVD removed 3/15   - Post-pull CTH ____  - Bromocriptine 10mg TID  - Seroquel 12.5mg qhs  - Pain control prn: Tylenol, Tramadol    PULM:  - RA  - maintain O2 sats >92%    CV:  - SBP goal:   - Nimodipine 60q4h for SAH protocol  - Simvastatin 10mg daily  - PRN: Hydralazine, Labetalol    RENAL:  - Dangelo; failed TOV 3/10 and 3/13  - TOV when rectal tube d/c'd  - Flomax 0.4mg   - Monitor electrolytes, replete prn    GI:  - Diet: Easy to chew (lactose restricted) + ensure TID   - Rectal tube (placed 3/14) while patient has liquid bowel movements  - LBM 3/16  - Metamucil BID    ENDO:   - Maintain euglycemia     HEME/ONC:  - SCDs and Lovenox 30mg for VTE prophylaxis    ID:   - Monitor fever curve and WBC  - Continue Zosyn for suspected aspiration (3/9-3/16)     PT/OT: AR; PMR recs pending     Dispo:    Discussed with Dr. Weber TRANSFER NOTE    HPI:  72 y/o mandarin speaking female with PMH of HLD and osteoporosis presents to [facility] with complaints of having cerebral aneurysm. States in 10/2023 she started to have B/L hand numbness. At that time she had MRI brain, MRA head and neck. MRA showed an ACOMM aneurysm. She underwent a cerebral angiogram by José Manuel Villaseñor at [facility] on December 7th, 2023, with the intent to treat this month. The aneurysm is described as a 5.5x4.6x3.6mm wide necked acomm aneurysm that projects medio-superiorly. Reports occasional headaches, described as numbness, 5/10 in severity, worse with laying down, relieved with sitting up or standing. Patient underwent diagnostic angiogram 2/14/24 which confirmed acomm aneurysm. On 3/6 patient underwent aneurysm embolization with coiling complicated by aneurysm rupture, controlled with coils and balloon tamponade. NISHANT CT showed SAH b/l frontal lobes and R sylvian fissure along with contrast staining. Patient admitted to NSICU for post op care.      INTERVAL HPI/OVERNIGHT EVENTS:  3/6 - Admitted to NSICU post Angio. Post procedure CT w/ SAH 2nd to rupture intra-op, repaired (was a small bleb next to main aneurysm). Intubated and EVD placed, set to 20. Dropped to 10 overnight.  3/7 - Exam improved, Extubated  3/8 - Febrile with RLL consolidation, started on Zosyn 3/9  3/10 - EVD prox port of collection system damage/leaking noted, replaced.  3/11 - S/p cerebral angiogram, confirmed Acomm aneurysm occlusion with small neck remnant, no vasospasm noted  3/13 - EVD clamped  3/15 - EVD removed  3/16 - Post-pull CTH ____    Vital Signs Last 24 Hrs  T(C): 37.6 (16 Mar 2024 07:00), Max: 38.4 (15 Mar 2024 11:00)  T(F): 99.7 (16 Mar 2024 07:00), Max: 101.1 (15 Mar 2024 11:00)  HR: 90 (16 Mar 2024 07:00) (79 - 96)  BP: 144/94 (16 Mar 2024 07:00) (117/96 - 156/78)  BP(mean): 110 (16 Mar 2024 07:00) (84 - 124)  RR: 14 (16 Mar 2024 07:00) (13 - 20)  SpO2: 99% (16 Mar 2024 07:00) (97% - 100%)    Parameters below as of 16 Mar 2024 06:00  Patient On (Oxygen Delivery Method): room air      PHYSICAL EXAM:  GENERAL: NAD, well-groomed  HEAD:  s/p EVD removal, sutures at incision site, 1 staple at exit site, c/d/i  MENTAL STATUS: AAO x3; Awake; Opens eyes spontaneously; Appropriately conversant without aphasia; follows commands  CRANIAL NERVES: PERRL. EOMI without nystagmus. Face symmetric w/ normal eye closure and smile, tongue midline. Hearing grossly intact. Speech clear.   MOTOR: strength 5/5 b/l upper and lower extremities  SENSATION: grossly intact to light touch all extremities  CHEST/LUNG: Non-labored breathing on room air  EXTREMITIES: no clubbing, cyanosis, or edema  SKIN: Warm, dry    LABS:                        9.6    9.66  )-----------( 320      ( 16 Mar 2024 03:00 )             27.2     03-16    132<L>  |  96  |  7.3<L>  ----------------------------<  113<H>  2.6<LL>   |  23.0  |  0.35<L>    Ca    7.7<L>      16 Mar 2024 03:00  Phos  2.0     03-16  Mg     2.1     03-16      PTT - ( 15 Mar 2024 04:00 )  PTT:28.2 sec  Urinalysis Basic - ( 16 Mar 2024 03:00 )    Color: x / Appearance: x / SG: x / pH: x  Gluc: 113 mg/dL / Ketone: x  / Bili: x / Urobili: x   Blood: x / Protein: x / Nitrite: x   Leuk Esterase: x / RBC: x / WBC x   Sq Epi: x / Non Sq Epi: x / Bacteria: x        03-15 @ 07:01  -  03-16 @ 07:00  --------------------------------------------------------  IN: 3344.9 mL / OUT: 3650 mL / NET: -305.1 mL        RADIOLOGY & ADDITIONAL TESTS:      Assessment:  73F with PMH HLD, osteoporosis, acomm aneurysm presented for cerebral angiogram with aneurysm on 3/6. Patient started on ASA 81 in preparation for the procedure. Patient underwent aneurysm embolization with 9 coils complicated by aneurysm rupture, controlled with coils and balloon tamponade. Patient extubated and admitted to NSICU for post op care.    Plan:  NEURO:  - Neuro checks q2h  - EVD removed 3/15   - Post-pull CTH ____  - Bromocriptine 10mg TID  - Seroquel 12.5mg qhs  - Pain control prn: Tylenol, Tramadol    PULM:  - RA  - maintain O2 sats >92%    CV:  - SBP goal:   - Nimodipine 60q4h for SAH protocol  - Simvastatin 10mg daily  - PRN: Hydralazine, Labetalol    RENAL:  - Dangelo; failed TOV 3/10 and 3/13  - TOV when rectal tube d/c'd  - Flomax 0.4mg   - Monitor electrolytes, replete prn    GI:  - Diet: Easy to chew (lactose restricted) + ensure TID   - Rectal tube (placed 3/14) while patient has liquid bowel movements  - LBM 3/16  - Metamucil BID    ENDO:   - Maintain euglycemia     HEME/ONC:  - SCDs and Lovenox 30mg for VTE prophylaxis    ID:   - Monitor fever curve and WBC  - Continue Zosyn for suspected aspiration (3/9-3/16)     PT/OT: AR; PMR recs pending     Dispo:      -------------------------------------------------------------------------------------------------------------------------------------------------------------------------------------------------------------------      Attending addendum:  Pt seen and examined. Agree with above assessment and plan    73F with PMH HLD, osteoporosis, acomm aneurysm presented for cerebral angiogram with aneurysm on 3/6.     PHYSICAL EXAM:  General: Calm, laying in bed  HEENT: MMM  Neuro:  -Mental status- No acute distress, AOx2, conversational, following commands  -CN- PERRL 3mm, EOMI, tongue midline, face symmetric  -Motor- full strength in all ext  -Sensation- intact to LT   -Coordination- no dysmetria noted  CV: RRR      - Neuro checks q2h  - EVD removed 3/15   - Bromocriptine 10mg TID  - Seroquel 12.5mg qhs TRANSFER NOTE    HPI:  72 y/o mandarin speaking female with PMH of HLD and osteoporosis presents to [facility] with complaints of having cerebral aneurysm. States in 10/2023 she started to have B/L hand numbness. At that time she had MRI brain, MRA head and neck. MRA showed an ACOMM aneurysm. She underwent a cerebral angiogram by José Manuel Villaseñor at [facility] on December 7th, 2023, with the intent to treat this month. The aneurysm is described as a 5.5x4.6x3.6mm wide necked acomm aneurysm that projects medio-superiorly. Reports occasional headaches, described as numbness, 5/10 in severity, worse with laying down, relieved with sitting up or standing. Patient underwent diagnostic angiogram 2/14/24 which confirmed acomm aneurysm. On 3/6 patient underwent aneurysm embolization with coiling complicated by aneurysm rupture, controlled with coils and balloon tamponade. NISHANT CT showed SAH b/l frontal lobes and R sylvian fissure along with contrast staining. Patient admitted to NSICU for post op care.      INTERVAL HPI/OVERNIGHT EVENTS:  3/6 - Admitted to NSICU post Angio. Post procedure CT w/ SAH 2nd to rupture intra-op, repaired (was a small bleb next to main aneurysm). Intubated and EVD placed, set to 20. Dropped to 10 overnight.  3/7 - Exam improved, Extubated  3/8 - Febrile with RLL consolidation, started on Zosyn 3/9  3/10 - EVD prox port of collection system damage/leaking noted, replaced.  3/11 - S/p cerebral angiogram, confirmed Acomm aneurysm occlusion with small neck remnant, no vasospasm noted  3/13 - EVD clamped  3/15 - EVD removed  3/16 - K+ 2.6 overnight, being repleted. Post-pull CTH stable.     Vital Signs Last 24 Hrs  T(C): 37.6 (16 Mar 2024 07:00), Max: 38.4 (15 Mar 2024 11:00)  T(F): 99.7 (16 Mar 2024 07:00), Max: 101.1 (15 Mar 2024 11:00)  HR: 90 (16 Mar 2024 07:00) (79 - 96)  BP: 144/94 (16 Mar 2024 07:00) (117/96 - 156/78)  BP(mean): 110 (16 Mar 2024 07:00) (84 - 124)  RR: 14 (16 Mar 2024 07:00) (13 - 20)  SpO2: 99% (16 Mar 2024 07:00) (97% - 100%)    Parameters below as of 16 Mar 2024 06:00  Patient On (Oxygen Delivery Method): room air      PHYSICAL EXAM:  GENERAL: NAD, well-groomed  HEAD:  s/p EVD removal, sutures at incision site, 1 staple at exit site, c/d/i  MENTAL STATUS: AAO x2 (confused to time); Awake; Opens eyes spontaneously; Appropriately conversant without aphasia; follows commands  CRANIAL NERVES: PERRL. EOMI without nystagmus. Face symmetric w/ normal eye closure and smile, tongue midline. Hearing grossly intact. Speech clear.   MOTOR: VALERO strong   SENSATION: grossly intact to light touch all extremities  CHEST/LUNG: Non-labored breathing on room air  EXTREMITIES: no clubbing, cyanosis, or edema  SKIN: Warm, dry    LABS:                        9.6    9.66  )-----------( 320      ( 16 Mar 2024 03:00 )             27.2     03-16    132<L>  |  96  |  7.3<L>  ----------------------------<  113<H>  2.6<LL>   |  23.0  |  0.35<L>    Ca    7.7<L>      16 Mar 2024 03:00  Phos  2.0     03-16  Mg     2.1     03-16      PTT - ( 15 Mar 2024 04:00 )  PTT:28.2 sec  Urinalysis Basic - ( 16 Mar 2024 03:00 )    Color: x / Appearance: x / SG: x / pH: x  Gluc: 113 mg/dL / Ketone: x  / Bili: x / Urobili: x   Blood: x / Protein: x / Nitrite: x   Leuk Esterase: x / RBC: x / WBC x   Sq Epi: x / Non Sq Epi: x / Bacteria: x        03-15 @ 07:01  -  03-16 @ 07:00  --------------------------------------------------------  IN: 3344.9 mL / OUT: 3650 mL / NET: -305.1 mL        RADIOLOGY & ADDITIONAL TESTS:      Assessment:  73F with PMH HLD, osteoporosis, acomm aneurysm presented for cerebral angiogram for aneurysm embolization on 3/6, complicated by aneurysm rupture, controlled with coils and balloon tamponade. Patient extubated and admitted to NSICU for post op care.    Plan:  NEURO:  - Neuro checks q2h  - EVD removed 3/15 (incision site suture removal: 3/20, exit site staple removal: 3/29)  - Post-pull CTH stable  - Bromocriptine 10mg TID  - Seroquel 12.5mg qhs  - Pain control prn: Tylenol, Tramadol    PULM:  - RA  - maintain O2 sats >92%    CV:  - SBP goal:   - Nimodipine 60q4h for SAH protocol  - Simvastatin 10mg daily  - PRN: Hydralazine, Labetalol    RENAL:  - Dangelo; failed TOV 3/10 and 3/13  - Flomax 0.8mg   - TOV 3/17  - K+ 2.6, repeat 3.4. C/w repletion   - Monitor electrolytes, replete prn    GI:  - Diet: Easy to chew (lactose restricted) + ensure TID   - Rectal tube d/c'd  - LBM 3/16  - Metamucil BID  - MVI    ENDO:   - Maintain euglycemia     HEME/ONC:  - SCDs and Lovenox 30mg for VTE prophylaxis    ID:   - Monitor fever curve and WBC  - Complete Zosyn course for suspected aspiration (3/9-3/16)     PT/OT: AR; PMR recs pending     Dispo:      -------------------------------------------------------------------------------------------------------------------------------------------------------------------------------------------------------------------      Attending addendum:  Pt seen and examined. Agree with above assessment and plan    73F with PMH HLD, osteoporosis, acomm aneurysm presented for cerebral angiogram with aneurysm on 3/6.     PHYSICAL EXAM:  General: Calm, laying in bed  HEENT: MMM  Neuro:  -Mental status- No acute distress, AOx2, conversational, following commands  -CN- PERRL 3mm, EOMI, tongue midline, face symmetric  -Motor- full strength in all ext  -Sensation- intact to LT   -Coordination- no dysmetria noted  CV: RRR      - Neuro checks q2h  - EVD removed 3/15   - Bromocriptine 10mg TID  - Seroquel 12.5mg qhs TRANSFER NOTE    HPI:  74 y/o mandarin speaking female with PMH of HLD and osteoporosis presents to [facility] with complaints of having cerebral aneurysm. States in 10/2023 she started to have B/L hand numbness. At that time she had MRI brain, MRA head and neck. MRA showed an ACOMM aneurysm. She underwent a cerebral angiogram by José Manuel Villaseñor at [facility] on December 7th, 2023, with the intent to treat this month. The aneurysm is described as a 5.5x4.6x3.6mm wide necked acomm aneurysm that projects medio-superiorly. Reports occasional headaches, described as numbness, 5/10 in severity, worse with laying down, relieved with sitting up or standing. Patient underwent diagnostic angiogram 2/14/24 which confirmed acomm aneurysm. On 3/6 patient underwent aneurysm embolization with coiling complicated by aneurysm rupture, controlled with coils and balloon tamponade. NISH[facility] CT showed SAH b/l frontal lobes and R sylvian fissure along with contrast staining. Patient admitted to NSICU for post op care.      INTERVAL HPI/OVERNIGHT EVENTS:  3/6 - Admitted to NSICU post Angio. Post procedure CT w/ SAH 2nd to rupture intra-op, repaired (was a small bleb next to main aneurysm). Intubated and EVD placed, set to 20. Dropped to 10 overnight.  3/7 - Exam improved, Extubated  3/8 - Febrile with RLL consolidation, started on Zosyn 3/9  3/10 - EVD prox port of collection system damage/leaking noted, replaced.  3/11 - S/p cerebral angiogram, confirmed Acomm aneurysm occlusion with small neck remnant, no vasospasm noted  3/13 - EVD clamped  3/15 - EVD removed  3/16 - K+ 2.6 overnight, being repleted. Post-pull CTH stable.     Vital Signs Last 24 Hrs  T(C): 37.6 (16 Mar 2024 07:00), Max: 38.4 (15 Mar 2024 11:00)  T(F): 99.7 (16 Mar 2024 07:00), Max: 101.1 (15 Mar 2024 11:00)  HR: 90 (16 Mar 2024 07:00) (79 - 96)  BP: 144/94 (16 Mar 2024 07:00) (117/96 - 156/78)  BP(mean): 110 (16 Mar 2024 07:00) (84 - 124)  RR: 14 (16 Mar 2024 07:00) (13 - 20)  SpO2: 99% (16 Mar 2024 07:00) (97% - 100%)    Parameters below as of 16 Mar 2024 06:00  Patient On (Oxygen Delivery Method): room air      PHYSICAL EXAM:  GENERAL: NAD, well-groomed  HEAD:  s/p EVD removal, sutures at incision site, 1 staple at exit site, c/d/i  MENTAL STATUS: AAO x2 (confused to time); Awake; Opens eyes spontaneously; Appropriately conversant without aphasia; follows commands  CRANIAL NERVES: PERRL. EOMI without nystagmus. Face symmetric w/ normal eye closure and smile, tongue midline. Hearing grossly intact. Speech clear.   MOTOR: VALERO strong   SENSATION: grossly intact to light touch all extremities  CHEST/LUNG: Non-labored breathing on room air  EXTREMITIES: no clubbing, cyanosis, or edema  SKIN: Warm, dry    LABS:                        9.6    9.66  )-----------( 320      ( 16 Mar 2024 03:00 )             27.2     03-16    132<L>  |  96  |  7.3<L>  ----------------------------<  113<H>  2.6<LL>   |  23.0  |  0.35<L>    Ca    7.7<L>      16 Mar 2024 03:00  Phos  2.0     03-16  Mg     2.1     03-16      PTT - ( 15 Mar 2024 04:00 )  PTT:28.2 sec  Urinalysis Basic - ( 16 Mar 2024 03:00 )    Color: x / Appearance: x / SG: x / pH: x  Gluc: 113 mg/dL / Ketone: x  / Bili: x / Urobili: x   Blood: x / Protein: x / Nitrite: x   Leuk Esterase: x / RBC: x / WBC x   Sq Epi: x / Non Sq Epi: x / Bacteria: x        03-15 @ 07:01  -  03-16 @ 07:00  --------------------------------------------------------  IN: 3344.9 mL / OUT: 3650 mL / NET: -305.1 mL        RADIOLOGY & ADDITIONAL TESTS:      Assessment:  73F with PMH HLD, osteoporosis, acomm aneurysm presented for cerebral angiogram for aneurysm embolization on 3/6, complicated by aneurysm rupture, controlled with coils and balloon tamponade. Patient extubated and admitted to NSICU for post op care.    Plan:  NEURO:  - Neuro checks q2h  - EVD removed 3/15 (incision site suture removal: 3/20, exit site staple removal: 3/29)  - Post-pull CTH stable  - Bromocriptine 10mg TID  - Seroquel 12.5mg qhs  - Pain control prn: Tylenol, Tramadol    PULM:  - RA  - maintain O2 sats >92%    CV:  - SBP goal:   - Nimodipine 60q4h for SAH protocol  - Simvastatin 10mg daily  - PRN: Hydralazine, Labetalol    RENAL:  - Dangelo; failed TOV 3/10 and 3/13  - Flomax 0.8mg   - TOV 3/17  - K+ 2.6, repeat 3.4. C/w repletion   - Monitor electrolytes, replete prn    GI:  - Diet: Easy to chew (lactose restricted) + ensure TID   - Rectal tube (placed 3/14) in place while loose stools  - LBM 3/16  - Metamucil BID  - MVI    ENDO:   - Maintain euglycemia     HEME/ONC:  - SCDs and Lovenox 30mg for VTE prophylaxis    ID:   - Monitor fever curve and WBC  - Loose stools - will send O and P  - Complete Zosyn course for suspected aspiration (3/9-3/16)     PT/OT: AR; PMR recs pending     Dispo: Neurosurgery SDU      -------------------------------------------------------------------------------------------------------------------------------------------------------------------------------------------------------------------      Attending addendum:  Pt seen and examined. Agree with above assessment and plan    73F with PMH HLD, osteoporosis, acomm aneurysm presented for cerebral angiogram with aneurysm on 3/6.     PHYSICAL EXAM:  General: Calm, laying in bed  HEENT: MMM  Neuro:  -Mental status- No acute distress, AOx2, conversational, following commands  -CN- PERRL 3mm, EOMI, tongue midline, face symmetric  -Motor- full strength in all ext  -Sensation- intact to LT   -Coordination- no dysmetria noted  CV: RRR      - Neuro checks q2h  - EVD removed 3/15   - Bromocriptine 10mg TID  - Seroquel 12.5mg qhs TRANSFER NOTE    HPI:  72 y/o mandarin speaking female with PMH of HLD and osteoporosis presents to [facility] with complaints of having cerebral aneurysm. States in 10/2023 she started to have B/L hand numbness. At that time she had MRI brain, MRA head and neck. MRA showed an ACOMM aneurysm. She underwent a cerebral angiogram by José Manuel Villaseñor at [facility] on December 7th, 2023, with the intent to treat this month. The aneurysm is described as a 5.5x4.6x3.6mm wide necked acomm aneurysm that projects medio-superiorly. Reports occasional headaches, described as numbness, 5/10 in severity, worse with laying down, relieved with sitting up or standing. Patient underwent diagnostic angiogram 2/14/24 which confirmed acomm aneurysm. On 3/6 patient underwent aneurysm embolization with coiling complicated by aneurysm rupture, controlled with coils and balloon tamponade. NISHANT CT showed SAH b/l frontal lobes and R sylvian fissure along with contrast staining. Patient admitted to NSICU for post op care.      INTERVAL HPI/OVERNIGHT EVENTS:  3/6 - Admitted to NSICU post Angio. Post procedure CT w/ SAH 2nd to rupture intra-op, repaired (was a small bleb next to main aneurysm). Intubated and EVD placed, set to 20. Dropped to 10 overnight.  3/7 - Exam improved, Extubated  3/8 - Febrile with RLL consolidation, started on Zosyn 3/9  3/10 - EVD prox port of collection system damage/leaking noted, replaced.  3/11 - S/p cerebral angiogram, confirmed Acomm aneurysm occlusion with small neck remnant, no vasospasm noted  3/13 - EVD clamped  3/15 - EVD removed  3/16 - K+ 2.6 overnight, being repleted. Post-pull CTH stable.     Vital Signs Last 24 Hrs  T(C): 37.6 (16 Mar 2024 07:00), Max: 38.4 (15 Mar 2024 11:00)  T(F): 99.7 (16 Mar 2024 07:00), Max: 101.1 (15 Mar 2024 11:00)  HR: 90 (16 Mar 2024 07:00) (79 - 96)  BP: 144/94 (16 Mar 2024 07:00) (117/96 - 156/78)  BP(mean): 110 (16 Mar 2024 07:00) (84 - 124)  RR: 14 (16 Mar 2024 07:00) (13 - 20)  SpO2: 99% (16 Mar 2024 07:00) (97% - 100%)    Parameters below as of 16 Mar 2024 06:00  Patient On (Oxygen Delivery Method): room air      PHYSICAL EXAM:  GENERAL: NAD, well-groomed  HEAD:  s/p EVD removal, sutures at incision site, 1 staple at exit site, c/d/i  MENTAL STATUS: AAO x2 (confused to time); Awake; Opens eyes spontaneously; Appropriately conversant without aphasia; follows commands  CRANIAL NERVES: PERRL. EOMI without nystagmus. Face symmetric w/ normal eye closure and smile, tongue midline. Hearing grossly intact. Speech clear.   MOTOR: VALERO strong   SENSATION: grossly intact to light touch all extremities  CHEST/LUNG: Non-labored breathing on room air  EXTREMITIES: no clubbing, cyanosis, or edema  SKIN: Warm, dry    LABS:                        9.6    9.66  )-----------( 320      ( 16 Mar 2024 03:00 )             27.2     03-16    132<L>  |  96  |  7.3<L>  ----------------------------<  113<H>  2.6<LL>   |  23.0  |  0.35<L>    Ca    7.7<L>      16 Mar 2024 03:00  Phos  2.0     03-16  Mg     2.1     03-16      PTT - ( 15 Mar 2024 04:00 )  PTT:28.2 sec  Urinalysis Basic - ( 16 Mar 2024 03:00 )    Color: x / Appearance: x / SG: x / pH: x  Gluc: 113 mg/dL / Ketone: x  / Bili: x / Urobili: x   Blood: x / Protein: x / Nitrite: x   Leuk Esterase: x / RBC: x / WBC x   Sq Epi: x / Non Sq Epi: x / Bacteria: x        03-15 @ 07:01  -  03-16 @ 07:00  --------------------------------------------------------  IN: 3344.9 mL / OUT: 3650 mL / NET: -305.1 mL        RADIOLOGY & ADDITIONAL TESTS:      Assessment:  73F with PMH HLD, osteoporosis, acomm aneurysm presented for cerebral angiogram for aneurysm embolization on 3/6, complicated by aneurysm rupture, controlled with coils and balloon tamponade. Patient extubated and admitted to NSICU for post op care.    Plan:  NEURO:  - Neuro checks q2h  - EVD removed 3/15 (incision site suture removal: 3/20, exit site staple removal: 3/29)  - Post-pull CTH stable  - Bromocriptine 10mg TID  - Seroquel 12.5mg qhs  - Pain control prn: Tylenol, Tramadol    PULM:  - RA  - maintain O2 sats >92%    CV:  - SBP goal:   - Nimodipine 60q4h for SAH protocol  - Simvastatin 10mg daily  - PRN: Hydralazine, Labetalol    RENAL:  - Dangelo; failed TOV 3/10 and 3/13  - Flomax 0.8mg   - TOV 3/17  - K+ 2.6, repeat 3.4. C/w repletion   - Monitor electrolytes, replete prn    GI:  - Diet: Easy to chew (lactose restricted) + ensure TID   - Rectal tube (placed 3/14) in place while loose stools  - LBM 3/16  - Metamucil BID  - MVI    ENDO:   - Maintain euglycemia     HEME/ONC:  - SCDs and Lovenox 30mg for VTE prophylaxis    ID:   - Monitor fever curve and WBC  - Complete Zosyn course for suspected aspiration (3/9-3/16)     Medicine consulted     PT/OT: AR; PMR recs pending     Dispo: Neurosurgery SDU      -------------------------------------------------------------------------------------------------------------------------------------------------------------------------------------------------------------------      Attending addendum:  Pt seen and examined. Agree with above assessment and plan    73F with [facility] HLD, osteoporosis, acomm aneurysm presented for cerebral angiogram with aneurysm on 3/6.     PHYSICAL EXAM:  General: Calm, laying in bed  HEENT: MMM  Neuro:  -Mental status- No acute distress, AOx2, conversational, following commands  -CN- PERRL 3mm, EOMI, tongue midline, face symmetric  -Motor- full strength in all ext  -Sensation- intact to LT   -Coordination- no dysmetria noted  CV: RRR      - Neuro checks q2h  - EVD removed 3/15   - Bromocriptine 10mg TID  - Seroquel 12.5mg qhs

## 2024-03-17 LAB
ANION GAP SERPL CALC-SCNC: 14 MMOL/L — SIGNIFICANT CHANGE UP (ref 5–17)
BASOPHILS # BLD AUTO: 0.02 K/UL — SIGNIFICANT CHANGE UP (ref 0–0.2)
BASOPHILS NFR BLD AUTO: 0.2 % — SIGNIFICANT CHANGE UP (ref 0–2)
BUN SERPL-MCNC: 6.3 MG/DL — LOW (ref 8–20)
CALCIUM SERPL-MCNC: 8.3 MG/DL — LOW (ref 8.4–10.5)
CHLORIDE SERPL-SCNC: 103 MMOL/L — SIGNIFICANT CHANGE UP (ref 96–108)
CO2 SERPL-SCNC: 22 MMOL/L — SIGNIFICANT CHANGE UP (ref 22–29)
CREAT SERPL-MCNC: 0.37 MG/DL — LOW (ref 0.5–1.3)
CULTURE RESULTS: SIGNIFICANT CHANGE UP
EGFR: 108 ML/MIN/1.73M2 — SIGNIFICANT CHANGE UP
EOSINOPHIL # BLD AUTO: 0.02 K/UL — SIGNIFICANT CHANGE UP (ref 0–0.5)
EOSINOPHIL NFR BLD AUTO: 0.2 % — SIGNIFICANT CHANGE UP (ref 0–6)
GLUCOSE SERPL-MCNC: 117 MG/DL — HIGH (ref 70–99)
HCT VFR BLD CALC: 29.9 % — LOW (ref 34.5–45)
HGB BLD-MCNC: 10.2 G/DL — LOW (ref 11.5–15.5)
IMM GRANULOCYTES NFR BLD AUTO: 1.6 % — HIGH (ref 0–0.9)
LYMPHOCYTES # BLD AUTO: 2.36 K/UL — SIGNIFICANT CHANGE UP (ref 1–3.3)
LYMPHOCYTES # BLD AUTO: 20.3 % — SIGNIFICANT CHANGE UP (ref 13–44)
MAGNESIUM SERPL-MCNC: 2.2 MG/DL — SIGNIFICANT CHANGE UP (ref 1.8–2.6)
MCHC RBC-ENTMCNC: 30.6 PG — SIGNIFICANT CHANGE UP (ref 27–34)
MCHC RBC-ENTMCNC: 34.1 GM/DL — SIGNIFICANT CHANGE UP (ref 32–36)
MCV RBC AUTO: 89.8 FL — SIGNIFICANT CHANGE UP (ref 80–100)
MONOCYTES # BLD AUTO: 0.92 K/UL — HIGH (ref 0–0.9)
MONOCYTES NFR BLD AUTO: 7.9 % — SIGNIFICANT CHANGE UP (ref 2–14)
NEUTROPHILS # BLD AUTO: 8.13 K/UL — HIGH (ref 1.8–7.4)
NEUTROPHILS NFR BLD AUTO: 69.8 % — SIGNIFICANT CHANGE UP (ref 43–77)
PHOSPHATE SERPL-MCNC: 4.3 MG/DL — SIGNIFICANT CHANGE UP (ref 2.4–4.7)
PLATELET # BLD AUTO: 411 K/UL — HIGH (ref 150–400)
POTASSIUM SERPL-MCNC: 3.8 MMOL/L — SIGNIFICANT CHANGE UP (ref 3.5–5.3)
POTASSIUM SERPL-SCNC: 3.8 MMOL/L — SIGNIFICANT CHANGE UP (ref 3.5–5.3)
RBC # BLD: 3.33 M/UL — LOW (ref 3.8–5.2)
RBC # FLD: 13.2 % — SIGNIFICANT CHANGE UP (ref 10.3–14.5)
SODIUM SERPL-SCNC: 139 MMOL/L — SIGNIFICANT CHANGE UP (ref 135–145)
SPECIMEN SOURCE: SIGNIFICANT CHANGE UP
WBC # BLD: 11.64 K/UL — HIGH (ref 3.8–10.5)
WBC # FLD AUTO: 11.64 K/UL — HIGH (ref 3.8–10.5)

## 2024-03-17 PROCEDURE — 99232 SBSQ HOSP IP/OBS MODERATE 35: CPT

## 2024-03-17 PROCEDURE — 99223 1ST HOSP IP/OBS HIGH 75: CPT

## 2024-03-17 RX ORDER — SIMETHICONE 80 MG/1
80 TABLET, CHEWABLE ORAL EVERY 6 HOURS
Refills: 0 | Status: DISCONTINUED | OUTPATIENT
Start: 2024-03-17 | End: 2024-03-20

## 2024-03-17 RX ORDER — SODIUM CHLORIDE 9 MG/ML
500 INJECTION, SOLUTION INTRAVENOUS ONCE
Refills: 0 | Status: COMPLETED | OUTPATIENT
Start: 2024-03-17 | End: 2024-03-17

## 2024-03-17 RX ORDER — POTASSIUM CHLORIDE 20 MEQ
40 PACKET (EA) ORAL ONCE
Refills: 0 | Status: COMPLETED | OUTPATIENT
Start: 2024-03-17 | End: 2024-03-17

## 2024-03-17 RX ADMIN — NIMODIPINE 60 MILLIGRAM(S): 60 SOLUTION ORAL at 21:15

## 2024-03-17 RX ADMIN — Medication 650 MILLIGRAM(S): at 06:30

## 2024-03-17 RX ADMIN — SIMVASTATIN 10 MILLIGRAM(S): 20 TABLET, FILM COATED ORAL at 21:15

## 2024-03-17 RX ADMIN — NYSTATIN CREAM 1 APPLICATION(S): 100000 CREAM TOPICAL at 18:00

## 2024-03-17 RX ADMIN — NYSTATIN CREAM 1 APPLICATION(S): 100000 CREAM TOPICAL at 05:13

## 2024-03-17 RX ADMIN — BROMOCRIPTINE MESYLATE 10 MILLIGRAM(S): 5 CAPSULE ORAL at 05:12

## 2024-03-17 RX ADMIN — Medication 1 TABLET(S): at 11:25

## 2024-03-17 RX ADMIN — Medication 650 MILLIGRAM(S): at 12:10

## 2024-03-17 RX ADMIN — Medication 1 PACKET(S): at 05:13

## 2024-03-17 RX ADMIN — BROMOCRIPTINE MESYLATE 10 MILLIGRAM(S): 5 CAPSULE ORAL at 13:22

## 2024-03-17 RX ADMIN — CHLORHEXIDINE GLUCONATE 1 APPLICATION(S): 213 SOLUTION TOPICAL at 11:25

## 2024-03-17 RX ADMIN — Medication 650 MILLIGRAM(S): at 05:36

## 2024-03-17 RX ADMIN — NIMODIPINE 60 MILLIGRAM(S): 60 SOLUTION ORAL at 02:11

## 2024-03-17 RX ADMIN — ENOXAPARIN SODIUM 30 MILLIGRAM(S): 100 INJECTION SUBCUTANEOUS at 21:16

## 2024-03-17 RX ADMIN — NIMODIPINE 60 MILLIGRAM(S): 60 SOLUTION ORAL at 10:12

## 2024-03-17 RX ADMIN — TAMSULOSIN HYDROCHLORIDE 0.8 MILLIGRAM(S): 0.4 CAPSULE ORAL at 21:15

## 2024-03-17 RX ADMIN — NIMODIPINE 60 MILLIGRAM(S): 60 SOLUTION ORAL at 17:17

## 2024-03-17 RX ADMIN — NIMODIPINE 60 MILLIGRAM(S): 60 SOLUTION ORAL at 05:12

## 2024-03-17 RX ADMIN — QUETIAPINE FUMARATE 12.5 MILLIGRAM(S): 200 TABLET, FILM COATED ORAL at 21:16

## 2024-03-17 RX ADMIN — Medication 1 PACKET(S): at 17:17

## 2024-03-17 RX ADMIN — BROMOCRIPTINE MESYLATE 10 MILLIGRAM(S): 5 CAPSULE ORAL at 21:15

## 2024-03-17 RX ADMIN — Medication 40 MILLIEQUIVALENT(S): at 05:12

## 2024-03-17 RX ADMIN — NIMODIPINE 60 MILLIGRAM(S): 60 SOLUTION ORAL at 13:22

## 2024-03-17 RX ADMIN — SIMETHICONE 80 MILLIGRAM(S): 80 TABLET, CHEWABLE ORAL at 03:13

## 2024-03-17 RX ADMIN — SODIUM CHLORIDE 500 MILLILITER(S): 9 INJECTION, SOLUTION INTRAVENOUS at 08:45

## 2024-03-17 NOTE — PROGRESS NOTE ADULT - SUBJECTIVE AND OBJECTIVE BOX
HPI:  72 y/o mandarin speaking female with PMH of HLD and osteoporosis presents to [facility] with complaints of having cerebral aneurysm. States in 10/2023 she started to have B/L hand numbness. At that time she had MRI brain, MRA head and neck. MRA showed an ACOMM aneurysm. She underwent a cerebral angiogram by José Manuel Villaseñor at [facility] on December 7th, 2023, with the intent to treat this month. The aneurysm is described as a 5.5x4.6x3.6mm wide necked acomm aneurysm that projects medio-superiorly. Reports occasional headaches, described as numbness, 5/10 in severity, worse with laying down, relieved with sitting up or standing. Patient underwent diagnostic angiogram 2/14/24 which confirmed acomm aneurysm. On 3/6 patient underwent aneurysm embolization with coiling complicated by aneurysm rupture, controlled with coils and balloon tamponade. NISHANT CT showed SAH b/l frontal lobes and R sylvian fissure along with contrast staining. Patient admitted to NSICU for post op care    INTERVAL HPI:  3/6 - Admitted to NSICU post Angio. Post procedure CT w/ SAH 2nd to rupture intra-op, repaired (was a small bleb next to main aneurysm). Intubated and EVD placed, set to 20. Dropped to 10 overnight.  3/7 - Exam improved, Extubated  3/8 - Febrile with RLL consolidation, started on Zosyn 3/9  3/10 - EVD prox port of collection system damage/leaking noted, replaced.  3/11 - S/p cerebral angiogram, confirmed Acomm aneurysm occlusion with small neck remnant, no vasospasm noted  3/13 - EVD clamped  3/15 - EVD removed  3/16 - K+ 2.6 overnight, being repleted. Post-pull CTH stable. Downgraded to neurosurgery    OVERNIGHT EVENTS:  Continued loose bowels    Family available for translation    74 yo female seen with family at bedside. Patient denies any headaches or new weakness at this time. No complaints.      Vital Signs Last 24 Hrs  T(C): 36.7 (17 Mar 2024 09:00), Max: 38.3 (16 Mar 2024 15:00)  T(F): 98.1 (17 Mar 2024 09:00), Max: 100.9 (16 Mar 2024 15:00)  HR: 86 (17 Mar 2024 10:00) (80 - 111)  BP: 152/80 (17 Mar 2024 10:00) (105/53 - 152/80)  BP(mean): 95 (17 Mar 2024 10:00) (70 - 118)  RR: 18 (17 Mar 2024 10:00) (13 - 25)  SpO2: 100% (17 Mar 2024 10:00) (98% - 100%)    Parameters below as of 17 Mar 2024 08:00  Patient On (Oxygen Delivery Method): room air    PHYSICAL EXAM:  GENERAL: NAD, well-groomed  HEAD:  s/p EVD removal, sutures at incision site, 1 staple at exit site, c/d/i  MENTAL STATUS: AAO x3 Opens eyes spontaneously; Appropriately conversant without aphasia; follows commands  CRANIAL NERVES: PERRL. EOMI without nystagmus.   MOTOR: VALERO Antigravity, limited ROM of Left HF secondary to pain  SENSATION: grossly intact to light touch all extremities  SKIN: Warm, dry    LABS:                        10.2   11.64 )-----------( 411      ( 17 Mar 2024 02:22 )             29.9     03-17    139  |  103  |  6.3<L>  ----------------------------<  117<H>  3.8   |  22.0  |  0.37<L>    Ca    8.3<L>      17 Mar 2024 02:22  Phos  4.3     03-17  Mg     2.2     03-17    Urinalysis Basic - ( 17 Mar 2024 02:22 )    Color: x / Appearance: x / SG: x / pH: x  Gluc: 117 mg/dL / Ketone: x  / Bili: x / Urobili: x   Blood: x / Protein: x / Nitrite: x   Leuk Esterase: x / RBC: x / WBC x   Sq Epi: x / Non Sq Epi: x / Bacteria: x    03-16 @ 07:01  -  03-17 @ 07:00  --------------------------------------------------------  IN: 2516.4 mL / OUT: 3075 mL / NET: -558.6 mL    03-17 @ 07:01  -  03-17 @ 10:58  --------------------------------------------------------  IN: 500 mL / OUT: 425 mL / NET: 75 mL    RADIOLOGY & ADDITIONAL TESTS:  < from: CT Head No Cont (03.16.24 @ 03:22) >  IMPRESSION: No change in mild ventricular dilatation with   intraventricular hemorrhage and hemorrhage in the frontal lobe midline   and corpus callosum after extraventricular drain removal since 3/15/2024.    --- End of Report ---            FRANTZ ROY MD; Attending Radiologist  This document has been electronically signed. Mar 16 2024  9:07AM    < end of copied text >  < from: CT Head No Cont (03.15.24 @ 05:38) >  IMPRESSION: Stable follow-up CT study.    --- End of Report ---            KIMMY VILLELA MD; Attending Radiologist  This document has been electronically signed. Mar 15 2024  7:45AM    < end of copied text >

## 2024-03-17 NOTE — CONSULT NOTE ADULT - SUBJECTIVE AND OBJECTIVE BOX
Patient is a 73y old  Female who presents with a chief complaint of cerebral aneurysm , s/p aneurysm embolization with coiling complicated by aneurysm rupture,   controlled with coils and balloon tamponade. NISHANT CT showed SAH b/l frontal lobes and R sylvian fissure along with contrast staining. Patient admitted to NSICU for post op care.   Downgraded to Step down on 3/16. Medicine consulted for medical mgmt .   Patient seen and examined , comfortable , AAOX4 . Denies sob, chest pain , denies n/v , tolerating diet , Dangelo + , Rectal tube in for diarrhea .     CC: diarrhea       HPI:  74 y/o mandarin speaking female with PMH of HLD and osteoporosis presents to [facility] with complaints of having cerebral aneurysm. States in 10/2023 she started to have B/L hand numbness. At that time she had MRI brain, MRA head and neck. MRA showed an ACOMM aneurysm. She underwent a cerebral angiogram by José Manuel Villaseñor at [facility] on December 7th, 2023, with the intent to treat this month. The aneurysm is described as a 5.5x4.6x3.6mm wide necked acomm aneurysm that projects medio-superiorly. Reports occasional headaches, described as numbness.       INTERVAL HPI:  3/6 - Admitted to NSICU post Angio. Post procedure CT w/ SAH 2nd to rupture intra-op, repaired (was a small bleb next to main aneurysm). Intubated and EVD placed, set to 20. Dropped to 10 overnight.  3/7 - Exam improved, Extubated  3/8 - Febrile with RLL consolidation, started on Zosyn 3/9  3/10 - EVD prox port of collection system damage/leaking noted, replaced.  3/11 - S/p cerebral angiogram, confirmed Acomm aneurysm occlusion with small neck remnant, no vasospasm noted  3/13 - EVD clamped  3/15 - EVD removed  3/16 - K+ 2.6 overnight, being repleted. Post-pull CTH stable. Downgraded to neurosurgery    PAST MEDICAL & SURGICAL HISTORY:  HLD (hyperlipidemia)      Cerebral aneurysm      Osteoporosis      History of cerebral angiography          Social History:  Tobacco - denies  ETOH - denies   Illicit drug abuse - denies    FAMILY HISTORY:  Family history of CVA        Allergies    No Known Allergies    Intolerances    lactose (Diarrhea)      HOME MEDICATIONS :     simvastatin 10 mg oral tablet: 1 tab(s) orally once a day (at bedtime) (06 Mar 2024 08:30)  Vascepa 1 g oral capsule: 1 cap(s) orally once a day (07 Mar 2024 09:13)  Vitamin B, C, D, calcium: once a day (06 Mar 2024 08:30)      REVIEW OF SYSTEMS:    Diarrhea , all other systems are reviewed and are negative .     MEDICATIONS  (STANDING):  bromocriptine Capsule 10 milliGRAM(s) Oral three times a day  chlorhexidine 2% Cloths 1 Application(s) Topical daily  enoxaparin Injectable 30 milliGRAM(s) SubCutaneous every 24 hours  multivitamin 1 Tablet(s) Oral daily  niMODipine Oral Solution 60 milliGRAM(s) Oral every 4 hours  nystatin Powder 1 Application(s) Topical two times a day  psyllium Powder 1 Packet(s) Oral two times a day  QUEtiapine 12.5 milliGRAM(s) Oral at bedtime  simvastatin 10 milliGRAM(s) Oral at bedtime  tamsulosin 0.8 milliGRAM(s) Oral at bedtime    MEDICATIONS  (PRN):  acetaminophen     Tablet .. 650 milliGRAM(s) Oral every 6 hours PRN Temp greater or equal to 38C (100.4F)  hydrALAZINE Injectable 10 milliGRAM(s) IV Push every 2 hours PRN SBP > 160  labetalol Injectable 10 milliGRAM(s) IV Push every 2 hours PRN Systolic blood pressure > 160  ondansetron Injectable 4 milliGRAM(s) IV Push every 6 hours PRN Nausea and/or Vomiting  simethicone 80 milliGRAM(s) Chew every 6 hours PRN Gas      Vital Signs Last 24 Hrs  T(C): 37.5 (17 Mar 2024 11:00), Max: 38.3 (16 Mar 2024 15:00)  T(F): 99.5 (17 Mar 2024 11:00), Max: 100.9 (16 Mar 2024 15:00)  HR: 100 (17 Mar 2024 11:00) (80 - 111)  BP: 147/78 (17 Mar 2024 11:00) (105/53 - 152/80)  BP(mean): 96 (17 Mar 2024 11:00) (70 - 108)  RR: 23 (17 Mar 2024 11:00) (13 - 25)  SpO2: 100% (17 Mar 2024 11:00) (98% - 100%)    Parameters below as of 17 Mar 2024 08:00  Patient On (Oxygen Delivery Method): room air        PHYSICAL EXAM:    GENERAL: NAD, well-groomed, well-developed  HEAD: surgical site clean and dry   EYES: EOMI, PERRLA, conjunctiva and sclera clear  NECK: Supple, No JVD, Normal thyroid  NERVOUS SYSTEM:  Alert & Oriented X 4 , no focal deficit   CHEST/LUNG: CTA  b/l,  no rales, rhonchi, wheezing, or rubs  HEART: Regular rate and rhythm; No murmurs, rubs, or gallops  ABDOMEN: Soft, Nontender, Nondistended; Bowel sounds present  EXTREMITIES:  2+ Peripheral Pulses, No clubbing, cyanosis, or edema ,   LYMPH: No lymphadenopathy noted  SKIN: No rashes or lesions    LABS:                        10.2   11.64 )-----------( 411      ( 17 Mar 2024 02:22 )  Culture - Sputum . (03.09.24 @ 08:20)    Gram Stain:   No Squamous epithelial cells seen per low power field  Rare polymorphonuclear leukocytes seen per low power field  Moderate Gram positive cocci in pairs seen per oil power field  Few Gram Negative Rods seen per oil power field   Specimen Source: .Sputum Sputum   Culture Results:   Normal Respiratory Dhara present               29.9     03-17    139  |  103  |  6.3<L>  ----------------------------<  117<H>  3.8   |  22.0  |  0.37<L>    Ca    8.3<L>      17 Mar 2024 02:22    Phos  4.3     03-17    Mg     2.2     03-17      Culture - CSF with Gram Stain . (03.12.24 @ 12:45)    Gram Stain:   No polymorphonuclear cells seen  No organisms seen  by cytocentrifuge   Specimen Source: .CSF CSF   Culture Results:   No growth to date.    Culture - Blood (03.12.24 @ 12:00)    Specimen Source: .Blood Blood-Peripheral   Culture Results:   No growth at 4 days    Culture - Blood (03.12.24 @ 11:45)    Specimen Source: .Blood Blood-Peripheral   Culture Results:   No growth at 4 days    Respiratory Viral Panel with COVID-19 by KEVIN (03.12.24 @ 11:45)    Rapid RVP Result: [facility]   SARS-CoV-2: [facility]: This Respiratory Panel uses polymerase chain reaction (PCR) to detect for  adenovirus; coronavirus (HKU1, NL63, 229E, OC43); human metapneumovirus  (hMPV); human enterovirus/rhinovirus (Entero/RV); influenza A; influenza  A/H1; influenza A/H3; influenza A/H1-2009; influenza B; parainfluenza  viruses 1, 2, 3, 4; respiratory syncytial virus; Mycoplasma pneumoniae;  Chlamydophila pneumoniae; and SARS-CoV-2.      RADIOLOGY & ADDITIONAL STUDIES:    < from: CT Head No Cont (03.16.24 @ 03:22) >    ACC: [id number] EXAM:  CT BRAIN   ORDERED BY: ALEXI KIRBY     PROCEDURE DATE:  03/16/2024      < end of copied text >  < from: CT Head No Cont (03.16.24 @ 03:22) >    IMPRESSION: No change in mild ventricular dilatation with   intraventricular hemorrhage and hemorrhage in the frontal lobe midline   and corpus callosum after extraventricular drain removal since 3/15/2024.    --- End of Report ---    < end of copied text >      < from: Xray Abdomen 1 View PORTABLE -Urgent (Xray Abdomen 1 View PORTABLE -Urgent .) (03.13.24 @ 15:50) >    ACC: [id number] EXAM:  XR ABDOMEN PORTABLE URGENT 1V   ORDERED BY: AMADA WHALEN     PROCEDURE DATE:  03/13/2024      < end of copied text >  < from: Xray Abdomen 1 View PORTABLE -Urgent (Xray Abdomen 1 View PORTABLE -Urgent .) (03.13.24 @ 15:50) >    IMPRESSION: Mildly distended colon nearly diffusely without significant   fecal burden or obvious signs of obstruction. Fecal material can be seen   localized to the mid left descending colon    --- End of Report ---    < end of copied text >      < from: US Duplex Venous Lower Ext Complete, Bilateral (03.12.24 @ 15:23) >    ACC: [id number] EXAM:  US DPLX LWR EXT VEINS COMPL BI   ORDERED BY: VY GUTIERREZ     PROCEDURE DATE:  03/12/2024      < end of copied text >  < from: US Duplex Venous Lower Ext Complete, Bilateral (03.12.24 @ 15:23) >  IMPRESSION:  No evidence of deep venous thrombosis in either lower extremity.    --- End of Report ---        < from: Xray Chest 1 View- PORTABLE-Urgent (Xray Chest 1 View- PORTABLE-Urgent .) (03.12.24 @ 12:01) >    ACC: [id number] EXAM:  XR CHEST PORTABLE URGENT 1V   ORDERED BY: AUGUSTIN CASTILLO     PROCEDURE DATE:  03/12/2024        < end of copied text >  < from: Xray Chest 1 View- PORTABLE-Urgent (Xray Chest 1 View- PORTABLE-Urgent .) (03.12.24 @ 12:01) >    IMPRESSION: Persistent right base infiltrate. Stable left tracheal   deviation suggesting right thyroid enlargement.    --- End of Report ---    < end of copied text >    < from: CT Abdomen and Pelvis w/wo IV Cont (03.07.24 @ 17:18) >    ACC: [id number] EXAM:  CT ABDOMEN AND PELVIS WAW IC   ORDERED BY: VY GUTIERREZ     PROCEDURE DATE:  03/07/2024      < end of copied text >  < from: CT Abdomen and Pelvis w/wo IV Cont (03.07.24 @ 17:18) >  FINDINGS:  LOWER CHEST: Trace bilateral layering pleural effusions with mild   bilateral lower lobe dependent atelectasis.    LIVER: There are scattered hepatic cysts. Otherwise, within normal limits.  BILE DUCTS: Mild central intrahepatic and extrahepatic biliary   dilatation, compatible with the patient's stated age.  GALLBLADDER: Vicarious excretion of contrast into the gallbladder. No   gallbladder wall thickening. Mild pericholecystic and perihepatic fluid.  SPLEEN: Within normal limits.  PANCREAS: Within normal limits.  ADRENALS: Within normal limits.  KIDNEYS/URETERS: Bilateral renal cysts. Otherwise, within normal limits.    BLADDER: Within normal limits.  REPRODUCTIVE ORGANS: Uterus and adnexa within normal limits.    BOWEL: Rectal temperature probe. No bowel obstruction. Appendix is   normal. There is no evidence of active intraluminal extravasation of   contrast. There is no mesenteric adenopathy.  PERITONEUM: There is no evidence of hyperattenuating collection within   the peritoneal cavity or retroperitoneum to suggest acute hemorrhage.  VESSELS: There is linear thrombus within the right external iliac artery   on images 105 through 107 of series 8, likely related to recent   neurointerventional procedure.  RETROPERITONEUM/LYMPH NODES: No lymphadenopathy.  ABDOMINAL WALL: Trace stranding in the right groin with adjacent clip,   related to recent interventional procedure.  BONES: Within normal limits.    IMPRESSION: No evidence of intraperitoneal or retroperitoneal hemorrhage.   No evidence of active intraluminal extravasation of contrastinto the   bowel.    --- End of Report ---    < end of copied text >

## 2024-03-17 NOTE — PROGRESS NOTE ADULT - ASSESSMENT
73F with PMH HLD, osteoporosis who presents for cerebral angiogram with aneurysm embolization. Patient had MRI / MRA brain 2/2 hand numbness which had incidental finding of acomm aneurysm. Patient underwent diagnostic angiogram 2/14/24 which confirmed acomm aneurysm. Patient presents today for cerebral angiogram with aneurysm embolization via balloon assisted coiling. Patient started on ASA 81 in preparation for the procedure. Patient underwent aneurysm embolization with 9 coils complicated by aneurysm rupture, controlled with coils and balloon tamponade. Patient extubated and admitted to NSICU for post op care.    Plan:  -Patient seen and examined on AM rounds with Dr. Davalos  - Neuro checks q2h  - Bromocriptine 10mg TID  - Seroquel 12.5mg nightly  - Pain control- Tylenol, Tramadol    PULM:  -2L NC, titrate for FIO2 >92%    CV:  SBP goal:   - Nimodipine 30mg q2hrs for SAH protocol  - Simvastatin 10mg daily    RENAL:  -Flomax 0.4mg  for urinary retention  -Continue Dangelo Cath with attempt TOV in AM 3/18  - AM LABS    GI:  - Diet: Easy to chew  - Rectal tube while patient has liquid bowel movements  - LBM 3/14   - Metamucil    ENDO:   - No active issues    HEME/ONC:  - SCDs and Lovenox 30mg for VTE prophylaxis    ID:   - Low grade temps, work up NTD  - Mild leukocytosis

## 2024-03-17 NOTE — CONSULT NOTE ADULT - ASSESSMENT
74 y/o mandarin speaking female with PMH of HLD and osteoporosis presents to [facility] with complaints of having cerebral aneurysm. States in 10/2023 she started to have B/L hand numbness. At that time she had MRI brain, MRA head and neck. MRA showed an ACOMM aneurysm. She underwent a cerebral angiogram by José Manuel Villaseñor at [facility] on December 7th, 2023, with the intent to treat this month. The aneurysm is described as a 5.5x4.6x3.6mm wide necked acomm aneurysm that projects medio-superiorly. Reports occasional headaches, described as numbness.   S/P  aneurysm embolization with coiling on 3/6  complicated by aneurysm rupture, controlled with coils and balloon tamponade. NISHANT CT showed SAH b/l frontal lobes and R sylvian fissure along with contrast staining. Patient admitted to NSICU for post op care.   Downgraded to Step down on 3/16. Medicine consulted for medical mgmt .     1.     Persistent right base infiltrate. Stable left tracheal   deviation suggesting right thyroid enlargement.  # ACOMM aneurism , S/P  aneurysm embolization with coiling on 3/6  complicated by aneurysm rupture,   controlled with coils and balloon tamponade.    NISHANT CT showed SAH b/l frontal lobes and R sylvian fissure along with contrast staining.    Patient admitted to NSICU for post op care. Now downgraded to step down   - further mgmt as per primary team   - Neurology noted and appreciated    - Neuro checks q2h  - Bromocriptine 10mg TID- for likely central fevers   - Seroquel 12.5mg nightly  - Pain control- Tylenol, Tramadol  - pt/ot  - Nimodipine 30mg q2hrs for SAH protocol  as per NS       # HLD - Simvastatin 10mg daily    # Postop urinary retention - failed TOV twice   -Flomax 0.8mg  qhs   -Continue Dangelo Cath with attempt TOV on 3/18     # Diarrhea , rectal tube + ,   Abdominal XR ( 3/13) noted with  Fecal material   localized to the mid left descending colon.   Likely overflow diarrhea . Will recommend to d/c rectal tube ,   give enema   FOBT - negative .        # Low grade temps, work up NTD- likely due to central fevers   On Bromocriptine- continue    - Mild leukocytosis  - trend fever / WBC    # Hypokalemia - likely due to diarrhea - supplements  monitor electrolytes     #Hypophosphatemia - resolved     # Hyponatremia - likely due to diarrhea - resolved      DVT prophylaxis  -on Lovenox     Thank you for the courtesy of this consult ,   will follow along with you .     Time spent reviewing the chart documentation, reviewing labs and imaging studies, evaluating the patient,  and documenting- 78 minutes .

## 2024-03-18 LAB
ANION GAP SERPL CALC-SCNC: 12 MMOL/L — SIGNIFICANT CHANGE UP (ref 5–17)
ANION GAP SERPL CALC-SCNC: 14 MMOL/L — SIGNIFICANT CHANGE UP (ref 5–17)
BUN SERPL-MCNC: 7.5 MG/DL — LOW (ref 8–20)
BUN SERPL-MCNC: 9.9 MG/DL — SIGNIFICANT CHANGE UP (ref 8–20)
CALCIUM SERPL-MCNC: 8.1 MG/DL — LOW (ref 8.4–10.5)
CALCIUM SERPL-MCNC: 8.6 MG/DL — SIGNIFICANT CHANGE UP (ref 8.4–10.5)
CHLORIDE SERPL-SCNC: 92 MMOL/L — LOW (ref 96–108)
CHLORIDE SERPL-SCNC: 98 MMOL/L — SIGNIFICANT CHANGE UP (ref 96–108)
CO2 SERPL-SCNC: 23 MMOL/L — SIGNIFICANT CHANGE UP (ref 22–29)
CO2 SERPL-SCNC: 23 MMOL/L — SIGNIFICANT CHANGE UP (ref 22–29)
CREAT SERPL-MCNC: 0.37 MG/DL — LOW (ref 0.5–1.3)
CREAT SERPL-MCNC: 0.45 MG/DL — LOW (ref 0.5–1.3)
EGFR: 103 ML/MIN/1.73M2 — SIGNIFICANT CHANGE UP
EGFR: 108 ML/MIN/1.73M2 — SIGNIFICANT CHANGE UP
GLUCOSE SERPL-MCNC: 109 MG/DL — HIGH (ref 70–99)
GLUCOSE SERPL-MCNC: 132 MG/DL — HIGH (ref 70–99)
HCT VFR BLD CALC: 27.8 % — LOW (ref 34.5–45)
HGB BLD-MCNC: 9.6 G/DL — LOW (ref 11.5–15.5)
MAGNESIUM SERPL-MCNC: 2.1 MG/DL — SIGNIFICANT CHANGE UP (ref 1.6–2.6)
MCHC RBC-ENTMCNC: 31 PG — SIGNIFICANT CHANGE UP (ref 27–34)
MCHC RBC-ENTMCNC: 34.5 GM/DL — SIGNIFICANT CHANGE UP (ref 32–36)
MCV RBC AUTO: 89.7 FL — SIGNIFICANT CHANGE UP (ref 80–100)
PHOSPHATE SERPL-MCNC: 2.3 MG/DL — LOW (ref 2.4–4.7)
PLATELET # BLD AUTO: 404 K/UL — HIGH (ref 150–400)
POTASSIUM SERPL-MCNC: 3.3 MMOL/L — LOW (ref 3.5–5.3)
POTASSIUM SERPL-MCNC: 3.7 MMOL/L — SIGNIFICANT CHANGE UP (ref 3.5–5.3)
POTASSIUM SERPL-SCNC: 3.3 MMOL/L — LOW (ref 3.5–5.3)
POTASSIUM SERPL-SCNC: 3.7 MMOL/L — SIGNIFICANT CHANGE UP (ref 3.5–5.3)
RBC # BLD: 3.1 M/UL — LOW (ref 3.8–5.2)
RBC # FLD: 13.7 % — SIGNIFICANT CHANGE UP (ref 10.3–14.5)
SODIUM SERPL-SCNC: 129 MMOL/L — LOW (ref 135–145)
SODIUM SERPL-SCNC: 133 MMOL/L — LOW (ref 135–145)
WBC # BLD: 9.54 K/UL — SIGNIFICANT CHANGE UP (ref 3.8–10.5)
WBC # FLD AUTO: 9.54 K/UL — SIGNIFICANT CHANGE UP (ref 3.8–10.5)

## 2024-03-18 PROCEDURE — 99232 SBSQ HOSP IP/OBS MODERATE 35: CPT

## 2024-03-18 PROCEDURE — 99233 SBSQ HOSP IP/OBS HIGH 50: CPT

## 2024-03-18 PROCEDURE — 99223 1ST HOSP IP/OBS HIGH 75: CPT | Mod: GC

## 2024-03-18 RX ORDER — SODIUM CHLORIDE 9 MG/ML
1000 INJECTION INTRAMUSCULAR; INTRAVENOUS; SUBCUTANEOUS
Refills: 0 | Status: DISCONTINUED | OUTPATIENT
Start: 2024-03-18 | End: 2024-03-20

## 2024-03-18 RX ORDER — SACCHAROMYCES BOULARDII 250 MG
250 POWDER IN PACKET (EA) ORAL
Refills: 0 | Status: DISCONTINUED | OUTPATIENT
Start: 2024-03-18 | End: 2024-03-20

## 2024-03-18 RX ORDER — POTASSIUM CHLORIDE 20 MEQ
40 PACKET (EA) ORAL ONCE
Refills: 0 | Status: COMPLETED | OUTPATIENT
Start: 2024-03-18 | End: 2024-03-18

## 2024-03-18 RX ORDER — SODIUM CHLORIDE 9 MG/ML
500 INJECTION INTRAMUSCULAR; INTRAVENOUS; SUBCUTANEOUS ONCE
Refills: 0 | Status: COMPLETED | OUTPATIENT
Start: 2024-03-18 | End: 2024-03-18

## 2024-03-18 RX ADMIN — SODIUM CHLORIDE 500 MILLILITER(S): 9 INJECTION INTRAMUSCULAR; INTRAVENOUS; SUBCUTANEOUS at 20:31

## 2024-03-18 RX ADMIN — Medication 40 MILLIEQUIVALENT(S): at 20:37

## 2024-03-18 RX ADMIN — Medication 650 MILLIGRAM(S): at 04:04

## 2024-03-18 RX ADMIN — ENOXAPARIN SODIUM 30 MILLIGRAM(S): 100 INJECTION SUBCUTANEOUS at 21:00

## 2024-03-18 RX ADMIN — BROMOCRIPTINE MESYLATE 10 MILLIGRAM(S): 5 CAPSULE ORAL at 13:59

## 2024-03-18 RX ADMIN — NIMODIPINE 60 MILLIGRAM(S): 60 SOLUTION ORAL at 05:38

## 2024-03-18 RX ADMIN — NIMODIPINE 60 MILLIGRAM(S): 60 SOLUTION ORAL at 13:59

## 2024-03-18 RX ADMIN — Medication 63.75 MILLIMOLE(S): at 12:50

## 2024-03-18 RX ADMIN — BROMOCRIPTINE MESYLATE 10 MILLIGRAM(S): 5 CAPSULE ORAL at 21:01

## 2024-03-18 RX ADMIN — BROMOCRIPTINE MESYLATE 10 MILLIGRAM(S): 5 CAPSULE ORAL at 05:38

## 2024-03-18 RX ADMIN — Medication 1 PACKET(S): at 17:41

## 2024-03-18 RX ADMIN — Medication 1 TABLET(S): at 12:50

## 2024-03-18 RX ADMIN — NIMODIPINE 60 MILLIGRAM(S): 60 SOLUTION ORAL at 21:00

## 2024-03-18 RX ADMIN — NYSTATIN CREAM 1 APPLICATION(S): 100000 CREAM TOPICAL at 05:39

## 2024-03-18 RX ADMIN — NIMODIPINE 60 MILLIGRAM(S): 60 SOLUTION ORAL at 10:00

## 2024-03-18 RX ADMIN — CHLORHEXIDINE GLUCONATE 1 APPLICATION(S): 213 SOLUTION TOPICAL at 12:50

## 2024-03-18 RX ADMIN — TAMSULOSIN HYDROCHLORIDE 0.8 MILLIGRAM(S): 0.4 CAPSULE ORAL at 21:01

## 2024-03-18 RX ADMIN — SODIUM CHLORIDE 125 MILLILITER(S): 9 INJECTION INTRAMUSCULAR; INTRAVENOUS; SUBCUTANEOUS at 20:31

## 2024-03-18 RX ADMIN — Medication 1 PACKET(S): at 05:38

## 2024-03-18 RX ADMIN — NIMODIPINE 60 MILLIGRAM(S): 60 SOLUTION ORAL at 17:41

## 2024-03-18 RX ADMIN — NYSTATIN CREAM 1 APPLICATION(S): 100000 CREAM TOPICAL at 17:42

## 2024-03-18 RX ADMIN — QUETIAPINE FUMARATE 12.5 MILLIGRAM(S): 200 TABLET, FILM COATED ORAL at 21:01

## 2024-03-18 RX ADMIN — SIMVASTATIN 10 MILLIGRAM(S): 20 TABLET, FILM COATED ORAL at 21:01

## 2024-03-18 RX ADMIN — Medication 250 MILLIGRAM(S): at 17:41

## 2024-03-18 RX ADMIN — NIMODIPINE 60 MILLIGRAM(S): 60 SOLUTION ORAL at 01:02

## 2024-03-18 NOTE — PROGRESS NOTE ADULT - THIS PATIENT HAS THE FOLLOWING CONDITION(S)/DIAGNOSES ON THIS ADMISSION:
Encephalopathy
Encephalopathy/Acute Respiratory Failure
Encephalopathy/Cerebral Edema
Encephalopathy/Cerebral Edema
None
Acomm Aneurysm, SAH/IVH/Encephalopathy
None
None/Cerebral Edema/Brain Compression / Herniation
SAH/Brain Compression / Herniation
Encephalopathy/Cerebral Edema
Encephalopathy/Cerebral Edema
Cerebral Edema
Cerebral Edema/Brain Compression / Herniation
Encephalopathy/Cerebral Edema
None
None
SAH/Brain Compression / Herniation
SAH/Brain Compression / Herniation
Encephalopathy/Cerebral Edema
Encephalopathy
Encephalopathy/Cerebral Edema

## 2024-03-18 NOTE — PROGRESS NOTE ADULT - ASSESSMENT
ASSESSMENT: 74 y/o mandarin speaking female with PMH of HLD and osteoporosis presents to [facility] with complaints of having cerebral aneurysm. States in 10/2023 she started to have B/L hand numbness. At that time she had MRI brain, MRA head and neck. MRA showed an ACOMM aneurysm. She underwent a cerebral angiogram by José Manuel Villaseñor at [facility] on December 7th, 2023, with the intent to treat this month. The aneurysm is described as a 5.5x4.6x3.6mm wide necked acomm aneurysm that projects medio-superiorly. Reports occasional headaches, described as numbness, 5/10 in severity, worse with laying down, relieved with sitting up or standing. Patient underwent diagnostic angiogram 2/14/24 which confirmed acomm aneurysm. On 3/6 patient underwent aneurysm embolization with coiling complicated by aneurysm rupture, controlled with coils and balloon tamponade. NISHANT CT showed SAH b/l frontal lobes and R sylvian fissure along with contrast staining. EVD placed  3/6, removed by neurosurgery 3/15. Transferred to Stroke service 3/18.   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     NEURO:   #ACOMM aneurysm elective procedure complicated by aneurysm rupture controlled with coils/balloon tamponade  #SAH  #EVD 3/6, removed 3/15  -Neurologically stable  -Continue close monitoring for neurologic deterioration    -Stroke neuro checks q4hrs   -MRI Brain w/o, MRA Head w/o and Neck w/contrast results as above   -Most recent CTH 3/16 - stable  -s/p Keppra  -Continues on nimodipine 60mg q4hrs - per Neurosurgery would discontinue on 3/20  -?Central fevers - continues on bromocriptine 10mg TID  -Dysphagia screen: pass, on easy to chew diet  -Physical therapy/OT/Speech eval/treatment.            #Stroke prevention:  -SBP goal  per neuro IR recommendations, avoiding rapid fluctuations and hypotension   -ANTITHROMBOTIC THERAPY: n/a   -Lipid panel pending; simvastatin 10mg  -HA1C 5.8  -TTE noted above    CARDIOVASCULAR:   -TTE noted above  -cardiac monitoring w/ telemetry for now, further evaluation pending findings of noted workup                              HEMATOLOGY:   H/H 9.6/27.8, Platelets 404  -patient should have all age and risk appropriate malignancy screenings with PCP or sooner if clinically suspected      DVT ppx: Heparin s.c [] LMWH [X]     PULMONARY: CXR ___ , protecting airway, saturating well     RENAL:   #Urinary retention  BUN/Cr 7.5/0.37  -maintain adequate hydration    -tamsulosin 0.8mg bedtime   -urinary retention s/p void trial - straight cath protocol for >400cc, bladder scan q6hrs     Na Goal:  135-145     Dangelo:    ID:   afebrile, no leukocytosis, monitor for si/sx of infection     Psych:  Quetiapine 12.5mg at bedtime  Delirium precautions    OTHER:  condition and plan of care d/w patient, questions and concerns addressed.     DISPOSITION: Rehab or home depending on PT eval once stable and workup is complete      CORE MEASURES:        Admission NIHSS:      Tenecteplase : [] YES [] NO      LDL/HDL/A1C:     Depression Screen- if depression hx and/or present      Statin Therapy:     Dysphagia Screen: [] PASS [] FAIL     Smoking [] YES [] NO      Afib [] YES [] NO     Stroke Education [] YES [] NO    Obtain screening lower extremity venous ultrasound in patients who meet 1 or more of the following criteria as patient is high risk for DVT/PE on admission:   [] History of DVT/PE  []Hypercoagulable states (Factor V Leiden, Cancer, OCP, etc. )  []Prolonged immobility (hemiplegia/hemiparesis/post operative or any other extended immobilization)  [] Transferred from outside facility (Rehab or Long term care)  [] Age </= to 50 ASSESSMENT: 72 y/o mandarin speaking female with PMH of HLD and osteoporosis presents to [facility] with complaints of having cerebral aneurysm. States in 10/2023 she started to have B/L hand numbness. At that time she had MRI brain, MRA head and neck. MRA showed an ACOMM aneurysm. She underwent a cerebral angiogram by José Manuel Villaseñor at [facility] [place] on December 7th, 2023, with the intent to treat this month. The aneurysm is described as a 5.5x4.6x3.6mm wide necked acomm aneurysm that projects medio-superiorly. Reports occasional headaches, described as numbness, 5/10 in severity, worse with laying down, relieved with sitting up or standing. Patient underwent diagnostic angiogram 2/14/24 which confirmed acomm aneurysm. On 3/6 patient underwent aneurysm embolization with coiling complicated by aneurysm rupture, controlled with coils and balloon tamponade. NISHANT CT showed SAH b/l frontal lobes and R sylvian fissure along with contrast staining. EVD placed  3/6, removed by neurosurgery 3/15. Transferred to Stroke service 3/18.   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     NEURO:   #ACOMM aneurysm elective procedure complicated by aneurysm rupture controlled with coils/balloon tamponade  #SAH  #EVD 3/6, removed 3/15  -Neurologically stable  -Continue close monitoring for neurologic deterioration    -Stroke neuro checks q4hrs   -MRI Brain w/o, MRA Head w/o and Neck w/contrast results as above   -Most recent CTH 3/16 - stable  -s/p Keppra  -Continues on nimodipine 60mg q4hrs - per Neurosurgery would discontinue on 3/20  -?Central fevers - continues on bromocriptine 10mg TID  -Dysphagia screen: pass, on easy to chew diet  -Physical therapy/OT/Speech eval/treatment.            #Stroke prevention:  -SBP goal  per neuro IR recommendations, avoiding rapid fluctuations and hypotension   -ANTITHROMBOTIC THERAPY: n/a   -Lipid panel pending; simvastatin 10mg  -HA1C 5.8  -TTE noted above    CARDIOVASCULAR:   -TTE noted above  -cardiac monitoring w/ telemetry for now, further evaluation pending findings of noted workup                              HEMATOLOGY:   H/H 9.6/27.8, Platelets 404  -patient should have all age and risk appropriate malignancy screenings with PCP or sooner if clinically suspected      DVT ppx: Heparin s.c [] LMWH [X]     PULMONARY:   -most recent CXR 3/12 with persistent right base infiltrate  -s/p zosyn for possible pneumonia  -consider repeat if concern for infection  -protecting airway, saturating well     RENAL:   #Urinary retention  BUN/Cr 7.5/0.37  -maintain adequate hydration    -tamsulosin 0.8mg bedtime   -urinary retention s/p void trial - straight cath protocol for >400cc, bladder scan q6hrs     Na Goal:  135-145     Dangelo: removed, void trial ongoing    ID:   -s/p zosyn for possible pneumonia  -fluctuating fevers - Tmax 100.4 in past 24hrs, received tylenol with good response; possible central fevers  -no leukocytosis  -monitor for si/sx of infection     GI:  #diarrhea  -Abdominal XR 3/13 noted  -s/p rectal tube, removed 3/17  -monitor BM, loose stool possible side effect of nimodipine    Psych:  -Quetiapine 12.5mg at bedtime  -Delirium precautions    OTHER:  condition and plan of care d/w patient, questions and concerns addressed. Discussed with family at bedside, plan for transfer to Stroke service    DISPOSITION: PT/OT recommending acute rehab once medically stable for discharge      CORE MEASURES:        Admission NIHSS: n/a      Tenecteplase : [] YES [X] NO      LDL/HDL/A1C: pending lipid panel/ HA1C 5.8     Depression Screen- if depression hx and/or present      Statin Therapy: simvastatin 10mg     Dysphagia Screen: [X] PASS [] FAIL     Smoking [] YES [X] NO      Afib [] YES [X] NO     Stroke Education [] YES [] NO [X] pending    Obtain screening lower extremity venous ultrasound in patients who meet 1 or more of the following criteria as patient is high risk for DVT/PE on admission:   [] History of DVT/PE  []Hypercoagulable states (Factor V Leiden, Cancer, OCP, etc. )  []Prolonged immobility (hemiplegia/hemiparesis/post operative or any other extended immobilization)  [] Transferred from outside facility (Rehab or Long term care)  [] Age </= to 50 ASSESSMENT: 74 y/o mandarin speaking female with PMH of HLD and osteoporosis presents to [facility] with complaints of having cerebral aneurysm. States in 10/2023 she started to have B/L hand numbness. At that time she had MRI brain, MRA head and neck. MRA showed an ACOMM aneurysm. She underwent a cerebral angiogram by José Manuel Villaseñor at [facility] on December 7th, 2023, with the intent to treat this month. The aneurysm is described as a 5.5x4.6x3.6mm wide necked acomm aneurysm that projects medio-superiorly. Reports occasional headaches, described as numbness, 5/10 in severity, worse with laying down, relieved with sitting up or standing. Patient underwent diagnostic angiogram 2/14/24 which confirmed acomm aneurysm. On 3/6 patient underwent aneurysm embolization with coiling complicated by aneurysm rupture, controlled with coils and balloon tamponade. NISHANT CT showed SAH b/l frontal lobes and R sylvian fissure along with contrast staining. EVD placed  3/6, removed by neurosurgery 3/15. Transferred to Stroke service 3/18.   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     NEURO:   #ACOMM aneurysm elective procedure complicated by aneurysm rupture controlled with coils/balloon tamponade  #SAH  #EVD 3/6, removed 3/15  -Neurologically stable  -Continue close monitoring for neurologic deterioration    -Stroke neuro checks q4hrs   -MRI Brain w/o, MRA Head w/o and Neck w/contrast results as above   -Most recent CTH 3/16 - stable  -will need repeat MRI brain w/wo contrast in 4-6 weeks  -s/p Keppra  -Continues on nimodipine 60mg q4hrs - per Neurosurgery would discontinue on 3/20  -?Central fevers - continues on bromocriptine 10mg TID  -Dysphagia screen: pass, on easy to chew diet  -Physical therapy/OT/Speech eval/treatment.            #Stroke prevention:  -SBP goal  per neuro IR recommendations, avoiding rapid fluctuations and hypotension   -ANTITHROMBOTIC THERAPY: n/a   -Lipid panel pending; simvastatin 10mg  -HA1C 5.8  -TTE noted above    CARDIOVASCULAR:   -TTE noted above  -cardiac monitoring w/ telemetry for now, further evaluation pending findings of noted workup                              HEMATOLOGY:   H/H 9.6/27.8, Platelets 404  -patient should have all age and risk appropriate malignancy screenings with PCP or sooner if clinically suspected      DVT ppx: Heparin s.c [] LMWH [X]     PULMONARY:   -most recent CXR 3/12 with persistent right base infiltrate  -s/p zosyn for possible pneumonia  -consider repeat if concern for infection  -protecting airway, saturating well     RENAL:   #Urinary retention  BUN/Cr 7.5/0.37  -maintain adequate hydration    -tamsulosin 0.8mg bedtime   -urinary retention s/p void trial - straight cath protocol for >400cc, bladder scan q6hrs     Na Goal:  135-145     Dangelo: removed, void trial ongoing    ID:   -s/p zosyn for possible pneumonia  -fluctuating fevers - Tmax 100.4 in past 24hrs, received tylenol with good response; possible central fevers  -no leukocytosis  -monitor for si/sx of infection     GI:  #diarrhea  -Abdominal XR 3/13 noted  -s/p rectal tube, removed 3/17  -monitor BM, loose stool possible side effect of nimodipine    Psych:  -Quetiapine 12.5mg at bedtime  -Delirium precautions    OTHER:  condition and plan of care d/w patient, questions and concerns addressed. Discussed with family at bedside, plan for transfer to Stroke service    DISPOSITION: PT/OT recommending acute rehab once medically stable for discharge      CORE MEASURES:        Admission NIHSS: n/a      Tenecteplase : [] YES [X] NO      LDL/HDL/A1C: pending lipid panel/ HA1C 5.8     Depression Screen- if depression hx and/or present      Statin Therapy: simvastatin 10mg     Dysphagia Screen: [X] PASS [] FAIL     Smoking [] YES [X] NO      Afib [] YES [X] NO     Stroke Education [] YES [] NO [X] pending    Obtain screening lower extremity venous ultrasound in patients who meet 1 or more of the following criteria as patient is high risk for DVT/PE on admission:   [] History of DVT/PE  []Hypercoagulable states (Factor V Leiden, Cancer, OCP, etc. )  []Prolonged immobility (hemiplegia/hemiparesis/post operative or any other extended immobilization)  [] Transferred from outside facility (Rehab or Long term care)  [] Age </= to 50 ASSESSMENT: 72 y/o mandarin speaking female with PMH of HLD and osteoporosis presents to [facility] with complaints of having cerebral aneurysm. States in 10/2023 she started to have B/L hand numbness. At that time she had MRI brain, MRA head and neck. MRA showed an ACOMM aneurysm. She underwent a cerebral angiogram by José Manuel Villaseñor at [facility] on December 7th, 2023, with the intent to treat this month. The aneurysm is described as a 5.5x4.6x3.6mm wide necked acomm aneurysm that projects medio-superiorly. Reports occasional headaches, described as numbness, 5/10 in severity, worse with laying down, relieved with sitting up or standing. Patient underwent diagnostic angiogram 2/14/24 which confirmed acomm aneurysm. On 3/6 patient underwent aneurysm embolization with coiling complicated by aneurysm rupture, controlled with coils and balloon tamponade. NISHANT CT showed SAH b/l frontal lobes and R sylvian fissure along with contrast staining. EVD placed  3/6, removed by neurosurgery 3/15. Transferred to Stroke service 3/18.   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     NEURO:   #ACOMM aneurysm elective procedure complicated by aneurysm rupture controlled with coils/balloon tamponade  #SAH  #EVD 3/6, removed 3/15  -Neurologically stable  -Continue close monitoring for neurologic deterioration    -Stroke neuro checks q4hrs   -MRI Brain w/o, MRA Head w/o and Neck w/contrast results as above   -Most recent CTH 3/16 - stable  -will need repeat MRI brain w/wo contrast in 4-6 weeks  -s/p Keppra  -Continues on nimodipine 60mg q4hrs - per Neurosurgery would discontinue on 3/20  -?Central fevers - continues on bromocriptine 10mg TID  -Dysphagia screen: pass, on easy to chew diet  -Physical therapy/OT/Speech eval/treatment.            #Stroke prevention:  -SBP goal  per neuro IR recommendations, avoiding rapid fluctuations and hypotension   -ANTITHROMBOTIC THERAPY: n/a   -Lipid panel pending; simvastatin 10mg  -HA1C 5.8  -TTE noted above    CARDIOVASCULAR:   -TTE noted above  -cardiac monitoring w/ telemetry for now, further evaluation pending findings of noted workup                              HEMATOLOGY:   H/H 9.6/27.8, Platelets 404  -patient should have all age and risk appropriate malignancy screenings with PCP or sooner if clinically suspected      DVT ppx: Heparin s.c [] LMWH [X]     PULMONARY:   -most recent CXR 3/12 with persistent right base infiltrate  -s/p zosyn for possible pneumonia  -consider repeat if concern for infection  -protecting airway, saturating well     RENAL:   #Urinary retention  #Hyponatremia  BUN/Cr 7.5/0.37  -maintain adequate hydration    -tamsulosin 0.8mg bedtime   -urinary retention s/p void trial - straight cath protocol for >400cc, bladder scan q6hrs  -hyponatremia, likely due to diarrhea, continue to monitor     Na Goal:  135-145     Dangelo: removed, void trial ongoing    ID:   -s/p zosyn for possible pneumonia  -fluctuating fevers - Tmax 100.4 in past 24hrs, received tylenol with good response; possible central fevers  -no leukocytosis  -monitor for si/sx of infection     GI:  #diarrhea  -Abdominal XR 3/13 noted  -s/p rectal tube, removed 3/17  -monitor BM, loose stool possible side effect of nimodipine    Psych:  -Quetiapine 12.5mg at bedtime  -Delirium precautions    OTHER:  condition and plan of care d/w patient, questions and concerns addressed. Discussed with family at bedside, plan for transfer to Stroke service    DISPOSITION: PT/OT recommending acute rehab once medically stable for discharge      CORE MEASURES:        Admission NIHSS: n/a      Tenecteplase : [] YES [X] NO      LDL/HDL/A1C: pending lipid panel/ HA1C 5.8     Depression Screen- if depression hx and/or present      Statin Therapy: simvastatin 10mg     Dysphagia Screen: [X] PASS [] FAIL     Smoking [] YES [X] NO      Afib [] YES [X] NO     Stroke Education [] YES [] NO [X] pending    Obtain screening lower extremity venous ultrasound in patients who meet 1 or more of the following criteria as patient is high risk for DVT/PE on admission:   [] History of DVT/PE  []Hypercoagulable states (Factor V Leiden, Cancer, OCP, etc. )  []Prolonged immobility (hemiplegia/hemiparesis/post operative or any other extended immobilization)  [] Transferred from outside facility (Rehab or Long term care)  [] Age </= to 50 ASSESSMENT: 74 y/o mandarin speaking female with PMH of HLD and osteoporosis presents to [facility] with complaints of having cerebral aneurysm. States in 10/2023 she started to have B/L hand numbness. At that time she had MRI brain, MRA head and neck. MRA showed an ACOMM aneurysm. She underwent a cerebral angiogram by José Manuel Villaseñor at [facility] on December 7th, 2023, with the intent to treat this month. The aneurysm is described as a 5.5x4.6x3.6mm wide necked acomm aneurysm that projects medio-superiorly. Reports occasional headaches, described as numbness, 5/10 in severity, worse with laying down, relieved with sitting up or standing. Patient underwent diagnostic angiogram 2/14/24 which confirmed acomm aneurysm. On 3/6 patient underwent aneurysm embolization with coiling complicated by aneurysm rupture, controlled with coils and balloon tamponade. NISHANT CT showed SAH b/l frontal lobes and R sylvian fissure along with contrast staining. EVD placed  3/6, removed by neurosurgery 3/15. Transferred to Stroke service 3/18.   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   Hospital course c/b waxing and waning mental status    NEURO:   #ACOMM aneurysm elective procedure complicated by aneurysm rupture controlled with coils/balloon tamponade  #SAH  #EVD 3/6, removed 3/15  # Metabolic encephalopathy  -Neurologically stable  -Continue close monitoring for neurologic deterioration    -Stroke neuro checks q4hrs   -MRI Brain w/o, MRA Head w/o and Neck w/contrast results as above   -Most recent CTH 3/16 - stable  -will need repeat MRI brain w/wo contrast in 4-6 weeks  -s/p Keppra  -Continues on nimodipine 60mg q4hrs - per Neurosurgery would discontinue on 3/20  -?Central fevers - continues on bromocriptine 10mg TID  -Dysphagia screen: pass, on easy to chew diet  -Physical therapy/OT/Speech eval/treatment.            #Stroke prevention:  -SBP goal  per neuro IR recommendations, avoiding rapid fluctuations and hypotension   -ANTITHROMBOTIC THERAPY: n/a   -Lipid panel pending; simvastatin 10mg  -HA1C 5.8  -TTE noted above    CARDIOVASCULAR:   -TTE noted above  -cardiac monitoring w/ telemetry for now, further evaluation pending findings of noted workup                              HEMATOLOGY:   H/H 9.6/27.8, Platelets 404  -patient should have all age and risk appropriate malignancy screenings with PCP or sooner if clinically suspected      DVT ppx: Heparin s.c [] LMWH [X]     PULMONARY:   -most recent CXR 3/12 with persistent right base infiltrate  -s/p zosyn for possible pneumonia  -consider repeat if concern for infection  -protecting airway, saturating well     RENAL:   #Urinary retention  #Hyponatremia  BUN/Cr 7.5/0.37  -maintain adequate hydration    -tamsulosin 0.8mg bedtime   -urinary retention s/p void trial - straight cath protocol for >400cc, bladder scan q6hrs  -hyponatremia, likely due to diarrhea, continue to monitor     Na Goal:  135-145     Dangelo: removed, void trial ongoing    ID:   -s/p zosyn for possible pneumonia  -fluctuating fevers - Tmax 100.4 in past 24hrs, received tylenol with good response; possible central fevers  -no leukocytosis  -monitor for si/sx of infection     GI:  #diarrhea  -Abdominal XR 3/13 noted  -s/p rectal tube, removed 3/17  -monitor BM, loose stool possible side effect of nimodipine    Psych:  -Quetiapine 12.5mg at bedtime  -Delirium precautions    OTHER:  condition and plan of care d/w patient, questions and concerns addressed. Discussed with family at bedside, plan for transfer to Stroke service    DISPOSITION: PT/OT recommending acute rehab once medically stable for discharge      CORE MEASURES:        Admission NIHSS: n/a      Tenecteplase : [] YES [X] NO      LDL/HDL/A1C: pending lipid panel/ HA1C 5.8     Depression Screen- if depression hx and/or present      Statin Therapy: simvastatin 10mg     Dysphagia Screen: [X] PASS [] FAIL     Smoking [] YES [X] NO      Afib [] YES [X] NO     Stroke Education [] YES [] NO [X] pending    Obtain screening lower extremity venous ultrasound in patients who meet 1 or more of the following criteria as patient is high risk for DVT/PE on admission:   [] History of DVT/PE  []Hypercoagulable states (Factor V Leiden, Cancer, OCP, etc. )  []Prolonged immobility (hemiplegia/hemiparesis/post operative or any other extended immobilization)  [] Transferred from outside facility (Rehab or Long term care)  [] Age </= to 50

## 2024-03-18 NOTE — CONSULT NOTE ADULT - SUBJECTIVE AND OBJECTIVE BOX
HPI:  72 y/o mandarin speaking female with PMH of HLD and osteoporosis presents to [facility] with complaints of having cerebral aneurysm. States in 10/2023 she started to have B/L hand numbness. At that time she had MRI brain, MRA head and neck. MRA showed an ACOMM aneurysm. She underwent a cerebral angiogram by José Manuel Villaseñor at [facility] on December 7th, 2023, with the intent to treat this month. The aneurysm is described as a 5.5x4.6x3.6mm wide necked acomm aneurysm that projects medio-superiorly. Reports occasional headaches, described as numbness, 5/10 in severity, worse with laying down, relieved with sitting up or standing. Patient underwent diagnostic angiogram 2/14/24 which confirmed acomm aneurysm. On 3/6 patient underwent aneurysm embolization with coiling complicated by aneurysm rupture, controlled with coils and balloon tamponade. NISHANT CT showed SAH b/l frontal lobes and R sylvian fissure along with contrast staining. EVD placed  3/6, removed by neurosurgery 3/15. 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     INTERVAL HPI:  3/6 - Admitted to NSICU post Angio. Post procedure CT w/ SAH 2nd to rupture intra-op, repaired (was a small bleb next to main aneurysm). Intubated and EVD placed, set to 20. Dropped to 10 overnight.  3/7 - Exam improved, Extubated  3/8 - Febrile with RLL consolidation, started on Zosyn 3/9  3/10 - EVD prox port of collection system damage/leaking noted, replaced.  3/11 - S/p cerebral angiogram, confirmed Acomm aneurysm occlusion with small neck remnant, no vasospasm noted  3/13 - EVD clamped  3/15 - EVD removed  3/16 - K+ 2.6 overnight, being repleted. Post-pull CTH stable. Downgraded to neurosurgery      PM&R was consulted for disposition recommendations.    Today, the patient..     Imaging Reviewed Today:    RADIOLOGY & ADDITIONAL STUDIES (independently reviewed unless otherwise noted):  CT Head No Cont (03.16.24 @ 03:22)   IMPRESSION: No change in mild ventricular dilatation with   intraventricular hemorrhage and hemorrhage in the frontal lobe midline   and corpus callosum after extraventricular drain removal since 3/15/2024.    CT Head No Cont (03.15.24 @ 05:38)   IMPRESSION: Stable follow-up CT study.    US Duplex Venous Lower Ext Complete, Bilateral (03.12.24 @ 15:23)   IMPRESSION:  No evidence of deep venous thrombosis in either lower extremity.    CT Head No Cont (03.10.24 @ 12:37)   IMPRESSION:  Mildly decreased bilateral mesial frontal parenchymal hemorrhages.  Similar anterior interhemispheric acute subarachnoid hemorrhage.  Mildly decreased intraventricular hemorrhage within the bilateral lateral   and third ventricles.  Decreased ventricular size with near resolution of hydrocephalus.  No large midline shift.  Basal cisterns are more well visualized on the current examination, this  may be due to technique.    MR Brain, MR Angio Head w/ IV Cont (03.09.24 @ 18:57)   IMPRESSION:  MR brain: Intraparenchymal hemorrhage within the medial anterior frontal   lobes measuring approximately 3.5 cm, unchanged. There is surrounding   edema and extension into the genuine body of corpus callosum, unchanged.   Hemorrhagic clot within the LEFT lateral ventricle and third ventricle is   unchanged. Minimal hemorrhage seen in the dependent portions of the RIGHT   lateral ventricle unchanged. Minimal subarachnoid hemorrhage seen along   the medial sulci of the BILATERAL frontal and parietal lobes as well as   scattered throughout the sulci of the brain. RIGHT frontal   ventriculostomy catheter tip is seen extending toward the body of the   LEFT ventricle and third ventricle.  Coil material is seen in the region   of the anterior communicating artery on the RIGHT.   Blood products are   seen in the region of the known aneurysm possibly indicating partial   patency.    MRA intracranial:   Coil material is seen in the region of the anterior   communicating artery. There is 1 x 4 mm region of signal seen on the   noncontrast but not the postcontrast which may reflect blood products   within the aneurysm indicating patency.    CT Abdomen and Pelvis w/wo IV Cont (03.07.24 @ 17:18)   IMPRESSION: No evidence of intraperitoneal or retroperitoneal hemorrhage.   No evidence of active intraluminal extravasation of contrastinto the   bowel.    US Doppler Transcranial Comp (03.07.24 @ 11:05)   IMPRESSION:    Inadequate insonation windows on both sides  Exam is nondiagnostic    CT Head No Cont (03.07.24 @ 05:56)   IMPRESSION:   persistent intracranial hemorrhage within the BILATERAL   lateral and third ventricles, LEFT greater than RIGHT, with mild   obstructive hydrocephalus slightly increased. RIGHT frontal shunt   catheter tip seen in body of RIGHT lateral ventricle unchanged.   Subarachnoid hemorrhage again noted in the BILATERAL medial frontal lobes   with hemorrhage in the anterior corpus callosum, LEFT greaterthan RIGHT.    CT Head No Cont (03.06.24 @ 19:20)   IMPRESSION:  Interval placement of right frontal approach ventriculostomy catheter   since the prior study performed 3 hours ago, with improved hydrocephalus   and slightly decreased sulcal/cisternal bilateral subarachnoid hemorrhage.    CT Head No Cont (03.06.24 @ 16:27)   IMPRESSION:  Status post coiling/embolization of anterior communicating artery   aneurysm, with postoperative changes including diffuse sulcal/cisternal   pattern of subarachnoid hemorrhage mixed with contrast leakage from   recent procedure.    Intraventricular hemorrhage is present with hydrocephalus.                            ----------------------------    VITALS  T(C): 36.6 (03-18-24 @ 08:00), Max: 38 (03-18-24 @ 04:00)  HR: 85 (03-18-24 @ 08:00) (75 - 101)  BP: 109/63 (03-18-24 @ 08:00) (109/63 - 152/80)  RR: 21 (03-18-24 @ 08:00) (12 - 25)  SpO2: 99% (03-18-24 @ 08:00) (97% - 100%)  Wt(kg): --    PAST MEDICAL & SURGICAL HISTORY  HLD (hyperlipidemia)    Cerebral aneurysm    Osteoporosis    History of cerebral angiography        FUNCTIONAL/SOCIAL HISTORY - as per documentation/history  Hx taken from pt's daughter in law Dominick 2*2 pt speaks Cantonese. Pt lives in a private home with her spouse, son and grandson (Will have family home 24/7 if needed) 6 steps to enter with handrails, 12 steps inside with handrails. Pt was independent PTA without an assist device. Pt owns no DME. (-)     CURRENT FUNCTIONAL STATUS  PT 3/15   Therapeutic Interventions    Bed Mobility  Bed Mobility Training Supine-to-Sit: minimum assist (75% patient effort);  1 person assist  Bed Mobility Training Limitations: decreased ability to use legs for bridging/pushing;  impaired ability to control trunk for mobility;  decreased ability to use arms for pushing/pulling;  decreased strength;  impaired balance;  pain    Sit-Stand Transfer Training  Transfer Training Sit-to-Stand Transfer: minimum assist (75% patient effort);  1 person assist;  weight-bearing as tolerated   hand held assist   Transfer Training Stand-to-Sit Transfer: minimum assist (75% patient effort);  1 person assist;  weight-bearing as tolerated   hand held assist   Sit-to-Stand Transfer Training Transfer Safety Analysis: decreased balance;  decreased weight-shifting ability;  decreased strength;  impaired balance;  impaired postural control;  cognitive, decreased safety awareness;  hand held assist     Gait Training  Gait Training: minimum assist (75% patient effort);  1 person assist;  weight-bearing as tolerated   hand held assist ;  bed to chair  Gait Analysis: 2-point gait   decreased gayathri;  shuffling;  decreased step length;  decreased weight-shifting ability;  impaired balance;  decreased strength;  impaired postural control;  pain;  Bed to Chair;  hand held assist     Neuromuscular Re-education  Neuromuscular Re-education Detail: static standing for perihygiene care     OT 3/14  Toilet Training  Toilet Training Assistance: dependent (less than 25% patient effort);  for raul care, thoroughness and clothing management following multiple bowel movement     RECENT LABS/IMAGING  REVIEWED    CBC Full  -  ( 18 Mar 2024 04:00 )  WBC Count : 9.54 K/uL  RBC Count : 3.10 M/uL  Hemoglobin : 9.6 g/dL  Hematocrit : 27.8 %  Platelet Count - Automated : 404 K/uL  Mean Cell Volume : 89.7 fl  Mean Cell Hemoglobin : 31.0 pg  Mean Cell Hemoglobin Concentration : 34.5 gm/dL  Auto Neutrophil # : x  Auto Lymphocyte # : x  Auto Monocyte # : x  Auto Eosinophil # : x  Auto Basophil # : x  Auto Neutrophil % : x  Auto Lymphocyte % : x  Auto Monocyte % : x  Auto Eosinophil % : x  Auto Basophil % : x    03-18    133<L>  |  98  |  7.5<L>  ----------------------------<  109<H>  3.7   |  23.0  |  0.37<L>    Ca    8.1<L>      18 Mar 2024 04:00  Phos  2.3     03-18  Mg     2.1     03-18      Urinalysis Basic - ( 18 Mar 2024 04:00 )    Color: x / Appearance: x / SG: x / pH: x  Gluc: 109 mg/dL / Ketone: x  / Bili: x / Urobili: x   Blood: x / Protein: x / Nitrite: x   Leuk Esterase: x / RBC: x / WBC x   Sq Epi: x / Non Sq Epi: x / Bacteria: x        ALLERGIES  lactose (Diarrhea)  No Known Allergies      MEDICATIONS   acetaminophen     Tablet .. 650 milliGRAM(s) Oral every 6 hours PRN  bromocriptine Capsule 10 milliGRAM(s) Oral three times a day  chlorhexidine 2% Cloths 1 Application(s) Topical daily  enoxaparin Injectable 30 milliGRAM(s) SubCutaneous every 24 hours  hydrALAZINE Injectable 10 milliGRAM(s) IV Push every 2 hours PRN  labetalol Injectable 10 milliGRAM(s) IV Push every 2 hours PRN  multivitamin 1 Tablet(s) Oral daily  niMODipine Oral Solution 60 milliGRAM(s) Oral every 4 hours  nystatin Powder 1 Application(s) Topical two times a day  ondansetron Injectable 4 milliGRAM(s) IV Push every 6 hours PRN  psyllium Powder 1 Packet(s) Oral two times a day  QUEtiapine 12.5 milliGRAM(s) Oral at bedtime  simethicone 80 milliGRAM(s) Chew every 6 hours PRN  simvastatin 10 milliGRAM(s) Oral at bedtime  tamsulosin 0.8 milliGRAM(s) Oral at bedtime          ----------------------------------------------------------------------------------------  PHYSICAL EXAM  Constitutional - NAD, Comfortable  HEENT - NCAT, EOMI  Neck - Supple, No limited ROM  Chest - Breathing comfortably, No wheezing  Cardiovascular - S1S2   Abdomen - Soft   Extremities - No C/C/E, No calf tenderness   Neurologic Exam -                    Cognitive - AAO to self, place, date, year, situation     Communication - Fluent, No dysarthria     Cranial Nerves - CN 2-12 intact     FUNCTIONAL MOTOR EXAM - No focal deficits                    LEFT    UE - ShAB 5/5, EF 5/5, EE 5/5, WE 5/5,  5/5                    RIGHT UE - ShAB 5/5, EF 5/5, EE 5/5, WE 5/5,  5/5                    LEFT    LE - HF 5/5, KE 5/5, DF 5/5, PF 5/5                    RIGHT LE - HF 5/5, KE 5/5, DF 5/5, PF 5/5        Sensory - Intact to LT     Reflexes - DTR Intact, No primitive reflexive     Coordination - FTN intact     OculoVestibular - No saccades, No nystagmus, VOR         Balance - WNL Static  Psychiatric - Mood stable, Affect WNL  ----------------------------------------------------------------------------------------  ASSESSMENT/PLAN  73yFemale with functional deficits after  Pain - Tylenol  DVT PPX - SCDs  Impaired Mobility/Function/Rehab Recommendations -  HPI:  74 y/o mandarin speaking female with PMH of HLD and osteoporosis presents to [facility] with complaints of having cerebral aneurysm. States in 10/2023 she started to have B/L hand numbness. At that time she had MRI brain, MRA head and neck. MRA showed an ACOMM aneurysm. She underwent a cerebral angiogram by José Manuel Villaseñor at [facility] on December 7th, 2023, with the intent to treat this month. The aneurysm is described as a 5.5x4.6x3.6mm wide necked acomm aneurysm that projects medio-superiorly. Reports occasional headaches, described as numbness, 5/10 in severity, worse with laying down, relieved with sitting up or standing. Patient underwent diagnostic angiogram 2/14/24 which confirmed acomm aneurysm. On 3/6 patient underwent aneurysm embolization with coiling complicated by aneurysm rupture, controlled with coils and balloon tamponade. NISHANT CT showed SAH b/l frontal lobes and R sylvian fissure along with contrast staining. EVD placed  3/6, removed by neurosurgery 3/15. 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     INTERVAL HPI:  3/6 - Admitted to NSICU post Angio. Post procedure CT w/ SAH 2nd to rupture intra-op, repaired (was a small bleb next to main aneurysm). Intubated and EVD placed, set to 20. Dropped to 10 overnight.  3/7 - Exam improved, Extubated  3/8 - Febrile with RLL consolidation, started on Zosyn 3/9  3/10 - EVD prox port of collection system damage/leaking noted, replaced.  3/11 - S/p cerebral angiogram, confirmed Acomm aneurysm occlusion with small neck remnant, no vasospasm noted  3/13 - EVD clamped  3/15 - EVD removed  3/16 - K+ 2.6 overnight, being repleted. Post-pull CTH stable. Downgraded to neurosurgery      PM&R was consulted for disposition recommendations.    Today, the patient was seen and examined by bedside in the AM. Spoke with family by bedside, father and daughter, who helped with history taking. As per daughter, her mother has been doing well. She has some increased confusion starting around 3-4pm every day. In the mornings, she is less confused. As per patient, she does not have any weakness/numbness. She denies any headaches or changes in vision. She has been OOB to chair. She has been having profuse diarrhea, but has been afebrile. Vitals stable during examination. Patient has very supportive family who are willing to help on discharge.     Imaging Reviewed Today:    RADIOLOGY & ADDITIONAL STUDIES (independently reviewed unless otherwise noted):  CT Head No Cont (03.16.24 @ 03:22)   IMPRESSION: No change in mild ventricular dilatation with   intraventricular hemorrhage and hemorrhage in the frontal lobe midline   and corpus callosum after extraventricular drain removal since 3/15/2024.    CT Head No Cont (03.15.24 @ 05:38)   IMPRESSION: Stable follow-up CT study.    US Duplex Venous Lower Ext Complete, Bilateral (03.12.24 @ 15:23)   IMPRESSION:  No evidence of deep venous thrombosis in either lower extremity.    CT Head No Cont (03.10.24 @ 12:37)   IMPRESSION:  Mildly decreased bilateral mesial frontal parenchymal hemorrhages.  Similar anterior interhemispheric acute subarachnoid hemorrhage.  Mildly decreased intraventricular hemorrhage within the bilateral lateral   and third ventricles.  Decreased ventricular size with near resolution of hydrocephalus.  No large midline shift.  Basal cisterns are more well visualized on the current examination, this  may be due to technique.    MR Brain, MR Angio Head w/ IV Cont (03.09.24 @ 18:57)   IMPRESSION:  MR brain: Intraparenchymal hemorrhage within the medial anterior frontal   lobes measuring approximately 3.5 cm, unchanged. There is surrounding   edema and extension into the genuine body of corpus callosum, unchanged.   Hemorrhagic clot within the LEFT lateral ventricle and third ventricle is   unchanged. Minimal hemorrhage seen in the dependent portions of the RIGHT   lateral ventricle unchanged. Minimal subarachnoid hemorrhage seen along   the medial sulci of the BILATERAL frontal and parietal lobes as well as   scattered throughout the sulci of the brain. RIGHT frontal   ventriculostomy catheter tip is seen extending toward the body of the   LEFT ventricle and third ventricle.  Coil material is seen in the region   of the anterior communicating artery on the RIGHT.   Blood products are   seen in the region of the known aneurysm possibly indicating partial   patency.    MRA intracranial:   Coil material is seen in the region of the anterior   communicating artery. There is 1 x 4 mm region of signal seen on the   noncontrast but not the postcontrast which may reflect blood products   within the aneurysm indicating patency.    CT Abdomen and Pelvis w/wo IV Cont (03.07.24 @ 17:18)   IMPRESSION: No evidence of intraperitoneal or retroperitoneal hemorrhage.   No evidence of active intraluminal extravasation of contrastinto the   bowel.    US Doppler Transcranial Comp (03.07.24 @ 11:05)   IMPRESSION:    Inadequate insonation windows on both sides  Exam is nondiagnostic    CT Head No Cont (03.07.24 @ 05:56)   IMPRESSION:   persistent intracranial hemorrhage within the BILATERAL   lateral and third ventricles, LEFT greater than RIGHT, with mild   obstructive hydrocephalus slightly increased. RIGHT frontal shunt   catheter tip seen in body of RIGHT lateral ventricle unchanged.   Subarachnoid hemorrhage again noted in the BILATERAL medial frontal lobes   with hemorrhage in the anterior corpus callosum, LEFT greaterthan RIGHT.    CT Head No Cont (03.06.24 @ 19:20)   IMPRESSION:  Interval placement of right frontal approach ventriculostomy catheter   since the prior study performed 3 hours ago, with improved hydrocephalus   and slightly decreased sulcal/cisternal bilateral subarachnoid hemorrhage.    CT Head No Cont (03.06.24 @ 16:27)   IMPRESSION:  Status post coiling/embolization of anterior communicating artery   aneurysm, with postoperative changes including diffuse sulcal/cisternal   pattern of subarachnoid hemorrhage mixed with contrast leakage from recent procedure.    Intraventricular hemorrhage is present with hydrocephalus.    ----------------------------    VITALS  T(C): 36.6 (03-18-24 @ 08:00), Max: 38 (03-18-24 @ 04:00)  HR: 85 (03-18-24 @ 08:00) (75 - 101)  BP: 109/63 (03-18-24 @ 08:00) (109/63 - 152/80)  RR: 21 (03-18-24 @ 08:00) (12 - 25)  SpO2: 99% (03-18-24 @ 08:00) (97% - 100%)  Wt(kg): --    PAST MEDICAL & SURGICAL HISTORY  HLD (hyperlipidemia)    Cerebral aneurysm    Osteoporosis    History of cerebral angiography      FUNCTIONAL/SOCIAL HISTORY - as per documentation/history  Hx taken from pt's daughter in law Dominick 2*2 pt speaks Cantonese. Pt lives in a private home with her spouse, son and grandson (Will have family home 24/7 if needed) 6 steps to enter with handrails, 12 steps inside with handrails. Pt was independent PTA without an assist device. Pt owns no DME. (-)     CURRENT FUNCTIONAL STATUS  PT 3/15   Therapeutic Interventions    Bed Mobility  Bed Mobility Training Supine-to-Sit: minimum assist (75% patient effort);  1 person assist  Bed Mobility Training Limitations: decreased ability to use legs for bridging/pushing;  impaired ability to control trunk for mobility;  decreased ability to use arms for pushing/pulling;  decreased strength;  impaired balance;  pain    Sit-Stand Transfer Training  Transfer Training Sit-to-Stand Transfer: minimum assist (75% patient effort);  1 person assist;  weight-bearing as tolerated   hand held assist   Transfer Training Stand-to-Sit Transfer: minimum assist (75% patient effort);  1 person assist;  weight-bearing as tolerated   hand held assist   Sit-to-Stand Transfer Training Transfer Safety Analysis: decreased balance;  decreased weight-shifting ability;  decreased strength;  impaired balance;  impaired postural control;  cognitive, decreased safety awareness;  hand held assist     Gait Training  Gait Training: minimum assist (75% patient effort);  1 person assist;  weight-bearing as tolerated   hand held assist ;  bed to chair  Gait Analysis: 2-point gait   decreased gayathri;  shuffling;  decreased step length;  decreased weight-shifting ability;  impaired balance;  decreased strength;  impaired postural control;  pain;  Bed to Chair;  hand held assist     Neuromuscular Re-education  Neuromuscular Re-education Detail: static standing for perihygiene care     OT 3/14  Toilet Training  Toilet Training Assistance: dependent (less than 25% patient effort);  for raul care, thoroughness and clothing management following multiple bowel movement     RECENT LABS/IMAGING  REVIEWED    CBC Full  -  ( 18 Mar 2024 04:00 )  WBC Count : 9.54 K/uL  RBC Count : 3.10 M/uL  Hemoglobin : 9.6 g/dL  Hematocrit : 27.8 %  Platelet Count - Automated : 404 K/uL  Mean Cell Volume : 89.7 fl  Mean Cell Hemoglobin : 31.0 pg  Mean Cell Hemoglobin Concentration : 34.5 gm/dL  Auto Neutrophil # : x  Auto Lymphocyte # : x  Auto Monocyte # : x  Auto Eosinophil # : x  Auto Basophil # : x  Auto Neutrophil % : x  Auto Lymphocyte % : x  Auto Monocyte % : x  Auto Eosinophil % : x  Auto Basophil % : x    03-18    133<L>  |  98  |  7.5<L>  ----------------------------<  109<H>  3.7   |  23.0  |  0.37<L>    Ca    8.1<L>      18 Mar 2024 04:00  Phos  2.3     03-18  Mg     2.1     03-18      Urinalysis Basic - ( 18 Mar 2024 04:00 )    Color: x / Appearance: x / SG: x / pH: x  Gluc: 109 mg/dL / Ketone: x  / Bili: x / Urobili: x   Blood: x / Protein: x / Nitrite: x   Leuk Esterase: x / RBC: x / WBC x   Sq Epi: x / Non Sq Epi: x / Bacteria: x        ALLERGIES  lactose (Diarrhea)  No Known Allergies      MEDICATIONS   acetaminophen     Tablet .. 650 milliGRAM(s) Oral every 6 hours PRN  bromocriptine Capsule 10 milliGRAM(s) Oral three times a day  chlorhexidine 2% Cloths 1 Application(s) Topical daily  enoxaparin Injectable 30 milliGRAM(s) SubCutaneous every 24 hours  hydrALAZINE Injectable 10 milliGRAM(s) IV Push every 2 hours PRN  labetalol Injectable 10 milliGRAM(s) IV Push every 2 hours PRN  multivitamin 1 Tablet(s) Oral daily  niMODipine Oral Solution 60 milliGRAM(s) Oral every 4 hours  nystatin Powder 1 Application(s) Topical two times a day  ondansetron Injectable 4 milliGRAM(s) IV Push every 6 hours PRN  psyllium Powder 1 Packet(s) Oral two times a day  QUEtiapine 12.5 milliGRAM(s) Oral at bedtime  simethicone 80 milliGRAM(s) Chew every 6 hours PRN  simvastatin 10 milliGRAM(s) Oral at bedtime  tamsulosin 0.8 milliGRAM(s) Oral at bedtime    ----------------------------------------------------------------------------------------  PHYSICAL EXAM  Constitutional - NAD, Comfortable  Neck - Supple, No limited ROM  Chest - Breathing comfortably, No wheezing  Cardiovascular - RRR  Abdomen - Soft   Extremities - No C/C/E, No calf tenderness   Neurologic Exam -                    Cognitive - AAO to self, place, date, year, situation     Communication - Fluent, No dysarthria     Cranial Nerves - CN 2-12 intact     FUNCTIONAL MOTOR EXAM - No focal deficits                    LEFT    UE - ShAB 4/5, EF 4/5, EE 4/5, WE 4/5,  4/5                    RIGHT UE - ShAB 4/5, EF 4/5, EE 4/5, WE 4/5,  4/5                    LEFT    LE - HF 4/5, KE 4/5, DF 4/5, PF 4/5                    RIGHT LE - HF 4/5, KE 4/5, DF 4/5, PF 4/5        Sensory - Intact to LT     Reflexes - DTR Intact, No primitive reflexive     Coordination - Slowed FTN intact  Psychiatric - Mood stable, Affect WNL  ----------------------------------------------------------------------------------------  ASSESSMENT/PLAN  73yFemale with functional deficits after ACOMM aneurysm s/p embolization and coiling.   SAH - EVD removed (3/15)   Central Fevers? - Consider Tapering Bromocriptine    Delirium - Recommend delirium precautions, Recommend starting Melatonin 5mg qhs   C/f Vasospasm - Nimodipine, Metamucil   HTN - Hydralazine, Labetalol    Diarrhea - Simethicone   Urinary retention - Tamsulosin   Pain - Tylenol  DVT PPX - SCDs, lovenox   Rehab/Impaired mobility and function - Continuous hospitalization is crucial for managing the patient's acute medical issues (ACOMM aneurysm, SAH, Delirium), which have significantly impacted their mobility, quality of life, and function. Rehabilitation recommendations will be based on the patient's functional progress and their ability to participate in and tolerate therapeutic interventions, which may change over time. Maintaining ongoing mobilization by the staff is imperative to prevent secondary medical complications and associated health issues related to debility.    Given patient's current functional status, potential for improvement, and family support, she would most likely benefit from discharge HOME vs ACUTE inpatient rehabilitation. Discharge recommendations will be based on ongoing assessment by PT/OT and medical optimization. PM&R will continue to follow.     RECOMMENDATIONS   Consider Tapering Bromocriptine - Patient afebrile   Delirium precautions   Start Melatonin 5mg qhs

## 2024-03-18 NOTE — PROGRESS NOTE ADULT - SUBJECTIVE AND OBJECTIVE BOX
Patient is a 73y old  Female who presents with a chief complaint of cerebral aneurysm (14 Mar 2024 20:58)    HPI:  73F with PMH HLD, osteoporosis who presents for cerebral angiogram with aneurysm embolization. Patient had MRI / MRA brain 2/2 hand numbness which had incidental finding of acomm aneurysm. Patient underwent diagnostic angiogram 2/14/24 which confirmed acomm aneurysm. Patient presents today for cerebral angiogram with aneurysm embolization via balloon assisted coiling. Patient started on ASA 81 in preparation for the procedure. Patient underwent aneurysm embolization with coiling complicated by aneurysm rupture, controlled with coils and balloon tamponade. NISHANT CT showed SAH b/l frontal lobes and R sylvian fissure along with contrast staining. Course complicated by worsening SAH, IVH requiring EVD placement.       Interval history:  Patient seen and examined by neurosurgery team. Patient POD 12 from intraop acomm aneurysm rupture requiring EVD, since weaned. Patient downgraded from ICU on 3/16. Fever curve trending down. Rectal tube removed yesterday. Patient reports no headache. No acute events reported.       Vital Signs Last 24 Hrs  T(C): 36.6 (18 Mar 2024 08:00), Max: 38 (18 Mar 2024 04:00)  T(F): 97.9 (18 Mar 2024 08:00), Max: 100.4 (18 Mar 2024 04:00)  HR: 85 (18 Mar 2024 08:00) (75 - 101)  BP: 109/63 (18 Mar 2024 08:00) (109/63 - 152/80)  BP(mean): 78 (18 Mar 2024 08:00) (69 - 101)  RR: 21 (18 Mar 2024 08:00) (12 - 25)  SpO2: 99% (18 Mar 2024 08:00) (97% - 100%)    Parameters below as of 18 Mar 2024 08:00  Patient On (Oxygen Delivery Method): room air      Physical Exam:  Constitutional: NAD, lying in bed  Neuro  * Mental Status:  GCS 15: Awake, alert, oriented to conversation x2-3. No aphasia or difficulty speaking. No dysarthria.   * Cranial Nerves: Cnii-Cnxii grossly intact. EOMI, tongue midline, no gaze deviation  * Motor: RUE 5/5, LUE 5/5, RLE 3/5 without drift, LLE 3/5 with drift  * Sensory: Sensation intact to light touch  * Reflexes: not assessed   EVD site: dressing intact, clean, dry       LABS:                        9.6    9.54  )-----------( 404      ( 18 Mar 2024 04:00 )             27.8     03-18    133<L>  |  98  |  7.5<L>  ----------------------------<  109<H>  3.7   |  23.0  |  0.37<L>    Ca    8.1<L>      18 Mar 2024 04:00  Phos  2.3     03-18  Mg     2.1     03-18      I&O:   17 Mar 2024 07:01  -  18 Mar 2024 07:00  --------------------------------------------------------  IN: 2420 mL / OUT: 2370 mL / NET: 50 mL    18 Mar 2024 07:01  -  18 Mar 2024 09:25  --------------------------------------------------------  IN: 0 mL / OUT: 50 mL / NET: -50 mL      Medications:  MEDICATIONS  (STANDING):  bromocriptine Capsule 10 milliGRAM(s) Oral three times a day  chlorhexidine 2% Cloths 1 Application(s) Topical daily  enoxaparin Injectable 30 milliGRAM(s) SubCutaneous every 24 hours  multivitamin 1 Tablet(s) Oral daily  niMODipine Oral Solution 60 milliGRAM(s) Oral every 4 hours  nystatin Powder 1 Application(s) Topical two times a day  psyllium Powder 1 Packet(s) Oral two times a day  QUEtiapine 12.5 milliGRAM(s) Oral at bedtime  simvastatin 10 milliGRAM(s) Oral at bedtime  sodium phosphate 15 milliMole(s)/250 mL IVPB 15 milliMole(s) IV Intermittent once  tamsulosin 0.8 milliGRAM(s) Oral at bedtime    MEDICATIONS  (PRN):  acetaminophen     Tablet .. 650 milliGRAM(s) Oral every 6 hours PRN Temp greater or equal to 38C (100.4F)  hydrALAZINE Injectable 10 milliGRAM(s) IV Push every 2 hours PRN SBP > 160  labetalol Injectable 10 milliGRAM(s) IV Push every 2 hours PRN Systolic blood pressure > 160  ondansetron Injectable 4 milliGRAM(s) IV Push every 6 hours PRN Nausea and/or Vomiting  simethicone 80 milliGRAM(s) Chew every 6 hours PRN Gas      RADIOLOGY & ADDITIONAL STUDIES:  < from: CT Head No Cont (03.16.24 @ 03:22) >  IMPRESSION: No change in mild ventricular dilatation with   intraventricular hemorrhage and hemorrhage in the frontal lobe midline   and corpus callosum after extraventricular drain removal since 3/15/2024.    --- End of Report ---  FRANTZ ROY MD; Attending Radiologist  This document has been electronically signed. Mar 16 2024  9:07AM    < end of copied text >

## 2024-03-18 NOTE — PROGRESS NOTE ADULT - SUBJECTIVE AND OBJECTIVE BOX
Preliminary note, official recommendations pending attending review/signature   Seen and examined by Stroke team attending/team, assessment/ plan as discussed with stroke team attending/team as noted.     [facility] Stroke Team  Progress Note     HPI:  74 y/o mandarin speaking female with PMH of HLD and osteoporosis presents to PST with complaints of having cerebral aneurysm. States in 10/2023 she started to have B/L hand numbness. At that time she had MRI brain, MRA head and neck. MRA showed an ACOMM aneurysm. She underwent a cerebral angiogram by José Manuel Villaseñor at [facility] on December 7th, 2023, with the intent to treat this month. The aneurysm is described as a 5.5x4.6x3.6mm wide necked acomm aneurysm that projects medio-superiorly. Reports occasional headaches, described as numbness, 5/10 in severity, worse with laying down, relieved with sitting up or standing. Patient underwent diagnostic angiogram 2/14/24 which confirmed acomm aneurysm. On 3/6 patient underwent aneurysm embolization with coiling complicated by aneurysm rupture, controlled with coils and balloon tamponade. NISHANT CT showed SAH b/l frontal lobes and R sylvian fissure along with contrast staining. Patient admitted to NSICU for post op care.. EVD now removed per neurosurgery 3/15. Transferred to neurosurgery service 3/16, stroke team asked to consult, transferred to Stroke service 3/18.       SUBJECTIVE: No events overnight.  No new neurologic complaints. Reports improving diarrhea, not resolved as of yet. Exam and history obtained with aid of family interpretation, patient is Mandarin/Cantonese speaking.    acetaminophen     Tablet .. 650 milliGRAM(s) Oral every 6 hours PRN  bromocriptine Capsule 10 milliGRAM(s) Oral three times a day  chlorhexidine 2% Cloths 1 Application(s) Topical daily  enoxaparin Injectable 30 milliGRAM(s) SubCutaneous every 24 hours  hydrALAZINE Injectable 10 milliGRAM(s) IV Push every 2 hours PRN  labetalol Injectable 10 milliGRAM(s) IV Push every 2 hours PRN  multivitamin 1 Tablet(s) Oral daily  niMODipine Oral Solution 60 milliGRAM(s) Oral every 4 hours  nystatin Powder 1 Application(s) Topical two times a day  ondansetron Injectable 4 milliGRAM(s) IV Push every 6 hours PRN  psyllium Powder 1 Packet(s) Oral two times a day  QUEtiapine 12.5 milliGRAM(s) Oral at bedtime  saccharomyces boulardii 250 milliGRAM(s) Oral two times a day  simethicone 80 milliGRAM(s) Chew every 6 hours PRN  simvastatin 10 milliGRAM(s) Oral at bedtime  tamsulosin 0.8 milliGRAM(s) Oral at bedtime      Allergies  No Known Allergies    Intolerances  lactose (Diarrhea)    SOCIAL HISTORY:  no tob,   no alcohol   no drugs    FAMILY HISTORY:  Family history of CVA    PHYSICAL EXAM:   Vital Signs Last 24 Hrs  T(C): 37 (18 Mar 2024 10:00), Max: 38 (18 Mar 2024 04:00)  T(F): 98.6 (18 Mar 2024 10:00), Max: 100.4 (18 Mar 2024 04:00)  HR: 92 (18 Mar 2024 13:00) (73 - 101)  BP: 149/74 (18 Mar 2024 13:00) (109/63 - 149/74)  BP(mean): 97 (18 Mar 2024 13:00) (69 - 130)  RR: 22 (18 Mar 2024 13:00) (12 - 22)  SpO2: 99% (18 Mar 2024 13:00) (95% - 100%)    Parameters below as of 18 Mar 2024 08:00  Patient On (Oxygen Delivery Method): room air        General: No acute distress.   HEENT: Head normocephalic, atraumatic. Right frontal EVD site bandage C/D/I, no drainage noted. Conjunctivae clear w/o exudates or hemorrhage. Sclera non-icteric. Nares are patent bilaterally. Mucous membranes pink and moist.  No tonsillar swelling or exudates.    Neck: Supple, no adenopathy. Trachea midline. No JVD.  Cardiac: Normal rate and rhythm. S1, S2 auscultated. No murmurs, gallops, or rubs.     Respiratory: Lung sounds clear in all fields. Chest wall symmetric, nontender.   Abdominal: Soft, nondistended, nontender. Bowel sounds normoactive x 4 quadrants. No masses, hepatomegaly, or splenomegaly.   Skin: Skin is warm, dry and intact without rashes or lesions. Appropriate color for ethnicity. Nailbeds pink with no cyanosis or clubbing.   Extremities: No edema, 2+ peripheral pulses in b/l upper and b/l lower extremities. Full range of motion in all joints.     NEUROLOGICAL EXAM:  Mental status: The patient is awake and alert and has normal attention span.  The patient is fully oriented in 3 spheres. The patient is oriented to current events. The patient is able to name objects, follow commands, repeat sentences. Primary language is Cantonese   Cranial nerves: Pupils equal and react symmetrically to light. There is no visual field deficit to confrontation. Extraocular motion is full with no nystagmus. There is no ptosis. Facial sensation is intact. Facial musculature is symmetric. Palate elevates symmetrically. Tongue is midline.  Motor: There is normal bulk and tone.  There is no tremor.  LUE pronator drift, strength 5-/5. RUE and bilateral LE strength 5/5  Sensation: Intact to light touch  in 4 extremities    LABS:                        9.6    9.54  )-----------( 404      ( 18 Mar 2024 04:00 )             27.8    03-18    133<L>  |  98  |  7.5<L>  ----------------------------<  109<H>  3.7   |  23.0  |  0.37<L>    Ca    8.1<L>      18 Mar 2024 04:00  Phos  2.3     03-18  Mg     2.1     03-18    A1C with Estimated Average Glucose (02.09.24 @ 10:57)   A1C with Estimated Average Glucose Result: 5.8 %    RADIOLOGY & ADDITIONAL STUDIES (independently reviewed unless otherwise noted):    < from: CT Head No Cont (03.16.24 @ 03:22) >  IMPRESSION: No change in mild ventricular dilatation with   intraventricular hemorrhage and hemorrhage in the frontal lobe midline   and corpus callosum after extraventricular drain removal since 3/15/2024.    --- End of Report ---    < end of copied text >      < from: CT Head No Cont (03.15.24 @ 05:38) >  IMPRESSION: Stable follow-up CT study.    --- End of Report ---    < end of copied text >        < from: TTE W or WO Ultrasound Enhancing Agent (03.13.24 @ 12:03) >  CONCLUSIONS:      1. Left ventricular cavity is normal in size. Left ventricular wall thickness is normal. Left ventricular systolic function is normal with an ejection fraction visually estimated at 65 to 70 %.   2. Normal left ventricular diastolic function.   3. Normal right ventricular cavity size and normal systolic function.   4. The left atrium is normal.   5. The right atrium is normal in size.   6. Fibrocalcific aortic valve sclerosis without stenosis.   7. Mild mitral valve leaflet calcification.   8. Trace mitral regurgitation.   9. Estimated pulmonary artery systolic pressure is 23 mmHg, normal pulmonary artery pressure.    < end of copied text >        < from: US Duplex Venous Lower Ext Complete, Bilateral (03.12.24 @ 15:23) >  IMPRESSION:  No evidence of deep venous thrombosis in either lower extremity.            --- End of Report ---    < end of copied text >      < from: IR Neuro (03.11.24 @ 09:01) >  IMPRESSION:  1. Interval complete occlusion of the coiled A-comm aneurysm except for a   small 1.6 mm x 0.8 mm neck remnant. No interval aneurysm recanalization   or evidence for pseudoaneurysm.  2. No vasospasm.    < end of copied text >      < from: CT Head No Cont (03.10.24 @ 12:37) >  IMPRESSION:    Mildly decreased bilateral mesial frontal parenchymal hemorrhages.    Similar anterior interhemispheric acute subarachnoid hemorrhage.    Mildly decreased intraventricular hemorrhage within the bilateral lateral   and third ventricles.    Decreased ventricular size with near resolution of hydrocephalus.    No large midline shift.    Basal cisterns are more well visualized on the current examination, this   may be due to technique.    --- End of Report ---    < end of copied text >      < from: MR Angio Head w/ IV Cont (03.09.24 @ 18:57) >  IMPRESSION:    MR brain: Intraparenchymal hemorrhage within the medial anterior frontal   lobes measuring approximately 3.5 cm, unchanged. There is surrounding   edema and extension into the genuine body of corpus callosum, unchanged.   Hemorrhagic clot within the LEFT lateral ventricle and third ventricle is   unchanged. Minimal hemorrhage seen in the dependent portions of the RIGHT   lateral ventricle unchanged. Minimal subarachnoid hemorrhage seen along   the medial sulci of the BILATERAL frontal and parietal lobes as well as   scattered throughout the sulci of the brain. RIGHT frontal   ventriculostomy catheter tip is seen extending toward the body of the   LEFT ventricle and third ventricle.  Coil material is seen in the region   of the anterior communicating artery on the RIGHT.   Blood products are   seen in the region of the known aneurysm possibly indicating partial   patency.    MRA intracranial:   Coil material is seen in the region of the anterior   communicating artery. There is 1 x 4 mm region of signal seen on the   noncontrast but not the postcontrast which may reflect blood products   within the aneurysm indicating patency.    < end of copied text >      < from: CT Head No Cont (03.07.24 @ 05:56) >  IMPRESSION:   persistent intracranial hemorrhage within the BILATERAL   lateral and third ventricles, LEFT greater than RIGHT, with mild   obstructive hydrocephalus slightly increased. RIGHT frontal shunt   catheter tip seen in body of RIGHT lateral ventricle unchanged.   Subarachnoid hemorrhage again noted in the BILATERAL medial frontal lobes   with hemorrhage in the anterior corpus callosum, LEFT greaterthan RIGHT.    --- End of Report ---      < end of copied text >      < from: CT Head No Cont (03.06.24 @ 19:20) >  IMPRESSION:  Interval placement of right frontal approach ventriculostomy catheter   since the prior study performed 3 hours ago, with improved hydrocephalus   and slightly decreased sulcal/cisternal bilateral subarachnoid hemorrhage.    < end of copied text >      < from: CT Head No Cont (03.06.24 @ 16:27) >  IMPRESSION:  Status post coiling/embolization of anterior communicating artery   aneurysm, with postoperative changes including diffuse sulcal/cisternal   pattern of subarachnoid hemorrhage mixed with contrast leakage from   recent procedure.    Intraventricular hemorrhage is present with hydrocephalus.    < end of copied text >      < from: IR Neuro (03.06.24 @ 12:13) >  IMPRESSION:  1. Incidental multilobular 6.9 mm x 4.7 mm x 4.2 mm A-comm aneurysm with   a 2.8 mm neck arising from the proximal segment of the median artery of   the corpus callosum which branches from the left A2 segment.  2. Status postballoon assisted coil embolization, the aneurysm is nearly   completely occluded other than a 2.2 mm x 1.4 mm x 4.3 mm aneurysmal neck   remnant.    < end of copied text >     Preliminary note, official recommendations pending attending review/signature   Seen and examined by Stroke team attending/team, assessment/ plan as discussed with stroke team attending/team as noted.     [facility] Stroke Team  Progress Note     HPI:  74 y/o mandarin speaking female with PMH of HLD and osteoporosis presents to PST with complaints of having cerebral aneurysm. States in 10/2023 she started to have B/L hand numbness. At that time she had MRI brain, MRA head and neck. MRA showed an ACOMM aneurysm. She underwent a cerebral angiogram by José Manuel Villaseñor at [facility] on December 7th, 2023, with the intent to treat this month. The aneurysm is described as a 5.5x4.6x3.6mm wide necked acomm aneurysm that projects medio-superiorly. Reports occasional headaches, described as numbness, 5/10 in severity, worse with laying down, relieved with sitting up or standing. Patient underwent diagnostic angiogram 2/14/24 which confirmed acomm aneurysm. On 3/6 patient underwent aneurysm embolization with coiling complicated by aneurysm rupture, controlled with coils and balloon tamponade. NISHANT CT showed SAH b/l frontal lobes and R sylvian fissure along with contrast staining. Patient admitted to NSICU for post op care.. EVD now removed per neurosurgery 3/15. Transferred to neurosurgery service 3/16, stroke team asked to consult, transferred to Stroke service 3/18.       SUBJECTIVE: No events overnight.  No new neurologic complaints. Reports improving diarrhea, not resolved as of yet. Exam and history obtained with aid of family interpretation, patient is Mandarin/Cantonese speaking.    acetaminophen     Tablet .. 650 milliGRAM(s) Oral every 6 hours PRN  bromocriptine Capsule 10 milliGRAM(s) Oral three times a day  chlorhexidine 2% Cloths 1 Application(s) Topical daily  enoxaparin Injectable 30 milliGRAM(s) SubCutaneous every 24 hours  hydrALAZINE Injectable 10 milliGRAM(s) IV Push every 2 hours PRN  labetalol Injectable 10 milliGRAM(s) IV Push every 2 hours PRN  multivitamin 1 Tablet(s) Oral daily  niMODipine Oral Solution 60 milliGRAM(s) Oral every 4 hours  nystatin Powder 1 Application(s) Topical two times a day  ondansetron Injectable 4 milliGRAM(s) IV Push every 6 hours PRN  psyllium Powder 1 Packet(s) Oral two times a day  QUEtiapine 12.5 milliGRAM(s) Oral at bedtime  saccharomyces boulardii 250 milliGRAM(s) Oral two times a day  simethicone 80 milliGRAM(s) Chew every 6 hours PRN  simvastatin 10 milliGRAM(s) Oral at bedtime  tamsulosin 0.8 milliGRAM(s) Oral at bedtime      Allergies  No Known Allergies    Intolerances  lactose (Diarrhea)    SOCIAL HISTORY:  no tob,   no alcohol   no drugs    FAMILY HISTORY:  Family history of CVA    PHYSICAL EXAM:   Vital Signs Last 24 Hrs  T(C): 37 (18 Mar 2024 10:00), Max: 38 (18 Mar 2024 04:00)  T(F): 98.6 (18 Mar 2024 10:00), Max: 100.4 (18 Mar 2024 04:00)  HR: 92 (18 Mar 2024 13:00) (73 - 101)  BP: 149/74 (18 Mar 2024 13:00) (109/63 - 149/74)  BP(mean): 97 (18 Mar 2024 13:00) (69 - 130)  RR: 22 (18 Mar 2024 13:00) (12 - 22)  SpO2: 99% (18 Mar 2024 13:00) (95% - 100%)    Parameters below as of 18 Mar 2024 08:00  Patient On (Oxygen Delivery Method): room air        General: No acute distress.   HEENT: Head normocephalic, atraumatic. Right frontal EVD site bandage C/D/I, no drainage noted. Conjunctivae clear w/o exudates or hemorrhage. Sclera non-icteric. Nares are patent bilaterally. Mucous membranes pink and moist.  No tonsillar swelling or exudates.    Neck: Supple, no adenopathy. Trachea midline. No JVD.  Cardiac: Normal rate and rhythm. S1, S2 auscultated. No murmurs, gallops, or rubs.     Respiratory: Lung sounds clear in all fields. Chest wall symmetric, nontender.   Abdominal: Soft, nondistended, nontender. Bowel sounds normoactive x 4 quadrants. No masses, hepatomegaly, or splenomegaly.   Skin: Skin is warm, dry and intact without rashes or lesions. Appropriate color for ethnicity. Nailbeds pink with no cyanosis or clubbing.   Extremities: No edema, 2+ peripheral pulses in b/l upper and b/l lower extremities. Full range of motion in all joints.     NEUROLOGICAL EXAM:  Mental status: The patient is awake and alert and has normal attention span.  The patient is fully oriented in 3 spheres. The patient is oriented to current events. The patient is able to name objects, follow commands, repeat sentences. Primary language is Cantonese   Cranial nerves: Pupils equal and react symmetrically to light. There is no visual field deficit to confrontation. Extraocular motion is full with no nystagmus. There is no ptosis. Facial sensation is intact. Facial musculature is symmetric. Palate elevates symmetrically. Tongue is midline.  Motor: There is normal bulk and tone.  There is no tremor.  LUE pronator drift, strength 5-/5. RUE and bilateral LE strength 5/5  Sensation: Intact to light touch  in 4 extremities    LABS:                        9.6    9.54  )-----------( 404      ( 18 Mar 2024 04:00 )             27.8    03-18    133<L>  |  98  |  7.5<L>  ----------------------------<  109<H>  3.7   |  23.0  |  0.37<L>    Ca    8.1<L>      18 Mar 2024 04:00  Phos  2.3     03-18  Mg     2.1     03-18    A1C with Estimated Average Glucose (02.09.24 @ 10:57)   A1C with Estimated Average Glucose Result: 5.8 %      RADIOLOGY & ADDITIONAL STUDIES (independently reviewed unless otherwise noted):    CT Head No Cont (03.16.24 @ 03:22) >  IMPRESSION: No change in mild ventricular dilatation with intraventricular hemorrhage and hemorrhage in the frontal lobe midline and corpus callosum after extraventricular drain removal since 3/15/2024.  --- End of Report ---    CT Head No Cont (03.15.24 @ 05:38) >  IMPRESSION: Stable follow-up CT study.  --- End of Report ---    TTE W or WO Ultrasound Enhancing Agent (03.13.24 @ 12:03) >  CONCLUSIONS:    1. Left ventricular cavity is normal in size. Left ventricular wall thickness is normal. Left ventricular systolic function is normal with an ejection fraction visually estimated at 65 to 70 %.   2. Normal left ventricular diastolic function.   3. Normal right ventricular cavity size and normal systolic function.   4. The left atrium is normal.   5. The right atrium is normal in size.   6. Fibrocalcific aortic valve sclerosis without stenosis.   7. Mild mitral valve leaflet calcification.   8. Trace mitral regurgitation.   9. Estimated pulmonary artery systolic pressure is 23 mmHg, normal pulmonary artery pressure.      US Duplex Venous Lower Ext Complete, Bilateral (03.12.24 @ 15:23) >  IMPRESSION: No evidence of deep venous thrombosis in either lower extremity.  --- End of Report ---    IR Neuro (03.11.24 @ 09:01) >  IMPRESSION:  1. 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 No interval aneurysm recanalization or evidence for pseudoaneurysm.  2. No vasospasm.      CT Head No Cont (03.10.24 @ 12:37) >  IMPRESSION: Mildly decreased bilateral mesial frontal parenchymal hemorrhages. Similar anterior interhemispheric acute subarachnoid hemorrhage. Mildly decreased intraventricular hemorrhage within the bilateral lateral   and third ventricles. Decreased ventricular size with near resolution of hydrocephalus. No large midline shift. Basal cisterns are more well visualized on the current examination, this may be due to technique.  --- End of Report ---    MR Angio Head w/ IV Cont (03.09.24 @ 18:57) >  IMPRESSION:  MR brain: Intraparenchymal hemorrhage within the medial anterior frontal lobes measuring approximately 3.5 cm, unchanged. There is surrounding edema and extension into the genuine body of corpus callosum, unchanged. Hemorrhagic clot within the LEFT lateral ventricle and third ventricle is unchanged. Minimal hemorrhage seen in the dependent portions of the RIGHT lateral ventricle unchanged. Minimal subarachnoid hemorrhage seen along the medial sulci of the BILATERAL frontal and parietal lobes as well as scattered throughout the sulci of the brain. RIGHT frontal ventriculostomy catheter tip is seen extending toward the body of the   LEFT ventricle and third ventricle.  Coil material is seen in the region of the anterior communicating artery on the RIGHT.   Blood products are seen in the region of the known aneurysm possibly indicating partial patency.    MRA intracranial:   Coil material is seen in the region of the anterior communicating artery. There is 1 x 4 mm region of signal seen on the noncontrast but not the postcontrast which may reflect blood products within the aneurysm indicating patency.      CT Head No Cont (03.07.24 @ 05:56) >  IMPRESSION:   persistent intracranial hemorrhage within the BILATERAL   lateral and third ventricles, LEFT greater than RIGHT, with mild   obstructive hydrocephalus slightly increased. RIGHT frontal shunt   catheter tip seen in body of RIGHT lateral ventricle unchanged.   Subarachnoid hemorrhage again noted in the BILATERAL medial frontal lobes   with hemorrhage in the anterior corpus callosum, LEFT greaterthan RIGHT.    --- End of Report ---      < end of copied text >      < from: CT Head No Cont (03.06.24 @ 19:20) >  IMPRESSION:  Interval placement of right frontal approach ventriculostomy catheter   since the prior study performed 3 hours ago, with improved hydrocephalus   and slightly decreased sulcal/cisternal bilateral subarachnoid hemorrhage.    < end of copied text >      < from: CT Head No Cont (03.06.24 @ 16:27) >  IMPRESSION:  Status post coiling/embolization of anterior communicating artery   aneurysm, with postoperative changes including diffuse sulcal/cisternal   pattern of subarachnoid hemorrhage mixed with contrast leakage from   recent procedure.    Intraventricular hemorrhage is present with hydrocephalus.    < end of copied text >      < from: IR Neuro (03.06.24 @ 12:13) >  IMPRESSION:  1. Incidental multilobular 6.9 mm x 4.7 mm x 4.2 mm A-comm aneurysm with   a 2.8 mm neck arising from the proximal segment of the median artery of   the corpus callosum which branches from the left A2 segment.  2. Status postballoon assisted coil embolization, the aneurysm is nearly   completely occluded other than a 2.2 mm x 1.4 mm x 4.3 mm aneurysmal neck   remnant.    < end of copied text >     Preliminary note, official recommendations pending attending review/signature   Seen and examined by Stroke team attending/team, assessment/ plan as discussed with stroke team attending/team as noted.     [facility] Stroke Team  Progress Note     HPI:  74 y/o mandarin speaking female with PMH of HLD and osteoporosis presents to PST with complaints of having cerebral aneurysm. States in 10/2023 she started to have B/L hand numbness. At that time she had MRI brain, MRA head and neck. MRA showed an ACOMM aneurysm. She underwent a cerebral angiogram by José Manuel Villaseñor at [facility] on December 7th, 2023, with the intent to treat this month. The aneurysm is described as a 5.5x4.6x3.6mm wide necked acomm aneurysm that projects medio-superiorly. Reports occasional headaches, described as numbness, 5/10 in severity, worse with laying down, relieved with sitting up or standing. Patient underwent diagnostic angiogram 2/14/24 which confirmed acomm aneurysm. On 3/6 patient underwent aneurysm embolization with coiling complicated by aneurysm rupture, controlled with coils and balloon tamponade. NISHANT CT showed SAH b/l frontal lobes and R sylvian fissure along with contrast staining. Patient admitted to NSICU for post op care.. EVD now removed per neurosurgery 3/15. Transferred to neurosurgery service 3/16, stroke team asked to consult, transferred to Stroke service 3/18.       SUBJECTIVE: No events overnight.  No new neurologic complaints. Reports improving diarrhea, not resolved as of yet. Exam and history obtained with aid of family interpretation, patient is Mandarin/Cantonese speaking.    acetaminophen     Tablet .. 650 milliGRAM(s) Oral every 6 hours PRN  bromocriptine Capsule 10 milliGRAM(s) Oral three times a day  chlorhexidine 2% Cloths 1 Application(s) Topical daily  enoxaparin Injectable 30 milliGRAM(s) SubCutaneous every 24 hours  hydrALAZINE Injectable 10 milliGRAM(s) IV Push every 2 hours PRN  labetalol Injectable 10 milliGRAM(s) IV Push every 2 hours PRN  multivitamin 1 Tablet(s) Oral daily  niMODipine Oral Solution 60 milliGRAM(s) Oral every 4 hours  nystatin Powder 1 Application(s) Topical two times a day  ondansetron Injectable 4 milliGRAM(s) IV Push every 6 hours PRN  psyllium Powder 1 Packet(s) Oral two times a day  QUEtiapine 12.5 milliGRAM(s) Oral at bedtime  saccharomyces boulardii 250 milliGRAM(s) Oral two times a day  simethicone 80 milliGRAM(s) Chew every 6 hours PRN  simvastatin 10 milliGRAM(s) Oral at bedtime  tamsulosin 0.8 milliGRAM(s) Oral at bedtime      Allergies  No Known Allergies    Intolerances  lactose (Diarrhea)    SOCIAL HISTORY:  no tob,   no alcohol   no drugs    FAMILY HISTORY:  Family history of CVA    PHYSICAL EXAM:   Vital Signs Last 24 Hrs  T(C): 37 (18 Mar 2024 10:00), Max: 38 (18 Mar 2024 04:00)  T(F): 98.6 (18 Mar 2024 10:00), Max: 100.4 (18 Mar 2024 04:00)  HR: 92 (18 Mar 2024 13:00) (73 - 101)  BP: 149/74 (18 Mar 2024 13:00) (109/63 - 149/74)  BP(mean): 97 (18 Mar 2024 13:00) (69 - 130)  RR: 22 (18 Mar 2024 13:00) (12 - 22)  SpO2: 99% (18 Mar 2024 13:00) (95% - 100%)    Parameters below as of 18 Mar 2024 08:00  Patient On (Oxygen Delivery Method): room air        General: No acute distress.   HEENT: Head normocephalic, atraumatic. Right frontal EVD site bandage C/D/I, no drainage noted. Conjunctivae clear w/o exudates or hemorrhage. Sclera non-icteric. Nares are patent bilaterally. Mucous membranes pink and moist.  No tonsillar swelling or exudates.    Neck: Supple, no adenopathy. Trachea midline. No JVD.  Cardiac: Normal rate and rhythm. S1, S2 auscultated. No murmurs, gallops, or rubs.     Respiratory: Lung sounds clear in all fields. Chest wall symmetric, nontender.   Abdominal: Soft, nondistended, nontender. Bowel sounds normoactive x 4 quadrants. No masses, hepatomegaly, or splenomegaly.   Skin: Skin is warm, dry and intact without rashes or lesions. Appropriate color for ethnicity. Nailbeds pink with no cyanosis or clubbing.   Extremities: No edema, 2+ peripheral pulses in b/l upper and b/l lower extremities. Full range of motion in all joints.     NEUROLOGICAL EXAM:  Mental status: The patient is awake and alert and has normal attention span.  The patient is fully oriented in 3 spheres. The patient is oriented to current events. The patient is able to name objects, follow commands, repeat sentences. Primary language is Cantonese   Cranial nerves: Pupils equal and react symmetrically to light. There is no visual field deficit to confrontation. Extraocular motion is full with no nystagmus. There is no ptosis. Facial sensation is intact. Facial musculature is symmetric. Palate elevates symmetrically. Tongue is midline.  Motor: There is normal bulk and tone.  There is no tremor.  LUE pronator drift, strength 5-/5. RUE and bilateral LE strength 5/5  Sensation: Intact to light touch  in 4 extremities    LABS:                        9.6    9.54  )-----------( 404      ( 18 Mar 2024 04:00 )             27.8    03-18    133<L>  |  98  |  7.5<L>  ----------------------------<  109<H>  3.7   |  23.0  |  0.37<L>    Ca    8.1<L>      18 Mar 2024 04:00  Phos  2.3     03-18  Mg     2.1     03-18    A1C with Estimated Average Glucose (02.09.24 @ 10:57)   A1C with Estimated Average Glucose Result: 5.8 %      RADIOLOGY & ADDITIONAL STUDIES (independently reviewed unless otherwise noted):    CT Head No Cont (03.16.24 @ 03:22) >  IMPRESSION: No change in mild ventricular dilatation with intraventricular hemorrhage and hemorrhage in the frontal lobe midline and corpus callosum after extraventricular drain removal since 3/15/2024.  --- End of Report ---    CT Head No Cont (03.15.24 @ 05:38) >  IMPRESSION: Stable follow-up CT study.  --- End of Report ---    TTE W or WO Ultrasound Enhancing Agent (03.13.24 @ 12:03) >  CONCLUSIONS:    1. Left ventricular cavity is normal in size. Left ventricular wall thickness is normal. Left ventricular systolic function is normal with an ejection fraction visually estimated at 65 to 70 %.   2. Normal left ventricular diastolic function.   3. Normal right ventricular cavity size and normal systolic function.   4. The left atrium is normal.   5. The right atrium is normal in size.   6. Fibrocalcific aortic valve sclerosis without stenosis.   7. Mild mitral valve leaflet calcification.   8. Trace mitral regurgitation.   9. Estimated pulmonary artery systolic pressure is 23 mmHg, normal pulmonary artery pressure.      US Duplex Venous Lower Ext Complete, Bilateral (03.12.24 @ 15:23) >  IMPRESSION: No evidence of deep venous thrombosis in either lower extremity.  --- End of Report ---    IR Neuro (03.11.24 @ 09:01) >  IMPRESSION:  1. 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 No interval aneurysm recanalization or evidence for pseudoaneurysm.  2. No vasospasm.      CT Head No Cont (03.10.24 @ 12:37) >  IMPRESSION: Mildly decreased bilateral mesial frontal parenchymal hemorrhages. Similar anterior interhemispheric acute subarachnoid hemorrhage. Mildly decreased intraventricular hemorrhage within the bilateral lateral   and third ventricles. Decreased ventricular size with near resolution of hydrocephalus. No large midline shift. Basal cisterns are more well visualized on the current examination, this may be due to technique.  --- End of Report ---    MR Angio Head w/ IV Cont (03.09.24 @ 18:57) >  IMPRESSION:  MR brain: Intraparenchymal hemorrhage within the medial anterior frontal lobes measuring approximately 3.5 cm, unchanged. There is surrounding edema and extension into the genuine body of corpus callosum, unchanged. Hemorrhagic clot within the LEFT lateral ventricle and third ventricle is unchanged. Minimal hemorrhage seen in the dependent portions of the RIGHT lateral ventricle unchanged. Minimal subarachnoid hemorrhage seen along the medial sulci of the BILATERAL frontal and parietal lobes as well as scattered throughout the sulci of the brain. RIGHT frontal ventriculostomy catheter tip is seen extending toward the body of the   LEFT ventricle and third ventricle.  Coil material is seen in the region of the anterior communicating artery on the RIGHT.   Blood products are seen in the region of the known aneurysm possibly indicating partial patency.    MRA intracranial:   Coil material is seen in the region of the anterior communicating artery. There is 1 x 4 mm region of signal seen on the noncontrast but not the postcontrast which may reflect blood products within the aneurysm indicating patency.      CT Head No Cont (03.07.24 @ 05:56) >  IMPRESSION:   persistent intracranial hemorrhage within the BILATERAL lateral and third ventricles, LEFT greater than RIGHT, with mild obstructive hydrocephalus slightly increased. RIGHT frontal shunt catheter tip seen in body of RIGHT lateral ventricle unchanged. Subarachnoid hemorrhage again noted in the BILATERAL medial frontal lobes with hemorrhage in the anterior corpus callosum, LEFT greaterthan RIGHT.  --- End of Report ---    CT Head No Cont (03.06.24 @ 19:20) >  IMPRESSION: Interval placement of right frontal approach ventriculostomy catheter since the prior study performed 3 hours ago, with improved hydrocephalus and slightly decreased sulcal/cisternal bilateral subarachnoid hemorrhage.    CT Head No Cont (03.06.24 @ 16:27) >  IMPRESSION: Status post coiling/embolization of anterior communicating artery aneurysm, with postoperative changes including diffuse sulcal/cisternal pattern of subarachnoid hemorrhage mixed with contrast leakage from recent procedure. Intraventricular hemorrhage is present with hydrocephalus.    IR Neuro (03.06.24 @ 12:13) >  IMPRESSION:  1. 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  2. Status postballoon assisted coil embolization, the aneurysm is nearly completely occluded other than a 2.2 mm x 1.4 mm x 4.3 mm aneurysmal neck remnant.     [facility] Stroke Team  Progress Note     HPI:  72 y/o mandarin speaking female with PMH of HLD and osteoporosis presents to PST with complaints of having cerebral aneurysm. States in 10/2023 she started to have B/L hand numbness. At that time she had MRI brain, MRA head and neck. MRA showed an ACOMM aneurysm. She underwent a cerebral angiogram by José Manuel Villaseñor at [facility] on December 7th, 2023, with the intent to treat this month. The aneurysm is described as a 5.5x4.6x3.6mm wide necked acomm aneurysm that projects medio-superiorly. Reports occasional headaches, described as numbness, 5/10 in severity, worse with laying down, relieved with sitting up or standing. Patient underwent diagnostic angiogram 2/14/24 which confirmed acomm aneurysm. On 3/6 patient underwent aneurysm embolization with coiling complicated by aneurysm rupture, controlled with coils and balloon tamponade. NISHANT CT showed SAH b/l frontal lobes and R sylvian fissure along with contrast staining. Patient admitted to NSICU for post op care.. EVD now removed per neurosurgery 3/15. Transferred to neurosurgery service 3/16, stroke team asked to consult, transferred to Stroke service 3/18.       SUBJECTIVE: No events overnight.  No new neurologic complaints. Reports improving diarrhea, not resolved as of yet. Exam and history obtained with aid of family interpretation, patient is Mandarin/Cantonese speaking.    acetaminophen     Tablet .. 650 milliGRAM(s) Oral every 6 hours PRN  bromocriptine Capsule 10 milliGRAM(s) Oral three times a day  chlorhexidine 2% Cloths 1 Application(s) Topical daily  enoxaparin Injectable 30 milliGRAM(s) SubCutaneous every 24 hours  hydrALAZINE Injectable 10 milliGRAM(s) IV Push every 2 hours PRN  labetalol Injectable 10 milliGRAM(s) IV Push every 2 hours PRN  multivitamin 1 Tablet(s) Oral daily  niMODipine Oral Solution 60 milliGRAM(s) Oral every 4 hours  nystatin Powder 1 Application(s) Topical two times a day  ondansetron Injectable 4 milliGRAM(s) IV Push every 6 hours PRN  psyllium Powder 1 Packet(s) Oral two times a day  QUEtiapine 12.5 milliGRAM(s) Oral at bedtime  saccharomyces boulardii 250 milliGRAM(s) Oral two times a day  simethicone 80 milliGRAM(s) Chew every 6 hours PRN  simvastatin 10 milliGRAM(s) Oral at bedtime  tamsulosin 0.8 milliGRAM(s) Oral at bedtime      Allergies  No Known Allergies    Intolerances  lactose (Diarrhea)    SOCIAL HISTORY:  no tob,   no alcohol   no drugs    FAMILY HISTORY:  Family history of CVA    PHYSICAL EXAM:   Vital Signs Last 24 Hrs  T(C): 37 (18 Mar 2024 10:00), Max: 38 (18 Mar 2024 04:00)  T(F): 98.6 (18 Mar 2024 10:00), Max: 100.4 (18 Mar 2024 04:00)  HR: 92 (18 Mar 2024 13:00) (73 - 101)  BP: 149/74 (18 Mar 2024 13:00) (109/63 - 149/74)  BP(mean): 97 (18 Mar 2024 13:00) (69 - 130)  RR: 22 (18 Mar 2024 13:00) (12 - 22)  SpO2: 99% (18 Mar 2024 13:00) (95% - 100%)    Parameters below as of 18 Mar 2024 08:00  Patient On (Oxygen Delivery Method): room air        General: No acute distress.   HEENT: Head normocephalic, atraumatic. Right frontal EVD site bandage C/D/I, no drainage noted. Conjunctivae clear w/o exudates or hemorrhage. Sclera non-icteric. Nares are patent bilaterally. Mucous membranes pink and moist.  No tonsillar swelling or exudates.    Neck: Supple, no adenopathy. Trachea midline. No JVD.  Cardiac: Normal rate and rhythm. S1, S2 auscultated. No murmurs, gallops, or rubs.     Respiratory: Lung sounds clear in all fields. Chest wall symmetric, nontender.   Abdominal: Soft, nondistended, nontender. Bowel sounds normoactive x 4 quadrants. No masses, hepatomegaly, or splenomegaly.   Skin: Skin is warm, dry and intact without rashes or lesions. Appropriate color for ethnicity. Nailbeds pink with no cyanosis or clubbing.   Extremities: No edema, 2+ peripheral pulses in b/l upper and b/l lower extremities. Full range of motion in all joints.     NEUROLOGICAL EXAM:  Mental status: The patient is awake and alert and has normal attention span.  The patient is fully oriented in 3 spheres. The patient is oriented to current events. The patient is able to name objects, follow commands, repeat sentences. Primary language is Cantonese   Cranial nerves: Pupils equal and react symmetrically to light. There is no visual field deficit to confrontation. Extraocular motion is full with no nystagmus. There is no ptosis. Facial sensation is intact. Facial musculature is symmetric. Palate elevates symmetrically. Tongue is midline.  Motor: There is normal bulk and tone.  There is no tremor.  LUE pronator drift, strength 5-/5. RUE and bilateral LE strength 5/5  Sensation: Intact to light touch  in 4 extremities    LABS:                        9.6    9.54  )-----------( 404      ( 18 Mar 2024 04:00 )             27.8    03-18    133<L>  |  98  |  7.5<L>  ----------------------------<  109<H>  3.7   |  23.0  |  0.37<L>    Ca    8.1<L>      18 Mar 2024 04:00  Phos  2.3     03-18  Mg     2.1     03-18    A1C with Estimated Average Glucose (02.09.24 @ 10:57)   A1C with Estimated Average Glucose Result: 5.8 %      RADIOLOGY & ADDITIONAL STUDIES (independently reviewed unless otherwise noted):    CT Head No Cont (03.16.24 @ 03:22) >  IMPRESSION: No change in mild ventricular dilatation with intraventricular hemorrhage and hemorrhage in the frontal lobe midline and corpus callosum after extraventricular drain removal since 3/15/2024.  --- End of Report ---    CT Head No Cont (03.15.24 @ 05:38) >  IMPRESSION: Stable follow-up CT study.  --- End of Report ---    TTE W or WO Ultrasound Enhancing Agent (03.13.24 @ 12:03) >  CONCLUSIONS:    1. Left ventricular cavity is normal in size. Left ventricular wall thickness is normal. Left ventricular systolic function is normal with an ejection fraction visually estimated at 65 to 70 %.   2. Normal left ventricular diastolic function.   3. Normal right ventricular cavity size and normal systolic function.   4. The left atrium is normal.   5. The right atrium is normal in size.   6. Fibrocalcific aortic valve sclerosis without stenosis.   7. Mild mitral valve leaflet calcification.   8. Trace mitral regurgitation.   9. Estimated pulmonary artery systolic pressure is 23 mmHg, normal pulmonary artery pressure.      US Duplex Venous Lower Ext Complete, Bilateral (03.12.24 @ 15:23) >  IMPRESSION: No evidence of deep venous thrombosis in either lower extremity.  --- End of Report ---    IR Neuro (03.11.24 @ 09:01) >  IMPRESSION:  1. 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 No interval aneurysm recanalization or evidence for pseudoaneurysm.  2. No vasospasm.      CT Head No Cont (03.10.24 @ 12:37) >  IMPRESSION: Mildly decreased bilateral mesial frontal parenchymal hemorrhages. Similar anterior interhemispheric acute subarachnoid hemorrhage. Mildly decreased intraventricular hemorrhage within the bilateral lateral   and third ventricles. Decreased ventricular size with near resolution of hydrocephalus. No large midline shift. Basal cisterns are more well visualized on the current examination, this may be due to technique.  --- End of Report ---    MR Angio Head w/ IV Cont (03.09.24 @ 18:57) >  IMPRESSION:  MR brain: Intraparenchymal hemorrhage within the medial anterior frontal lobes measuring approximately 3.5 cm, unchanged. There is surrounding edema and extension into the genuine body of corpus callosum, unchanged. Hemorrhagic clot within the LEFT lateral ventricle and third ventricle is unchanged. Minimal hemorrhage seen in the dependent portions of the RIGHT lateral ventricle unchanged. Minimal subarachnoid hemorrhage seen along the medial sulci of the BILATERAL frontal and parietal lobes as well as scattered throughout the sulci of the brain. RIGHT frontal ventriculostomy catheter tip is seen extending toward the body of the   LEFT ventricle and third ventricle.  Coil material is seen in the region of the anterior communicating artery on the RIGHT.   Blood products are seen in the region of the known aneurysm possibly indicating partial patency.    MRA intracranial:   Coil material is seen in the region of the anterior communicating artery. There is 1 x 4 mm region of signal seen on the noncontrast but not the postcontrast which may reflect blood products within the aneurysm indicating patency.      CT Head No Cont (03.07.24 @ 05:56) >  IMPRESSION:   persistent intracranial hemorrhage within the BILATERAL lateral and third ventricles, LEFT greater than RIGHT, with mild obstructive hydrocephalus slightly increased. RIGHT frontal shunt catheter tip seen in body of RIGHT lateral ventricle unchanged. Subarachnoid hemorrhage again noted in the BILATERAL medial frontal lobes with hemorrhage in the anterior corpus callosum, LEFT greaterthan RIGHT.  --- End of Report ---    CT Head No Cont (03.06.24 @ 19:20) >  IMPRESSION: Interval placement of right frontal approach ventriculostomy catheter since the prior study performed 3 hours ago, with improved hydrocephalus and slightly decreased sulcal/cisternal bilateral subarachnoid hemorrhage.    CT Head No Cont (03.06.24 @ 16:27) >  IMPRESSION: Status post coiling/embolization of anterior communicating artery aneurysm, with postoperative changes including diffuse sulcal/cisternal pattern of subarachnoid hemorrhage mixed with contrast leakage from recent procedure. Intraventricular hemorrhage is present with hydrocephalus.    IR Neuro (03.06.24 @ 12:13) >  IMPRESSION:  1. 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  2. Status postballoon assisted coil embolization, the aneurysm is nearly completely occluded other than a 2.2 mm x 1.4 mm x 4.3 mm aneurysmal neck remnant.

## 2024-03-18 NOTE — PROGRESS NOTE ADULT - ASSESSMENT
Assessment:  73F with PMH HLD who presented for elective acomm aneurysm embolization with balloon-assisted coiling, complicated by aneurysm rupture controlled with coils and balloon tamponade.   - PBD 12, POD 12  - EVD removed   - Exam remains stable      Plan:  - Discussed with Dr. Campuzano  - Q2 hour neuro checks  - SBP goal   - Maintain normothermia, bromocriptine 10 TID for fevers  - Maintain euvolemia, strict I&Os, supplement for net negative PRN  - Nimodipine 60q4 as tolerated by HR and BP   - No longer requiring TCDs 2/2 poor windows   - DVT prophylaxis: SCDs, lovenox  - No AED required  - Dangelo removal today, on tamsulosin   - Easy to chew diet   - Hx of loose BMs, last BM 3/18, Simethicone, metamucil ordered   - Seroquel qHS for sleep   - Simvastatin 10   - PT/OT recommending AR, PM&R consulted pending recs  - Medicine consulted, appreciate recs    - Transfer of service to stroke team, will continue to follow  Assessment:  73F with PMH HLD who presented for elective acomm aneurysm embolization with balloon-assisted coiling, complicated by aneurysm rupture controlled with coils and balloon tamponade.   - PBD 12, POD 12  - EVD removed   - Exam remains stable      Plan:  - Discussed with Dr. Campuzano  - Q2 hour neuro checks  - SBP goal   - Maintain normothermia, bromocriptine 10 TID for fevers  - Maintain euvolemia, strict I&Os, supplement for net negative PRN  - Nimodipine 60q4 as tolerated by HR and BP   - No longer requiring TCDs 2/2 poor windows   - DVT prophylaxis: SCDs, lovenox  - No AED required  - Dangelo removal today, on tamsulosin   - Easy to chew diet   - Hx of loose BMs, last BM 3/18, Simethicone, metamucil ordered   - Seroquel qHS for sleep   - Simvastatin 10   - PT/OT recommending AR, PM&R consulted pending recs  - Medicine consulted, appreciate recs    - Transfer of service to stroke team, will continue to follow   - Please re-call as needed Assessment:  73F with PMH HLD who presented for elective acomm aneurysm embolization with balloon-assisted coiling, complicated by aneurysm rupture controlled with coils and balloon tamponade.   - PBD 12, POD 12  - EVD removed   - Exam remains stable      Plan:  - Discussed with Dr. Campuzano  - Q2 hour neuro checks  - SBP goal   - Maintain normothermia, bromocriptine 10 TID for fevers  - Maintain euvolemia, strict I&Os, supplement for net negative PRN  - Nimodipine 60q4 as tolerated by HR and BP, continue through 3/20 and then ok to d/c   - No longer requiring TCDs 2/2 poor windows   - DVT prophylaxis: SCDs, lovenox  - No AED required  - Dangelo removal today, on tamsulosin   - Easy to chew diet   - Hx of loose BMs, last BM 3/18, Simethicone, metamucil ordered   - Seroquel qHS for sleep   - Simvastatin 10   - PT/OT recommending AR, PM&R consulted pending recs  - Medicine consulted, appreciate recs    - Transfer of service to stroke team, will continue to follow   - Please re-call as needed

## 2024-03-18 NOTE — CONSULT NOTE ADULT - ATTENDING COMMENTS
Patient seen and examined, discussed patient with Dr. Edwards and agree with recommendations.    Rehab/Impaired mobility and function - Patient continues to require hospitalization for the above diagnoses and ongoing active management of comorbid complications (ICU level care, limited functional assessment - needs a follow up PT/OT) that are substantially impairing functional ability and impairing quality of life necessitating ongoing medical management of these complications.     Patient's exam demonstrates a fairly intact neuro exam. Family is very involved. Did not demonstrate significant functional ability on initial therpay eval and expect there to be improvement in her status. Discussed with daughter that could need AR or may be able to go home. Discussed patient is not appropriate for [facility] (discussed the difference).    Will continue to follow. Rehab recommendations are dependent on how functional progress changes as well as how patient continues to participate and tolerate therapeutic interventions, WHICH MAY CHANGE UPON FOLLOW UP EXAMINATIONS. Recommend ongoing mobilization by staff to maintain cardiopulmonary function and prevention of secondary complications related to debility. Discussed the specific management and recommendations above with rehab clinical care team/rehab liaison.       Total Time Spent on Encounter (reviewing clinical notes, labs, radiology, medications, patient history/exam, assessment and plan) - 75 minutes

## 2024-03-18 NOTE — PROGRESS NOTE ADULT - SUBJECTIVE AND OBJECTIVE BOX
CC: cerebral aneurysm     INTERVAL HPI/OVERNIGHT EVENTS: Patient seen and examined. Rectal tube removed yesterday morning. Having more formed stool as per RN. Dangelo remains in place, however RN reports will attempt another voiding trial as patient is more ambulatory. No acute issues overnight.     Vital Signs Last 24 Hrs  T(C): 36.6 (18 Mar 2024 08:00), Max: 38 (18 Mar 2024 04:00)  T(F): 97.9 (18 Mar 2024 08:00), Max: 100.4 (18 Mar 2024 04:00)  HR: 85 (18 Mar 2024 08:00) (75 - 101)  BP: 109/63 (18 Mar 2024 08:00) (109/63 - 152/80)  BP(mean): 78 (18 Mar 2024 08:00) (69 - 108)  RR: 21 (18 Mar 2024 08:00) (12 - 25)  SpO2: 99% (18 Mar 2024 08:00) (97% - 100%)    Parameters below as of 18 Mar 2024 08:00  Patient On (Oxygen Delivery Method): room air    ROS:  Constitutional, Eyes, ENT, Cardiovascular, Respiratory, Gastrointestinal, Genitourinary, Musculoskeletal, Integumentary, Psychiatric, Endocrine, Heme/Lymph are otherwise negative.    PHYSICAL EXAM:    GENERAL: NAD, well-groomed, well-developed  HEAD: surgical site clean and dry   EYES: EOMI, PERRLA, conjunctiva and sclera clear  NECK: Supple, No JVD  NERVOUS SYSTEM:  Alert & Oriented X 4 , no focal deficit   CHEST/LUNG: CTA  b/l,  no rales, rhonchi, wheezing, or rubs  HEART: Regular rate and rhythm; No murmurs, rubs, or gallops  ABDOMEN: Soft, Nontender, Nondistended; Bowel sounds present  EXTREMITIES:  2+ Peripheral Pulses, No clubbing, cyanosis, or edema ,   LYMPH: No lymphadenopathy noted  SKIN: No rashes or lesions      I&O's Detail    17 Mar 2024 07:01  -  18 Mar 2024 07:00  --------------------------------------------------------  IN:    Lactated Ringers Bolus: 500 mL    Oral Fluid: 1920 mL  Total IN: 2420 mL    OUT:    Indwelling Catheter - Urethral (mL): 2320 mL    Rectal Tube (mL): 50 mL  Total OUT: 2370 mL    Total NET: 50 mL      18 Mar 2024 07:01  -  18 Mar 2024 08:33  --------------------------------------------------------  IN:  Total IN: 0 mL    OUT:    Indwelling Catheter - Urethral (mL): 50 mL  Total OUT: 50 mL    Total NET: -50 mL                                    9.6    9.54  )-----------( 404      ( 18 Mar 2024 04:00 )             27.8     18 Mar 2024 04:00    133    |  98     |  7.5    ----------------------------<  109    3.7     |  23.0   |  0.37     Ca    8.1        18 Mar 2024 04:00  Phos  2.3       18 Mar 2024 04:00  Mg     2.1       18 Mar 2024 04:00        CAPILLARY BLOOD GLUCOSE          Urinalysis Basic - ( 18 Mar 2024 04:00 )    Color: x / Appearance: x / SG: x / pH: x  Gluc: 109 mg/dL / Ketone: x  / Bili: x / Urobili: x   Blood: x / Protein: x / Nitrite: x   Leuk Esterase: x / RBC: x / WBC x   Sq Epi: x / Non Sq Epi: x / Bacteria: x        MEDICATIONS  (STANDING):  bromocriptine Capsule 10 milliGRAM(s) Oral three times a day  chlorhexidine 2% Cloths 1 Application(s) Topical daily  enoxaparin Injectable 30 milliGRAM(s) SubCutaneous every 24 hours  multivitamin 1 Tablet(s) Oral daily  niMODipine Oral Solution 60 milliGRAM(s) Oral every 4 hours  nystatin Powder 1 Application(s) Topical two times a day  psyllium Powder 1 Packet(s) Oral two times a day  QUEtiapine 12.5 milliGRAM(s) Oral at bedtime  simvastatin 10 milliGRAM(s) Oral at bedtime  tamsulosin 0.8 milliGRAM(s) Oral at bedtime    MEDICATIONS  (PRN):  acetaminophen     Tablet .. 650 milliGRAM(s) Oral every 6 hours PRN Temp greater or equal to 38C (100.4F)  hydrALAZINE Injectable 10 milliGRAM(s) IV Push every 2 hours PRN SBP > 160  labetalol Injectable 10 milliGRAM(s) IV Push every 2 hours PRN Systolic blood pressure > 160  ondansetron Injectable 4 milliGRAM(s) IV Push every 6 hours PRN Nausea and/or Vomiting  simethicone 80 milliGRAM(s) Chew every 6 hours PRN Gas      RADIOLOGY & ADDITIONAL TESTS:   CC: cerebral aneurysm     INTERVAL HPI/OVERNIGHT EVENTS: Patient seen and examined. Rectal tube removed yesterday morning. Having more formed stool as per RN. Dangelo remains in place, however RN reports will attempt another voiding trial as patient is more ambulatory. No acute issues overnight.     Vital Signs Last 24 Hrs  T(C): 36.6 (18 Mar 2024 08:00), Max: 38 (18 Mar 2024 04:00)  T(F): 97.9 (18 Mar 2024 08:00), Max: 100.4 (18 Mar 2024 04:00)  HR: 85 (18 Mar 2024 08:00) (75 - 101)  BP: 109/63 (18 Mar 2024 08:00) (109/63 - 152/80)  BP(mean): 78 (18 Mar 2024 08:00) (69 - 108)  RR: 21 (18 Mar 2024 08:00) (12 - 25)  SpO2: 99% (18 Mar 2024 08:00) (97% - 100%)    Parameters below as of 18 Mar 2024 08:00  Patient On (Oxygen Delivery Method): room air    ROS:  Constitutional, Eyes, ENT, Cardiovascular, Respiratory, Gastrointestinal, Genitourinary, Musculoskeletal, Integumentary, Psychiatric, Endocrine, Heme/Lymph are otherwise negative.    PHYSICAL EXAM:    GENERAL: NAD, well-groomed, well-developed  HEAD: surgical site clean and dry   EYES: EOMI, PERRLA, conjunctiva and sclera clear  NECK: Supple, No JVD  NERVOUS SYSTEM:  Alert & Oriented X 4 , no focal deficit   CHEST/LUNG: CTA  b/l,  no rales, rhonchi, wheezing, or rubs  HEART: Regular rate and rhythm; No murmurs, rubs, or gallops  ABDOMEN: Soft, Nontender, Nondistended; Bowel sounds present  EXTREMITIES:  2+ Peripheral Pulses, No clubbing, cyanosis, or edema ,   LYMPH: No lymphadenopathy noted  SKIN: No rashes or lesions      I&O's Detail    17 Mar 2024 07:01  -  18 Mar 2024 07:00  --------------------------------------------------------  IN:    Lactated Ringers Bolus: 500 mL    Oral Fluid: 1920 mL  Total IN: 2420 mL    OUT:    Indwelling Catheter - Urethral (mL): 2320 mL    Rectal Tube (mL): 50 mL  Total OUT: 2370 mL    Total NET: 50 mL      18 Mar 2024 07:01  -  18 Mar 2024 08:33  --------------------------------------------------------  IN:  Total IN: 0 mL    OUT:    Indwelling Catheter - Urethral (mL): 50 mL  Total OUT: 50 mL    Total NET: -50 mL                                    9.6    9.54  )-----------( 404      ( 18 Mar 2024 04:00 )             27.8     18 Mar 2024 04:00    133    |  98     |  7.5    ----------------------------<  109    3.7     |  23.0   |  0.37     Ca    8.1        18 Mar 2024 04:00  Phos  2.3       18 Mar 2024 04:00  Mg     2.1       18 Mar 2024 04:00        CAPILLARY BLOOD GLUCOSE            MEDICATIONS  (STANDING):  bromocriptine Capsule 10 milliGRAM(s) Oral three times a day  chlorhexidine 2% Cloths 1 Application(s) Topical daily  enoxaparin Injectable 30 milliGRAM(s) SubCutaneous every 24 hours  multivitamin 1 Tablet(s) Oral daily  niMODipine Oral Solution 60 milliGRAM(s) Oral every 4 hours  nystatin Powder 1 Application(s) Topical two times a day  psyllium Powder 1 Packet(s) Oral two times a day  QUEtiapine 12.5 milliGRAM(s) Oral at bedtime  simvastatin 10 milliGRAM(s) Oral at bedtime  tamsulosin 0.8 milliGRAM(s) Oral at bedtime    MEDICATIONS  (PRN):  acetaminophen     Tablet .. 650 milliGRAM(s) Oral every 6 hours PRN Temp greater or equal to 38C (100.4F)  hydrALAZINE Injectable 10 milliGRAM(s) IV Push every 2 hours PRN SBP > 160  labetalol Injectable 10 milliGRAM(s) IV Push every 2 hours PRN Systolic blood pressure > 160  ondansetron Injectable 4 milliGRAM(s) IV Push every 6 hours PRN Nausea and/or Vomiting  simethicone 80 milliGRAM(s) Chew every 6 hours PRN Gas      RADIOLOGY & ADDITIONAL TESTS:    < from: CT Head No Cont (03.16.24 @ 03:22) >    ACC: [id number] EXAM:  CT BRAIN   ORDERED BY: ALEXI KIRBY     PROCEDURE DATE:  03/16/2024          INTERPRETATION:  CLINICAL INDICATION: Subarachnoid hemorrhage with coiled   aneurysm post EVD removal    5mm axial sections of the brain were obtained from base to vertex,   without the intravenous administration of contrast material. Coronal and   sagittal computer generated reconstructed views are available.    Comparison is made with the prior CT of 3/15/2024.    The right frontal ventricular catheter crossing the midline to have its   tip in the left lateral ventricle previously identified has been removed.   There is no change in mild ventricular dilatation with intraventricular   hemorrhage and hemorrhage in the genu of the corpus callosum and midline   inferior frontal lobe. An aneurysm coil mass is unchanged. There is   lucency in the right frontal lobe along the tract of the catheter.    IMPRESSION: No change in mild ventricular dilatation with   intraventricular hemorrhage and hemorrhage in the frontal lobe midline   and corpus callosum after extraventricular drain removal since 3/15/2024.    --- End of Report ---    < end of copied text >

## 2024-03-18 NOTE — PROGRESS NOTE ADULT - ASSESSMENT
Assessment:  73F with PMH HLD who presented for elective acomm aneurysm embolization with balloon-assisted coiling, complicated by aneurysm rupture controlled with coils and balloon tamponade.   - PBD 9, POD 9  - Downgraded from ICU Saturday 3/16 pending discharge to rehab       Plan:  - Discussed with Dr. Dowd  - Ok for Q4 hour neuro checks  - SBP goal   - Maintain normothermia  - Maintain euvolemia, strict I&Os, supplement for net negative PRN  - Nimodipine 60q4 as tolerated by HR and BP  - No longer requiring TCDs 2/2 poor windows   - DVT prophylaxis: SCDs, lovenox  - No AED required  - PM&R recommending acute rehab   - Transferred care to stroke / neurology team today Assessment:  73F with PMH HLD who presented for elective acomm aneurysm embolization with balloon-assisted coiling, complicated by aneurysm rupture controlled with coils and balloon tamponade.   - PBD 12, POD 12  - Downgraded from ICU Saturday 3/16 pending discharge to rehab       Plan:  - Discussed with Dr. Dowd  - Ok for Q4 hour neuro checks  - SBP goal   - Maintain normothermia  - Maintain euvolemia, strict I&Os, supplement for net negative PRN  - Nimodipine 60q4 as tolerated by HR and BP  - No longer requiring TCDs 2/2 poor windows   - DVT prophylaxis: SCDs, lovenox  - No AED required  - PM&R recommending acute rehab   - Transferred care to stroke / neurology team today

## 2024-03-18 NOTE — PROGRESS NOTE ADULT - ASSESSMENT
72 y/o mandarin speaking female with PMH of HLD and osteoporosis presents to [facility] with complaints of having cerebral aneurysm. States in 10/2023 she started to have B/L hand numbness. At that time she had MRI brain, MRA head and neck. MRA showed an ACOMM aneurysm. She underwent a cerebral angiogram by José Manuel Villaseñor at [facility] on December 7th, 2023, with the intent to treat this month. The aneurysm is described as a 5.5x4.6x3.6mm wide necked acomm aneurysm that projects medio-superiorly. Reports occasional headaches, described as numbness.   S/P  aneurysm embolization with coiling on 3/6  complicated by aneurysm rupture, controlled with coils and balloon tamponade. NISHANT CT showed SAH b/l frontal lobes and R sylvian fissure along with contrast staining. Patient admitted to NSICU for post op care.   Downgraded to Step down on 3/16. Medicine consulted for medical mgmt .       # ACOMM aneurism , S/P  aneurysm embolization with coiling on 3/6  complicated by aneurysm rupture,   controlled with coils and balloon tamponade.    NISHANT CT showed SAH b/l frontal lobes and R sylvian fissure along with contrast staining.    Patient admitted to NSICU for post op care. Now downgraded to step down   - further mgmt as per primary team   - Neurology noted and appreciated    - Neuro checks q2h  - Bromocriptine 10mg TID- for likely central fevers   - Seroquel 12.5mg nightly  - Pain control- Tylenol, Tramadol  - pt/ot  - Nimodipine 60mg q4hrs for SAH protocol  as per NS   - Avoid hypotension, rapid changes in BP      # HLD - Simvastatin 10mg daily    # Postop urinary retention - failed TOV twice   -Flomax 0.8mg  qhs   -Continue Dangelo Cath with attempt TOV on 3/18   -Ambulate to bathroom    # Diarrhea , rectal tube now removed   Abdominal XR ( 3/13) noted with  Fecal material   localized to the mid left descending colon.   Having more formed stool  Mineral oil enema as needed  Continue Bowel RX  FOBT - negative .        # Low grade temps, work up NTD- likely due to central fevers   Completed course of Zosyn  On Bromocriptine- continue    - Mild leukocytosis, now resolved    # Hypokalemia - likely due to diarrhea - supplements  monitor electrolytes     #Hypophosphatemia - replace as needed    # Hyponatremia - likely due to diarrhea - monitor. Na Goal: 135-145     DVT prophylaxis  -on Lovenox     Dispo: Transfer to Stepdown to Neuro stroke service. Please recall if needed.  74 y/o mandarin speaking female with PMH of HLD and osteoporosis presents to [facility] with complaints of having cerebral aneurysm. States in 10/2023 she started to have B/L hand numbness. At that time she had MRI brain, MRA head and neck. MRA showed an ACOMM aneurysm. She underwent a cerebral angiogram by José Manuel Villaseñor at [facility] on December 7th, 2023, with the intent to treat this month. The aneurysm is described as a 5.5x4.6x3.6mm wide necked acomm aneurysm that projects medio-superiorly. Reports occasional headaches, described as numbness.   S/P  aneurysm embolization with coiling on 3/6  complicated by aneurysm rupture, controlled with coils and balloon tamponade. NISHANT CT showed SAH b/l frontal lobes and R sylvian fissure along with contrast staining. Patient admitted to NSICU for post op care.   Downgraded to Step down on 3/16. Medicine consulted for medical mgmt .       # ACOMM aneurism , S/P  aneurysm embolization with coiling on 3/6  complicated by aneurysm rupture,   controlled with coils and balloon tamponade.    NISHANT CT showed SAH b/l frontal lobes and R sylvian fissure along with contrast staining.    Patient admitted to NSICU for post op care. Now downgraded to step down   - further mgmt as per primary team   - Neurology noted and appreciated    - Neuro checks q2h  - Bromocriptine 10mg TID- for likely central fevers   - Seroquel 12.5mg nightly  - Pain control- Tylenol, Tramadol  - pt/ot  - Nimodipine 60mg q4hrs for SAH protocol  as per NS   - Avoid hypotension, rapid changes in BP      # HLD - Simvastatin 10mg daily    # Postop urinary retention - failed TOV twice   -Flomax 0.8mg  qhs   -Continue Dangelo Cath with attempt TOV on 3/18   -Ambulate to bathroom    # Diarrhea , rectal tube now removed ( 3/17/24)  Abdominal XR ( 3/13) noted with  Fecal material   localized to the mid left descending colon.   Having more formed stool after rectal tube removed   Mineral oil enema as needed  Continue Bowel RX  FOBT - negative .        # Low grade temps, work up NTD- likely due to central fevers   Completed course of Zosyn for possible PNA   On Bromocriptine- continue    - Mild leukocytosis, now resolved    # Hypokalemia - likely due to diarrhea - supplemented   monitor electrolytes     #Hypophosphatemia - replace as needed    # Hyponatremia - likely due to diarrhea - monitor. Na Goal: 135-145     DVT prophylaxis  -on Lovenox     Dispo: Transfer to Stepdown to Neuro stroke service. Please recall if needed.

## 2024-03-18 NOTE — PROGRESS NOTE ADULT - SUBJECTIVE AND OBJECTIVE BOX
Patient is a 73y old  Female who presents with a chief complaint of ACOMM Aneurysm (18 Mar 2024 14:12)    HPI:  73F with PMH HLD, osteoporosis who presents for cerebral angiogram with aneurysm embolization. Patient had MRI / MRA brain 2/2 hand numbness which had incidental finding of acomm aneurysm. Patient underwent diagnostic angiogram 2/14/24 which confirmed acomm aneurysm. Patient presents today for cerebral angiogram with aneurysm embolization via balloon assisted coiling. Patient started on ASA 81 in preparation for the procedure. Patient underwent aneurysm embolization with coiling complicated by aneurysm rupture, controlled with coils and balloon tamponade. NISHANT CT showed SAH b/l frontal lobes and R sylvian fissure along with contrast staining. Course complicated by worsening SAH, IVH requiring EVD placement.       Interval history:  Patient seen and examined by neurosurgery team. Patient tolerating EVD removal well. No acute events reported.       Vital Signs Last 24 Hrs  T(C): 36.6 (18 Mar 2024 08:00), Max: 38 (18 Mar 2024 04:00)  T(F): 97.9 (18 Mar 2024 08:00), Max: 100.4 (18 Mar 2024 04:00)  HR: 85 (18 Mar 2024 08:00) (75 - 101)  BP: 109/63 (18 Mar 2024 08:00) (109/63 - 152/80)  BP(mean): 78 (18 Mar 2024 08:00) (69 - 101)  RR: 21 (18 Mar 2024 08:00) (12 - 25)  SpO2: 99% (18 Mar 2024 08:00) (97% - 100%)    Parameters below as of 18 Mar 2024 08:00  Patient On (Oxygen Delivery Method): room air      Physical Exam:  Constitutional: NAD, lying in bed  Neuro  * Mental Status:  GCS 15: Awake, alert, oriented to conversation x2-3. No aphasia or difficulty speaking. No dysarthria.   * Cranial Nerves: Cnii-Cnxii grossly intact. EOMI, tongue midline, no gaze deviation  * Motor: RUE 5/5, LUE 5/5, RLE 3/5 without drift, LLE 3/5 with drift  * Sensory: Sensation intact to light touch  * Reflexes: not assessed   EVD site: dressing intact, clean, dry       LABS:                        9.6    9.54  )-----------( 404      ( 18 Mar 2024 04:00 )             27.8     03-18    133<L>  |  98  |  7.5<L>  ----------------------------<  109<H>  3.7   |  23.0  |  0.37<L>    Ca    8.1<L>      18 Mar 2024 04:00  Phos  2.3     03-18  Mg     2.1     03-18      I&O:   17 Mar 2024 07:01  -  18 Mar 2024 07:00  --------------------------------------------------------  IN: 2420 mL / OUT: 2370 mL / NET: 50 mL    18 Mar 2024 07:01  -  18 Mar 2024 09:25  --------------------------------------------------------  IN: 0 mL / OUT: 50 mL / NET: -50 mL      Medications:  MEDICATIONS  (STANDING):  bromocriptine Capsule 10 milliGRAM(s) Oral three times a day  chlorhexidine 2% Cloths 1 Application(s) Topical daily  enoxaparin Injectable 30 milliGRAM(s) SubCutaneous every 24 hours  multivitamin 1 Tablet(s) Oral daily  niMODipine Oral Solution 60 milliGRAM(s) Oral every 4 hours  nystatin Powder 1 Application(s) Topical two times a day  psyllium Powder 1 Packet(s) Oral two times a day  QUEtiapine 12.5 milliGRAM(s) Oral at bedtime  simvastatin 10 milliGRAM(s) Oral at bedtime  sodium phosphate 15 milliMole(s)/250 mL IVPB 15 milliMole(s) IV Intermittent once  tamsulosin 0.8 milliGRAM(s) Oral at bedtime    MEDICATIONS  (PRN):  acetaminophen     Tablet .. 650 milliGRAM(s) Oral every 6 hours PRN Temp greater or equal to 38C (100.4F)  hydrALAZINE Injectable 10 milliGRAM(s) IV Push every 2 hours PRN SBP > 160  labetalol Injectable 10 milliGRAM(s) IV Push every 2 hours PRN Systolic blood pressure > 160  ondansetron Injectable 4 milliGRAM(s) IV Push every 6 hours PRN Nausea and/or Vomiting  simethicone 80 milliGRAM(s) Chew every 6 hours PRN Gas      RADIOLOGY & ADDITIONAL STUDIES:  < from: CT Head No Cont (03.16.24 @ 03:22) >  IMPRESSION: No change in mild ventricular dilatation with   intraventricular hemorrhage and hemorrhage in the frontal lobe midline   and corpus callosum after extraventricular drain removal since 3/15/2024.    --- End of Report ---  FRANTZ ROY MD; Attending Radiologist  This document has been electronically signed. Mar 16 2024  9:07AM    < end of copied text >

## 2024-03-19 LAB
ALBUMIN SERPL ELPH-MCNC: 3.5 G/DL — SIGNIFICANT CHANGE UP (ref 3.3–5.2)
ANION GAP SERPL CALC-SCNC: 11 MMOL/L — SIGNIFICANT CHANGE UP (ref 5–17)
ANION GAP SERPL CALC-SCNC: 11 MMOL/L — SIGNIFICANT CHANGE UP (ref 5–17)
ANION GAP SERPL CALC-SCNC: 12 MMOL/L — SIGNIFICANT CHANGE UP (ref 5–17)
APPEARANCE UR: CLEAR — SIGNIFICANT CHANGE UP
BACTERIA # UR AUTO: NEGATIVE /HPF — SIGNIFICANT CHANGE UP
BILIRUB UR-MCNC: NEGATIVE — SIGNIFICANT CHANGE UP
BLD GP AB SCN SERPL QL: SIGNIFICANT CHANGE UP
BUN SERPL-MCNC: 6.4 MG/DL — LOW (ref 8–20)
BUN SERPL-MCNC: 7.7 MG/DL — LOW (ref 8–20)
BUN SERPL-MCNC: 8.2 MG/DL — SIGNIFICANT CHANGE UP (ref 8–20)
BUN SERPL-MCNC: 8.9 MG/DL — SIGNIFICANT CHANGE UP (ref 8–20)
CALCIUM SERPL-MCNC: 7.4 MG/DL — LOW (ref 8.4–10.5)
CALCIUM SERPL-MCNC: 7.5 MG/DL — LOW (ref 8.4–10.5)
CALCIUM SERPL-MCNC: 7.6 MG/DL — LOW (ref 8.4–10.5)
CALCIUM SERPL-MCNC: 8.2 MG/DL — LOW (ref 8.4–10.5)
CAST: 1 /LPF — SIGNIFICANT CHANGE UP (ref 0–4)
CHLORIDE SERPL-SCNC: 101 MMOL/L — SIGNIFICANT CHANGE UP (ref 96–108)
CHLORIDE SERPL-SCNC: 99 MMOL/L — SIGNIFICANT CHANGE UP (ref 96–108)
CHLORIDE UR-SCNC: 72 MMOL/L — SIGNIFICANT CHANGE UP
CHOLEST SERPL-MCNC: 142 MG/DL — SIGNIFICANT CHANGE UP
CO2 SERPL-SCNC: 20 MMOL/L — LOW (ref 22–29)
CO2 SERPL-SCNC: 21 MMOL/L — LOW (ref 22–29)
CO2 SERPL-SCNC: 21 MMOL/L — LOW (ref 22–29)
CO2 SERPL-SCNC: 22 MMOL/L — SIGNIFICANT CHANGE UP (ref 22–29)
COLOR SPEC: YELLOW — SIGNIFICANT CHANGE UP
CREAT SERPL-MCNC: 0.31 MG/DL — LOW (ref 0.5–1.3)
CREAT SERPL-MCNC: 0.34 MG/DL — LOW (ref 0.5–1.3)
CREAT SERPL-MCNC: 0.34 MG/DL — LOW (ref 0.5–1.3)
CREAT SERPL-MCNC: 0.4 MG/DL — LOW (ref 0.5–1.3)
DIFF PNL FLD: ABNORMAL
EGFR: 106 ML/MIN/1.73M2 — SIGNIFICANT CHANGE UP
EGFR: 110 ML/MIN/1.73M2 — SIGNIFICANT CHANGE UP
EGFR: 110 ML/MIN/1.73M2 — SIGNIFICANT CHANGE UP
EGFR: 112 ML/MIN/1.73M2 — SIGNIFICANT CHANGE UP
GLUCOSE SERPL-MCNC: 108 MG/DL — HIGH (ref 70–99)
GLUCOSE SERPL-MCNC: 109 MG/DL — HIGH (ref 70–99)
GLUCOSE SERPL-MCNC: 123 MG/DL — HIGH (ref 70–99)
GLUCOSE SERPL-MCNC: 154 MG/DL — HIGH (ref 70–99)
GLUCOSE UR QL: NEGATIVE MG/DL — SIGNIFICANT CHANGE UP
HCT VFR BLD CALC: 25 % — LOW (ref 34.5–45)
HCT VFR BLD CALC: 26.4 % — LOW (ref 34.5–45)
HDLC SERPL-MCNC: 63 MG/DL — SIGNIFICANT CHANGE UP
HGB BLD-MCNC: 8.6 G/DL — LOW (ref 11.5–15.5)
HGB BLD-MCNC: 9.2 G/DL — LOW (ref 11.5–15.5)
KETONES UR-MCNC: NEGATIVE MG/DL — SIGNIFICANT CHANGE UP
LEUKOCYTE ESTERASE UR-ACNC: NEGATIVE — SIGNIFICANT CHANGE UP
LIPID PNL WITH DIRECT LDL SERPL: 55 MG/DL — SIGNIFICANT CHANGE UP
MAGNESIUM SERPL-MCNC: 2 MG/DL — SIGNIFICANT CHANGE UP (ref 1.6–2.6)
MCHC RBC-ENTMCNC: 31 PG — SIGNIFICANT CHANGE UP (ref 27–34)
MCHC RBC-ENTMCNC: 31.3 PG — SIGNIFICANT CHANGE UP (ref 27–34)
MCHC RBC-ENTMCNC: 34.4 GM/DL — SIGNIFICANT CHANGE UP (ref 32–36)
MCHC RBC-ENTMCNC: 34.8 GM/DL — SIGNIFICANT CHANGE UP (ref 32–36)
MCV RBC AUTO: 89.8 FL — SIGNIFICANT CHANGE UP (ref 80–100)
MCV RBC AUTO: 90.3 FL — SIGNIFICANT CHANGE UP (ref 80–100)
NITRITE UR-MCNC: NEGATIVE — SIGNIFICANT CHANGE UP
NON HDL CHOLESTEROL: 79 MG/DL — SIGNIFICANT CHANGE UP
OSMOLALITY SERPL: 278 MOSMOL/KG — LOW (ref 280–301)
OSMOLALITY UR: 291 MOSM/KG — LOW (ref 300–1000)
PH UR: 6.5 — SIGNIFICANT CHANGE UP (ref 5–8)
PHOSPHATE SERPL-MCNC: 1.7 MG/DL — LOW (ref 2.4–4.7)
PHOSPHATE SERPL-MCNC: 2 MG/DL — LOW (ref 2.4–4.7)
PLATELET # BLD AUTO: 401 K/UL — HIGH (ref 150–400)
PLATELET # BLD AUTO: 442 K/UL — HIGH (ref 150–400)
POTASSIUM SERPL-MCNC: 3.3 MMOL/L — LOW (ref 3.5–5.3)
POTASSIUM SERPL-MCNC: 3.6 MMOL/L — SIGNIFICANT CHANGE UP (ref 3.5–5.3)
POTASSIUM SERPL-MCNC: 3.8 MMOL/L — SIGNIFICANT CHANGE UP (ref 3.5–5.3)
POTASSIUM SERPL-MCNC: 3.8 MMOL/L — SIGNIFICANT CHANGE UP (ref 3.5–5.3)
POTASSIUM SERPL-SCNC: 3.3 MMOL/L — LOW (ref 3.5–5.3)
POTASSIUM SERPL-SCNC: 3.6 MMOL/L — SIGNIFICANT CHANGE UP (ref 3.5–5.3)
POTASSIUM SERPL-SCNC: 3.8 MMOL/L — SIGNIFICANT CHANGE UP (ref 3.5–5.3)
POTASSIUM SERPL-SCNC: 3.8 MMOL/L — SIGNIFICANT CHANGE UP (ref 3.5–5.3)
PROT UR-MCNC: NEGATIVE MG/DL — SIGNIFICANT CHANGE UP
RBC # BLD: 2.77 M/UL — LOW (ref 3.8–5.2)
RBC # BLD: 2.94 M/UL — LOW (ref 3.8–5.2)
RBC # FLD: 13.6 % — SIGNIFICANT CHANGE UP (ref 10.3–14.5)
RBC # FLD: 13.9 % — SIGNIFICANT CHANGE UP (ref 10.3–14.5)
RBC CASTS # UR COMP ASSIST: 1 /HPF — SIGNIFICANT CHANGE UP (ref 0–4)
SODIUM SERPL-SCNC: 131 MMOL/L — LOW (ref 135–145)
SODIUM SERPL-SCNC: 131 MMOL/L — LOW (ref 135–145)
SODIUM SERPL-SCNC: 132 MMOL/L — LOW (ref 135–145)
SODIUM SERPL-SCNC: 132 MMOL/L — LOW (ref 135–145)
SODIUM UR-SCNC: 74 MMOL/L — SIGNIFICANT CHANGE UP
SP GR SPEC: 1.01 — SIGNIFICANT CHANGE UP (ref 1–1.03)
SQUAMOUS # UR AUTO: 0 /HPF — SIGNIFICANT CHANGE UP (ref 0–5)
TRIGL SERPL-MCNC: 120 MG/DL — SIGNIFICANT CHANGE UP
TROPONIN T, HIGH SENSITIVITY RESULT: 12 NG/L — SIGNIFICANT CHANGE UP (ref 0–51)
UROBILINOGEN FLD QL: 0.2 MG/DL — SIGNIFICANT CHANGE UP (ref 0.2–1)
WBC # BLD: 11.67 K/UL — HIGH (ref 3.8–10.5)
WBC # BLD: 12.57 K/UL — HIGH (ref 3.8–10.5)
WBC # FLD AUTO: 11.67 K/UL — HIGH (ref 3.8–10.5)
WBC # FLD AUTO: 12.57 K/UL — HIGH (ref 3.8–10.5)
WBC UR QL: 1 /HPF — SIGNIFICANT CHANGE UP (ref 0–5)
YEAST-LIKE CELLS: PRESENT

## 2024-03-19 PROCEDURE — 99233 SBSQ HOSP IP/OBS HIGH 50: CPT

## 2024-03-19 PROCEDURE — 93010 ELECTROCARDIOGRAM REPORT: CPT | Mod: 59

## 2024-03-19 PROCEDURE — 95816 EEG AWAKE AND DROWSY: CPT | Mod: 26

## 2024-03-19 PROCEDURE — 74018 RADEX ABDOMEN 1 VIEW: CPT | Mod: 26

## 2024-03-19 PROCEDURE — 71045 X-RAY EXAM CHEST 1 VIEW: CPT | Mod: 26

## 2024-03-19 PROCEDURE — 99232 SBSQ HOSP IP/OBS MODERATE 35: CPT

## 2024-03-19 RX ORDER — POTASSIUM PHOSPHATE, MONOBASIC POTASSIUM PHOSPHATE, DIBASIC 236; 224 MG/ML; MG/ML
15 INJECTION, SOLUTION INTRAVENOUS ONCE
Refills: 0 | Status: COMPLETED | OUTPATIENT
Start: 2024-03-19 | End: 2024-03-19

## 2024-03-19 RX ORDER — POTASSIUM CHLORIDE 20 MEQ
40 PACKET (EA) ORAL ONCE
Refills: 0 | Status: COMPLETED | OUTPATIENT
Start: 2024-03-19 | End: 2024-03-19

## 2024-03-19 RX ADMIN — Medication 650 MILLIGRAM(S): at 14:04

## 2024-03-19 RX ADMIN — NIMODIPINE 60 MILLIGRAM(S): 60 SOLUTION ORAL at 14:28

## 2024-03-19 RX ADMIN — Medication 1 TABLET(S): at 11:36

## 2024-03-19 RX ADMIN — SIMVASTATIN 10 MILLIGRAM(S): 20 TABLET, FILM COATED ORAL at 21:22

## 2024-03-19 RX ADMIN — NIMODIPINE 60 MILLIGRAM(S): 60 SOLUTION ORAL at 01:33

## 2024-03-19 RX ADMIN — Medication 650 MILLIGRAM(S): at 21:25

## 2024-03-19 RX ADMIN — Medication 1 PACKET(S): at 05:20

## 2024-03-19 RX ADMIN — Medication 650 MILLIGRAM(S): at 12:25

## 2024-03-19 RX ADMIN — BROMOCRIPTINE MESYLATE 10 MILLIGRAM(S): 5 CAPSULE ORAL at 05:20

## 2024-03-19 RX ADMIN — NYSTATIN CREAM 1 APPLICATION(S): 100000 CREAM TOPICAL at 05:21

## 2024-03-19 RX ADMIN — Medication 250 MILLIGRAM(S): at 17:04

## 2024-03-19 RX ADMIN — POTASSIUM PHOSPHATE, MONOBASIC POTASSIUM PHOSPHATE, DIBASIC 62.5 MILLIMOLE(S): 236; 224 INJECTION, SOLUTION INTRAVENOUS at 05:20

## 2024-03-19 RX ADMIN — SODIUM CHLORIDE 125 MILLILITER(S): 9 INJECTION INTRAMUSCULAR; INTRAVENOUS; SUBCUTANEOUS at 09:21

## 2024-03-19 RX ADMIN — Medication 650 MILLIGRAM(S): at 22:00

## 2024-03-19 RX ADMIN — Medication 1 PACKET(S): at 17:03

## 2024-03-19 RX ADMIN — BROMOCRIPTINE MESYLATE 10 MILLIGRAM(S): 5 CAPSULE ORAL at 14:28

## 2024-03-19 RX ADMIN — Medication 1 TABLET(S): at 18:41

## 2024-03-19 RX ADMIN — CHLORHEXIDINE GLUCONATE 1 APPLICATION(S): 213 SOLUTION TOPICAL at 11:36

## 2024-03-19 RX ADMIN — NIMODIPINE 60 MILLIGRAM(S): 60 SOLUTION ORAL at 21:21

## 2024-03-19 RX ADMIN — QUETIAPINE FUMARATE 12.5 MILLIGRAM(S): 200 TABLET, FILM COATED ORAL at 21:22

## 2024-03-19 RX ADMIN — BROMOCRIPTINE MESYLATE 10 MILLIGRAM(S): 5 CAPSULE ORAL at 21:26

## 2024-03-19 RX ADMIN — TAMSULOSIN HYDROCHLORIDE 0.8 MILLIGRAM(S): 0.4 CAPSULE ORAL at 21:26

## 2024-03-19 RX ADMIN — Medication 250 MILLIGRAM(S): at 05:21

## 2024-03-19 RX ADMIN — Medication 40 MILLIEQUIVALENT(S): at 18:41

## 2024-03-19 RX ADMIN — NYSTATIN CREAM 1 APPLICATION(S): 100000 CREAM TOPICAL at 17:03

## 2024-03-19 RX ADMIN — ENOXAPARIN SODIUM 30 MILLIGRAM(S): 100 INJECTION SUBCUTANEOUS at 21:31

## 2024-03-19 RX ADMIN — NIMODIPINE 60 MILLIGRAM(S): 60 SOLUTION ORAL at 09:21

## 2024-03-19 RX ADMIN — NIMODIPINE 60 MILLIGRAM(S): 60 SOLUTION ORAL at 17:03

## 2024-03-19 RX ADMIN — NIMODIPINE 60 MILLIGRAM(S): 60 SOLUTION ORAL at 05:20

## 2024-03-19 NOTE — EEG REPORT - NS EEG TEXT BOX
KASHMIR CARBAJAL [id number]     Study Date: 03-19-24  Duration: 22 minutes  --------------------------------------------------------------------------------------------------  History:  CC/ HPI Patient is a 73y old  Female who presents with a chief complaint of ACOMM Aneurysm (18 Mar 2024 14:12)    MEDICATIONS  (STANDING):  bromocriptine Capsule 10 milliGRAM(s) Oral three times a day  niMODipine Oral Solution 60 milliGRAM(s) Oral every 4 hours  QUEtiapine 12.5 milliGRAM(s) Oral at bedtime  simvastatin 10 milliGRAM(s) Oral at bedtime  tamsulosin 0.8 milliGRAM(s) Oral at bedtime    --------------------------------------------------------------------------------------------------  Study Interpretation:    [[[Abbreviation Key:  PDR=alpha rhythm/posterior dominant rhythm. A-P=anterior posterior.  Amplitude: ‘very low’:<20; ‘low’:20-49; ‘medium’:; ‘high’:>150uV.  Persistence for periodic/rhythmic patterns (% of epoch) ‘rare’:<1%; ‘occasional’:1-10%; ‘frequent’:10-50%; ‘abundant’:50-90%; ‘continuous’:>90%.  Persistence for sporadic discharges: ‘rare’:<1/hr; ‘occasional’:1/min-1/hr; ‘frequent’:>1/min; ‘abundant’:>1/10 sec.  RPP=rhythmic and periodic patterns; GRDA=generalized rhythmic delta activity; FIRDA=frontal intermittent GRDA; LRDA=lateralized rhythmic delta activity; TIRDA=temporal intermittent rhythmic delta activity;  LPD=PLED=lateralized periodic discharges; GPD=generalized periodic discharges; BIPDs =bilateral independent periodic discharges; Mf=multifocal; SIRPDs=stimulus induced rhythmic, periodic, or ictal appearing discharges; BIRDs=brief potentially ictal rhythmic discharges >4 Hz, lasting .5-10s; PFA (paroxysmal bursts >13 Hz or =8 Hz <10s).  Modifiers: +F=with fast component; +S=with spike component; +R=with rhythmic component.  S-B=burst suppression pattern.  Max=maximal. N1-drowsy; N2-stage II sleep; N3-slow wave sleep. SSS/BETS=small sharp spikes/benign epileptiform transients of sleep. HV=hyperventilation; PS=photic stimulation]]]    Daily EEG Visual Analysis    FINDINGS:      Background:  Continuity: continuous  Symmetry: symmetric  PDR: unable to ascertain during this recording.  Reactivity: present  Voltage: normal, mostly 20-150uV  Anterior Posterior Gradient: present  Other background findings: none  Breach: absent    Background Slowing:  Generalized slowing: none was present.  Focal slowing: none was present.    State Changes:   -N2 sleep transients were not recorded.    Sporadic Epileptiform Discharges:    None    Rhythmic and Periodic Patterns (RPPs):  None     Electrographic and Electroclinical seizures:  None    Other Clinical Events:  None    Activation Procedures:   -Hyperventilation was not performed.    -Photic stimulation was not performed.    Artifacts:  Significant myogenic and movement artifacts were noted throughout the study.     ECG:  The heart rate on single channel ECG was predominantly between 50-60 BPM during the last 20 seconds of viable ECG recording.    EEG Classification / Summary:  This is a technically limited study with brief periods of viable recording demonstrating a continuous, symmetric background in wakefulness.     Clinical Impression: ***THIS IS A PRELIMINARY FELLOW REPORT PENDING REVIEW WITH ATTENDING EPILEPTOLOGIST***  There were no clear epileptiform abnormalities noted in the periods of viable recording.   Consider repeating EEG if clinically warranted.     Alejandro Cerna MD  PGY-6, Pediatric Epilepsy Fellow     -------------------------------------------------------------------------------------------------------  [facility] EEG Reading Room Ph#: [phone]  Epilepsy Answering Service after 5PM and before 8:30AM: Ph#: [phone] KASHMIR CARBAJAL [id number]     Study Date: 03-19-24  Duration: 22 minutes  --------------------------------------------------------------------------------------------------  History:  CC/ HPI Patient is a 73y old  Female who presents with a chief complaint of ACOMM Aneurysm (18 Mar 2024 14:12)    MEDICATIONS  (STANDING):  bromocriptine Capsule 10 milliGRAM(s) Oral three times a day  niMODipine Oral Solution 60 milliGRAM(s) Oral every 4 hours  QUEtiapine 12.5 milliGRAM(s) Oral at bedtime  simvastatin 10 milliGRAM(s) Oral at bedtime  tamsulosin 0.8 milliGRAM(s) Oral at bedtime    --------------------------------------------------------------------------------------------------  Study Interpretation:    [[[Abbreviation Key:  PDR=alpha rhythm/posterior dominant rhythm. A-P=anterior posterior.  Amplitude: ‘very low’:<20; ‘low’:20-49; ‘medium’:; ‘high’:>150uV.  Persistence for periodic/rhythmic patterns (% of epoch) ‘rare’:<1%; ‘occasional’:1-10%; ‘frequent’:10-50%; ‘abundant’:50-90%; ‘continuous’:>90%.  Persistence for sporadic discharges: ‘rare’:<1/hr; ‘occasional’:1/min-1/hr; ‘frequent’:>1/min; ‘abundant’:>1/10 sec.  RPP=rhythmic and periodic patterns; GRDA=generalized rhythmic delta activity; FIRDA=frontal intermittent GRDA; LRDA=lateralized rhythmic delta activity; TIRDA=temporal intermittent rhythmic delta activity;  LPD=PLED=lateralized periodic discharges; GPD=generalized periodic discharges; BIPDs =bilateral independent periodic discharges; Mf=multifocal; SIRPDs=stimulus induced rhythmic, periodic, or ictal appearing discharges; BIRDs=brief potentially ictal rhythmic discharges >4 Hz, lasting .5-10s; PFA (paroxysmal bursts >13 Hz or =8 Hz <10s).  Modifiers: +F=with fast component; +S=with spike component; +R=with rhythmic component.  S-B=burst suppression pattern.  Max=maximal. N1-drowsy; N2-stage II sleep; N3-slow wave sleep. SSS/BETS=small sharp spikes/benign epileptiform transients of sleep. HV=hyperventilation; PS=photic stimulation]]]    Daily EEG Visual Analysis    FINDINGS:      Background:  Continuity: continuous  Symmetry: symmetric  PDR: unable to ascertain for most of recording. rare fragments of 8-9-Hz PDR visible.  Voltage: normal  Anterior Posterior Gradient: present  Other background findings: none  Breach: absent    Background Slowing:  Generalized slowing: no clear generalized slowing  Focal slowing: none    State Changes:   Drowsiness and stage 2 sleep not captured.    Sporadic Epileptiform Discharges:    None    Rhythmic and Periodic Patterns (RPPs):  None     Electrographic and Electroclinical seizures:  None    Other Clinical Events:  None    Activation Procedures:   -Hyperventilation was not performed.    -Photic stimulation was not performed.    Artifacts:  Continuous myogenic and intermittent movement artifacts significantly limit interpretation.    Single-lead EKG: Regular rhythm during readable portions, mostly limited by artifact.    EEG Classification / Summary:  Normal routine EEG in the awake state, within the limits of interpretation.  Interpretation significantly limited by continuous muscle artifact.    Clinical Impression:   Technically limited study due to continuous muscle artifact. No obvious abnormalities noted.  Consider repeating EEG if clinically warranted.         -------------------------------------------------------------------------------------------------------  Alejandro Cerna MD  PGY-6, Pediatric Epilepsy Fellow     Angelita Bridges MD  Attending Physician, [facility]     [facility] EEG Reading Room Ph#: [phone]  Epilepsy Answering Service after 5PM and before 8:30AM: Ph#: [phone]

## 2024-03-19 NOTE — PROGRESS NOTE ADULT - ASSESSMENT
Assessment:  73F with PMH HLD who presented for elective acomm aneurysm embolization with balloon-assisted coiling, complicated by aneurysm rupture controlled with coils and balloon tamponade.   - PBD 10, POD 10  - Downgraded from ICU Saturday 3/16 pending discharge to rehab     Plan:  - Discussed with Dr. Dowd  - Q4 hour neuro checks  - SBP goal   - Maintain normothermia  - Maintain euvolemia, strict I&Os, supplement for net negative PRN  - Nimodipine 60q4 as tolerated by HR and BP  - No longer requiring TCDs 2/2 poor windows   - DVT prophylaxis: SCDs, lovenox  - No AED required  - PM&R recommending acute rehab   - Further care as per Stroke Neurology

## 2024-03-19 NOTE — PROGRESS NOTE ADULT - SUBJECTIVE AND OBJECTIVE BOX
Patient is a 73y old  Female who presents with a chief complaint of ACOMM Aneurysm (18 Mar 2024 14:12)    HPI:  73F with PMH HLD, osteoporosis who presents for cerebral angiogram with aneurysm embolization. Patient had MRI / MRA brain 2/2 hand numbness which had incidental finding of acomm aneurysm. Patient underwent diagnostic angiogram 2/14/24 which confirmed acomm aneurysm. Patient presents today for cerebral angiogram with aneurysm embolization via balloon assisted coiling. Patient started on ASA 81 in preparation for the procedure. Patient underwent aneurysm embolization with coiling complicated by aneurysm rupture, controlled with coils and balloon tamponade. NISHANT CT showed SAH b/l frontal lobes and R sylvian fissure along with contrast staining. Course complicated by worsening SAH, IVH requiring EVD placement.     Interval history:  Patient seen and examined by neurosurgery team. Patient resting comfortably in bed, currently eating dinner. Dangelo replaced this afternoon for urinary retention, patient on Flomax 0.8.     Vital Signs Last 24 Hrs  T(C): 37 (19 Mar 2024 15:00), Max: 38.2 (19 Mar 2024 12:30)  T(F): 98.6 (19 Mar 2024 15:00), Max: 100.8 (19 Mar 2024 12:30)  HR: 80 (19 Mar 2024 15:00) (77 - 98)  BP: 143/69 (19 Mar 2024 15:00) (108/52 - 154/75)  BP(mean): 90 (19 Mar 2024 15:00) (68 - 115)  RR: 14 (19 Mar 2024 15:00) (14 - 24)  SpO2: 99% (19 Mar 2024 15:00) (98% - 100%)    Parameters below as of 19 Mar 2024 15:00  Patient On (Oxygen Delivery Method): room air    Physical Exam:  Constitutional: NAD, lying in bed  Neuro  * Mental Status:  GCS 15: Awake, alert, oriented to conversation. No aphasia or difficulty speaking. No dysarthria. Able to name objects and their function.  * Cranial Nerves: Cnii-Cnxii grossly intact. PERRL, EOMI, tongue midline, no gaze deviation  * Motor: RUE 5/5, LUE 5/5, RLE 5/5, LLE 5/5, no drift or dysmetria  * Sensory: Sensation intact to light touch  * Reflexes: not assessed   Groin: soft, non-tender, no ecchymosis, no hematoma     LABS:                        8.6    12.57 )-----------( 401      ( 19 Mar 2024 04:00 )             25.0     03-19    132<L>  |  101  |  8.9  ----------------------------<  154<H>  3.3<L>   |  20.0<L>  |  0.40<L>    Ca    7.4<L>      19 Mar 2024 15:51  Phos  2.0     03-19  Mg     2.0     03-19    TPro  x   /  Alb  3.5  /  TBili  x   /  DBili  x   /  AST  x   /  ALT  x   /  AlkPhos  x   03-19    Urinalysis Basic - ( 19 Mar 2024 15:51 )  Color: x / Appearance: x / SG: x / pH: x  Gluc: 154 mg/dL / Ketone: x  / Bili: x / Urobili: x   Blood: x / Protein: x / Nitrite: x   Leuk Esterase: x / RBC: x / WBC x   Sq Epi: x / Non Sq Epi: x / Bacteria: x    CULTURES:  Culture Results:   No growth at 5 days (03-12 @ 12:45)  Culture Results:   No growth at 5 days (03-12 @ 12:00)    I&O:   I&O's Summary    18 Mar 2024 07:01  -  19 Mar 2024 07:00  --------------------------------------------------------  IN: 3562.5 mL / OUT: 2200 mL / NET: 1362.5 mL    19 Mar 2024 07:01  -  19 Mar 2024 18:01  --------------------------------------------------------  IN: 1135 mL / OUT: 1440 mL / NET: -305 mL    Medications:  MEDICATIONS  (STANDING):  bromocriptine Capsule 10 milliGRAM(s) Oral three times a day  calcium carbonate 1250 mG  + Vitamin D (OsCal 500 + D) 1 Tablet(s) Oral daily  chlorhexidine 2% Cloths 1 Application(s) Topical daily  enoxaparin Injectable 30 milliGRAM(s) SubCutaneous every 24 hours  multivitamin 1 Tablet(s) Oral daily  niMODipine Oral Solution 60 milliGRAM(s) Oral every 4 hours  nystatin Powder 1 Application(s) Topical two times a day  potassium chloride    Tablet ER 40 milliEquivalent(s) Oral once  psyllium Powder 1 Packet(s) Oral two times a day  QUEtiapine 12.5 milliGRAM(s) Oral at bedtime  saccharomyces boulardii 250 milliGRAM(s) Oral two times a day  simvastatin 10 milliGRAM(s) Oral at bedtime  sodium chloride 0.9%. 1000 milliLiter(s) (125 mL/Hr) IV Continuous <Continuous>  tamsulosin 0.8 milliGRAM(s) Oral at bedtime    MEDICATIONS  (PRN):  acetaminophen     Tablet .. 650 milliGRAM(s) Oral every 6 hours PRN Temp greater or equal to 38C (100.4F)  hydrALAZINE Injectable 10 milliGRAM(s) IV Push every 2 hours PRN SBP > 160  labetalol Injectable 10 milliGRAM(s) IV Push every 2 hours PRN Systolic blood pressure > 160  ondansetron Injectable 4 milliGRAM(s) IV Push every 6 hours PRN Nausea and/or Vomiting  simethicone 80 milliGRAM(s) Chew every 6 hours PRN Gas    RADIOLOGY & ADDITIONAL STUDIES:  < from: CT Head No Cont (03.16.24 @ 03:22) >    IMPRESSION: No change in mild ventricular dilatation with   intraventricular hemorrhage and hemorrhage in the frontal lobe midline   and corpus callosum after extraventricular drain removal since 3/15/2024.    --- End of Report ---    FRANTZ ROY MD; Attending Radiologist  This document has been electronically signed. Mar 16 2024  9:07    < end of copied text >

## 2024-03-19 NOTE — CHART NOTE - NSCHARTNOTEFT_GEN_A_CORE
Medicine PA-   Pt's Na-129 and K-3.3 at beginning of shift.  NS 500cc bolus given and started NS IVF @125ml/hr.                        8.6    12.57 )-----------( 401      ( 19 Mar 2024 04:00 )             25.0     03-19    132<L>  |  99  |  7.7<L>  ----------------------------<  108<H>  3.6   |  22.0  |  0.34<L>    Ca    7.5<L>      19 Mar 2024 04:00  Phos  2.0     03-19  Mg     2.0     03-19    >Hyponatremia, Hypophosphatemia  -potassium phosph 15mmol IVPB  -continue IVF NS  -repeat labs pending    >Anemia, no active bleeding  -pt's hgb-8.6, was 9.6  -T+C, trend hgb  -day team to f/u

## 2024-03-19 NOTE — PROGRESS NOTE ADULT - ASSESSMENT
72 y/o mandarin speaking female with PMH of HLD and osteoporosis presents to [facility] with complaints of having cerebral aneurysm. States in 10/2023 she started to have B/L hand numbness. At that time she had MRI brain, MRA head and neck. MRA showed an ACOMM aneurysm. She underwent a cerebral angiogram by José Manuel Villaseñor at [facility] on December 7th, 2023, with the intent to treat this month. The aneurysm is described as a 5.5x4.6x3.6mm wide necked acomm aneurysm that projects medio-superiorly. Reports occasional headaches, described as numbness.   S/P  aneurysm embolization with coiling on 3/6  complicated by aneurysm rupture, controlled with coils and balloon tamponade. NISHANT CT showed SAH b/l frontal lobes and R sylvian fissure along with contrast staining. Patient admitted to NSICU for post op care.   Downgraded to Step down on 3/16. Medicine consulted for medical mgmt .       # ACOMM aneurism , S/P  aneurysm embolization with coiling on 3/6  complicated by aneurysm rupture,   controlled with coils and balloon tamponade.    NISHANT CT showed SAH b/l frontal lobes and R sylvian fissure along with contrast staining.    Patient admitted to NSICU for post op care. Now downgraded to step down   - further mgmt as per primary team   - Neurology noted and appreciated    - Neuro checks q2h  - Bromocriptine 10mg TID- for likely central fevers   - Seroquel 12.5mg nightly  - Pain control- Tylenol, Tramadol  - pt/ot  - Nimodipine 60mg q4hrs for SAH protocol  as per NS   - Avoid hypotension, rapid changes in BP  - New tremors noted: EEG ordered.     #anemia- probably dilutional, monitor CBC      # HLD - Simvastatin 10mg daily    # Postop urinary retention -  -Flomax 0.8mg  qhs   -Continue Dangelo Cath, failed TOV on 3/18 ( 3rd attempt)  -Ambulate to bathroom    # Diarrhea , rectal tube now removed ( 3/17/24)  Abdominal XR ( 3/13) noted with  Fecal material   localized to the mid left descending colon.   Chjeck KUB for possible leaking around obstruction.        # Low grade temps, work up NTD-   Completed course of Zosyn for possible PNA   On Bromocriptine- continue    - Mild leukocytosis with ? encephalopathy. Check UA    # Hypokalemia - likely due to diarrhea - supplemented   monitor electrolytes     #Hypophosphatemia - replace as needed    # Hyponatremia - likely due to diarrhea - monitor. Na Goal: 135-145     DVT prophylaxis  -on Lovenox     Dispo: Transfer to Stepdown to Neuro stroke service. 74 y/o mandarin speaking female with PMH of HLD and osteoporosis presents to [facility] with complaints of having cerebral aneurysm. States in 10/2023 she started to have B/L hand numbness. At that time she had MRI brain, MRA head and neck. MRA showed an ACOMM aneurysm. She underwent a cerebral angiogram by José Manuel Villaseñor at [facility] on December 7th, 2023, with the intent to treat this month. The aneurysm is described as a 5.5x4.6x3.6mm wide necked acomm aneurysm that projects medio-superiorly. Reports occasional headaches, described as numbness.   S/P  aneurysm embolization with coiling on 3/6  complicated by aneurysm rupture, controlled with coils and balloon tamponade. NISHANT CT showed SAH b/l frontal lobes and R sylvian fissure along with contrast staining. Patient admitted to NSICU for post op care.   Downgraded to Step down on 3/16. Medicine consulted for medical mgmt .       # ACOMM aneurism , S/P  aneurysm embolization with coiling on 3/6  complicated by aneurysm rupture,   controlled with coils and balloon tamponade.    NISHANT CT showed SAH b/l frontal lobes and R sylvian fissure along with contrast staining.    Patient admitted to NSICU for post op care. Now downgraded to step down   - further mgmt as per primary team   - Neurology noted and appreciated    - Neuro checks q2h  - Bromocriptine 10mg TID- for likely central fevers   - Seroquel 12.5mg nightly  - Pain control- Tylenol, Tramadol  - pt/ot  - Nimodipine 60mg q4hrs for SAH protocol  as per NS   - Avoid hypotension, rapid changes in BP  - New tremors noted: EEG ordered.     #anemia- probably dilutional, Hg - 8.6 , FOBT - negative , monitor CBC,   transfuse if Hg < 7 , consider anemia w/u       # HLD - Simvastatin 10mg daily    # Postop urinary retention -  -Flomax 0.8mg  qhs   -Continue Dangelo Cath, failed TOV on 3/18 ( 3rd attempt)  -Ambulate to bathroom    # Diarrhea , rectal tube now removed ( 3/17/24)  Abdominal XR ( 3/13) noted with  Fecal material   localized to the mid left descending colon.   Check KUB for possible leaking around obstruction.        # Low grade temps, work up NTD-   Completed course of Zosyn for possible PNA   On Bromocriptine- continue    - Mild leukocytosis with ? encephalopathy. Check UA    # Hypokalemia - likely due to diarrhea - supplemented   monitor electrolytes     #Hypophosphatemia - replace as needed    # Hyponatremia - likely due to diarrhea - monitor. Na Goal: 135-145     DVT prophylaxis  -on Lovenox     Dispo: Transfer to Stepdown to Neuro stroke service.

## 2024-03-19 NOTE — PROGRESS NOTE ADULT - SUBJECTIVE AND OBJECTIVE BOX
Preliminary note, official recommendations pending attending review/signature   Seen and examined by Stroke team attending/team, assessment/ plan as discussed with stroke team attending/team as noted.     [facility] Stroke Team  Progress Note     HPI:    74 y/o mandarin speaking female with PMH of HLD and osteoporosis presents to PST with complaints of having cerebral aneurysm. States in 10/2023 she started to have B/L hand numbness. At that time she had MRI brain, MRA head and neck. MRA showed an ACOMM aneurysm. She underwent a cerebral angiogram by José Manuel Villaseñor at [facility] on December 7th, 2023, with the intent to treat this month. The aneurysm is described as a 5.5x4.6x3.6mm wide necked acomm aneurysm that projects medio-superiorly. Reports occasional headaches, described as numbness, 5/10 in severity, worse with laying down, relieved with sitting up or standing. Patient underwent diagnostic angiogram 2/14/24 which confirmed acomm aneurysm. On 3/6 patient underwent aneurysm embolization with coiling complicated by aneurysm rupture, controlled with coils and balloon tamponade. NISHANT CT showed SAH b/l frontal lobes and R sylvian fissure along with contrast staining. Patient admitted to NSICU for post op care.. EVD now removed per neurosurgery 3/15. Transferred to neurosurgery service 3/16, stroke team asked to consult, transferred to Stroke service 3/18.     SUBJECTIVE: Noted hyponatremia overnight.  No new neurologic complaints.  ROS reported negative unless otherwise noted.    acetaminophen     Tablet .. 650 milliGRAM(s) Oral every 6 hours PRN  bromocriptine Capsule 10 milliGRAM(s) Oral three times a day  chlorhexidine 2% Cloths 1 Application(s) Topical daily  enoxaparin Injectable 30 milliGRAM(s) SubCutaneous every 24 hours  hydrALAZINE Injectable 10 milliGRAM(s) IV Push every 2 hours PRN  labetalol Injectable 10 milliGRAM(s) IV Push every 2 hours PRN  multivitamin 1 Tablet(s) Oral daily  niMODipine Oral Solution 60 milliGRAM(s) Oral every 4 hours  nystatin Powder 1 Application(s) Topical two times a day  ondansetron Injectable 4 milliGRAM(s) IV Push every 6 hours PRN  psyllium Powder 1 Packet(s) Oral two times a day  QUEtiapine 12.5 milliGRAM(s) Oral at bedtime  saccharomyces boulardii 250 milliGRAM(s) Oral two times a day  simethicone 80 milliGRAM(s) Chew every 6 hours PRN  simvastatin 10 milliGRAM(s) Oral at bedtime  sodium chloride 0.9%. 1000 milliLiter(s) IV Continuous <Continuous>  tamsulosin 0.8 milliGRAM(s) Oral at bedtime      PHYSICAL EXAM:   Vital Signs Last 24 Hrs  T(C): 36.8 (19 Mar 2024 07:55), Max: 37 (18 Mar 2024 10:00)  T(F): 98.2 (19 Mar 2024 07:55), Max: 98.6 (18 Mar 2024 10:00)  HR: 93 (19 Mar 2024 09:00) (85 - 107)  BP: 123/107 (19 Mar 2024 09:00) (104/90 - 150/73)  BP(mean): 115 (19 Mar 2024 09:00) (68 - 130)  RR: 18 (19 Mar 2024 09:00) (14 - 24)  SpO2: 99% (19 Mar 2024 09:00) (95% - 100%)    Parameters below as of 19 Mar 2024 08:00  Patient On (Oxygen Delivery Method): room air    EXAM PENDING     General: No acute distress.   HEENT: Head normocephalic, atraumatic. Conjunctivae clear w/o exudates or hemorrhage. Sclera non-icteric. Nares are patent bilaterally. Mucous membranes pink and moist.  No tonsillar swelling or exudates.    Neck: Supple, no adenopathy. Trachea midline. No JVD.  Cardiac: Normal rate and rhythm. S1, S2 auscultated. No murmurs, gallops, or rubs.     Respiratory: Lung sounds clear in all fields. Chest wall symmetric, nontender.   Abdominal: Soft, nondistended, nontender. Bowel sounds normoactive x 4 quadrants. No masses, hepatomegaly, or splenomegaly.   Skin: Skin is warm, dry and intact without rashes or lesions. Appropriate color for ethnicity. Nailbeds pink with no cyanosis or clubbing.   Extremities: No edema, 2+ peripheral pulses in b/l upper and b/l lower extremities. Full range of motion in all joints.     NEUROLOGICAL EXAM:  Mental status: Awake, alert, oriented x3, speech fluent, follows commands, no neglect, normal memory   Cranial Nerves: No facial asymmetry, no nystagmus, no dysarthria,  tongue midline  Motor exam: Normal tone, no drift, 5/5 RUE, 5/5 RLE, 5/5 LUE, 5/5 LLE, normal fine finger movements.  Sensation: Intact to light touch   Coordination/ Gait: No dysmetria, gait not tested    LABS:                        8.6    12.57 )-----------( 401      ( 19 Mar 2024 04:00 )             25.0    03-19    132<L>  |  99  |  7.7<L>  ----------------------------<  108<H>  3.6   |  22.0  |  0.34<L>    Ca    7.5<L>      19 Mar 2024 04:00  Phos  2.0     03-19  Mg     2.0     03-19 03-19 Chol 142 LDL -55- HDL 63 Trig 120    A1C:A1C with Estimated Average Glucose Result: 5.8 %    IMAGING: Reviewed by me.       CT Head No Cont (03.16.24 @ 03:22)   IMPRESSION: No change in mild ventricular dilatation with intraventricular hemorrhage and hemorrhage in the frontal lobe midline and corpus callosum after extraventricular drain removal since 3/15/2024.  --- End of Report ---    CT Head No Cont (03.15.24 @ 05:38)   IMPRESSION: Stable follow-up CT study.  --- End of Report ---    TTE W or WO Ultrasound Enhancing Agent (03.13.24 @ 12:03)   CONCLUSIONS:    1. Left ventricular cavity is normal in size. Left ventricular wall thickness is normal. Left ventricular systolic function is normal with an ejection fraction visually estimated at 65 to 70 %.   2. Normal left ventricular diastolic function.   3. Normal right ventricular cavity size and normal systolic function.   4. The left atrium is normal.   5. The right atrium is normal in size.   6. Fibrocalcific aortic valve sclerosis without stenosis.   7. Mild mitral valve leaflet calcification.   8. Trace mitral regurgitation.   9. Estimated pulmonary artery systolic pressure is 23 mmHg, normal pulmonary artery pressure.      US Duplex Venous Lower Ext Complete, Bilateral (03.12.24 @ 15:23) >  IMPRESSION: No evidence of deep venous thrombosis in either lower extremity.  --- End of Report ---    IR Neuro (03.11.24 @ 09:01)   IMPRESSION:  1. 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 No interval aneurysm recanalization or evidence for pseudoaneurysm.  2. No vasospasm.      CT Head No Cont (03.10.24 @ 12:37)   IMPRESSION: Mildly decreased bilateral mesial frontal parenchymal hemorrhages. Similar anterior interhemispheric acute subarachnoid hemorrhage. Mildly decreased intraventricular hemorrhage within the bilateral lateral   and third ventricles. Decreased ventricular size with near resolution of hydrocephalus. No large midline shift. Basal cisterns are more well visualized on the current examination, this may be due to technique.  --- End of Report ---    MR Angio Head w/ IV Cont (03.09.24 @ 18:57)   IMPRESSION:  MR brain: Intraparenchymal hemorrhage within the medial anterior frontal lobes measuring approximately 3.5 cm, unchanged. There is surrounding edema and extension into the genuine body of corpus callosum, unchanged. Hemorrhagic clot within the LEFT lateral ventricle and third ventricle is unchanged. Minimal hemorrhage seen in the dependent portions of the RIGHT lateral ventricle unchanged. Minimal subarachnoid hemorrhage seen along the medial sulci of the BILATERAL frontal and parietal lobes as well as scattered throughout the sulci of the brain. RIGHT frontal ventriculostomy catheter tip is seen extending toward the body of the   LEFT ventricle and third ventricle.  Coil material is seen in the region of the anterior communicating artery on the RIGHT.   Blood products are seen in the region of the known aneurysm possibly indicating partial patency.    MRA intracranial:   Coil material is seen in the region of the anterior communicating artery. There is 1 x 4 mm region of signal seen on the noncontrast but not the postcontrast which may reflect blood products within the aneurysm indicating patency.      CT Head No Cont (03.07.24 @ 05:56)   IMPRESSION:   persistent intracranial hemorrhage within the BILATERAL lateral and third ventricles, LEFT greater than RIGHT, with mild obstructive hydrocephalus slightly increased. RIGHT frontal shunt catheter tip seen in body of RIGHT lateral ventricle unchanged. Subarachnoid hemorrhage again noted in the BILATERAL medial frontal lobes with hemorrhage in the anterior corpus callosum, LEFT greaterthan RIGHT.  --- End of Report ---    CT Head No Cont (03.06.24 @ 19:20)   IMPRESSION: Interval placement of right frontal approach ventriculostomy catheter since the prior study performed 3 hours ago, with improved hydrocephalus and slightly decreased sulcal/cisternal bilateral subarachnoid hemorrhage.    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 Intraventricular hemorrhage is present with hydrocephalus.    IR Neuro (03.06.24 @ 12:13)   IMPRESSION:  1. 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  2. Status postballoon assisted coil embolization, the aneurysm is nearly completely occluded other than a 2.2 mm x 1.4 mm x 4.3 mm aneurysmal neck remnant.   Preliminary note, official recommendations pending attending review/signature   Seen and examined by Stroke team attending/team, assessment/ plan as discussed with stroke team attending/team as noted.   Translation per daughter at bedside as preferred.     [facility] Stroke Team  Progress Note     HPI:    74 y/o mandarin speaking female with PMH of HLD and osteoporosis presents to PST with complaints of having cerebral aneurysm. States in 10/2023 she started to have B/L hand numbness. At that time she had MRI brain, MRA head and neck. MRA showed an ACOMM aneurysm. She underwent a cerebral angiogram by José Manuel Villaseñor at [facility] on December 7th, 2023, with the intent to treat this month. The aneurysm is described as a 5.5x4.6x3.6mm wide necked acomm aneurysm that projects medio-superiorly. Reports occasional headaches, described as numbness, 5/10 in severity, worse with laying down, relieved with sitting up or standing. Patient underwent diagnostic angiogram 2/14/24 which confirmed acomm aneurysm. On 3/6 patient underwent aneurysm embolization with coiling complicated by aneurysm rupture, controlled with coils and balloon tamponade. NISHANT CT showed SAH b/l frontal lobes and R sylvian fissure along with contrast staining. Patient admitted to NSICU for post op care.. EVD now removed per neurosurgery 3/15. Transferred to neurosurgery service 3/16, stroke team asked to consult, transferred to Stroke service 3/18.     SUBJECTIVE: Noted hyponatremia overnight.  No new neurologic complaints. + abdominal fullness,   ROS reported negative unless otherwise noted.    acetaminophen     Tablet .. 650 milliGRAM(s) Oral every 6 hours PRN  bromocriptine Capsule 10 milliGRAM(s) Oral three times a day  chlorhexidine 2% Cloths 1 Application(s) Topical daily  enoxaparin Injectable 30 milliGRAM(s) SubCutaneous every 24 hours  hydrALAZINE Injectable 10 milliGRAM(s) IV Push every 2 hours PRN  labetalol Injectable 10 milliGRAM(s) IV Push every 2 hours PRN  multivitamin 1 Tablet(s) Oral daily  niMODipine Oral Solution 60 milliGRAM(s) Oral every 4 hours  nystatin Powder 1 Application(s) Topical two times a day  ondansetron Injectable 4 milliGRAM(s) IV Push every 6 hours PRN  psyllium Powder 1 Packet(s) Oral two times a day  QUEtiapine 12.5 milliGRAM(s) Oral at bedtime  saccharomyces boulardii 250 milliGRAM(s) Oral two times a day  simethicone 80 milliGRAM(s) Chew every 6 hours PRN  simvastatin 10 milliGRAM(s) Oral at bedtime  sodium chloride 0.9%. 1000 milliLiter(s) IV Continuous <Continuous>  tamsulosin 0.8 milliGRAM(s) Oral at bedtime      PHYSICAL EXAM:   Vital Signs Last 24 Hrs  T(C): 36.8 (19 Mar 2024 07:55), Max: 37 (18 Mar 2024 10:00)  T(F): 98.2 (19 Mar 2024 07:55), Max: 98.6 (18 Mar 2024 10:00)  HR: 93 (19 Mar 2024 09:00) (85 - 107)  BP: 123/107 (19 Mar 2024 09:00) (104/90 - 150/73)  BP(mean): 115 (19 Mar 2024 09:00) (68 - 130)  RR: 18 (19 Mar 2024 09:00) (14 - 24)  SpO2: 99% (19 Mar 2024 09:00) (95% - 100%)    Parameters below as of 19 Mar 2024 08:00  Patient On (Oxygen Delivery Method): room air      General: No acute distress.   HEENT: Head normocephalic, atraumatic. Conjunctivae clear w/o exudates or hemorrhage. Sclera non-icteric. Nares are patent bilaterally. Mucous membranes pink and moist.  No tonsillar swelling or exudates.    Neck: Supple, no adenopathy. Trachea midline. No JVD.  Cardiac: Normal rate and rhythm.   Respiratory: Lung sounds clear, no use of accessory muscles, respirations unlabored   Abdominal: Firm , distended, nontender. Bowel sounds  x 4 quadrants   Skin: Skin is warm, dry and intact without rashes or lesions. no drainage at groin site , no induration or hematoma, NTTP  Extremities: No edema, 2+ peripheral pulses in b/l upper and b/l lower extremities. Full range of motion in all joints.   Supersacral bony protuberance , slightly tender       NEUROLOGICAL EXAM:  Mental status: The patient is awake and alert and has normal attention span.  The patient is fully oriented in 3 spheres. The patient is oriented to current events. The patient is able to name objects, follow commands, repeat sentences.    Cranial nerves: slight left facial palsy, Pupils equal and react symmetrically to light. There is no visual field deficit to confrontation. Extraocular motion is full with no nystagmus. There is no ptosis. Facial sensation is intact.   Motor: There is normal bulk and tone.  There is no tremor.  LUE pronator drift, strength 5-/5, LLE 5-/5. RUE and bilateral LE strength 5/5  Sensation: Intact to light touch  in 4 extremities      LABS:                        8.6    12.57 )-----------( 401      ( 19 Mar 2024 04:00 )             25.0    03-19    132<L>  |  99  |  7.7<L>  ----------------------------<  108<H>  3.6   |  22.0  |  0.34<L>    Ca    7.5<L>      19 Mar 2024 04:00  Phos  2.0     03-19  Mg     2.0     03-19 03-19 Chol 142 LDL -55- HDL 63 Trig 120    A1C:A1C with Estimated Average Glucose Result: 5.8 %    IMAGING: Reviewed by me.       CT Head No Cont (03.16.24 @ 03:22)   IMPRESSION: No change in mild ventricular dilatation with intraventricular hemorrhage and hemorrhage in the frontal lobe midline and corpus callosum after extraventricular drain removal since 3/15/2024.    CT Head No Cont (03.15.24 @ 05:38)   IMPRESSION: Stable follow-up CT study.     TTE W or WO Ultrasound Enhancing Agent (03.13.24 @ 12:03)   CONCLUSIONS:    1. Left ventricular cavity is normal in size. Left ventricular wall thickness is normal. Left ventricular systolic function is normal with an ejection fraction visually estimated at 65 to 70 %.   2. Normal left ventricular diastolic function.   3. Normal right ventricular cavity size and normal systolic function.   4. The left atrium is normal.   5. The right atrium is normal in size.   6. Fibrocalcific aortic valve sclerosis without stenosis.   7. Mild mitral valve leaflet calcification.   8. Trace mitral regurgitation.   9. Estimated pulmonary artery systolic pressure is 23 mmHg, normal pulmonary artery pressure.    US Duplex Venous Lower Ext Complete, Bilateral (03.12.24 @ 15:23) >  IMPRESSION: No evidence of deep venous thrombosis in either lower extremity.     IR Neuro (03.11.24 @ 09:01)   IMPRESSION:  1. 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 No interval aneurysm recanalization or evidence for pseudoaneurysm.  2. No vasospasm.      CT Head No Cont (03.10.24 @ 12:37)   IMPRESSION: Mildly decreased bilateral mesial frontal parenchymal hemorrhages. Similar anterior interhemispheric acute subarachnoid hemorrhage. Mildly decreased intraventricular hemorrhage within the bilateral lateral   and third ventricles. Decreased ventricular size with near resolution of hydrocephalus. No large midline shift. Basal cisterns are more well visualized on the current examination, this may be due to technique.       MR Angio Head w/ IV Cont (03.09.24 @ 18:57)   IMPRESSION:  MR brain: Intraparenchymal hemorrhage within the medial anterior frontal lobes measuring approximately 3.5 cm, unchanged. There is surrounding edema and extension into the genuine body of corpus callosum, unchanged. Hemorrhagic clot within the LEFT lateral ventricle and third ventricle is unchanged. Minimal hemorrhage seen in the dependent portions of the RIGHT lateral ventricle unchanged. Minimal subarachnoid hemorrhage seen along the medial sulci of the BILATERAL frontal and parietal lobes as well as scattered throughout the sulci of the brain. RIGHT frontal ventriculostomy catheter tip is seen extending toward the body of the   LEFT ventricle and third ventricle.  Coil material is seen in the region of the anterior communicating artery on the RIGHT.   Blood products are seen in the region of the known aneurysm possibly indicating partial patency.    MRA intracranial:   Coil material is seen in the region of the anterior communicating artery. There is 1 x 4 mm region of signal seen on the noncontrast but not the postcontrast which may reflect blood products within the aneurysm indicating patency.      CT Head No Cont (03.07.24 @ 05:56)   IMPRESSION:   persistent intracranial hemorrhage within the BILATERAL lateral and third ventricles, LEFT greater than RIGHT, with mild obstructive hydrocephalus slightly increased. RIGHT frontal shunt catheter tip seen in body of RIGHT lateral ventricle unchanged. Subarachnoid hemorrhage again noted in the BILATERAL medial frontal lobes with hemorrhage in the anterior corpus callosum, LEFT greaterthan RIGHT.     CT Head No Cont (03.06.24 @ 19:20)   IMPRESSION: Interval placement of right frontal approach ventriculostomy catheter since the prior study performed 3 hours ago, with improved hydrocephalus and slightly decreased sulcal/cisternal bilateral subarachnoid hemorrhage.    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 Intraventricular hemorrhage is present with hydrocephalus.    IR Neuro (03.06.24 @ 12:13)   IMPRESSION:  1. 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  2. Status postballoon assisted coil embolization, the aneurysm is nearly completely occluded other than a 2.2 mm x 1.4 mm x 4.3 mm aneurysmal neck remnant.   Preliminary note, official recommendations pending attending review/signature   Seen and examined by Stroke team attending/team, assessment/ plan as discussed with stroke team attending/team as noted.   Translation per daughter at bedside as preferred.     [facility] Stroke Team  Progress Note     HPI:    74 y/o mandarin speaking female with PMH of HLD and osteoporosis presents to PST with complaints of having cerebral aneurysm. States in 10/2023 she started to have B/L hand numbness. At that time she had MRI brain, MRA head and neck. MRA showed an ACOMM aneurysm. She underwent a cerebral angiogram by José Manuel Villaseñor at [facility] on December 7th, 2023, with the intent to treat this month. The aneurysm is described as a 5.5x4.6x3.6mm wide necked acomm aneurysm that projects medio-superiorly. Reports occasional headaches, described as numbness, 5/10 in severity, worse with laying down, relieved with sitting up or standing. Patient underwent diagnostic angiogram 2/14/24 which confirmed acomm aneurysm. On 3/6 patient underwent aneurysm embolization with coiling complicated by aneurysm rupture, controlled with coils and balloon tamponade. NISHANT CT showed SAH b/l frontal lobes and R sylvian fissure along with contrast staining. Patient admitted to NSICU for post op care.. EVD now removed per neurosurgery 3/15. Transferred to neurosurgery service 3/16, stroke team asked to consult, transferred to Stroke service 3/18.     SUBJECTIVE: Noted hyponatremia overnight.  No new neurologic complaints. + abdominal fullness,   ROS reported negative unless otherwise noted.    acetaminophen     Tablet .. 650 milliGRAM(s) Oral every 6 hours PRN  bromocriptine Capsule 10 milliGRAM(s) Oral three times a day  chlorhexidine 2% Cloths 1 Application(s) Topical daily  enoxaparin Injectable 30 milliGRAM(s) SubCutaneous every 24 hours  hydrALAZINE Injectable 10 milliGRAM(s) IV Push every 2 hours PRN  labetalol Injectable 10 milliGRAM(s) IV Push every 2 hours PRN  multivitamin 1 Tablet(s) Oral daily  niMODipine Oral Solution 60 milliGRAM(s) Oral every 4 hours  nystatin Powder 1 Application(s) Topical two times a day  ondansetron Injectable 4 milliGRAM(s) IV Push every 6 hours PRN  psyllium Powder 1 Packet(s) Oral two times a day  QUEtiapine 12.5 milliGRAM(s) Oral at bedtime  saccharomyces boulardii 250 milliGRAM(s) Oral two times a day  simethicone 80 milliGRAM(s) Chew every 6 hours PRN  simvastatin 10 milliGRAM(s) Oral at bedtime  sodium chloride 0.9%. 1000 milliLiter(s) IV Continuous <Continuous>  tamsulosin 0.8 milliGRAM(s) Oral at bedtime      PHYSICAL EXAM:   Vital Signs Last 24 Hrs  T(C): 36.8 (19 Mar 2024 07:55), Max: 37 (18 Mar 2024 10:00)  T(F): 98.2 (19 Mar 2024 07:55), Max: 98.6 (18 Mar 2024 10:00)  HR: 93 (19 Mar 2024 09:00) (85 - 107)  BP: 123/107 (19 Mar 2024 09:00) (104/90 - 150/73)  BP(mean): 115 (19 Mar 2024 09:00) (68 - 130)  RR: 18 (19 Mar 2024 09:00) (14 - 24)  SpO2: 99% (19 Mar 2024 09:00) (95% - 100%)    Parameters below as of 19 Mar 2024 08:00  Patient On (Oxygen Delivery Method): room air      General: No acute distress.   HEENT: Head normocephalic, atraumatic. Conjunctivae clear w/o exudates or hemorrhage. Sclera non-icteric. Nares are patent bilaterally. Mucous membranes pink and moist.  No tonsillar swelling or exudates.    Neck: Supple, no adenopathy. Trachea midline. No JVD.  Cardiac: Normal rate and rhythm.   Respiratory: Lung sounds clear, no use of accessory muscles, respirations unlabored   Abdominal: Firm , distended, nontender. Bowel sounds  x 4 quadrants   Skin: Skin is warm, dry and intact without rashes or lesions. no drainage at groin site , no induration or hematoma, NTTP  Extremities: No edema, 2+ peripheral pulses in b/l upper and b/l lower extremities. Full range of motion in all joints.   Supersacral bony protuberance , slightly tender       NEUROLOGICAL EXAM:  Mental status: The patient is awake and alert and has normal attention span.  The patient is fully oriented in 3 spheres. The patient is oriented to current events. The patient is able to name objects, follow commands, repeat sentences.    Cranial nerves: slight left facial palsy, Pupils equal and react symmetrically to light. There is no visual field deficit to confrontation. Extraocular motion is full with no nystagmus. There is no ptosis. Facial sensation is intact.   Motor: There is normal bulk and tone.  There is no tremor.  LUE pronator drift, strength 5-/5, LLE 5-/5. RUE and bilateral LE strength 5/5  Sensation: Intact to light touch  in 4 extremities  Slight tremor b/l upper extremities       LABS:                        8.6    12.57 )-----------( 401      ( 19 Mar 2024 04:00 )             25.0    03-19    132<L>  |  99  |  7.7<L>  ----------------------------<  108<H>  3.6   |  22.0  |  0.34<L>    Ca    7.5<L>      19 Mar 2024 04:00  Phos  2.0     03-19  Mg     2.0     03-19 03-19 Chol 142 LDL -55- HDL 63 Trig 120    A1C:A1C with Estimated Average Glucose Result: 5.8 %    IMAGING: Reviewed by me.       CT Head No Cont (03.16.24 @ 03:22)   IMPRESSION: No change in mild ventricular dilatation with intraventricular hemorrhage and hemorrhage in the frontal lobe midline and corpus callosum after extraventricular drain removal since 3/15/2024.    CT Head No Cont (03.15.24 @ 05:38)   IMPRESSION: Stable follow-up CT study.     TTE W or WO Ultrasound Enhancing Agent (03.13.24 @ 12:03)   CONCLUSIONS:    1. Left ventricular cavity is normal in size. Left ventricular wall thickness is normal. Left ventricular systolic function is normal with an ejection fraction visually estimated at 65 to 70 %.   2. Normal left ventricular diastolic function.   3. Normal right ventricular cavity size and normal systolic function.   4. The left atrium is normal.   5. The right atrium is normal in size.   6. Fibrocalcific aortic valve sclerosis without stenosis.   7. Mild mitral valve leaflet calcification.   8. Trace mitral regurgitation.   9. Estimated pulmonary artery systolic pressure is 23 mmHg, normal pulmonary artery pressure.    US Duplex Venous Lower Ext Complete, Bilateral (03.12.24 @ 15:23) >  IMPRESSION: No evidence of deep venous thrombosis in either lower extremity.     IR Neuro (03.11.24 @ 09:01)   IMPRESSION:  1. 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 No interval aneurysm recanalization or evidence for pseudoaneurysm.  2. No vasospasm.      CT Head No Cont (03.10.24 @ 12:37)   IMPRESSION: Mildly decreased bilateral mesial frontal parenchymal hemorrhages. Similar anterior interhemispheric acute subarachnoid hemorrhage. Mildly decreased intraventricular hemorrhage within the bilateral lateral   and third ventricles. Decreased ventricular size with near resolution of hydrocephalus. No large midline shift. Basal cisterns are more well visualized on the current examination, this may be due to technique.       MR Angio Head w/ IV Cont (03.09.24 @ 18:57)   IMPRESSION:  MR brain: Intraparenchymal hemorrhage within the medial anterior frontal lobes measuring approximately 3.5 cm, unchanged. There is surrounding edema and extension into the genuine body of corpus callosum, unchanged. Hemorrhagic clot within the LEFT lateral ventricle and third ventricle is unchanged. Minimal hemorrhage seen in the dependent portions of the RIGHT lateral ventricle unchanged. Minimal subarachnoid hemorrhage seen along the medial sulci of the BILATERAL frontal and parietal lobes as well as scattered throughout the sulci of the brain. RIGHT frontal ventriculostomy catheter tip is seen extending toward the body of the   LEFT ventricle and third ventricle.  Coil material is seen in the region of the anterior communicating artery on the RIGHT.   Blood products are seen in the region of the known aneurysm possibly indicating partial patency.    MRA intracranial:   Coil material is seen in the region of the anterior communicating artery. There is 1 x 4 mm region of signal seen on the noncontrast but not the postcontrast which may reflect blood products within the aneurysm indicating patency.      CT Head No Cont (03.07.24 @ 05:56)   IMPRESSION:   persistent intracranial hemorrhage within the BILATERAL lateral and third ventricles, LEFT greater than RIGHT, with mild obstructive hydrocephalus slightly increased. RIGHT frontal shunt catheter tip seen in body of RIGHT lateral ventricle unchanged. Subarachnoid hemorrhage again noted in the BILATERAL medial frontal lobes with hemorrhage in the anterior corpus callosum, LEFT greaterthan RIGHT.     CT Head No Cont (03.06.24 @ 19:20)   IMPRESSION: Interval placement of right frontal approach ventriculostomy catheter since the prior study performed 3 hours ago, with improved hydrocephalus and slightly decreased sulcal/cisternal bilateral subarachnoid hemorrhage.    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 Intraventricular hemorrhage is present with hydrocephalus.    IR Neuro (03.06.24 @ 12:13)   IMPRESSION:  1. 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  2. Status postballoon assisted coil embolization, the aneurysm is nearly completely occluded other than a 2.2 mm x 1.4 mm x 4.3 mm aneurysmal neck remnant.      Translation per daughter at bedside as preferred.     [facility] Stroke Team  Progress Note     HPI:    72 y/o mandarin speaking female with PMH of HLD and osteoporosis presents to PST with complaints of having cerebral aneurysm. States in 10/2023 she started to have B/L hand numbness. At that time she had MRI brain, MRA head and neck. MRA showed an ACOMM aneurysm. She underwent a cerebral angiogram by José Manuel Villaseñor at [facility] on December 7th, 2023, with the intent to treat this month. The aneurysm is described as a 5.5x4.6x3.6mm wide necked acomm aneurysm that projects medio-superiorly. Reports occasional headaches, described as numbness, 5/10 in severity, worse with laying down, relieved with sitting up or standing. Patient underwent diagnostic angiogram 2/14/24 which confirmed acomm aneurysm. On 3/6 patient underwent aneurysm embolization with coiling complicated by aneurysm rupture, controlled with coils and balloon tamponade. NISHANT CT showed SAH b/l frontal lobes and R sylvian fissure along with contrast staining. Patient admitted to NSICU for post op care.. EVD now removed per neurosurgery 3/15. Transferred to neurosurgery service 3/16, stroke team asked to consult, transferred to Stroke service 3/18.     SUBJECTIVE: Noted hyponatremia overnight.  No new neurologic complaints. + abdominal fullness,   ROS reported negative unless otherwise noted.    acetaminophen     Tablet .. 650 milliGRAM(s) Oral every 6 hours PRN  bromocriptine Capsule 10 milliGRAM(s) Oral three times a day  chlorhexidine 2% Cloths 1 Application(s) Topical daily  enoxaparin Injectable 30 milliGRAM(s) SubCutaneous every 24 hours  hydrALAZINE Injectable 10 milliGRAM(s) IV Push every 2 hours PRN  labetalol Injectable 10 milliGRAM(s) IV Push every 2 hours PRN  multivitamin 1 Tablet(s) Oral daily  niMODipine Oral Solution 60 milliGRAM(s) Oral every 4 hours  nystatin Powder 1 Application(s) Topical two times a day  ondansetron Injectable 4 milliGRAM(s) IV Push every 6 hours PRN  psyllium Powder 1 Packet(s) Oral two times a day  QUEtiapine 12.5 milliGRAM(s) Oral at bedtime  saccharomyces boulardii 250 milliGRAM(s) Oral two times a day  simethicone 80 milliGRAM(s) Chew every 6 hours PRN  simvastatin 10 milliGRAM(s) Oral at bedtime  sodium chloride 0.9%. 1000 milliLiter(s) IV Continuous <Continuous>  tamsulosin 0.8 milliGRAM(s) Oral at bedtime      PHYSICAL EXAM:   Vital Signs Last 24 Hrs  T(C): 36.8 (19 Mar 2024 07:55), Max: 37 (18 Mar 2024 10:00)  T(F): 98.2 (19 Mar 2024 07:55), Max: 98.6 (18 Mar 2024 10:00)  HR: 93 (19 Mar 2024 09:00) (85 - 107)  BP: 123/107 (19 Mar 2024 09:00) (104/90 - 150/73)  BP(mean): 115 (19 Mar 2024 09:00) (68 - 130)  RR: 18 (19 Mar 2024 09:00) (14 - 24)  SpO2: 99% (19 Mar 2024 09:00) (95% - 100%)    Parameters below as of 19 Mar 2024 08:00  Patient On (Oxygen Delivery Method): room air      General: No acute distress.   HEENT: Head normocephalic, atraumatic. Conjunctivae clear w/o exudates or hemorrhage. Sclera non-icteric. Nares are patent bilaterally. Mucous membranes pink and moist.  No tonsillar swelling or exudates.    Neck: Supple, no adenopathy. Trachea midline. No JVD.  Cardiac: Normal rate and rhythm.   Respiratory: Lung sounds clear, no use of accessory muscles, respirations unlabored   Abdominal: Firm , distended, nontender. Bowel sounds  x 4 quadrants   Skin: Skin is warm, dry and intact without rashes or lesions. no drainage at groin site , no induration or hematoma, NTTP  Extremities: No edema, 2+ peripheral pulses in b/l upper and b/l lower extremities. Full range of motion in all joints.   Supersacral bony protuberance , slightly tender       NEUROLOGICAL EXAM:  Mental status: The patient is awake and alert and has normal attention span.  The patient is fully oriented in 3 spheres. The patient is oriented to current events. The patient is able to name objects, follow commands, repeat sentences.    Cranial nerves: slight left facial palsy, Pupils equal and react symmetrically to light. There is no visual field deficit to confrontation. Extraocular motion is full with no nystagmus. There is no ptosis. Facial sensation is intact.   Motor: There is normal bulk and tone.  There is no tremor.  LUE pronator drift, strength 5-/5, LLE 5-/5. RUE and bilateral LE strength 5/5  Sensation: Intact to light touch  in 4 extremities  Slight tremor b/l upper extremities       LABS:                        8.6    12.57 )-----------( 401      ( 19 Mar 2024 04:00 )             25.0    03-19    132<L>  |  99  |  7.7<L>  ----------------------------<  108<H>  3.6   |  22.0  |  0.34<L>    Ca    7.5<L>      19 Mar 2024 04:00  Phos  2.0     03-19  Mg     2.0     03-19 03-19 Chol 142 LDL -55- HDL 63 Trig 120    A1C:A1C with Estimated Average Glucose Result: 5.8 %    IMAGING: Reviewed by me.       CT Head No Cont (03.16.24 @ 03:22)   IMPRESSION: No change in mild ventricular dilatation with intraventricular hemorrhage and hemorrhage in the frontal lobe midline and corpus callosum after extraventricular drain removal since 3/15/2024.    CT Head No Cont (03.15.24 @ 05:38)   IMPRESSION: Stable follow-up CT study.     TTE W or WO Ultrasound Enhancing Agent (03.13.24 @ 12:03)   CONCLUSIONS:    1. Left ventricular cavity is normal in size. Left ventricular wall thickness is normal. Left ventricular systolic function is normal with an ejection fraction visually estimated at 65 to 70 %.   2. Normal left ventricular diastolic function.   3. Normal right ventricular cavity size and normal systolic function.   4. The left atrium is normal.   5. The right atrium is normal in size.   6. Fibrocalcific aortic valve sclerosis without stenosis.   7. Mild mitral valve leaflet calcification.   8. Trace mitral regurgitation.   9. Estimated pulmonary artery systolic pressure is 23 mmHg, normal pulmonary artery pressure.    US Duplex Venous Lower Ext Complete, Bilateral (03.12.24 @ 15:23) >  IMPRESSION: No evidence of deep venous thrombosis in either lower extremity.     IR Neuro (03.11.24 @ 09:01)   IMPRESSION:  1. 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 No interval aneurysm recanalization or evidence for pseudoaneurysm.  2. No vasospasm.      CT Head No Cont (03.10.24 @ 12:37)   IMPRESSION: Mildly decreased bilateral mesial frontal parenchymal hemorrhages. Similar anterior interhemispheric acute subarachnoid hemorrhage. Mildly decreased intraventricular hemorrhage within the bilateral lateral   and third ventricles. Decreased ventricular size with near resolution of hydrocephalus. No large midline shift. Basal cisterns are more well visualized on the current examination, this may be due to technique.       MR Angio Head w/ IV Cont (03.09.24 @ 18:57)   IMPRESSION:  MR brain: Intraparenchymal hemorrhage within the medial anterior frontal lobes measuring approximately 3.5 cm, unchanged. There is surrounding edema and extension into the genuine body of corpus callosum, unchanged. Hemorrhagic clot within the LEFT lateral ventricle and third ventricle is unchanged. Minimal hemorrhage seen in the dependent portions of the RIGHT lateral ventricle unchanged. Minimal subarachnoid hemorrhage seen along the medial sulci of the BILATERAL frontal and parietal lobes as well as scattered throughout the sulci of the brain. RIGHT frontal ventriculostomy catheter tip is seen extending toward the body of the   LEFT ventricle and third ventricle.  Coil material is seen in the region of the anterior communicating artery on the RIGHT.   Blood products are seen in the region of the known aneurysm possibly indicating partial patency.    MRA intracranial:   Coil material is seen in the region of the anterior communicating artery. There is 1 x 4 mm region of signal seen on the noncontrast but not the postcontrast which may reflect blood products within the aneurysm indicating patency.      CT Head No Cont (03.07.24 @ 05:56)   IMPRESSION:   persistent intracranial hemorrhage within the BILATERAL lateral and third ventricles, LEFT greater than RIGHT, with mild obstructive hydrocephalus slightly increased. RIGHT frontal shunt catheter tip seen in body of RIGHT lateral ventricle unchanged. Subarachnoid hemorrhage again noted in the BILATERAL medial frontal lobes with hemorrhage in the anterior corpus callosum, LEFT greaterthan RIGHT.     CT Head No Cont (03.06.24 @ 19:20)   IMPRESSION: Interval placement of right frontal approach ventriculostomy catheter since the prior study performed 3 hours ago, with improved hydrocephalus and slightly decreased sulcal/cisternal bilateral subarachnoid hemorrhage.    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 Intraventricular hemorrhage is present with hydrocephalus.    IR Neuro (03.06.24 @ 12:13)   IMPRESSION:  1. 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  2. Status postballoon assisted coil embolization, the aneurysm is nearly completely occluded other than a 2.2 mm x 1.4 mm x 4.3 mm aneurysmal neck remnant.

## 2024-03-19 NOTE — PROGRESS NOTE ADULT - ASSESSMENT
ASSESSMENT: 74 y/o mandarin speaking female with PMH of HLD and osteoporosis presents to [facility] with complaints of having cerebral aneurysm. States in 10/2023 she started to have B/L hand numbness. At that time she had MRI brain, MRA head and neck. MRA showed an ACOMM aneurysm. She underwent a cerebral angiogram by José Manuel Villaseñor at [facility] on December 7th, 2023, with the intent to treat this month. The aneurysm is described as a 5.5x4.6x3.6mm wide necked acomm aneurysm that projects medio-superiorly. Reports occasional headaches, described as numbness, 5/10 in severity, worse with laying down, relieved with sitting up or standing. Patient underwent diagnostic angiogram 2/14/24 which confirmed acomm aneurysm. On 3/6 patient underwent aneurysm embolization with coiling complicated by aneurysm rupture, controlled with coils and balloon tamponade. NISHANT CT showed SAH b/l frontal lobes and R sylvian fissure along with contrast staining. EVD placed  3/6, removed by neurosurgery 3/15. Transferred to Stroke service 3/18.   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       NEURO:   #ACOMM aneurysm elective procedure complicated by aneurysm rupture controlled with coils/balloon tamponade  #SAH  #EVD 3/6, removed 3/15  # Metabolic encephalopathy  -Neurologically stable  -Continue close monitoring for neurologic deterioration    -Stroke neuro checks q4hrs   -MRI Brain w/o, MRA Head w/o and Neck w/contrast results as above   -Most recent CTH 3/16 - stable  -will need repeat MRI brain w/wo contrast in 4-6 weeks  -s/p Keppra  -Continues on nimodipine 60mg q4hrs - per Neurosurgery would discontinue on 3/20, recommended to continue bromocriptine tid due to ? central fevers.   -Dysphagia screen: pass, on easy to chew diet  -Physical therapy/OT/Speech eval/treatment.            #Stroke prevention:  -SBP goal  per neuro IR recommendations, avoiding rapid fluctuations and hypotension   -ANTITHROMBOTIC THERAPY: n/a   -LDL- 55 , continue home regimen if applicable   -HA1C 5.8  -TTE noted above    CARDIOVASCULAR:   -TTE noted above  -cardiac monitoring w/ telemetry for now, further evaluation pending findings of noted workup                              HEMATOLOGY:   H/H with anemia and thrombocytosis , continue close monitoring, no active bleeding noted   -patient should have all age and risk appropriate malignancy screenings with PCP or sooner if clinically suspected      DVT ppx: Heparin s.c [] LMWH [X]     PULMONARY:   -most recent CXR 3/12 with persistent right base infiltrate  -s/p zosyn for possible pneumonia  -consider repeat if concern for infection  -protecting airway, saturating well     RENAL:   #Urinary retention  #Hyponatremia   BUN/Cr without acute change   -maintain adequate hydration    -tamsulosin 0.8mg bedtime   -urinary retention s/p void trial - straight cath protocol for >400cc, bladder scan q6hrs, feliciano re-inserted for failed TOV   -hyponatremia, likely due to diarrhea, continue to monitor, IVF in place with gradual improvement      Na Goal:  135-145     Feliciano: replaced     ID:   -s/p zosyn for possible pneumonia  -fluctuating fevers - Tmax 100.8 in past 24hrs,  possible central fevers but will need to screen for alternate causes   - leukocytosis  -monitor and screen for si/sx of infection   -Abdomen distended, diarrhea- abdominal imaging     GI:  #diarrhea  -Abdominal XR 3/13 noted  -s/p rectal tube, removed 3/17  -monitor BM, loose stool possible side effect of nimodipine  _Abdominal xray pending     Psych:  -Quetiapine 12.5mg at bedtime  -Delirium precautions    -Ortho: xray lumbar /sacral     OTHER:  condition and plan of care d/w patient, questions and concerns addressed. Discussed with family at bedside, medical team at bedside as well present for further evaluation and recommendations.     DISPOSITION: PT/OT recommending acute rehab once medically stable for discharge    CORE MEASURES:        Admission NIHSS: n/a      Tenecteplase : [] YES [X] NO      LDL/HDL/A1C: pending lipid panel/ HA1C 5.8     Depression Screen- if depression hx and/or present      Statin Therapy: simvastatin 10mg     Dysphagia Screen: [X] PASS [] FAIL     Smoking [] YES [X] NO      Afib [] YES [X] NO     Stroke Education [] YES [] NO [X] pending    Obtain screening lower extremity venous ultrasound in patients who meet 1 or more of the following criteria as patient is high risk for DVT/PE on admission:   [] History of DVT/PE  []Hypercoagulable states (Factor V Leiden, Cancer, OCP, etc. )  []Prolonged immobility (hemiplegia/hemiparesis/post operative or any other extended immobilization)  [] Transferred from outside facility (Rehab or Long term care)  [] Age </= to 50 ASSESSMENT: 72 y/o mandarin speaking female with PMH of HLD and osteoporosis presents to [facility] with complaints of having cerebral aneurysm. States in 10/2023 she started to have B/L hand numbness. At that time she had MRI brain, MRA head and neck. MRA showed an ACOMM aneurysm. She underwent a cerebral angiogram by José Manuel Villaseñor at [facility] on December 7th, 2023, with the intent to treat this month. The aneurysm is described as a 5.5x4.6x3.6mm wide necked acomm aneurysm that projects medio-superiorly. Reports occasional headaches, described as numbness, 5/10 in severity, worse with laying down, relieved with sitting up or standing. Patient underwent diagnostic angiogram 2/14/24 which confirmed acomm aneurysm. On 3/6 patient underwent aneurysm embolization with coiling complicated by aneurysm rupture, controlled with coils and balloon tamponade. NISHANT CT showed SAH b/l frontal lobes and R sylvian fissure along with contrast staining. EVD placed  3/6, removed by neurosurgery 3/15. Transferred to Stroke service 3/18.   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       NEURO:   #ACOMM aneurysm elective procedure complicated by aneurysm rupture controlled with coils/balloon tamponade  #SAH  #EVD 3/6, removed 3/15  # Metabolic encephalopathy  -Neurologically stable  -Continue close monitoring for neurologic deterioration    -Stroke neuro checks q4hrs   -Spot EEG   -MRI Brain w/o, MRA Head w/o and Neck w/contrast results as above   -Most recent CTH 3/16 - stable  -will need repeat MRI brain w/wo contrast in 4-6 weeks  -s/p Keppra  -Continues on nimodipine 60mg q4hrs - per Neurosurgery would discontinue on 3/20, recommended to continue bromocriptine tid due to ? central fevers.   -Dysphagia screen: pass, on easy to chew diet  -Physical therapy/OT/Speech eval/treatment.            #Stroke prevention:  -SBP goal  per neuro IR recommendations, avoiding rapid fluctuations and hypotension   -ANTITHROMBOTIC THERAPY: n/a   -LDL- 55 , continue home regimen if applicable   -HA1C 5.8  -TTE noted above    CARDIOVASCULAR:   -TTE noted above  -cardiac monitoring w/ telemetry for now, further evaluation pending findings of noted workup                              HEMATOLOGY:   H/H with anemia and thrombocytosis , continue close monitoring, no active bleeding noted   -patient should have all age and risk appropriate malignancy screenings with PCP or sooner if clinically suspected      DVT ppx: Heparin s.c [] LMWH [X]     PULMONARY:   -most recent CXR 3/12 with persistent right base infiltrate  -s/p zosyn for possible pneumonia  -consider repeat if concern for infection  -protecting airway, saturating well     RENAL:   #Urinary retention  #Hyponatremia   BUN/Cr without acute change   -maintain adequate hydration    -tamsulosin 0.8mg bedtime   -urinary retention s/p void trial - straight cath protocol for >400cc, bladder scan q6hrs, feliciano re-inserted for failed TOV   -hyponatremia, likely due to diarrhea, continue to monitor, IVF in place with gradual improvement      Na Goal:  135-145     Feliciano: replaced     ID:   -s/p zosyn for possible pneumonia  -fluctuating fevers - Tmax 100.8 in past 24hrs,  possible central fevers but will need to screen for alternate causes   - leukocytosis  -monitor and screen for si/sx of infection   -Abdomen distended, diarrhea- abdominal imaging     GI:  #diarrhea  -Abdominal XR 3/13 noted  -s/p rectal tube, removed 3/17  -monitor BM, loose stool possible side effect of nimodipine  _Abdominal xray pending     Psych:  -Quetiapine 12.5mg at bedtime  -Delirium precautions    -Ortho: xray lumbar /sacral     OTHER:  condition and plan of care d/w patient, questions and concerns addressed. Discussed with family at bedside, medical team at bedside as well present for further evaluation and recommendations.     DISPOSITION: PT/OT recommending acute rehab once medically stable for discharge    CORE MEASURES:        Admission NIHSS: n/a      Tenecteplase : [] YES [X] NO      LDL/HDL/A1C: pending lipid panel/ HA1C 5.8     Depression Screen- if depression hx and/or present      Statin Therapy: simvastatin 10mg     Dysphagia Screen: [X] PASS [] FAIL     Smoking [] YES [X] NO      Afib [] YES [X] NO     Stroke Education [] YES [] NO [X] pending    Obtain screening lower extremity venous ultrasound in patients who meet 1 or more of the following criteria as patient is high risk for DVT/PE on admission:   [] History of DVT/PE  []Hypercoagulable states (Factor V Leiden, Cancer, OCP, etc. )  []Prolonged immobility (hemiplegia/hemiparesis/post operative or any other extended immobilization)  [] Transferred from outside facility (Rehab or Long term care)  [] Age </= to 50 ASSESSMENT: 74 y/o mandarin speaking female with PMH of HLD and osteoporosis presents to [facility] with complaints of having cerebral aneurysm. States in 10/2023 she started to have B/L hand numbness. At that time she had MRI brain, MRA head and neck. MRA showed an ACOMM aneurysm. She underwent a cerebral angiogram by José Manuel Villaseñor at [facility] on December 7th, 2023, with the intent to treat this month. The aneurysm is described as a 5.5x4.6x3.6mm wide necked acomm aneurysm that projects medio-superiorly. Reports occasional headaches, described as numbness, 5/10 in severity, worse with laying down, relieved with sitting up or standing. Patient underwent diagnostic angiogram 2/14/24 which confirmed acomm aneurysm. On 3/6 patient underwent aneurysm embolization with coiling complicated by aneurysm rupture, controlled with coils and balloon tamponade. NISHANT CT showed SAH b/l frontal lobes and R sylvian fissure along with contrast staining. EVD placed  3/6, removed by neurosurgery 3/15. Transferred to Stroke service 3/18.   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       NEURO:   #ACOMM aneurysm elective procedure complicated by aneurysm rupture controlled with coils/balloon tamponade  #SAH  #EVD 3/6, removed 3/15  # Metabolic encephalopathy  # Tremors  -Neurologically stable  -Continue close monitoring for neurologic deterioration    -Stroke neuro checks q4hrs   -Spot EEG to eval intermittent tremors  -MRI Brain w/o, MRA Head w/o and Neck w/contrast results as above   -Most recent CTH 3/16 - stable  -will need repeat MRI brain w/wo contrast in 4-6 weeks  -s/p Keppra  -Continues on nimodipine 60mg q4hrs - per Neurosurgery would discontinue on 3/20, recommended to continue bromocriptine tid due to ? central fevers.   -Dysphagia screen: pass, on easy to chew diet  -Physical therapy/OT/Speech eval/treatment.            #Stroke prevention:  -SBP goal  per neuro IR recommendations, avoiding rapid fluctuations and hypotension   -ANTITHROMBOTIC THERAPY: n/a   -LDL- 55 , continue home regimen if applicable   -HA1C 5.8  -TTE noted above    CARDIOVASCULAR:   -TTE noted above  -cardiac monitoring w/ telemetry for now, further evaluation pending findings of noted workup                              HEMATOLOGY:   H/H with anemia and thrombocytosis , continue close monitoring, no active bleeding noted   -patient should have all age and risk appropriate malignancy screenings with PCP or sooner if clinically suspected      DVT ppx: Heparin s.c [] LMWH [X]     PULMONARY:   -most recent CXR 3/12 with persistent right base infiltrate  -s/p zosyn for possible pneumonia  -consider repeat if concern for infection  -protecting airway, saturating well     RENAL:   #Urinary retention  #Hyponatremia   BUN/Cr without acute change   -maintain adequate hydration    -tamsulosin 0.8mg bedtime   -urinary retention s/p void trial - straight cath protocol for >400cc, bladder scan q6hrs, feliciano re-inserted for failed TOV   -hyponatremia, likely due to diarrhea, continue to monitor, IVF in place with gradual improvement      Na Goal:  135-145     Feliciano: replaced     ID:   -s/p zosyn for possible pneumonia  -fluctuating fevers - Tmax 100.8 in past 24hrs,  possible central fevers but will need to screen for alternate causes   - leukocytosis  -monitor and screen for si/sx of infection   -Abdomen distended, diarrhea- abdominal imaging     GI:  #diarrhea  -Abdominal XR 3/13 noted  -s/p rectal tube, removed 3/17  -monitor BM, loose stool possible side effect of nimodipine  _Abdominal xray pending     Psych:  -Quetiapine 12.5mg at bedtime  -Delirium precautions    -Ortho: xray lumbar /sacral     OTHER:  condition and plan of care d/w patient, questions and concerns addressed. Discussed with family at bedside, medical team at bedside as well present for further evaluation and recommendations.     DISPOSITION: PT/OT recommending acute rehab once medically stable for discharge    CORE MEASURES:        Admission NIHSS: n/a      Tenecteplase : [] YES [X] NO      LDL/HDL/A1C: pending lipid panel/ HA1C 5.8     Depression Screen- if depression hx and/or present      Statin Therapy: simvastatin 10mg     Dysphagia Screen: [X] PASS [] FAIL     Smoking [] YES [X] NO      Afib [] YES [X] NO     Stroke Education [] YES [] NO [X] pending    Obtain screening lower extremity venous ultrasound in patients who meet 1 or more of the following criteria as patient is high risk for DVT/PE on admission:   [] History of DVT/PE  []Hypercoagulable states (Factor V Leiden, Cancer, OCP, etc. )  []Prolonged immobility (hemiplegia/hemiparesis/post operative or any other extended immobilization)  [] Transferred from outside facility (Rehab or Long term care)  [] Age </= to 50

## 2024-03-19 NOTE — PROGRESS NOTE ADULT - SUBJECTIVE AND OBJECTIVE BOX
CC: ACOMM Aneurysm     INTERVAL HPI/OVERNIGHT EVENTS: Called back by Neuro-stroke team with concern over low sodium and potassium. Replaced overnight with repeat lytes improved. As per daughter at bedside, patient more confused this morning, easily reoriented but thought her sister was here this morning ( she has not visited). RN reports patient was having diarrhea, also failed voiding trial and feliciano replaced. Seen and examined with Neuro. Daughter also reports seeing a "lump" at base of spine, not visualized on exam, however patient grimaces when sacral area palpated. B/L upper extremity fine tremors also noted, which are new.     Vital Signs Last 24 Hrs  T(C): 37.8 (19 Mar 2024 11:00), Max: 37.8 (19 Mar 2024 11:00)  T(F): 100 (19 Mar 2024 11:00), Max: 100 (19 Mar 2024 11:00)  HR: 92 (19 Mar 2024 11:00) (85 - 107)  BP: 118/107 (19 Mar 2024 10:00) (104/90 - 150/73)  BP(mean): 112 (19 Mar 2024 10:00) (68 - 115)  RR: 21 (19 Mar 2024 11:00) (14 - 24)  SpO2: 99% (19 Mar 2024 11:00) (95% - 100%)    Parameters below as of 19 Mar 2024 08:00  Patient On (Oxygen Delivery Method): room air    PHYSICAL EXAM:    GENERAL: NAD,  well-developed, sitting up in chair  HEAD: surgical site clean and dry   EYES: EOMI, PERRLA, conjunctiva and sclera clear  NECK: Supple, No JVD  NERVOUS SYSTEM:  Alert & Oriented X 4 , fine B/L UE tremor   CHEST/LUNG: CTA  b/l,  no rales, rhonchi, wheezing, or rubs  HEART: Regular rate and rhythm; No murmurs, rubs, or gallops  ABDOMEN: Softly distended, + BS  EXTREMITIES:  2+ Peripheral Pulses, No clubbing, cyanosis, or edema ,   Spine:  + discomfort with palpation LS spine, without deformity  SKIN: No rashes or lesions  I&O's Detail    18 Mar 2024 07:01  -  19 Mar 2024 07:00  --------------------------------------------------------  IN:    IV PiggyBack: 187.5 mL    Oral Fluid: 1500 mL    sodium chloride 0.9%: 1375 mL    Sodium Chloride 0.9% Bolus: 500 mL  Total IN: 3562.5 mL    OUT:    Indwelling Catheter - Urethral (mL): 200 mL    Intermittent Catheterization - Urethral (mL): 2000 mL  Total OUT: 2200 mL    Total NET: 1362.5 mL      19 Mar 2024 07:01  -  19 Mar 2024 11:06  --------------------------------------------------------  IN:    IV PiggyBack: 125 mL    sodium chloride 0.9%: 250 mL  Total IN: 375 mL    OUT:    Intermittent Catheterization - Urethral (mL): 975 mL  Total OUT: 975 mL    Total NET: -600 mL                                    8.6    12.57 )-----------( 401      ( 19 Mar 2024 04:00 )             25.0     19 Mar 2024 04:00    132    |  99     |  7.7    ----------------------------<  108    3.6     |  22.0   |  0.34     Ca    7.5        19 Mar 2024 04:00  Phos  2.0       19 Mar 2024 04:00  Mg     2.0       19 Mar 2024 04:00        CAPILLARY BLOOD GLUCOSE          Urinalysis Basic - ( 19 Mar 2024 10:30 )    Color: Yellow / Appearance: Clear / S.010 / pH: x  Gluc: x / Ketone: Negative mg/dL  / Bili: Negative / Urobili: 0.2 mg/dL   Blood: x / Protein: Negative mg/dL / Nitrite: Negative   Leuk Esterase: Negative / RBC: x / WBC x   Sq Epi: x / Non Sq Epi: x / Bacteria: x        MEDICATIONS  (STANDING):  bromocriptine Capsule 10 milliGRAM(s) Oral three times a day  chlorhexidine 2% Cloths 1 Application(s) Topical daily  enoxaparin Injectable 30 milliGRAM(s) SubCutaneous every 24 hours  multivitamin 1 Tablet(s) Oral daily  niMODipine Oral Solution 60 milliGRAM(s) Oral every 4 hours  nystatin Powder 1 Application(s) Topical two times a day  psyllium Powder 1 Packet(s) Oral two times a day  QUEtiapine 12.5 milliGRAM(s) Oral at bedtime  saccharomyces boulardii 250 milliGRAM(s) Oral two times a day  simvastatin 10 milliGRAM(s) Oral at bedtime  sodium chloride 0.9%. 1000 milliLiter(s) (125 mL/Hr) IV Continuous <Continuous>  tamsulosin 0.8 milliGRAM(s) Oral at bedtime    MEDICATIONS  (PRN):  acetaminophen     Tablet .. 650 milliGRAM(s) Oral every 6 hours PRN Temp greater or equal to 38C (100.4F)  hydrALAZINE Injectable 10 milliGRAM(s) IV Push every 2 hours PRN SBP > 160  labetalol Injectable 10 milliGRAM(s) IV Push every 2 hours PRN Systolic blood pressure > 160  ondansetron Injectable 4 milliGRAM(s) IV Push every 6 hours PRN Nausea and/or Vomiting  simethicone 80 milliGRAM(s) Chew every 6 hours PRN Gas      RADIOLOGY & ADDITIONAL TESTS:       CC: ACOMM Aneurysm     INTERVAL HPI/OVERNIGHT EVENTS: Called back by Neuro-stroke team with concern over low sodium and potassium. Replaced overnight with repeat lytes improved. As per daughter at bedside, patient more confused this morning, easily reoriented but thought her sister was here this morning ( she has not visited). RN reports patient was having diarrhea, also failed voiding trial and feliciano replaced. Seen and examined with Neuro. Daughter also reports seeing a "lump" at base of spine, not visualized on exam, however patient grimaces when sacral area palpated. B/L upper extremity fine tremors also noted, which are new.     Vital Signs Last 24 Hrs  T(C): 37.8 (19 Mar 2024 11:00), Max: 37.8 (19 Mar 2024 11:00)  T(F): 100 (19 Mar 2024 11:00), Max: 100 (19 Mar 2024 11:00)  HR: 92 (19 Mar 2024 11:00) (85 - 107)  BP: 118/107 (19 Mar 2024 10:00) (104/90 - 150/73)  BP(mean): 112 (19 Mar 2024 10:00) (68 - 115)  RR: 21 (19 Mar 2024 11:00) (14 - 24)  SpO2: 99% (19 Mar 2024 11:00) (95% - 100%)    Parameters below as of 19 Mar 2024 08:00  Patient On (Oxygen Delivery Method): room air    PHYSICAL EXAM:    GENERAL: NAD,  well-developed, sitting up in chair  HEAD: surgical site clean and dry   EYES: EOMI, PERRLA, conjunctiva and sclera clear  NECK: Supple, No JVD  NERVOUS SYSTEM:  Alert & Oriented X 4 , fine B/L UE tremor   CHEST/LUNG: CTA  b/l,  no rales, rhonchi, wheezing, or rubs  HEART: Regular rate and rhythm; No murmurs, rubs, or gallops  ABDOMEN: Softly distended, + BS  EXTREMITIES:  2+ Peripheral Pulses, No clubbing, cyanosis, or edema ,   Spine:  + discomfort with palpation LS spine, without deformity  SKIN: No rashes or lesions  I&O's Detail    18 Mar 2024 07:01  -  19 Mar 2024 07:00  --------------------------------------------------------  IN:    IV PiggyBack: 187.5 mL    Oral Fluid: 1500 mL    sodium chloride 0.9%: 1375 mL    Sodium Chloride 0.9% Bolus: 500 mL  Total IN: 3562.5 mL    OUT:    Indwelling Catheter - Urethral (mL): 200 mL    Intermittent Catheterization - Urethral (mL): 2000 mL  Total OUT: 2200 mL    Total NET: 1362.5 mL      19 Mar 2024 07:01  -  19 Mar 2024 11:06  --------------------------------------------------------  IN:    IV PiggyBack: 125 mL    sodium chloride 0.9%: 250 mL  Total IN: 375 mL    OUT:    Intermittent Catheterization - Urethral (mL): 975 mL  Total OUT: 975 mL    Total NET: -600 mL                                    8.6    12.57 )-----------( 401      ( 19 Mar 2024 04:00 )             25.0     19 Mar 2024 04:00    132    |  99     |  7.7    ----------------------------<  108    3.6     |  22.0   |  0.34     Ca    7.5        19 Mar 2024 04:00  Phos  2.0       19 Mar 2024 04:00  Mg     2.0       19 Mar 2024 04:00        CAPILLARY BLOOD GLUCOSE          Urinalysis Basic - ( 19 Mar 2024 10:30 )    Color: Yellow / Appearance: Clear / S.010 / pH: x  Gluc: x / Ketone: Negative mg/dL  / Bili: Negative / Urobili: 0.2 mg/dL   Blood: x / Protein: Negative mg/dL / Nitrite: Negative   Leuk Esterase: Negative / RBC: x / WBC x   Sq Epi: x / Non Sq Epi: x / Bacteria: x        MEDICATIONS  (STANDING):  bromocriptine Capsule 10 milliGRAM(s) Oral three times a day  chlorhexidine 2% Cloths 1 Application(s) Topical daily  enoxaparin Injectable 30 milliGRAM(s) SubCutaneous every 24 hours  multivitamin 1 Tablet(s) Oral daily  niMODipine Oral Solution 60 milliGRAM(s) Oral every 4 hours  nystatin Powder 1 Application(s) Topical two times a day  psyllium Powder 1 Packet(s) Oral two times a day  QUEtiapine 12.5 milliGRAM(s) Oral at bedtime  saccharomyces boulardii 250 milliGRAM(s) Oral two times a day  simvastatin 10 milliGRAM(s) Oral at bedtime  sodium chloride 0.9%. 1000 milliLiter(s) (125 mL/Hr) IV Continuous <Continuous>  tamsulosin 0.8 milliGRAM(s) Oral at bedtime    MEDICATIONS  (PRN):  acetaminophen     Tablet .. 650 milliGRAM(s) Oral every 6 hours PRN Temp greater or equal to 38C (100.4F)  hydrALAZINE Injectable 10 milliGRAM(s) IV Push every 2 hours PRN SBP > 160  labetalol Injectable 10 milliGRAM(s) IV Push every 2 hours PRN Systolic blood pressure > 160  ondansetron Injectable 4 milliGRAM(s) IV Push every 6 hours PRN Nausea and/or Vomiting  simethicone 80 milliGRAM(s) Chew every 6 hours PRN Gas      RADIOLOGY & ADDITIONAL TESTS:

## 2024-03-19 NOTE — PROGRESS NOTE ADULT - SUBJECTIVE AND OBJECTIVE BOX
Patient feels well.  Reports no pain.     FUNCTIONAL PROGRESS  3/18 PT  Bed Mobility  Bed Mobility Training Supine-to-Sit: minimum assist (75% patient effort);  1 person assist  Bed Mobility Training Limitations: decreased ability to use legs for bridging/pushing;  impaired ability to control trunk for mobility;  decreased strength;  impaired balance    Sit-Stand Transfer Training  Transfer Training Sit-to-Stand Transfer: minimum assist (75% patient effort);  1 person assist;  weight-bearing as tolerated   rolling walker  Transfer Training Stand-to-Sit Transfer: minimum assist (75% patient effort);  1 person assist;  weight-bearing as tolerated   rolling walker  Sit-to-Stand Transfer Training Transfer Safety Analysis: decreased balance;  decreased weight-shifting ability;  decreased strength;  impaired balance;  cognitive, decreased safety awareness;  rolling walker    Gait Training  Gait Training: minimum assist (75% patient effort);  1 person assist;  weight-bearing as tolerated   rolling walker;  40 feet  Gait Analysis: 2-point gait   shuffling;  decreased gayathri;  impaired postural control;  decreased strength;  cognitive, decreased safety awareness;  LOB x2 2*2 Pt very impulsive, reaching for objects while ambulating. ;  40 feet ;  rolling walker    3/18 OT  Bed Mobility  Bed Mobility Training Sit-to-Supine: Pt left OOB in chair +alarm  Bed Mobility Training Supine-to-Sit: minimum assist (75% patient effort);  1 person assist;  verbal cues;  bed rails;  head of bed elevated  Bed Mobility Training Limitations: decreased ability to use legs for bridging/pushing;  decreased ability to use arms for pushing/pulling;  +impulsive;  cognitive, decreased safety awareness;  impaired balance;  decreased strength    Sit-Stand Transfer Training  Transfer Training Sit-to-Stand Transfer: minimum assist (75% patient effort);  1 person assist;  verbal cues;  weight-bearing as tolerated   rolling walker  Transfer Training Stand-to-Sit Transfer: minimum assist (75% patient effort);  1 person assist;  verbal cues;  weight-bearing as tolerated   rolling walker  Sit-to-Stand Transfer Training Transfer Safety Analysis: decreased weight-shifting ability;  decreased balance;  decreased sequencing ability;  cognitive, decreased safety awareness;  impaired balance;  decreased strength;  +impulsive;  rolling walker    Functional Endurance  Functional Endurance Detail: Pt ambulated with RW and min A x 1, +external cues short distances around bed area due to decreased balance, decreased endurance and decreased strength. Pt with mild impulsivity and decreased safety awareness requires cues and assist. Pt educated in energy conservation techniques including proper breathing and activity pacing.     Toilet Training  Toilet Training Assistance: maximum assist (25% patient effort);  1 person assist;  verbal cues;  for pericare, thoroughness and clothing management 2*bowel incontinence. Pt with difficulty with dynamic balance for task and reaching.;  decreased flexibility;  decreased ROM;  decreased strength;  impaired balance;  cognitive, decreased safety awareness;  pain      VITALS  T(C): 36.8 (03-19-24 @ 07:55), Max: 37 (03-18-24 @ 10:00)  HR: 91 (03-19-24 @ 08:00) (73 - 107)  BP: 143/63 (03-19-24 @ 08:00) (104/90 - 150/73)  RR: 19 (03-19-24 @ 08:00) (14 - 24)  SpO2: 100% (03-19-24 @ 08:00) (95% - 100%)  Wt(kg): --    MEDICATIONS   acetaminophen     Tablet .. 650 milliGRAM(s) every 6 hours PRN  bromocriptine Capsule 10 milliGRAM(s) three times a day  chlorhexidine 2% Cloths 1 Application(s) daily  enoxaparin Injectable 30 milliGRAM(s) every 24 hours  hydrALAZINE Injectable 10 milliGRAM(s) every 2 hours PRN  labetalol Injectable 10 milliGRAM(s) every 2 hours PRN  multivitamin 1 Tablet(s) daily  niMODipine Oral Solution 60 milliGRAM(s) every 4 hours  nystatin Powder 1 Application(s) two times a day  ondansetron Injectable 4 milliGRAM(s) every 6 hours PRN  psyllium Powder 1 Packet(s) two times a day  QUEtiapine 12.5 milliGRAM(s) at bedtime  saccharomyces boulardii 250 milliGRAM(s) two times a day  simethicone 80 milliGRAM(s) every 6 hours PRN  simvastatin 10 milliGRAM(s) at bedtime  sodium chloride 0.9%. 1000 milliLiter(s) <Continuous>  tamsulosin 0.8 milliGRAM(s) at bedtime      RECENT LABS/IMAGING  - Reviewed Today                        8.6    12.57 )-----------( 401      ( 19 Mar 2024 04:00 )             25.0     03-19    132<L>  |  99  |  7.7<L>  ----------------------------<  108<H>  3.6   |  22.0  |  0.34<L>    Ca    7.5<L>      19 Mar 2024 04:00  Phos  2.0     03-19  Mg     2.0     03-19        Urinalysis Basic - ( 19 Mar 2024 04:00 )    Color: x / Appearance: x / SG: x / pH: x  Gluc: 108 mg/dL / Ketone: x  / Bili: x / Urobili: x   Blood: x / Protein: x / Nitrite: x   Leuk Esterase: x / RBC: x / WBC x   Sq Epi: x / Non Sq Epi: x / Bacteria: x        US Duplex Venous Lower Ext Complete, Bilateral 3/12 - No evidence of deep venous thrombosis in either lower extremity.    CT Head 3/10 - Mildly decreased bilateral mesial frontal parenchymal hemorrhages. Similar anterior interhemispheric acute subarachnoid hemorrhage.Mildly decreased intraventricular hemorrhage within the bilateral lateral    and third ventricles. Decreased ventricular size with near resolution of hydrocephalus.No large midline shift.  Basal cisterns are more well visualized on the current examination, this may be due to technique.    MR Brain, MR Angio Head 3/9 - MR brain: Intraparenchymal hemorrhage within the medial anterior frontal   lobes measuring approximately 3.5 cm, unchanged. There is surrounding edema and extension into the genuine body of corpus callosum, unchanged. Hemorrhagic clot within the LEFT lateral ventricle and third ventricle is unchanged. Minimal hemorrhage seen in the dependent portions of the RIGHT lateral ventricle unchanged. Minimal subarachnoid hemorrhage seen along the medial sulci of the BILATERAL frontal and parietal lobes as well as scattered throughout the sulci of the brain. RIGHT frontal ventriculostomy catheter tip is seen extending toward the body of the LEFT ventricle and third ventricle.  Coil material is seen in the region of the anterior communicating artery on the RIGHT.   Blood products are seen in the region of the known aneurysm possibly indicating partial patency.    CT Head 3/16 -  No change in mild ventricular dilatation with intraventricular hemorrhage and hemorrhage in the frontal lobe midline and corpus callosum after extraventricular drain removal since 3/15/2024.      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 - RECOMMEND DC Bromocriptine    Delirium - RECOMMEND Seroquel 12.5mg Q6PM PRN, RECOMMEND Melatonin 5mg HS  Pain - Tylenol  DVT PPX - SCDs, Lovenox   Rehab/Impaired mobility and function - Patient continues to require hospitalization for the above diagnoses and ongoing active management of comorbid complications (ICU level care) that are substantially impairing functional ability and impairing quality of life necessitating ongoing medical management of these complications.     Expect patient to need AR. At this time, medically being optimized.     Will continue to follow. Rehab recommendations are dependent on how functional progress changes as well as how patient continues to participate and tolerate therapeutic interventions, WHICH MAY CHANGE UPON FOLLOW UP EXAMINATIONS. Recommend ongoing mobilization by staff to maintain cardiopulmonary function and prevention of secondary complications related to debility. Discussed the specific management and recommendations above with rehab clinical care team/rehab liaison.

## 2024-03-20 LAB
ANION GAP SERPL CALC-SCNC: 11 MMOL/L — SIGNIFICANT CHANGE UP (ref 5–17)
ANION GAP SERPL CALC-SCNC: 11 MMOL/L — SIGNIFICANT CHANGE UP (ref 5–17)
ANION GAP SERPL CALC-SCNC: 13 MMOL/L — SIGNIFICANT CHANGE UP (ref 5–17)
BUN SERPL-MCNC: 4.9 MG/DL — LOW (ref 8–20)
BUN SERPL-MCNC: 5.3 MG/DL — LOW (ref 8–20)
BUN SERPL-MCNC: 5.9 MG/DL — LOW (ref 8–20)
CALCIUM SERPL-MCNC: 7.8 MG/DL — LOW (ref 8.4–10.5)
CALCIUM SERPL-MCNC: 8.2 MG/DL — LOW (ref 8.4–10.5)
CALCIUM SERPL-MCNC: 8.4 MG/DL — SIGNIFICANT CHANGE UP (ref 8.4–10.5)
CHLORIDE SERPL-SCNC: 100 MMOL/L — SIGNIFICANT CHANGE UP (ref 96–108)
CHLORIDE SERPL-SCNC: 101 MMOL/L — SIGNIFICANT CHANGE UP (ref 96–108)
CHLORIDE SERPL-SCNC: 99 MMOL/L — SIGNIFICANT CHANGE UP (ref 96–108)
CO2 SERPL-SCNC: 22 MMOL/L — SIGNIFICANT CHANGE UP (ref 22–29)
CO2 SERPL-SCNC: 24 MMOL/L — SIGNIFICANT CHANGE UP (ref 22–29)
CO2 SERPL-SCNC: 24 MMOL/L — SIGNIFICANT CHANGE UP (ref 22–29)
CREAT SERPL-MCNC: 0.27 MG/DL — LOW (ref 0.5–1.3)
CREAT SERPL-MCNC: 0.32 MG/DL — LOW (ref 0.5–1.3)
CREAT SERPL-MCNC: 0.37 MG/DL — LOW (ref 0.5–1.3)
EGFR: 108 ML/MIN/1.73M2 — SIGNIFICANT CHANGE UP
EGFR: 112 ML/MIN/1.73M2 — SIGNIFICANT CHANGE UP
EGFR: 116 ML/MIN/1.73M2 — SIGNIFICANT CHANGE UP
GLUCOSE SERPL-MCNC: 120 MG/DL — HIGH (ref 70–99)
GLUCOSE SERPL-MCNC: 124 MG/DL — HIGH (ref 70–99)
GLUCOSE SERPL-MCNC: 163 MG/DL — HIGH (ref 70–99)
HCT VFR BLD CALC: 25.6 % — LOW (ref 34.5–45)
HGB BLD-MCNC: 8.8 G/DL — LOW (ref 11.5–15.5)
MAGNESIUM SERPL-MCNC: 2.3 MG/DL — SIGNIFICANT CHANGE UP (ref 1.6–2.6)
MCHC RBC-ENTMCNC: 31 PG — SIGNIFICANT CHANGE UP (ref 27–34)
MCHC RBC-ENTMCNC: 34.4 GM/DL — SIGNIFICANT CHANGE UP (ref 32–36)
MCV RBC AUTO: 90.1 FL — SIGNIFICANT CHANGE UP (ref 80–100)
PHOSPHATE SERPL-MCNC: 3 MG/DL — SIGNIFICANT CHANGE UP (ref 2.4–4.7)
PLATELET # BLD AUTO: 411 K/UL — HIGH (ref 150–400)
POTASSIUM SERPL-MCNC: 3.7 MMOL/L — SIGNIFICANT CHANGE UP (ref 3.5–5.3)
POTASSIUM SERPL-MCNC: 3.8 MMOL/L — SIGNIFICANT CHANGE UP (ref 3.5–5.3)
POTASSIUM SERPL-MCNC: 3.8 MMOL/L — SIGNIFICANT CHANGE UP (ref 3.5–5.3)
POTASSIUM SERPL-SCNC: 3.7 MMOL/L — SIGNIFICANT CHANGE UP (ref 3.5–5.3)
POTASSIUM SERPL-SCNC: 3.8 MMOL/L — SIGNIFICANT CHANGE UP (ref 3.5–5.3)
POTASSIUM SERPL-SCNC: 3.8 MMOL/L — SIGNIFICANT CHANGE UP (ref 3.5–5.3)
RBC # BLD: 2.84 M/UL — LOW (ref 3.8–5.2)
RBC # FLD: 14 % — SIGNIFICANT CHANGE UP (ref 10.3–14.5)
SODIUM SERPL-SCNC: 133 MMOL/L — LOW (ref 135–145)
SODIUM SERPL-SCNC: 136 MMOL/L — SIGNIFICANT CHANGE UP (ref 135–145)
SODIUM SERPL-SCNC: 136 MMOL/L — SIGNIFICANT CHANGE UP (ref 135–145)
WBC # BLD: 8.6 K/UL — SIGNIFICANT CHANGE UP (ref 3.8–10.5)
WBC # FLD AUTO: 8.6 K/UL — SIGNIFICANT CHANGE UP (ref 3.8–10.5)

## 2024-03-20 PROCEDURE — 99232 SBSQ HOSP IP/OBS MODERATE 35: CPT

## 2024-03-20 PROCEDURE — 72220 X-RAY EXAM SACRUM TAILBONE: CPT | Mod: 26

## 2024-03-20 PROCEDURE — 72110 X-RAY EXAM L-2 SPINE 4/>VWS: CPT | Mod: 26

## 2024-03-20 PROCEDURE — 99233 SBSQ HOSP IP/OBS HIGH 50: CPT

## 2024-03-20 PROCEDURE — 93970 EXTREMITY STUDY: CPT | Mod: 26

## 2024-03-20 RX ORDER — MULTIVIT WITH MIN/MFOLATE/K2 340-15/3 G
296 POWDER (GRAM) ORAL ONCE
Refills: 0 | Status: COMPLETED | OUTPATIENT
Start: 2024-03-20 | End: 2024-03-20

## 2024-03-20 RX ORDER — MINERAL OIL
133 OIL (ML) MISCELLANEOUS ONCE
Refills: 0 | Status: COMPLETED | OUTPATIENT
Start: 2024-03-20 | End: 2024-03-20

## 2024-03-20 RX ORDER — BROMOCRIPTINE MESYLATE 5 MG/1
10 CAPSULE ORAL
Refills: 0 | Status: DISCONTINUED | OUTPATIENT
Start: 2024-03-20 | End: 2024-03-22

## 2024-03-20 RX ORDER — SODIUM CHLORIDE 9 MG/ML
1000 INJECTION INTRAMUSCULAR; INTRAVENOUS; SUBCUTANEOUS
Refills: 0 | Status: DISCONTINUED | OUTPATIENT
Start: 2024-03-20 | End: 2024-03-21

## 2024-03-20 RX ORDER — LIDOCAINE 4 G/100G
1 CREAM TOPICAL EVERY 24 HOURS
Refills: 0 | Status: DISCONTINUED | OUTPATIENT
Start: 2024-03-20 | End: 2024-03-27

## 2024-03-20 RX ADMIN — Medication 296 MILLILITER(S): at 11:45

## 2024-03-20 RX ADMIN — Medication 1 TABLET(S): at 11:45

## 2024-03-20 RX ADMIN — BROMOCRIPTINE MESYLATE 10 MILLIGRAM(S): 5 CAPSULE ORAL at 05:15

## 2024-03-20 RX ADMIN — Medication 1 TABLET(S): at 11:46

## 2024-03-20 RX ADMIN — SIMVASTATIN 10 MILLIGRAM(S): 20 TABLET, FILM COATED ORAL at 21:22

## 2024-03-20 RX ADMIN — NIMODIPINE 60 MILLIGRAM(S): 60 SOLUTION ORAL at 18:22

## 2024-03-20 RX ADMIN — Medication 650 MILLIGRAM(S): at 13:45

## 2024-03-20 RX ADMIN — Medication 650 MILLIGRAM(S): at 05:44

## 2024-03-20 RX ADMIN — CHLORHEXIDINE GLUCONATE 1 APPLICATION(S): 213 SOLUTION TOPICAL at 11:45

## 2024-03-20 RX ADMIN — Medication 650 MILLIGRAM(S): at 12:48

## 2024-03-20 RX ADMIN — ENOXAPARIN SODIUM 30 MILLIGRAM(S): 100 INJECTION SUBCUTANEOUS at 21:21

## 2024-03-20 RX ADMIN — NIMODIPINE 60 MILLIGRAM(S): 60 SOLUTION ORAL at 14:54

## 2024-03-20 RX ADMIN — QUETIAPINE FUMARATE 12.5 MILLIGRAM(S): 200 TABLET, FILM COATED ORAL at 21:22

## 2024-03-20 RX ADMIN — Medication 133 MILLILITER(S): at 11:46

## 2024-03-20 RX ADMIN — NYSTATIN CREAM 1 APPLICATION(S): 100000 CREAM TOPICAL at 05:16

## 2024-03-20 RX ADMIN — NIMODIPINE 60 MILLIGRAM(S): 60 SOLUTION ORAL at 10:19

## 2024-03-20 RX ADMIN — Medication 85 MILLIMOLE(S): at 00:42

## 2024-03-20 RX ADMIN — Medication 250 MILLIGRAM(S): at 05:18

## 2024-03-20 RX ADMIN — TAMSULOSIN HYDROCHLORIDE 0.8 MILLIGRAM(S): 0.4 CAPSULE ORAL at 21:22

## 2024-03-20 RX ADMIN — LIDOCAINE 1 PATCH: 4 CREAM TOPICAL at 18:21

## 2024-03-20 RX ADMIN — NIMODIPINE 60 MILLIGRAM(S): 60 SOLUTION ORAL at 21:22

## 2024-03-20 RX ADMIN — Medication 650 MILLIGRAM(S): at 05:14

## 2024-03-20 RX ADMIN — NIMODIPINE 60 MILLIGRAM(S): 60 SOLUTION ORAL at 05:15

## 2024-03-20 RX ADMIN — Medication 1 PACKET(S): at 05:15

## 2024-03-20 RX ADMIN — NYSTATIN CREAM 1 APPLICATION(S): 100000 CREAM TOPICAL at 18:22

## 2024-03-20 RX ADMIN — LIDOCAINE 1 PATCH: 4 CREAM TOPICAL at 19:33

## 2024-03-20 RX ADMIN — BROMOCRIPTINE MESYLATE 10 MILLIGRAM(S): 5 CAPSULE ORAL at 18:22

## 2024-03-20 RX ADMIN — SODIUM CHLORIDE 75 MILLILITER(S): 9 INJECTION INTRAMUSCULAR; INTRAVENOUS; SUBCUTANEOUS at 21:22

## 2024-03-20 NOTE — PROGRESS NOTE ADULT - SUBJECTIVE AND OBJECTIVE BOX
Patient is a 73y old  Female who presents with a chief complaint of ACOMM Aneurysm (18 Mar 2024 14:12)    HPI:  73F with PMH HLD, osteoporosis who presents for cerebral angiogram with aneurysm embolization. Patient had MRI / MRA brain 2/2 hand numbness which had incidental finding of acomm aneurysm. Patient underwent diagnostic angiogram 2/14/24 which confirmed acomm aneurysm. Patient presents today for cerebral angiogram with aneurysm embolization via balloon assisted coiling. Patient started on ASA 81 in preparation for the procedure. Patient underwent aneurysm embolization with coiling complicated by aneurysm rupture, controlled with coils and balloon tamponade. NISHANT CT showed SAH b/l frontal lobes and R sylvian fissure along with contrast staining. Course complicated by worsening SAH, IVH requiring EVD placement.       Interval history:  Patient seen and examined by neuro IR team. Patient reports she feels well. Patient febrile, fever w/u initiated. Concern for possible bowel constipation with diarrhea around constipation, given mineral oil enema. B/l LE DVT negative for clot.       Vital Signs Last 24 Hrs  T(C): 38.3 (20 Mar 2024 15:00), Max: 38.5 (20 Mar 2024 14:00)  T(F): 100.9 (20 Mar 2024 15:00), Max: 101.3 (20 Mar 2024 14:00)  HR: 94 (20 Mar 2024 15:00) (77 - 110)  BP: 147/82 (20 Mar 2024 15:00) (103/71 - 160/78)  BP(mean): 103 (20 Mar 2024 15:00) (73 - 106)  RR: 18 (20 Mar 2024 15:00) (14 - 30)  SpO2: 100% (20 Mar 2024 15:00) (97% - 100%)    Parameters below as of 20 Mar 2024 12:00  Patient On (Oxygen Delivery Method): room air      Physical Exam:  Constitutional: NAD, lying in bed  Neuro  * Mental Status:  GCS 15: Awake, alert, oriented to conversation. No aphasia or difficulty speaking. No dysarthria.   * Cranial Nerves: Cnii-Cnxii grossly intact. PERRL, EOMI, tongue midline, no gaze deviation  * Motor: RUE 5/5, LUE 5/5, b/l LE AG with some drift    * Sensory: Sensation intact to light touch  * Reflexes: not assessed       LABS:                        8.8    8.60  )-----------( 411      ( 20 Mar 2024 02:40 )             25.6     03-20    136  |  99  |  4.9<L>  ----------------------------<  163<H>  3.8   |  24.0  |  0.32<L>    Ca    8.2<L>      20 Mar 2024 09:30  Phos  3.0     03-20  Mg     2.3     03-20    TPro  x   /  Alb  3.5  /  TBili  x   /  DBili  x   /  AST  x   /  ALT  x   /  AlkPhos  x   03-19      Medications:  MEDICATIONS  (STANDING):  bromocriptine Capsule 10 milliGRAM(s) Oral two times a day  calcium carbonate 1250 mG  + Vitamin D (OsCal 500 + D) 1 Tablet(s) Oral daily  chlorhexidine 2% Cloths 1 Application(s) Topical daily  enoxaparin Injectable 30 milliGRAM(s) SubCutaneous every 24 hours  lidocaine   4% Patch 1 Patch Transdermal every 24 hours  multivitamin 1 Tablet(s) Oral daily  niMODipine Oral Solution 60 milliGRAM(s) Oral every 4 hours  nystatin Powder 1 Application(s) Topical two times a day  QUEtiapine 12.5 milliGRAM(s) Oral at bedtime  simvastatin 10 milliGRAM(s) Oral at bedtime  sodium chloride 0.9%. 1000 milliLiter(s) (75 mL/Hr) IV Continuous <Continuous>  tamsulosin 0.8 milliGRAM(s) Oral at bedtime    MEDICATIONS  (PRN):  acetaminophen     Tablet .. 650 milliGRAM(s) Oral every 6 hours PRN Temp greater or equal to 38C (100.4F)  hydrALAZINE Injectable 10 milliGRAM(s) IV Push every 2 hours PRN SBP > 160  labetalol Injectable 10 milliGRAM(s) IV Push every 2 hours PRN Systolic blood pressure > 160  ondansetron Injectable 4 milliGRAM(s) IV Push every 6 hours PRN Nausea and/or Vomiting      RADIOLOGY & ADDITIONAL STUDIES:  No new imaging to review

## 2024-03-20 NOTE — PROGRESS NOTE ADULT - ASSESSMENT
ASSESSMENT: 72 y/o mandarin speaking female with PMH of HLD and osteoporosis presents to [facility] with complaints of having cerebral aneurysm. States in 10/2023 she started to have B/L hand numbness. At that time she had MRI brain, MRA head and neck. MRA showed an ACOMM aneurysm. She underwent a cerebral angiogram by José Manuel Villaseñor at [facility] on December 7th, 2023, with the intent to treat this month. The aneurysm is described as a 5.5x4.6x3.6mm wide necked acomm aneurysm that projects medio-superiorly. Reports occasional headaches, described as numbness, 5/10 in severity, worse with laying down, relieved with sitting up or standing. Patient underwent diagnostic angiogram 2/14/24 which confirmed acomm aneurysm. On 3/6 patient underwent aneurysm embolization with coiling complicated by aneurysm rupture, controlled with coils and balloon tamponade. NISHANT CT showed SAH b/l frontal lobes and R sylvian fissure along with contrast staining. EVD placed  3/6, removed by neurosurgery 3/15. Transferred to Stroke service 3/18.   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       NEURO:   #ACOMM aneurysm elective procedure complicated by aneurysm rupture controlled with coils/balloon tamponade  #SAH  #EVD 3/6, removed 3/15  # Metabolic encephalopathy  # Tremors  -Neurologically without acute change   -Continue close monitoring for neurologic deterioration    -Stroke neuro checks q4hrs   - EEG without evidence of seizure   -MRI Brain w/o, MRA Head w/o and Neck w/contrast results as above   -will need repeat MRI brain w/wo contrast in 4-6 weeks  -s/p Keppra  -Continues on nimodipine 60mg q4hrs - per Neurosurgery would discontinue on 3/21, recommended to continue through 3/20 and  bromocriptine tid due to ? central fevers, will begin to titrate.   -Dysphagia screen: pass, on easy to chew diet  -Physical therapy/OT/Speech eval/treatment.            #Stroke prevention:  -SBP goal  per neuro IR recommendations, avoiding rapid fluctuations and hypotension   -ANTITHROMBOTIC THERAPY: n/a   -LDL- 55 , continue home regimen if applicable   -HA1C 5.8  -TTE noted above    CARDIOVASCULAR:   -TTE noted above  -cardiac monitoring w/ telemetry for now, further evaluation pending findings of noted workup                              HEMATOLOGY:   H/H with anemia and thrombocytosis (plt 411) , continue close monitoring, no active bleeding noted,   -patient should have all age and risk appropriate malignancy screenings with PCP or sooner if clinically suspected      DVT ppx: Heparin s.c [] LMWH [X]     PULMONARY:   -most recent CXR 3/19 with persistent right base infiltrate  -s/p zosyn for possible pneumonia  -consider CT chest if fever persists   -protecting airway, saturating well     RENAL:   #Urinary retention  #Hyponatremia   BUN/Cr without acute change   -maintain adequate hydration  -tamsulosin 0.8mg bedtime   -urinary retention s/p void trial- feliciano re-inserted   -hyponatremia, likely due to diarrhea, continue to monitor, IVF in place with gradual improvement - titrated to 75cc/hr , monitor closely      Na Goal:  135-145     Feliciano: replaced     ID:   -ID consulted  -s/p zosyn for possible pneumonia  -fluctuating fevers - Tmax 101.1,  possible central fevers but will need to screen for alternate causes and have ID follow up.   - leukocytosis resolved   -monitor and screen for si/sx of infection   -Abdomen distended, diarrhea- abdominal imaging revealed fecal.  enema and mag citrate as recommended by hospitalist ACP team.     GI:  #diarrhea  -Abdominal XR 3/13 noted  -s/p rectal tube, removed 3/17  -monitor BM, loose stool possible side effect of nimodipine  _Abdominal xray pending     Psych:  -Quetiapine 12.5mg at bedtime  -Delirium precautions    -Ortho: xray lumbar /sacral pending read given back pain.     OTHER:  condition and plan of care d/w patient, questions and concerns addressed. Discussed with family at bedside, medical team at bedside as well present for further evaluation and recommendations.     DISPOSITION: PT/OT recommending acute rehab once medically stable for discharge    CORE MEASURES:        Admission NIHSS: n/a      Tenecteplase : [] YES [X] NO      LDL/HDL/A1C: pending lipid panel/ HA1C 5.8     Depression Screen- if depression hx and/or present      Statin Therapy: simvastatin 10mg     Dysphagia Screen: [X] PASS [] FAIL     Smoking [] YES [X] NO      Afib [] YES [X] NO     Stroke Education [] YES [] NO [X] pending    Obtain screening lower extremity venous ultrasound in patients who meet 1 or more of the following criteria as patient is high risk for DVT/PE on admission:   [] History of DVT/PE  []Hypercoagulable states (Factor V Leiden, Cancer, OCP, etc. )  []Prolonged immobility (hemiplegia/hemiparesis/post operative or any other extended immobilization)  [] Transferred from outside facility (Rehab or Long term care)  [] Age </= to 50 ASSESSMENT: 74 y/o mandarin speaking female with PMH of HLD and osteoporosis presents to [facility] with complaints of having cerebral aneurysm. States in 10/2023 she started to have B/L hand numbness. At that time she had MRI brain, MRA head and neck. MRA showed an ACOMM aneurysm. She underwent a cerebral angiogram by José Manuel Villaseñor at [facility] on December 7th, 2023, with the intent to treat this month. The aneurysm is described as a 5.5x4.6x3.6mm wide necked acomm aneurysm that projects medio-superiorly. Reports occasional headaches, described as numbness, 5/10 in severity, worse with laying down, relieved with sitting up or standing. Patient underwent diagnostic angiogram 2/14/24 which confirmed acomm aneurysm. On 3/6 patient underwent aneurysm embolization with coiling complicated by aneurysm rupture, controlled with coils and balloon tamponade. NISHANT CT showed SAH b/l frontal lobes and R sylvian fissure along with contrast staining. EVD placed  3/6, removed by neurosurgery 3/15. Transferred to Stroke service 3/18.   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       NEURO:   #ACOMM aneurysm elective procedure complicated by aneurysm rupture controlled with coils/balloon tamponade  #SAH  #EVD 3/6, removed 3/15  # Metabolic encephalopathy  # Tremors  -Neurologically without acute change   -Continue close monitoring for neurologic deterioration    -Stroke neuro checks q4hrs   - EEG without evidence of seizure   -MRI Brain w/o, MRA Head w/o and Neck w/contrast results as above   -will need repeat MRI brain w/wo contrast in 4-6 weeks  -s/p Keppra  -Continues on nimodipine 60mg q4hrs - per Neurosurgery would discontinue on 3/21, recommended to continue through 3/20 and  bromocriptine tid due to ? central fevers, will begin to titrate.   -Dysphagia screen: pass, on easy to chew diet  -Physical therapy/OT/Speech eval/treatment.            #Stroke prevention:  -SBP goal  per neuro IR recommendations, avoiding rapid fluctuations and hypotension   -ANTITHROMBOTIC THERAPY: n/a   -LDL- 55 , continue home regimen if applicable   -HA1C 5.8  -TTE noted above    CARDIOVASCULAR:   -TTE noted above  -cardiac monitoring w/ telemetry for now, further evaluation pending findings of noted workup                              HEMATOLOGY:   H/H with anemia and thrombocytosis (plt 411) , continue close monitoring, no active bleeding noted,   -patient should have all age and risk appropriate malignancy screenings with PCP or sooner if clinically suspected      DVT ppx: Heparin s.c [] LMWH [X]     PULMONARY:   -most recent CXR 3/19 with persistent right base infiltrate  -s/p zosyn for possible pneumonia  -consider CT chest if fever persists   -protecting airway, saturating well     RENAL:   #Urinary retention  #Hyponatremia   BUN/Cr without acute change   -maintain adequate hydration  -tamsulosin 0.8mg bedtime   -urinary retention s/p void trial- feliciano re-inserted   -hyponatremia, likely due to diarrhea, continue to monitor, IVF in place with gradual improvement - titrated to 75cc/hr , monitor closely      Na Goal:  135-145     Feliciano: replaced     ID:   -ID consulted  -s/p zosyn for possible pneumonia  -fluctuating fevers - Tmax 101.1,  possible central fevers but will need to screen for alternate causes and have ID follow up.   - leukocytosis resolved   -monitor and screen for si/sx of infection   -Abdomen distended, diarrhea- abdominal imaging revealed fecal.  enema and mag citrate as recommended by hospitalist ACP team.     GI:  #diarrhea  -Abdominal XR 3/13 noted  -s/p rectal tube, removed 3/17  -monitor BM, loose stool possible side effect of nimodipine  _Abdominal xray pending     Psych:  -Quetiapine 12.5mg at bedtime  -Delirium precautions    Ortho: xray lumbar /sacral pending read given back pain. Possible meningeal irritation.  Will give lidocaine patch.      OTHER:  condition and plan of care d/w patient, questions and concerns addressed. Discussed with family at bedside, medical team at bedside as well present for further evaluation and recommendations.     DISPOSITION: PT/OT recommending acute rehab once medically stable for discharge    CORE MEASURES:        Admission NIHSS: n/a      Tenecteplase : [] YES [X] NO      LDL/HDL/A1C: pending lipid panel/ HA1C 5.8     Depression Screen- if depression hx and/or present      Statin Therapy: simvastatin 10mg     Dysphagia Screen: [X] PASS [] FAIL     Smoking [] YES [X] NO      Afib [] YES [X] NO     Stroke Education [] YES [] NO [X] pending    Obtain screening lower extremity venous ultrasound in patients who meet 1 or more of the following criteria as patient is high risk for DVT/PE on admission:   [] History of DVT/PE  []Hypercoagulable states (Factor V Leiden, Cancer, OCP, etc. )  []Prolonged immobility (hemiplegia/hemiparesis/post operative or any other extended immobilization)  [] Transferred from outside facility (Rehab or Long term care)  [] Age </= to 50

## 2024-03-20 NOTE — PROGRESS NOTE ADULT - SUBJECTIVE AND OBJECTIVE BOX
Preliminary note, official recommendations pending attending review/signature   Seen and examined by Stroke team attending/team, assessment/ plan as discussed with stroke team attending/team as noted.     [facility] Stroke Team  Progress Note     HPI:  74 y/o mandarin speaking female with PMH of HLD and osteoporosis presents to PST with complaints of having cerebral aneurysm. States in 10/2023 she started to have B/L hand numbness. At that time she had MRI brain, MRA head and neck. MRA showed an ACOMM aneurysm. She underwent a cerebral angiogram by José Manuel Villaseñor at [facility] on December 7th, 2023, with the intent to treat this month. The aneurysm is described as a 5.5x4.6x3.6mm wide necked acomm aneurysm that projects medio-superiorly. Reports occasional headaches, described as numbness, 5/10 in severity, worse with laying down, relieved with sitting up or standing. Patient underwent diagnostic angiogram 2/14/24 which confirmed acomm aneurysm. On 3/6 patient underwent aneurysm embolization with coiling complicated by aneurysm rupture, controlled with coils and balloon tamponade. NISHANT CT showed SAH b/l frontal lobes and R sylvian fissure along with contrast staining. Patient admitted to NSICU for post op care.. EVD now removed per neurosurgery 3/15. Transferred to neurosurgery service 3/16, stroke team asked to consult, transferred to Stroke service 3/18.     SUBJECTIVE: No events overnight.  No new neurologic complaints.  ROS reported negative unless otherwise noted.    acetaminophen     Tablet .. 650 milliGRAM(s) Oral every 6 hours PRN  bromocriptine Capsule 10 milliGRAM(s) Oral two times a day  calcium carbonate 1250 mG  + Vitamin D (OsCal 500 + D) 1 Tablet(s) Oral daily  chlorhexidine 2% Cloths 1 Application(s) Topical daily  enoxaparin Injectable 30 milliGRAM(s) SubCutaneous every 24 hours  hydrALAZINE Injectable 10 milliGRAM(s) IV Push every 2 hours PRN  labetalol Injectable 10 milliGRAM(s) IV Push every 2 hours PRN  multivitamin 1 Tablet(s) Oral daily  niMODipine Oral Solution 60 milliGRAM(s) Oral every 4 hours  nystatin Powder 1 Application(s) Topical two times a day  ondansetron Injectable 4 milliGRAM(s) IV Push every 6 hours PRN  psyllium Powder 1 Packet(s) Oral two times a day  QUEtiapine 12.5 milliGRAM(s) Oral at bedtime  saccharomyces boulardii 250 milliGRAM(s) Oral two times a day  simethicone 80 milliGRAM(s) Chew every 6 hours PRN  simvastatin 10 milliGRAM(s) Oral at bedtime  sodium chloride 0.9%. 1000 milliLiter(s) IV Continuous <Continuous>  tamsulosin 0.8 milliGRAM(s) Oral at bedtime      PHYSICAL EXAM:   Vital Signs Last 24 Hrs  T(C): 37 (20 Mar 2024 09:00), Max: 38.2 (19 Mar 2024 12:30)  T(F): 98.6 (20 Mar 2024 09:00), Max: 100.8 (19 Mar 2024 12:30)  HR: 85 (20 Mar 2024 09:00) (77 - 96)  BP: 154/68 (20 Mar 2024 09:00) (103/71 - 154/75)  BP(mean): 93 (20 Mar 2024 09:00) (73 - 106)  RR: 24 (20 Mar 2024 09:00) (14 - 25)  SpO2: 100% (20 Mar 2024 09:00) (97% - 100%)    Parameters below as of 20 Mar 2024 08:00  Patient On (Oxygen Delivery Method): room air        General: No acute distress.   HEENT: Head normocephalic, atraumatic. Conjunctivae clear w/o exudates or hemorrhage. Sclera non-icteric. Nares are patent bilaterally. Mucous membranes pink and moist.  No tonsillar swelling or exudates.    Neck: Supple, no adenopathy. Trachea midline. No JVD.  Cardiac: Normal rate and rhythm.   Respiratory: Lung sounds clear, no use of accessory muscles, respirations unlabored   Abdominal: Firm , distended, nontender. Bowel sounds  x 4 quadrants   Skin: Skin is warm, dry and intact without rashes or lesions. no drainage at groin site , no induration or hematoma, NTTP  Extremities: No edema, 2+ peripheral pulses in b/l upper and b/l lower extremities. Full range of motion in all joints.   Supersacral bony protuberance , slightly tender       NEUROLOGICAL EXAM:  Mental status: The patient is awake and alert and has normal attention span.  The patient is fully oriented in 3 spheres. The patient is oriented to current events. The patient is able to name objects, follow commands, repeat sentences.    Cranial nerves: slight left facial palsy, Pupils equal and react symmetrically to light. There is no visual field deficit to confrontation. Extraocular motion is full with no nystagmus. There is no ptosis. Facial sensation is intact.   Motor: There is normal bulk and tone.  There is no tremor.  LUE pronator drift, strength 5-/5, LLE 5-/5. RUE and bilateral LE strength 5/5  Sensation: Intact to light touch  in 4 extremities  Slight tremor b/l upper extremities     LABS:                        8.8    8.60  )-----------( 411      ( 20 Mar 2024 02:40 )             25.6    03-20    136  |  99  |  4.9<L>  ----------------------------<  163<H>  3.8   |  24.0  |  0.32<L>    Ca    8.2<L>      20 Mar 2024 09:30  Phos  3.0     03-20  Mg     2.3     03-20    TPro  x   /  Alb  3.5  /  TBili  x   /  DBili  x   /  AST  x   /  ALT  x   /  AlkPhos  x   03-19 03-19 Chol 142 LDL -55- HDL 63 Trig 120    A1C:A1C with Estimated Average Glucose Result: 5.8 %    IMAGING: Reviewed by me.     CT Head No Cont (03.16.24 @ 03:22)   IMPRESSION: No change in mild ventricular dilatation with intraventricular hemorrhage and hemorrhage in the frontal lobe midline and corpus callosum after extraventricular drain removal since 3/15/2024.    CT Head No Cont (03.15.24 @ 05:38)   IMPRESSION: Stable follow-up CT study.     TTE W or WO Ultrasound Enhancing Agent (03.13.24 @ 12:03)   CONCLUSIONS:    1. Left ventricular cavity is normal in size. Left ventricular wall thickness is normal. Left ventricular systolic function is normal with an ejection fraction visually estimated at 65 to 70 %.   2. Normal left ventricular diastolic function.   3. Normal right ventricular cavity size and normal systolic function.   4. The left atrium is normal.   5. The right atrium is normal in size.   6. Fibrocalcific aortic valve sclerosis without stenosis.   7. Mild mitral valve leaflet calcification.   8. Trace mitral regurgitation.   9. Estimated pulmonary artery systolic pressure is 23 mmHg, normal pulmonary artery pressure.    US Duplex Venous Lower Ext Complete, Bilateral (03.12.24 @ 15:23) >  IMPRESSION: No evidence of deep venous thrombosis in either lower extremity.     IR Neuro (03.11.24 @ 09:01)   IMPRESSION:  1. 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 No interval aneurysm recanalization or evidence for pseudoaneurysm.  2. No vasospasm.      CT Head No Cont (03.10.24 @ 12:37)   IMPRESSION: Mildly decreased bilateral mesial frontal parenchymal hemorrhages. Similar anterior interhemispheric acute subarachnoid hemorrhage. Mildly decreased intraventricular hemorrhage within the bilateral lateral   and third ventricles. Decreased ventricular size with near resolution of hydrocephalus. No large midline shift. Basal cisterns are more well visualized on the current examination, this may be due to technique.       MR Angio Head w/ IV Cont (03.09.24 @ 18:57)   IMPRESSION:  MR brain: Intraparenchymal hemorrhage within the medial anterior frontal lobes measuring approximately 3.5 cm, unchanged. There is surrounding edema and extension into the genuine body of corpus callosum, unchanged. Hemorrhagic clot within the LEFT lateral ventricle and third ventricle is unchanged. Minimal hemorrhage seen in the dependent portions of the RIGHT lateral ventricle unchanged. Minimal subarachnoid hemorrhage seen along the medial sulci of the BILATERAL frontal and parietal lobes as well as scattered throughout the sulci of the brain. RIGHT frontal ventriculostomy catheter tip is seen extending toward the body of the   LEFT ventricle and third ventricle.  Coil material is seen in the region of the anterior communicating artery on the RIGHT.   Blood products are seen in the region of the known aneurysm possibly indicating partial patency.    MRA intracranial:   Coil material is seen in the region of the anterior communicating artery. There is 1 x 4 mm region of signal seen on the noncontrast but not the postcontrast which may reflect blood products within the aneurysm indicating patency.      CT Head No Cont (03.07.24 @ 05:56)   IMPRESSION:   persistent intracranial hemorrhage within the BILATERAL lateral and third ventricles, LEFT greater than RIGHT, with mild obstructive hydrocephalus slightly increased. RIGHT frontal shunt catheter tip seen in body of RIGHT lateral ventricle unchanged. Subarachnoid hemorrhage again noted in the BILATERAL medial frontal lobes with hemorrhage in the anterior corpus callosum, LEFT greaterthan RIGHT.     CT Head No Cont (03.06.24 @ 19:20)   IMPRESSION: Interval placement of right frontal approach ventriculostomy catheter since the prior study performed 3 hours ago, with improved hydrocephalus and slightly decreased sulcal/cisternal bilateral subarachnoid hemorrhage.    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 Intraventricular hemorrhage is present with hydrocephalus.    IR Neuro (03.06.24 @ 12:13)   IMPRESSION:  1. 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  2. Status postballoon assisted coil embolization, the aneurysm is nearly completely occluded other than a 2.2 mm x 1.4 mm x 4.3 mm aneurysmal neck remnant.     Preliminary note, official recommendations pending attending review/signature   Seen and examined by Stroke team attending/team, assessment/ plan as discussed with stroke team attending/team as noted.     [facility] Stroke Team  Progress Note     HPI:  72 y/o mandarin speaking female with PMH of HLD and osteoporosis presents to PST with complaints of having cerebral aneurysm. States in 10/2023 she started to have B/L hand numbness. At that time she had MRI brain, MRA head and neck. MRA showed an ACOMM aneurysm. She underwent a cerebral angiogram by José Manuel Villaseñor at [facility] on December 7th, 2023, with the intent to treat this month. The aneurysm is described as a 5.5x4.6x3.6mm wide necked acomm aneurysm that projects medio-superiorly. Reports occasional headaches, described as numbness, 5/10 in severity, worse with laying down, relieved with sitting up or standing. Patient underwent diagnostic angiogram 2/14/24 which confirmed acomm aneurysm. On 3/6 patient underwent aneurysm embolization with coiling complicated by aneurysm rupture, controlled with coils and balloon tamponade. NISHANT CT showed SAH b/l frontal lobes and R sylvian fissure along with contrast staining. Patient admitted to NSICU for post op care.. EVD now removed per neurosurgery 3/15. Transferred to neurosurgery service 3/16, stroke team asked to consult, transferred to Stroke service 3/18.     SUBJECTIVE:  Febrile intermittently.  No new neurologic complaints.  ROS reported negative unless otherwise noted.    acetaminophen     Tablet .. 650 milliGRAM(s) Oral every 6 hours PRN  bromocriptine Capsule 10 milliGRAM(s) Oral two times a day  calcium carbonate 1250 mG  + Vitamin D (OsCal 500 + D) 1 Tablet(s) Oral daily  chlorhexidine 2% Cloths 1 Application(s) Topical daily  enoxaparin Injectable 30 milliGRAM(s) SubCutaneous every 24 hours  hydrALAZINE Injectable 10 milliGRAM(s) IV Push every 2 hours PRN  labetalol Injectable 10 milliGRAM(s) IV Push every 2 hours PRN  multivitamin 1 Tablet(s) Oral daily  niMODipine Oral Solution 60 milliGRAM(s) Oral every 4 hours  nystatin Powder 1 Application(s) Topical two times a day  ondansetron Injectable 4 milliGRAM(s) IV Push every 6 hours PRN  psyllium Powder 1 Packet(s) Oral two times a day  QUEtiapine 12.5 milliGRAM(s) Oral at bedtime  saccharomyces boulardii 250 milliGRAM(s) Oral two times a day  simethicone 80 milliGRAM(s) Chew every 6 hours PRN  simvastatin 10 milliGRAM(s) Oral at bedtime  sodium chloride 0.9%. 1000 milliLiter(s) IV Continuous <Continuous>  tamsulosin 0.8 milliGRAM(s) Oral at bedtime      PHYSICAL EXAM:   Vital Signs Last 24 Hrs  T(C): 37 (20 Mar 2024 09:00), Max: 38.2 (19 Mar 2024 12:30)  T(F): 98.6 (20 Mar 2024 09:00), Max: 100.8 (19 Mar 2024 12:30)  HR: 85 (20 Mar 2024 09:00) (77 - 96)  BP: 154/68 (20 Mar 2024 09:00) (103/71 - 154/75)  BP(mean): 93 (20 Mar 2024 09:00) (73 - 106)  RR: 24 (20 Mar 2024 09:00) (14 - 25)  SpO2: 100% (20 Mar 2024 09:00) (97% - 100%)    Parameters below as of 20 Mar 2024 08:00  Patient On (Oxygen Delivery Method): room air        General: No acute distress.   HEENT: Head normocephalic, atraumatic. Conjunctivae clear w/o exudates or hemorrhage. Sclera non-icteric. Nares are patent bilaterally. Mucous membranes pink and moist.    Cardiac: Normal rate and rhythm.   Respiratory: Lung sounds clear, no use of accessory muscles, respirations unlabored   Abdominal: Firm , distended, nontender. Bowel sounds diminished   Skin: Skin is warm, dry and intact without rashes or lesions.    Extremities: No edema       NEUROLOGICAL EXAM:  Mental status: The patient is awake and alert and has normal attention span.  The patient is fully oriented in 3 spheres. The patient is oriented to current events. The patient is able to name objects, follow commands, repeat sentences.    Cranial nerves: slight left facial palsy, Pupils equal and react symmetrically to light. There is no visual field deficit to confrontation. Extraocular motion is full with no nystagmus. There is no ptosis. Facial sensation is intact.   Motor: There is normal bulk and tone.  There is no tremor.  LUE pronator drift, strength 5-/5, LLE 5-/5. RUE and bilateral LE strength 5/5  Sensation: Intact to light touch  in 4 extremities  Slight tremor b/l upper extremities     LABS:                        8.8    8.60  )-----------( 411      ( 20 Mar 2024 02:40 )             25.6    03-20    136  |  99  |  4.9<L>  ----------------------------<  163<H>  3.8   |  24.0  |  0.32<L>    Ca    8.2<L>      20 Mar 2024 09:30  Phos  3.0     03-20  Mg     2.3     03-20    TPro  x   /  Alb  3.5  /  TBili  x   /  DBili  x   /  AST  x   /  ALT  x   /  AlkPhos  x   03-19 03-19 Chol 142 LDL -55- HDL 63 Trig 120    A1C:A1C with Estimated Average Glucose Result: 5.8 %    IMAGING: Reviewed by me.     CT Head No Cont (03.16.24 @ 03:22)   IMPRESSION: No change in mild ventricular dilatation with intraventricular hemorrhage and hemorrhage in the frontal lobe midline and corpus callosum after extraventricular drain removal since 3/15/2024.    CT Head No Cont (03.15.24 @ 05:38)   IMPRESSION: Stable follow-up CT study.     TTE W or WO Ultrasound Enhancing Agent (03.13.24 @ 12:03)   CONCLUSIONS:    1. Left ventricular cavity is normal in size. Left ventricular wall thickness is normal. Left ventricular systolic function is normal with an ejection fraction visually estimated at 65 to 70 %.   2. Normal left ventricular diastolic function.   3. Normal right ventricular cavity size and normal systolic function.   4. The left atrium is normal.   5. The right atrium is normal in size.   6. Fibrocalcific aortic valve sclerosis without stenosis.   7. Mild mitral valve leaflet calcification.   8. Trace mitral regurgitation.   9. Estimated pulmonary artery systolic pressure is 23 mmHg, normal pulmonary artery pressure.    US Duplex Venous Lower Ext Complete, Bilateral (03.12.24 @ 15:23) >  IMPRESSION: No evidence of deep venous thrombosis in either lower extremity.     IR Neuro (03.11.24 @ 09:01)   IMPRESSION:  1. 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 No interval aneurysm recanalization or evidence for pseudoaneurysm.  2. No vasospasm.      CT Head No Cont (03.10.24 @ 12:37)   IMPRESSION: Mildly decreased bilateral mesial frontal parenchymal hemorrhages. Similar anterior interhemispheric acute subarachnoid hemorrhage. Mildly decreased intraventricular hemorrhage within the bilateral lateral   and third ventricles. Decreased ventricular size with near resolution of hydrocephalus. No large midline shift. Basal cisterns are more well visualized on the current examination, this may be due to technique.       MR Angio Head w/ IV Cont (03.09.24 @ 18:57)   IMPRESSION:  MR brain: Intraparenchymal hemorrhage within the medial anterior frontal lobes measuring approximately 3.5 cm, unchanged. There is surrounding edema and extension into the genuine body of corpus callosum, unchanged. Hemorrhagic clot within the LEFT lateral ventricle and third ventricle is unchanged. Minimal hemorrhage seen in the dependent portions of the RIGHT lateral ventricle unchanged. Minimal subarachnoid hemorrhage seen along the medial sulci of the BILATERAL frontal and parietal lobes as well as scattered throughout the sulci of the brain. RIGHT frontal ventriculostomy catheter tip is seen extending toward the body of the   LEFT ventricle and third ventricle.  Coil material is seen in the region of the anterior communicating artery on the RIGHT.   Blood products are seen in the region of the known aneurysm possibly indicating partial patency.    MRA intracranial:   Coil material is seen in the region of the anterior communicating artery. There is 1 x 4 mm region of signal seen on the noncontrast but not the postcontrast which may reflect blood products within the aneurysm indicating patency.      CT Head No Cont (03.07.24 @ 05:56)   IMPRESSION:   persistent intracranial hemorrhage within the BILATERAL lateral and third ventricles, LEFT greater than RIGHT, with mild obstructive hydrocephalus slightly increased. RIGHT frontal shunt catheter tip seen in body of RIGHT lateral ventricle unchanged. Subarachnoid hemorrhage again noted in the BILATERAL medial frontal lobes with hemorrhage in the anterior corpus callosum, LEFT greaterthan RIGHT.     CT Head No Cont (03.06.24 @ 19:20)   IMPRESSION: Interval placement of right frontal approach ventriculostomy catheter since the prior study performed 3 hours ago, with improved hydrocephalus and slightly decreased sulcal/cisternal bilateral subarachnoid hemorrhage.    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 Intraventricular hemorrhage is present with hydrocephalus.    IR Neuro (03.06.24 @ 12:13)   IMPRESSION:  1. 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  2. Status postballoon assisted coil embolization, the aneurysm is nearly completely occluded other than a 2.2 mm x 1.4 mm x 4.3 mm aneurysmal neck remnant.    Study Date: 03-19-24  Duration: 22 minutes  EEG Classification / Summary:  Normal routine EEG in the awake state, within the limits of interpretation.  Interpretation significantly limited by continuous muscle artifact.  Clinical Impression:   Technically limited study due to continuous muscle artifact. No obvious abnormalities noted.  Consider repeating EEG if clinically warranted.            [facility] Stroke Team  Progress Note     HPI:  72 y/o mandarin speaking female with PMH of HLD and osteoporosis presents to PST with complaints of having cerebral aneurysm. States in 10/2023 she started to have B/L hand numbness. At that time she had MRI brain, MRA head and neck. MRA showed an ACOMM aneurysm. She underwent a cerebral angiogram by José Manuel Villaseñor at [facility] on December 7th, 2023, with the intent to treat this month. The aneurysm is described as a 5.5x4.6x3.6mm wide necked acomm aneurysm that projects medio-superiorly. Reports occasional headaches, described as numbness, 5/10 in severity, worse with laying down, relieved with sitting up or standing. Patient underwent diagnostic angiogram 2/14/24 which confirmed acomm aneurysm. On 3/6 patient underwent aneurysm embolization with coiling complicated by aneurysm rupture, controlled with coils and balloon tamponade. NISHANT CT showed SAH b/l frontal lobes and R sylvian fissure along with contrast staining. Patient admitted to NSICU for post op care.. EVD now removed per neurosurgery 3/15. Transferred to neurosurgery service 3/16, stroke team asked to consult, transferred to Stroke service 3/18.     SUBJECTIVE:  Febrile intermittently.  No new neurologic complaints.  ROS reported negative unless otherwise noted.    acetaminophen     Tablet .. 650 milliGRAM(s) Oral every 6 hours PRN  bromocriptine Capsule 10 milliGRAM(s) Oral two times a day  calcium carbonate 1250 mG  + Vitamin D (OsCal 500 + D) 1 Tablet(s) Oral daily  chlorhexidine 2% Cloths 1 Application(s) Topical daily  enoxaparin Injectable 30 milliGRAM(s) SubCutaneous every 24 hours  hydrALAZINE Injectable 10 milliGRAM(s) IV Push every 2 hours PRN  labetalol Injectable 10 milliGRAM(s) IV Push every 2 hours PRN  multivitamin 1 Tablet(s) Oral daily  niMODipine Oral Solution 60 milliGRAM(s) Oral every 4 hours  nystatin Powder 1 Application(s) Topical two times a day  ondansetron Injectable 4 milliGRAM(s) IV Push every 6 hours PRN  psyllium Powder 1 Packet(s) Oral two times a day  QUEtiapine 12.5 milliGRAM(s) Oral at bedtime  saccharomyces boulardii 250 milliGRAM(s) Oral two times a day  simethicone 80 milliGRAM(s) Chew every 6 hours PRN  simvastatin 10 milliGRAM(s) Oral at bedtime  sodium chloride 0.9%. 1000 milliLiter(s) IV Continuous <Continuous>  tamsulosin 0.8 milliGRAM(s) Oral at bedtime      PHYSICAL EXAM:   Vital Signs Last 24 Hrs  T(C): 37 (20 Mar 2024 09:00), Max: 38.2 (19 Mar 2024 12:30)  T(F): 98.6 (20 Mar 2024 09:00), Max: 100.8 (19 Mar 2024 12:30)  HR: 85 (20 Mar 2024 09:00) (77 - 96)  BP: 154/68 (20 Mar 2024 09:00) (103/71 - 154/75)  BP(mean): 93 (20 Mar 2024 09:00) (73 - 106)  RR: 24 (20 Mar 2024 09:00) (14 - 25)  SpO2: 100% (20 Mar 2024 09:00) (97% - 100%)    Parameters below as of 20 Mar 2024 08:00  Patient On (Oxygen Delivery Method): room air        General: No acute distress.   HEENT: Head normocephalic, atraumatic. Conjunctivae clear w/o exudates or hemorrhage. Sclera non-icteric. Nares are patent bilaterally. Mucous membranes pink and moist.    Cardiac: Normal rate and rhythm.   Respiratory: Lung sounds clear, no use of accessory muscles, respirations unlabored   Abdominal: Firm , distended, nontender. Bowel sounds diminished   Skin: Skin is warm, dry and intact without rashes or lesions.    Extremities: No edema       NEUROLOGICAL EXAM:  Mental status: The patient is awake and alert and has normal attention span.  The patient is fully oriented in 3 spheres. The patient is oriented to current events. The patient is able to name objects, follow commands, repeat sentences.    Cranial nerves: slight left facial palsy, Pupils equal and react symmetrically to light. There is no visual field deficit to confrontation. Extraocular motion is full with no nystagmus. There is no ptosis. Facial sensation is intact.   Motor: There is normal bulk and tone.  There is no tremor.  LUE pronator drift, strength 5-/5, LLE 5-/5. RUE and bilateral LE strength 5/5  Sensation: Intact to light touch  in 4 extremities  Slight tremor b/l upper extremities     LABS:                        8.8    8.60  )-----------( 411      ( 20 Mar 2024 02:40 )             25.6    03-20    136  |  99  |  4.9<L>  ----------------------------<  163<H>  3.8   |  24.0  |  0.32<L>    Ca    8.2<L>      20 Mar 2024 09:30  Phos  3.0     03-20  Mg     2.3     03-20    TPro  x   /  Alb  3.5  /  TBili  x   /  DBili  x   /  AST  x   /  ALT  x   /  AlkPhos  x   03-19 03-19 Chol 142 LDL -55- HDL 63 Trig 120    A1C:A1C with Estimated Average Glucose Result: 5.8 %    IMAGING: Reviewed by me.     CT Head No Cont (03.16.24 @ 03:22)   IMPRESSION: No change in mild ventricular dilatation with intraventricular hemorrhage and hemorrhage in the frontal lobe midline and corpus callosum after extraventricular drain removal since 3/15/2024.    CT Head No Cont (03.15.24 @ 05:38)   IMPRESSION: Stable follow-up CT study.     TTE W or WO Ultrasound Enhancing Agent (03.13.24 @ 12:03)   CONCLUSIONS:    1. Left ventricular cavity is normal in size. Left ventricular wall thickness is normal. Left ventricular systolic function is normal with an ejection fraction visually estimated at 65 to 70 %.   2. Normal left ventricular diastolic function.   3. Normal right ventricular cavity size and normal systolic function.   4. The left atrium is normal.   5. The right atrium is normal in size.   6. Fibrocalcific aortic valve sclerosis without stenosis.   7. Mild mitral valve leaflet calcification.   8. Trace mitral regurgitation.   9. Estimated pulmonary artery systolic pressure is 23 mmHg, normal pulmonary artery pressure.    US Duplex Venous Lower Ext Complete, Bilateral (03.12.24 @ 15:23) >  IMPRESSION: No evidence of deep venous thrombosis in either lower extremity.     IR Neuro (03.11.24 @ 09:01)   IMPRESSION:  1. 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 No interval aneurysm recanalization or evidence for pseudoaneurysm.  2. No vasospasm.      CT Head No Cont (03.10.24 @ 12:37)   IMPRESSION: Mildly decreased bilateral mesial frontal parenchymal hemorrhages. Similar anterior interhemispheric acute subarachnoid hemorrhage. Mildly decreased intraventricular hemorrhage within the bilateral lateral   and third ventricles. Decreased ventricular size with near resolution of hydrocephalus. No large midline shift. Basal cisterns are more well visualized on the current examination, this may be due to technique.       MR Angio Head w/ IV Cont (03.09.24 @ 18:57)   IMPRESSION:  MR brain: Intraparenchymal hemorrhage within the medial anterior frontal lobes measuring approximately 3.5 cm, unchanged. There is surrounding edema and extension into the genuine body of corpus callosum, unchanged. Hemorrhagic clot within the LEFT lateral ventricle and third ventricle is unchanged. Minimal hemorrhage seen in the dependent portions of the RIGHT lateral ventricle unchanged. Minimal subarachnoid hemorrhage seen along the medial sulci of the BILATERAL frontal and parietal lobes as well as scattered throughout the sulci of the brain. RIGHT frontal ventriculostomy catheter tip is seen extending toward the body of the   LEFT ventricle and third ventricle.  Coil material is seen in the region of the anterior communicating artery on the RIGHT.   Blood products are seen in the region of the known aneurysm possibly indicating partial patency.    MRA intracranial:   Coil material is seen in the region of the anterior communicating artery. There is 1 x 4 mm region of signal seen on the noncontrast but not the postcontrast which may reflect blood products within the aneurysm indicating patency.      CT Head No Cont (03.07.24 @ 05:56)   IMPRESSION:   persistent intracranial hemorrhage within the BILATERAL lateral and third ventricles, LEFT greater than RIGHT, with mild obstructive hydrocephalus slightly increased. RIGHT frontal shunt catheter tip seen in body of RIGHT lateral ventricle unchanged. Subarachnoid hemorrhage again noted in the BILATERAL medial frontal lobes with hemorrhage in the anterior corpus callosum, LEFT greaterthan RIGHT.     CT Head No Cont (03.06.24 @ 19:20)   IMPRESSION: Interval placement of right frontal approach ventriculostomy catheter since the prior study performed 3 hours ago, with improved hydrocephalus and slightly decreased sulcal/cisternal bilateral subarachnoid hemorrhage.    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 Intraventricular hemorrhage is present with hydrocephalus.    IR Neuro (03.06.24 @ 12:13)   IMPRESSION:  1. 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  2. Status postballoon assisted coil embolization, the aneurysm is nearly completely occluded other than a 2.2 mm x 1.4 mm x 4.3 mm aneurysmal neck remnant.    Study Date: 03-19-24  Duration: 22 minutes  EEG Classification / Summary:  Normal routine EEG in the awake state, within the limits of interpretation.  Interpretation significantly limited by continuous muscle artifact.  Clinical Impression:   Technically limited study due to continuous muscle artifact. No obvious abnormalities noted.  Consider repeating EEG if clinically warranted.

## 2024-03-20 NOTE — PROGRESS NOTE ADULT - ASSESSMENT
74 y/o mandarin speaking female with PMH of HLD and osteoporosis presents to PST with complaints of having cerebral aneurysm. States in 10/2023 she started to have B/L hand numbness. At that time she had MRI brain, MRA head and neck. MRA showed an ACOMM aneurysm. She underwent a cerebral angiogram by José Manuel Villaseñor at [facility] on December 7th, 2023, with the intent to treat this month. The aneurysm is described as a 5.5x4.6x3.6mm wide necked acomm aneurysm that projects medio-superiorly. Reports occasional headaches, described as numbness.   S/P  aneurysm embolization with coiling on 3/6  complicated by aneurysm rupture, controlled with coils and balloon tamponade. NISHANT CT showed SAH b/l frontal lobes and R sylvian fissure along with contrast staining. Patient admitted to NSICU for post op care.   Downgraded to Step down on 3/16. Medicine consulted for medical mgmt .       # ACOMM aneurism , S/P  aneurysm embolization with coiling on 3/6  complicated by aneurysm rupture,   controlled with coils and balloon tamponade.    NISHANT CT showed SAH b/l frontal lobes and R sylvian fissure along with contrast staining.    Patient admitted to NSICU for post op care. Now downgraded to step down and Neuro stroke service  - Neuro checks q4  - Bromocriptine 10mg TID- for likely central fevers   - Seroquel 12.5mg nightly  - Pain control- Tylenol, Tramadol  - pt/ot  - Nimodipine 60mg q4hrs for SAH protocol  as per NS   - Avoid hypotension, rapid changes in BP  - New tremors noted: EEG : Clinical Impression:   Technically limited study due to continuous muscle artifact. No obvious abnormalities noted.  Consider repeating EEG if clinically warranted.       #anemia- probably dilutional, Hg - 8.6 , FOBT - negative , monitor CBC,   transfuse if Hg < 7 , H&H stable      # HLD - Simvastatin 10mg daily    # Postop urinary retention -  -Flomax 0.8mg  qhs   -Continue Dangelo Cath, failed TOV on 3/18 ( 3rd attempt)  -Ambulate to bathroom  - Attempt TOV when constipation resolved    # Diarrhea , rectal tube now removed ( 3/17/24)  Abdominal XR ( 3/13) noted with  Fecal material   localized to the mid left descending colon.   KUB with Constipation, needs laxative/enema       # Low grade temps, work up NTD-   Completed course of Zosyn for possible PNA   On Bromocriptine- continue    - Mild leukocytosis now resolved, UA bland, no need to treat for yeast    # Hypokalemia - likely due to diarrhea - supplemented   monitor electrolytes     #Hypophosphatemia - replace as needed    # Hyponatremia - likely due to diarrhea - monitor. Na Goal: 135-145     DVT prophylaxis  -on Lovenox     Dispo: Transfer to Stepdown to Neuro stroke service. 74 y/o mandarin speaking female with PMH of HLD and osteoporosis presents to PST with complaints of having cerebral aneurysm. States in 10/2023 she started to have B/L hand numbness. At that time she had MRI brain, MRA head and neck. MRA showed an ACOMM aneurysm. She underwent a cerebral angiogram by José Manuel Villaseñor at [facility] on December 7th, 2023, with the intent to treat this month. The aneurysm is described as a 5.5x4.6x3.6mm wide necked acomm aneurysm that projects medio-superiorly. Reports occasional headaches, described as numbness.   S/P  aneurysm embolization with coiling on 3/6  complicated by aneurysm rupture, controlled with coils and balloon tamponade. NISHANT CT showed SAH b/l frontal lobes and R sylvian fissure along with contrast staining. Patient admitted to NSICU for post op care.   Downgraded to Step down on 3/16. Medicine consulted for medical mgmt .       # ACOMM aneurism , S/P  aneurysm embolization with coiling on 3/6  complicated by aneurysm rupture,   controlled with coils and balloon tamponade.    NISHANT CT showed SAH b/l frontal lobes and R sylvian fissure along with contrast staining.    Patient admitted to NSICU for post op care. Now downgraded to step down and Neuro stroke service  - Neuro checks q4  - Bromocriptine 10mg TID- for likely central fevers   - Seroquel 12.5mg nightly  - Pain control- Tylenol, Tramadol  - pt/ot  - Nimodipine 60mg q4hrs for SAH protocol  as per NS   - Avoid hypotension, rapid changes in BP  - New tremors noted: EEG : Clinical Impression:   Technically limited study due to continuous muscle artifact. No obvious abnormalities noted.  Consider repeating EEG if clinically warranted.       #anemia- probably dilutional, Hg - 8.6 , FOBT - negative , monitor CBC,   transfuse if Hg < 7 , H&H stable. Consider to check anemia w/u .      # HLD - Simvastatin 10mg daily    # Postop urinary retention -  -Flomax 0.8mg  qhs   -Continue Dangelo Cath, failed TOV on 3/18 ( 3rd attempt)  -Ambulate to bathroom  - Attempt TOV when constipation resolved    # Diarrhea ,likely due to overflow ,    rectal tube now removed ( 3/17/24)  Abdominal XR ( 3/13) noted with  Fecal material   localized to the mid left descending colon.   KUB with Constipation, needs laxative/enema       # Low grade temps, work up NTD-   Completed course of Zosyn for possible PNA   On Bromocriptine- continue    - Mild leukocytosis now resolved, UA=- yeast + -   likely colonized , consider to repeat UA     # Hypokalemia - likely due to diarrhea - supplemented   monitor electrolytes     #Hypophosphatemia - replace as needed    # Hyponatremia - likely due to diarrhea - monitor. Na Goal: 135-145     DVT prophylaxis  -on Lovenox     Dispo: Transfer to Stepdown to Neuro stroke service.

## 2024-03-20 NOTE — PROGRESS NOTE ADULT - SUBJECTIVE AND OBJECTIVE BOX
Patient feels well.  Reports some pain in her legs when moving them - diffusely located.     FUNCTIONAL PROGRESS  3/19 PT  Bed Mobility  Bed Mobility Training Sit-to-Supine: maximum assist (25% patient effort);  1 person assist;  increased assist needed for safety 2*2 pt sliding off of EOB.     Sit-Stand Transfer Training  Transfer Training Sit-to-Stand Transfer: minimum assist (75% patient effort);  1 person assist;  weight-bearing as tolerated   rolling walker  Transfer Training Stand-to-Sit Transfer: minimum assist (75% patient effort);  1 person assist;  weight-bearing as tolerated   rolling walker  Sit-to-Stand Transfer Training Transfer Safety Analysis: decreased balance;  decreased weight-shifting ability;  impaired balance;  decreased strength;  pain;  cognitive, decreased safety awareness;  rolling walker    Gait Training  Gait Training: minimum assist (75% patient effort);  weight-bearing as tolerated   rolling walker;  1 person + 1 person to manage equipment;  2nd assist for line management 2*2 pt with c/o low back pain and demonstrating increased difficulty with sequencing gait pattern today.;  7 feet  Gait Analysis: 2-point gait   shuffling;  decreased step length;  decreased weight-shifting ability;  decreased strength;  impaired balance;  impaired postural control;  pain;  cognitive, decreased safety awareness;  rolling walker    3/18 OT  Bed Mobility  Bed Mobility Training Sit-to-Supine: Pt left OOB in chair +alarm  Bed Mobility Training Supine-to-Sit: minimum assist (75% patient effort);  1 person assist;  verbal cues;  bed rails;  head of bed elevated  Bed Mobility Training Limitations: decreased ability to use legs for bridging/pushing;  decreased ability to use arms for pushing/pulling;  +impulsive;  cognitive, decreased safety awareness;  impaired balance;  decreased strength    Sit-Stand Transfer Training  Transfer Training Sit-to-Stand Transfer: minimum assist (75% patient effort);  1 person assist;  verbal cues;  weight-bearing as tolerated   rolling walker  Transfer Training Stand-to-Sit Transfer: minimum assist (75% patient effort);  1 person assist;  verbal cues;  weight-bearing as tolerated   rolling walker  Sit-to-Stand Transfer Training Transfer Safety Analysis: decreased weight-shifting ability;  decreased balance;  decreased sequencing ability;  cognitive, decreased safety awareness;  impaired balance;  decreased strength;  +impulsive;  rolling walker    Functional Endurance  Functional Endurance Detail: Pt ambulated with RW and min A x 1, +external cues short distances around bed area due to decreased balance, decreased endurance and decreased strength. Pt with mild impulsivity and decreased safety awareness requires cues and assist. Pt educated in energy conservation techniques including proper breathing and activity pacing.     Toilet Training  Toilet Training Assistance: maximum assist (25% patient effort);  1 person assist;  verbal cues;  for pericare, thoroughness and clothing management 2*bowel incontinence. Pt with difficulty with dynamic balance for task and reaching.;  decreased flexibility;  decreased ROM;  decreased strength;  impaired balance;  cognitive, decreased safety awareness;  pain      VITALS  T(C): 37.4 (03-20-24 @ 07:00), Max: 38.2 (03-19-24 @ 12:30)  HR: 90 (03-20-24 @ 07:00) (77 - 96)  BP: 124/76 (03-20-24 @ 07:00) (103/71 - 154/75)  RR: 17 (03-20-24 @ 07:00) (14 - 25)  SpO2: 98% (03-20-24 @ 07:00) (97% - 100%)  Wt(kg): --    MEDICATIONS   acetaminophen     Tablet .. 650 milliGRAM(s) every 6 hours PRN  bromocriptine Capsule 10 milliGRAM(s) three times a day  calcium carbonate 1250 mG  + Vitamin D (OsCal 500 + D) 1 Tablet(s) daily  chlorhexidine 2% Cloths 1 Application(s) daily  enoxaparin Injectable 30 milliGRAM(s) every 24 hours  hydrALAZINE Injectable 10 milliGRAM(s) every 2 hours PRN  labetalol Injectable 10 milliGRAM(s) every 2 hours PRN  multivitamin 1 Tablet(s) daily  niMODipine Oral Solution 60 milliGRAM(s) every 4 hours  nystatin Powder 1 Application(s) two times a day  ondansetron Injectable 4 milliGRAM(s) every 6 hours PRN  psyllium Powder 1 Packet(s) two times a day  QUEtiapine 12.5 milliGRAM(s) at bedtime  saccharomyces boulardii 250 milliGRAM(s) two times a day  simethicone 80 milliGRAM(s) every 6 hours PRN  simvastatin 10 milliGRAM(s) at bedtime  sodium chloride 0.9%. 1000 milliLiter(s) <Continuous>  tamsulosin 0.8 milliGRAM(s) at bedtime      RECENT LABS/IMAGING  - Reviewed Today                        8.8    8.60  )-----------( 411      ( 20 Mar 2024 02:40 )             25.6     03-20    133<L>  |  100  |  5.3<L>  ----------------------------<  120<H>  3.7   |  22.0  |  0.27<L>    Ca    7.8<L>      20 Mar 2024 02:40  Phos  3.0     03-20  Mg     2.3     03-20    TPro  x   /  Alb  3.5  /  TBili  x   /  DBili  x   /  AST  x   /  ALT  x   /  AlkPhos  x   03-19      Urinalysis Basic - ( 20 Mar 2024 02:40 )    Color: x / Appearance: x / SG: x / pH: x  Gluc: 120 mg/dL / Ketone: x  / Bili: x / Urobili: x   Blood: x / Protein: x / Nitrite: x   Leuk Esterase: x / RBC: x / WBC x   Sq Epi: x / Non Sq Epi: x / Bacteria: x                  US Duplex Venous Lower Ext Complete, Bilateral 3/12 - No evidence of deep venous thrombosis in either lower extremity.    CT Head 3/10 - Mildly decreased bilateral mesial frontal parenchymal hemorrhages. Similar anterior interhemispheric acute subarachnoid hemorrhage.Mildly decreased intraventricular hemorrhage within the bilateral lateral    and third ventricles. Decreased ventricular size with near resolution of hydrocephalus.No large midline shift.  Basal cisterns are more well visualized on the current examination, this may be due to technique.    MR Brain, MR Angio Head 3/9 - MR brain: Intraparenchymal hemorrhage within the medial anterior frontal   lobes measuring approximately 3.5 cm, unchanged. There is surrounding edema and extension into the genuine body of corpus callosum, unchanged. Hemorrhagic clot within the LEFT lateral ventricle and third ventricle is unchanged. Minimal hemorrhage seen in the dependent portions of the RIGHT lateral ventricle unchanged. Minimal subarachnoid hemorrhage seen along the medial sulci of the BILATERAL frontal and parietal lobes as well as scattered throughout the sulci of the brain. RIGHT frontal ventriculostomy catheter tip is seen extending toward the body of the LEFT ventricle and third ventricle.  Coil material is seen in the region of the anterior communicating artery on the RIGHT.   Blood products are seen in the region of the known aneurysm possibly indicating partial patency.    CT Head 3/16 -  No change in mild ventricular dilatation with intraventricular hemorrhage and hemorrhage in the frontal lobe midline and corpus callosum after extraventricular drain removal since 3/15/2024.      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 - RECOMMEND DC Bromocriptine    Delirium - RECOMMEND Seroquel 12.5mg Q6PM PRN, RECOMMEND Melatonin 5mg HS  Pain - Tylenol  DVT PPX - SCDs, Lovenox   Rehab/Impaired mobility and function - Patient continues to require hospitalization for the above diagnoses and ongoing active management of comorbid complications (ICU level care) that are substantially impairing functional ability and impairing quality of life necessitating ongoing medical management of these complications.     Expect patient to need AR. At this time, medically being optimized.     Will continue to follow. Rehab recommendations are dependent on how functional progress changes as well as how patient continues to participate and tolerate therapeutic interventions, WHICH MAY CHANGE UPON FOLLOW UP EXAMINATIONS. Recommend ongoing mobilization by staff to maintain cardiopulmonary function and prevention of secondary complications related to debility. Discussed the specific management and recommendations above with rehab clinical care team/rehab liaison.

## 2024-03-20 NOTE — PROGRESS NOTE ADULT - SUBJECTIVE AND OBJECTIVE BOX
CC: ACOMM Aneurysm     INTERVAL HPI/OVERNIGHT EVENTS: Patient seen and examined. Sitting upright in bed. Alert and answering questions. No events overnight.  No complaints.  Denies chest pain, SOB, dizziness, fever, chills.     Vital Signs Last 24 Hrs  T(C): 37.5 (20 Mar 2024 10:00), Max: 38.2 (19 Mar 2024 12:30)  T(F): 99.5 (20 Mar 2024 10:00), Max: 100.8 (19 Mar 2024 12:30)  HR: 82 (20 Mar 2024 10:00) (77 - 96)  BP: 155/85 (20 Mar 2024 10:00) (103/71 - 155/85)  BP(mean): 100 (20 Mar 2024 10:00) (73 - 106)  RR: 16 (20 Mar 2024 10:00) (14 - 25)  SpO2: 100% (20 Mar 2024 10:00) (97% - 100%)    Parameters below as of 20 Mar 2024 08:00  Patient On (Oxygen Delivery Method): room air  ROS:  Constitutional, Eyes, ENT, Gastrointestinal, Genitourinary, Musculoskeletal, Integumentary, Psychiatric, Endocrine, Heme/Lymph are otherwise negative.    PHYSICAL EXAM:    GENERAL: NAD,  well-developed, sitting up in bed  HEAD: surgical site clean and dry   EYES: EOMI, PERRLA, conjunctiva and sclera clear  NECK: Supple, No JVD  NERVOUS SYSTEM:  Alert & Oriented X 4 , fine B/L UE tremor, less pronounced  CHEST/LUNG: CTA  b/l,  no rales, rhonchi, wheezing, or rubs  HEART: Regular rate and rhythm; No murmurs, rubs, or gallops  ABDOMEN: Softly distended, + BS  EXTREMITIES:  2+ Peripheral Pulses, No clubbing, cyanosis, or edema   SKIN: No rashes or lesions    I&O's Detail    19 Mar 2024 07:01  -  20 Mar 2024 07:00  --------------------------------------------------------  IN:    IV PiggyBack: 125 mL    IV PiggyBack: 510 mL    Oral Fluid: 260 mL    sodium chloride 0.9%: 2625 mL  Total IN: 3520 mL    OUT:    Indwelling Catheter - Urethral (mL): 3345 mL    Intermittent Catheterization - Urethral (mL): 975 mL  Total OUT: 4320 mL    Total NET: -800 mL      20 Mar 2024 07:01  -  20 Mar 2024 10:22  --------------------------------------------------------  IN:    Oral Fluid: 120 mL    sodium chloride 0.9%: 375 mL  Total IN: 495 mL    OUT:    Indwelling Catheter - Urethral (mL): 800 mL  Total OUT: 800 mL    Total NET: -305 mL                                    8.8    8.60  )-----------( 411      ( 20 Mar 2024 02:40 )             25.6     20 Mar 2024 09:30    136    |  99     |  4.9    ----------------------------<  163    3.8     |  24.0   |  0.32     Ca    8.2        20 Mar 2024 09:30  Phos  3.0       20 Mar 2024 02:40  Mg     2.3       20 Mar 2024 02:40    TPro  x      /  Alb  3.5    /  TBili  x      /  DBili  x      /  AST  x      /  ALT  x      /  AlkPhos  x      19 Mar 2024 10:30      CAPILLARY BLOOD GLUCOSE        LIVER FUNCTIONS - ( 19 Mar 2024 10:30 )  Alb: 3.5 g/dL / Pro: x     / ALK PHOS: x     / ALT: x     / AST: x     / GGT: x           Urinalysis Basic - ( 20 Mar 2024 09:30 )    Color: x / Appearance: x / SG: x / pH: x  Gluc: 163 mg/dL / Ketone: x  / Bili: x / Urobili: x   Blood: x / Protein: x / Nitrite: x   Leuk Esterase: x / RBC: x / WBC x   Sq Epi: x / Non Sq Epi: x / Bacteria: x        MEDICATIONS  (STANDING):  bromocriptine Capsule 10 milliGRAM(s) Oral two times a day  calcium carbonate 1250 mG  + Vitamin D (OsCal 500 + D) 1 Tablet(s) Oral daily  chlorhexidine 2% Cloths 1 Application(s) Topical daily  enoxaparin Injectable 30 milliGRAM(s) SubCutaneous every 24 hours  magnesium citrate Oral Solution 296 milliLiter(s) Oral once  mineral oil enema 133 milliLiter(s) Rectal once  multivitamin 1 Tablet(s) Oral daily  niMODipine Oral Solution 60 milliGRAM(s) Oral every 4 hours  nystatin Powder 1 Application(s) Topical two times a day  psyllium Powder 1 Packet(s) Oral two times a day  QUEtiapine 12.5 milliGRAM(s) Oral at bedtime  saccharomyces boulardii 250 milliGRAM(s) Oral two times a day  simvastatin 10 milliGRAM(s) Oral at bedtime  sodium chloride 0.9%. 1000 milliLiter(s) (125 mL/Hr) IV Continuous <Continuous>  tamsulosin 0.8 milliGRAM(s) Oral at bedtime    MEDICATIONS  (PRN):  acetaminophen     Tablet .. 650 milliGRAM(s) Oral every 6 hours PRN Temp greater or equal to 38C (100.4F)  hydrALAZINE Injectable 10 milliGRAM(s) IV Push every 2 hours PRN SBP > 160  labetalol Injectable 10 milliGRAM(s) IV Push every 2 hours PRN Systolic blood pressure > 160  ondansetron Injectable 4 milliGRAM(s) IV Push every 6 hours PRN Nausea and/or Vomiting  simethicone 80 milliGRAM(s) Chew every 6 hours PRN Gas      RADIOLOGY & ADDITIONAL TESTS:  ACC: [id number] EXAM:  XR KUB 1 VIEW   ORDERED BY: VICENTA MCGUIRE     PROCEDURE DATE:  03/19/2024          INTERPRETATION:  Clinical history: 73-year-old female, rule out   ileus/constipation.    Portable view of the abdomen is compared to 3/13/2024 and is correlated   with the abdominal CT of 3/7/2024.    FINDINGS: Nonobstructive bowel gas pattern with a moderate amount of   feces in the descending colon and rectum.    No pneumoperitoneum or acute osseous finding.    IMPRESSION:  Nonobstructive bowel gas pattern/constipation    --- End of Report ---   CC: ACOMM Aneurysm     INTERVAL HPI/OVERNIGHT EVENTS: Patient seen and examined. Sitting upright in bed. Alert and answering questions. No events overnight.  No complaints.  Denies chest pain, SOB, dizziness, fever, chills.     Vital Signs Last 24 Hrs  T(C): 37.5 (20 Mar 2024 10:00), Max: 38.2 (19 Mar 2024 12:30)  T(F): 99.5 (20 Mar 2024 10:00), Max: 100.8 (19 Mar 2024 12:30)  HR: 82 (20 Mar 2024 10:00) (77 - 96)  BP: 155/85 (20 Mar 2024 10:00) (103/71 - 155/85)  BP(mean): 100 (20 Mar 2024 10:00) (73 - 106)  RR: 16 (20 Mar 2024 10:00) (14 - 25)  SpO2: 100% (20 Mar 2024 10:00) (97% - 100%)    Parameters below as of 20 Mar 2024 08:00  Patient On (Oxygen Delivery Method): room air  ROS:  Constitutional, Eyes, ENT, Gastrointestinal, Genitourinary, Musculoskeletal, Integumentary, Psychiatric, Endocrine, Heme/Lymph are otherwise negative.    PHYSICAL EXAM:    GENERAL: NAD,  well-developed, sitting up in bed  HEAD: surgical site clean and dry   EYES: EOMI, PERRLA, conjunctiva and sclera clear  NECK: Supple, No JVD  NERVOUS SYSTEM:  Alert & Oriented X 4 , fine B/L UE tremor, less pronounced  CHEST/LUNG: CTA  b/l,  no rales, rhonchi, wheezing, or rubs  HEART: Regular rate and rhythm; No murmurs, rubs, or gallops  ABDOMEN: Softly distended, + BS  EXTREMITIES:  2+ Peripheral Pulses, No clubbing, cyanosis, or edema   SKIN: No rashes or lesions    I&O's Detail    19 Mar 2024 07:01  -  20 Mar 2024 07:00  --------------------------------------------------------  IN:    IV PiggyBack: 125 mL    IV PiggyBack: 510 mL    Oral Fluid: 260 mL    sodium chloride 0.9%: 2625 mL  Total IN: 3520 mL    OUT:    Indwelling Catheter - Urethral (mL): 3345 mL    Intermittent Catheterization - Urethral (mL): 975 mL  Total OUT: 4320 mL    Total NET: -800 mL      20 Mar 2024 07:01  -  20 Mar 2024 10:22  --------------------------------------------------------  IN:    Oral Fluid: 120 mL    sodium chloride 0.9%: 375 mL  Total IN: 495 mL    OUT:    Indwelling Catheter - Urethral (mL): 800 mL  Total OUT: 800 mL    Total NET: -305 mL                                    8.8    8.60  )-----------( 411      ( 20 Mar 2024 02:40 )             25.6     20 Mar 2024 09:30    136    |  99     |  4.9    ----------------------------<  163    3.8     |  24.0   |  0.32     Ca    8.2        20 Mar 2024 09:30  Phos  3.0       20 Mar 2024 02:40  Mg     2.3       20 Mar 2024 02:40    TPro  x      /  Alb  3.5    /  TBili  x      /  DBili  x      /  AST  x      /  ALT  x      /  AlkPhos  x      19 Mar 2024 10:30      CAPILLARY BLOOD GLUCOSE        LIVER FUNCTIONS - ( 19 Mar 2024 10:30 )  Alb: 3.5 g/dL / Pro: x     / ALK PHOS: x     / ALT: x     / AST: x     / GGT: x           Urinalysis (03.19.24 @ 10:30)    pH Urine: 6.5   Glucose Qualitative, Urine: Negative mg/dL   Blood, Urine: Small   Color: Yellow   Urine Appearance: Clear   Bilirubin: Negative   Ketone - Urine: Negative mg/dL   Specific Gravity: 1.010   Protein, Urine: Negative mg/dL   Urobilinogen: 0.2 mg/dL   Nitrite: Negative   Leukocyte Esterase Concentration: Negative    MEDICATIONS  (STANDING):  bromocriptine Capsule 10 milliGRAM(s) Oral two times a day  calcium carbonate 1250 mG  + Vitamin D (OsCal 500 + D) 1 Tablet(s) Oral daily  chlorhexidine 2% Cloths 1 Application(s) Topical daily  enoxaparin Injectable 30 milliGRAM(s) SubCutaneous every 24 hours  magnesium citrate Oral Solution 296 milliLiter(s) Oral once  mineral oil enema 133 milliLiter(s) Rectal once  multivitamin 1 Tablet(s) Oral daily  niMODipine Oral Solution 60 milliGRAM(s) Oral every 4 hours  nystatin Powder 1 Application(s) Topical two times a day  psyllium Powder 1 Packet(s) Oral two times a day  QUEtiapine 12.5 milliGRAM(s) Oral at bedtime  saccharomyces boulardii 250 milliGRAM(s) Oral two times a day  simvastatin 10 milliGRAM(s) Oral at bedtime  sodium chloride 0.9%. 1000 milliLiter(s) (125 mL/Hr) IV Continuous <Continuous>  tamsulosin 0.8 milliGRAM(s) Oral at bedtime    MEDICATIONS  (PRN):  acetaminophen     Tablet .. 650 milliGRAM(s) Oral every 6 hours PRN Temp greater or equal to 38C (100.4F)  hydrALAZINE Injectable 10 milliGRAM(s) IV Push every 2 hours PRN SBP > 160  labetalol Injectable 10 milliGRAM(s) IV Push every 2 hours PRN Systolic blood pressure > 160  ondansetron Injectable 4 milliGRAM(s) IV Push every 6 hours PRN Nausea and/or Vomiting  simethicone 80 milliGRAM(s) Chew every 6 hours PRN Gas      RADIOLOGY & ADDITIONAL TESTS:  ACC: [id number] EXAM:  XR KUB 1 VIEW   ORDERED BY: VICENTA MCGUIRE     PROCEDURE DATE:  03/19/2024          INTERPRETATION:  Clinical history: 73-year-old female, rule out   ileus/constipation.    Portable view of the abdomen is compared to 3/13/2024 and is correlated   with the abdominal CT of 3/7/2024.    FINDINGS: Nonobstructive bowel gas pattern with a moderate amount of   feces in the descending colon and rectum.    No pneumoperitoneum or acute osseous finding.    IMPRESSION:  Nonobstructive bowel gas pattern/constipation    --- End of Report ---    < from: Xray Chest 1 View- PORTABLE-Urgent (Xray Chest 1 View- PORTABLE-Urgent .) (03.19.24 @ 16:37) >    ACC: [id number] EXAM:  XR CHEST PORTABLE URGENT 1V   ORDERED BY: ROZINA BEAN     PROCEDURE DATE:  03/19/2024          INTERPRETATION:  Clinical history: 73-year-old female, chest pain.    Portable view of the chest is compared to 3/12/2024.    FINDINGS: Normal cardiac silhouette and normal pulmonary vasculature with   no consolidation, effusion, pneumothorax or acute osseous finding.    IMPRESSION:    No acute findings, clearing of interstitial infiltrate at the right base    --- End of Report ---    < end of copied text >

## 2024-03-20 NOTE — CHART NOTE - NSCHARTNOTEFT_GEN_A_CORE
Source: Patient [x ]  Family [ ]   other [x ] EMR and staff     Current Diet: Diet, Easy to Chew:   Lactose Restricted (Milk Sugar Intoler.)  Banatrol TF     Qty per Day:  3 (03-18-24 @ 10:24) [Active]    PO intake:  < 50% [x ]   50-75%  [ ]   %  [ ]  other :    Source for PO intake [ ] Patient [ ] family [x ] chart [x ] staff [ ] other    Current Weight:   3/19: 44.4 kg   3/14: 47.9 kg   3/10:  51.1 kg   3/7:  54.5 kg   (Generalized edema)     % Weight Change: Unsure of accuracy of weights; however trending down, will continue to monitor weights for trends.     Pertinent Medications: MEDICATIONS  (STANDING):  bromocriptine Capsule 10 milliGRAM(s) Oral two times a day  calcium carbonate 1250 mG  + Vitamin D (OsCal 500 + D) 1 Tablet(s) Oral daily  chlorhexidine 2% Cloths 1 Application(s) Topical daily  enoxaparin Injectable 30 milliGRAM(s) SubCutaneous every 24 hours  mineral oil enema 133 milliLiter(s) Rectal once  multivitamin 1 Tablet(s) Oral daily  niMODipine Oral Solution 60 milliGRAM(s) Oral every 4 hours  nystatin Powder 1 Application(s) Topical two times a day  psyllium Powder 1 Packet(s) Oral two times a day  QUEtiapine 12.5 milliGRAM(s) Oral at bedtime  saccharomyces boulardii 250 milliGRAM(s) Oral two times a day  simvastatin 10 milliGRAM(s) Oral at bedtime  sodium chloride 0.9%. 1000 milliLiter(s) (125 mL/Hr) IV Continuous <Continuous>  tamsulosin 0.8 milliGRAM(s) Oral at bedtime    MEDICATIONS  (PRN):  acetaminophen     Tablet .. 650 milliGRAM(s) Oral every 6 hours PRN Temp greater or equal to 38C (100.4F)  hydrALAZINE Injectable 10 milliGRAM(s) IV Push every 2 hours PRN SBP > 160  labetalol Injectable 10 milliGRAM(s) IV Push every 2 hours PRN Systolic blood pressure > 160  ondansetron Injectable 4 milliGRAM(s) IV Push every 6 hours PRN Nausea and/or Vomiting  simethicone 80 milliGRAM(s) Chew every 6 hours PRN Gas    Pertinent Labs: CBC Full  -  ( 20 Mar 2024 02:40 )  WBC Count : 8.60 K/uL  RBC Count : 2.84 M/uL  Hemoglobin : 8.8 g/dL  Hematocrit : 25.6 %  Platelet Count - Automated : 411 K/uL  Mean Cell Volume : 90.1 fl  Mean Cell Hemoglobin : 31.0 pg  Mean Cell Hemoglobin Concentration : 34.4 gm/dL      Skin: EVD R side of head     Nutrition focused physical exam-previously conducted - found signs of malnutrition [ ]absent [x ]present    Subcutaneous fat loss: [x ] Orbital fat pads region, [ x]Buccal fat region, [ ]Triceps region,  [ ]Ribs region    Muscle wasting: [x ]Temples region, [x ]Clavicle region, [ ]Shoulder region, [ ]Scapula region, [ ]Interosseous region,  [ ]thigh region, [ ]Calf region    Estimated Needs:   [x ] no change since previous assessment  [ ] recalculated:     Hospital Course:   Pt is a 72 y/o mandarin speaking female with PMH of HLD and osteoporosis presents to PST with complaints of having cerebral aneurysm. States in 10/2023 she started to have B/L hand numbness. At that time she had MRI brain, MRA head and neck. MRA showed an ACOMM aneurysm. She underwent a cerebral angiogram by José Manuel Villaseñor at [facility] on December 7th, 2023, with the intent to treat this month. The aneurysm is described as a 5.5x4.6x3.6mm wide necked acomm aneurysm that projects medio-superiorly. Reports occasional headaches, described as numbness, 5/10 in severity, worse with laying down, relieved with sitting up or standing. Patient underwent diagnostic angiogram 2/14/24 which confirmed acomm aneurysm. On 3/6 patient underwent aneurysm embolization with coiling complicated by aneurysm rupture, controlled with coils and balloon tamponade. NISHANT CT showed SAH b/l frontal lobes and R sylvian fissure along with contrast staining. Patient admitted to NSICU for post op care.. EVD now removed per neurosurgery 3/15. Transferred to neurosurgery service 3/16, stroke team asked to consult, transferred to Stroke service 3/18.       Current Nutrition Diagnosis:  Pt remains at high nutrition risk secondary to Moderate (acute) protein calorie malnutrition related to inability to meet sufficient protein-energy needs in setting of s/p aneurysm embolization, advanced age  as evidenced by mild/mod muscle/fat loss, 1+ generalized edema. Pt continues to have fair appetite/PO intake; consuming ~ 25-50% at meals, pt requiring assistance with meal set up. Recommendations below:     Recommendations:   1. MVI daily   2. Check weight daily to monitor trends   3. Assistance with meals.   4. Ensure TID (350kcal, 20gm protein per shake)       Monitoring and Evaluation:   [x ] PO intake [x ] Tolerance to diet prescription [X] Weights  [X] Follow up per protocol [X] Labs:

## 2024-03-20 NOTE — PROGRESS NOTE ADULT - ASSESSMENT
Assessment:  73F with PMH HLD who presented for elective acomm aneurysm embolization with balloon-assisted coiling, complicated by aneurysm rupture controlled with coils and balloon tamponade.   - PBD 14, POD 14  - Exam stable  - Fever w/u pending       Plan:  - Discussed and examined with Dr. Dowd  - Spoke extensively to family about progress and prognosis   - Q4 hour neuro checks  - SBP goal   - Maintain normothermia  - Maintain euvolemia, strict I&Os, supplement for net negative PRN  - Ok to d/c nimodipine   - No longer requiring TCDs 2/2 poor windows   - DVT prophylaxis: SCDs, lovenox  - No AED required  - PM&R recommending acute rehab   - Fever w/u pending

## 2024-03-21 LAB
ANION GAP SERPL CALC-SCNC: 11 MMOL/L — SIGNIFICANT CHANGE UP (ref 5–17)
BUN SERPL-MCNC: 6.6 MG/DL — LOW (ref 8–20)
CALCIUM SERPL-MCNC: 7.7 MG/DL — LOW (ref 8.4–10.5)
CHLORIDE SERPL-SCNC: 103 MMOL/L — SIGNIFICANT CHANGE UP (ref 96–108)
CO2 SERPL-SCNC: 24 MMOL/L — SIGNIFICANT CHANGE UP (ref 22–29)
CREAT SERPL-MCNC: 0.32 MG/DL — LOW (ref 0.5–1.3)
EGFR: 112 ML/MIN/1.73M2 — SIGNIFICANT CHANGE UP
FERRITIN SERPL-MCNC: 383 NG/ML — HIGH (ref 13–330)
FOLATE SERPL-MCNC: >20 NG/ML — SIGNIFICANT CHANGE UP
GLUCOSE SERPL-MCNC: 120 MG/DL — HIGH (ref 70–99)
HCT VFR BLD CALC: 24.4 % — LOW (ref 34.5–45)
HGB BLD-MCNC: 8.4 G/DL — LOW (ref 11.5–15.5)
IRON SATN MFR SERPL: 14 % — SIGNIFICANT CHANGE UP (ref 14–50)
IRON SATN MFR SERPL: 40 UG/DL — SIGNIFICANT CHANGE UP (ref 37–145)
MCHC RBC-ENTMCNC: 31 PG — SIGNIFICANT CHANGE UP (ref 27–34)
MCHC RBC-ENTMCNC: 34.4 GM/DL — SIGNIFICANT CHANGE UP (ref 32–36)
MCV RBC AUTO: 90 FL — SIGNIFICANT CHANGE UP (ref 80–100)
PLATELET # BLD AUTO: 465 K/UL — HIGH (ref 150–400)
POTASSIUM SERPL-MCNC: 3.9 MMOL/L — SIGNIFICANT CHANGE UP (ref 3.5–5.3)
POTASSIUM SERPL-SCNC: 3.9 MMOL/L — SIGNIFICANT CHANGE UP (ref 3.5–5.3)
RBC # BLD: 2.71 M/UL — LOW (ref 3.8–5.2)
RBC # FLD: 14.6 % — HIGH (ref 10.3–14.5)
SODIUM SERPL-SCNC: 138 MMOL/L — SIGNIFICANT CHANGE UP (ref 135–145)
TIBC SERPL-MCNC: 287 UG/DL — SIGNIFICANT CHANGE UP (ref 220–430)
TRANSFERRIN SERPL-MCNC: 201 MG/DL — SIGNIFICANT CHANGE UP (ref 192–382)
VIT B12 SERPL-MCNC: 1596 PG/ML — HIGH (ref 232–1245)
WBC # BLD: 12.5 K/UL — HIGH (ref 3.8–10.5)
WBC # FLD AUTO: 12.5 K/UL — HIGH (ref 3.8–10.5)

## 2024-03-21 PROCEDURE — 99223 1ST HOSP IP/OBS HIGH 75: CPT

## 2024-03-21 PROCEDURE — 99233 SBSQ HOSP IP/OBS HIGH 50: CPT | Mod: GC

## 2024-03-21 PROCEDURE — G0316 PROLONG INPT EVAL ADD15 M: CPT

## 2024-03-21 PROCEDURE — 99233 SBSQ HOSP IP/OBS HIGH 50: CPT

## 2024-03-21 RX ORDER — SODIUM CHLORIDE 9 MG/ML
1000 INJECTION INTRAMUSCULAR; INTRAVENOUS; SUBCUTANEOUS
Refills: 0 | Status: DISCONTINUED | OUTPATIENT
Start: 2024-03-21 | End: 2024-03-25

## 2024-03-21 RX ADMIN — ENOXAPARIN SODIUM 30 MILLIGRAM(S): 100 INJECTION SUBCUTANEOUS at 22:42

## 2024-03-21 RX ADMIN — NIMODIPINE 60 MILLIGRAM(S): 60 SOLUTION ORAL at 22:40

## 2024-03-21 RX ADMIN — NYSTATIN CREAM 1 APPLICATION(S): 100000 CREAM TOPICAL at 17:50

## 2024-03-21 RX ADMIN — BROMOCRIPTINE MESYLATE 10 MILLIGRAM(S): 5 CAPSULE ORAL at 17:49

## 2024-03-21 RX ADMIN — LIDOCAINE 1 PATCH: 4 CREAM TOPICAL at 17:50

## 2024-03-21 RX ADMIN — LIDOCAINE 1 PATCH: 4 CREAM TOPICAL at 20:09

## 2024-03-21 RX ADMIN — BROMOCRIPTINE MESYLATE 10 MILLIGRAM(S): 5 CAPSULE ORAL at 05:38

## 2024-03-21 RX ADMIN — QUETIAPINE FUMARATE 12.5 MILLIGRAM(S): 200 TABLET, FILM COATED ORAL at 22:43

## 2024-03-21 RX ADMIN — TAMSULOSIN HYDROCHLORIDE 0.8 MILLIGRAM(S): 0.4 CAPSULE ORAL at 22:43

## 2024-03-21 RX ADMIN — Medication 650 MILLIGRAM(S): at 22:43

## 2024-03-21 RX ADMIN — Medication 650 MILLIGRAM(S): at 10:59

## 2024-03-21 RX ADMIN — CHLORHEXIDINE GLUCONATE 1 APPLICATION(S): 213 SOLUTION TOPICAL at 11:30

## 2024-03-21 RX ADMIN — NIMODIPINE 60 MILLIGRAM(S): 60 SOLUTION ORAL at 05:37

## 2024-03-21 RX ADMIN — Medication 650 MILLIGRAM(S): at 11:59

## 2024-03-21 RX ADMIN — NIMODIPINE 60 MILLIGRAM(S): 60 SOLUTION ORAL at 10:47

## 2024-03-21 RX ADMIN — NIMODIPINE 60 MILLIGRAM(S): 60 SOLUTION ORAL at 01:14

## 2024-03-21 RX ADMIN — NYSTATIN CREAM 1 APPLICATION(S): 100000 CREAM TOPICAL at 05:40

## 2024-03-21 RX ADMIN — Medication 1 TABLET(S): at 11:27

## 2024-03-21 RX ADMIN — SIMVASTATIN 10 MILLIGRAM(S): 20 TABLET, FILM COATED ORAL at 22:43

## 2024-03-21 RX ADMIN — SODIUM CHLORIDE 50 MILLILITER(S): 9 INJECTION INTRAMUSCULAR; INTRAVENOUS; SUBCUTANEOUS at 22:44

## 2024-03-21 RX ADMIN — NIMODIPINE 60 MILLIGRAM(S): 60 SOLUTION ORAL at 13:36

## 2024-03-21 RX ADMIN — Medication 650 MILLIGRAM(S): at 23:43

## 2024-03-21 RX ADMIN — NIMODIPINE 60 MILLIGRAM(S): 60 SOLUTION ORAL at 17:50

## 2024-03-21 RX ADMIN — Medication 1 TABLET(S): at 11:28

## 2024-03-21 NOTE — PROGRESS NOTE ADULT - ASSESSMENT
72 y/o mandarin speaking female with PMH of HLD and osteoporosis presents to PST with complaints of having cerebral aneurysm. States in 10/2023 she started to have B/L hand numbness. At that time she had MRI brain, MRA head and neck. MRA showed an ACOMM aneurysm. She underwent a cerebral angiogram by José Manuel Villaseñor at [facility] on December 7th, 2023, with the intent to treat this month. The aneurysm is described as a 5.5x4.6x3.6mm wide necked acomm aneurysm that projects medio-superiorly. Reports occasional headaches, described as numbness.   S/P  aneurysm embolization with coiling on 3/6  complicated by aneurysm rupture, controlled with coils and balloon tamponade. NISHANT CT showed SAH b/l frontal lobes and R sylvian fissure along with contrast staining. Patient admitted to NSICU for post op care.   Downgraded to Step down on 3/16. Medicine consulted for medical mgmt .       # ACOMM aneurism , S/P  aneurysm embolization with coiling on 3/6  complicated by aneurysm rupture,   controlled with coils and balloon tamponade.    NISHANT CT showed SAH b/l frontal lobes and R sylvian fissure along with contrast staining.    Patient admitted to NSICU for post op care. Now downgraded to step down and Neuro stroke service  - Neuro checks q4  - Bromocriptine 10mg TID- for likely central fevers   - Seroquel 12.5mg nightly  - Pain control- Tylenol, Tramadol  - pt/ot  - Nimodipine 60mg q4hrs for SAH protocol  as per NS   - Avoid hypotension, rapid changes in BP  - New tremors noted: EEG : Clinical Impression:   Technically limited study due to continuous muscle artifact. No obvious abnormalities noted.  Consider repeating EEG if clinically warranted.       #anemia- probably dilutional, Hg - 8.6 , FOBT - negative , monitor CBC,   transfuse if Hg < 7 , H&H stable. Consider to check anemia w/u .      # HLD - Simvastatin 10mg daily    # Postop urinary retention -  -Flomax 0.8mg  qhs   -Continue Dangelo Cath, failed TOV on 3/18 ( 3rd attempt)  -Ambulate to bathroom  - Attempt TOV when constipation resolved    # Diarrhea ,likely due to overflow ,    rectal tube now removed ( 3/17/24)  Abdominal XR ( 3/13) noted with  Fecal material   localized to the mid left descending colon.   KUB with Constipation, received laxative/enema had multiple BM with formed stool       # Low grade temps, work up NTD-   Completed course of Zosyn for possible PNA   On Bromocriptine- continue    - Mild leukocytosis now resolved, UA=- yeast + -   likely colonized , consider to repeat UA   Chest xray clear  LS xray performed , not resulted  MR LS spine ordered    # Hypokalemia -  supplemented   monitor electrolytes     #Hypophosphatemia - replace as needed    # Hyponatremia - Resolved, monitor. Na Goal: 135-145     DVT prophylaxis  -on Lovenox  74 y/o mandarin speaking female with PMH of HLD and osteoporosis presents to PST with complaints of having cerebral aneurysm. States in 10/2023 she started to have B/L hand numbness. At that time she had MRI brain, MRA head and neck. MRA showed an ACOMM aneurysm. She underwent a cerebral angiogram by José Manuel Villaseñor at [facility] on December 7th, 2023, with the intent to treat this month. The aneurysm is described as a 5.5x4.6x3.6mm wide necked acomm aneurysm that projects medio-superiorly. Reports occasional headaches, described as numbness.   S/P  aneurysm embolization with coiling on 3/6  complicated by aneurysm rupture, controlled with coils and balloon tamponade. NISHANT CT showed SAH b/l frontal lobes and R sylvian fissure along with contrast staining. Patient admitted to NSICU for post op care.   Downgraded to Step down on 3/16. Medicine consulted for medical mgmt .       # ACOMM aneurism , S/P  aneurysm embolization with coiling on 3/6  complicated by aneurysm rupture,   controlled with coils and balloon tamponade.    NISHANT CT showed SAH b/l frontal lobes and R sylvian fissure along with contrast staining.    Patient admitted to NSICU for post op care. Now downgraded to step down and Neuro stroke service  - Neuro checks q4  - Bromocriptine 10mg TID- for likely central fevers   - Seroquel 12.5mg nightly  - Pain control- Tylenol, Tramadol  - pt/ot  - Nimodipine 60mg q4hrs for SAH protocol  as per NS   - Avoid hypotension, rapid changes in BP  - New tremors noted: EEG : Clinical Impression:   Technically limited study due to continuous muscle artifact. No obvious abnormalities noted.  Consider repeating EEG if clinically warranted.       #anemia- probably dilutional, multiple blood draws - Hg -8.6 , FOBT - negative ,   Anemia work up with normal folate/Iron , elevated B12 , monitor CBC,   transfuse if Hg < 7 , H&H stable.       # HLD - Simvastatin 10mg daily    # Postop urinary retention -  -Flomax 0.8mg  qhs   -Continue Dangelo Cath, failed TOV on 3/18 ( 3rd attempt)  -Ambulate to bathroom  - Attempt TOV as patient had multiple BM     # Diarrhea ,likely due to overflow 2/2 constipation    rectal tube  removed ( 3/17/24)  Abdominal XR ( 3/13) noted with  Fecal material   localized to the mid left descending colon.   Repeat KUB ( 3/20/24)  with Constipation, received laxative/enema had multiple BM with formed stool  Consider to start Miralax / Senna , d/c Calcium to prevent constipation        # Low grade temps till 2/20 with Tmax 101.3 work up NTD-   Completed course of Zosyn for possible PNA   CXR - unremarkable   US of LE - no dvt   COVID/RVP  (3/12/24) - negative - REPEAT   c/o low back pain , MRI spine ordered   Repeat UA/ urine / urine culture / Blood cultures   ID consulted and appreciated     # Hypokalemia -  supplemented   monitor electrolytes     #Hypophosphatemia - replace as needed    # Hyponatremia - Resolved, monitor. Na Goal: 135-145     DVT prophylaxis  -on Lovenox

## 2024-03-21 NOTE — PROGRESS NOTE ADULT - SUBJECTIVE AND OBJECTIVE BOX
CC: ACOMM Aneurysm       INTERVAL HPI/OVERNIGHT EVENTS: Patient seen and examined. Family at bedside. Had multiple BM's yesterday.. Still having some lower back pain. Reported headache this morning improved with Tylenol.  No new neurologic complaints.  ROS reported negative unless otherwise noted.    Vital Signs Last 24 Hrs  T(C): 37.1 (21 Mar 2024 07:52), Max: 38.5 (20 Mar 2024 14:00)  T(F): 98.8 (21 Mar 2024 07:52), Max: 101.3 (20 Mar 2024 14:00)  HR: 96 (21 Mar 2024 07:52) (92 - 98)  BP: 136/71 (21 Mar 2024 07:52) (125/75 - 147/82)  BP(mean): 103 (20 Mar 2024 15:00) (103 - 105)  RR: 18 (21 Mar 2024 07:52) (17 - 22)  SpO2: 99% (21 Mar 2024 07:52) (96% - 100%)    Parameters below as of 21 Mar 2024 07:52  Patient On (Oxygen Delivery Method): room air    PHYSICAL EXAM:    GENERAL: NAD,  well-developed, sitting up in bed  HEAD: surgical site clean and dry   EYES: EOMI, PERRLA, conjunctiva and sclera clear  NECK: Supple, No JVD  NERVOUS SYSTEM:  Alert & Oriented X 4 , slight tremor B/L UE  CHEST/LUNG: CTA  b/l,  no rales, rhonchi, wheezing, or rubs  HEART: Regular rate and rhythm; No murmurs, rubs, or gallops  ABDOMEN: Softly distended, + BS, non tender  EXTREMITIES:  2+ Peripheral Pulses, No clubbing, cyanosis, or edema   SKIN: No rashes or lesions      I&O's Detail    20 Mar 2024 07:01  -  21 Mar 2024 07:00  --------------------------------------------------------  IN:    Oral Fluid: 420 mL    sodium chloride 0.9%: 625 mL    sodium chloride 0.9%: 150 mL  Total IN: 1195 mL    OUT:    Indwelling Catheter - Urethral (mL): 3740 mL  Total OUT: 3740 mL    Total NET: -2545 mL                                    8.4    12.50 )-----------( 465      ( 21 Mar 2024 04:40 )             24.4     21 Mar 2024 04:40    138    |  103    |  6.6    ----------------------------<  120    3.9     |  24.0   |  0.32     Ca    7.7        21 Mar 2024 04:40  Phos  3.0       20 Mar 2024 02:40  Mg     2.3       20 Mar 2024 02:40        CAPILLARY BLOOD GLUCOSE          Urinalysis Basic - ( 21 Mar 2024 04:40 )    Color: x / Appearance: x / SG: x / pH: x  Gluc: 120 mg/dL / Ketone: x  / Bili: x / Urobili: x   Blood: x / Protein: x / Nitrite: x   Leuk Esterase: x / RBC: x / WBC x   Sq Epi: x / Non Sq Epi: x / Bacteria: x        MEDICATIONS  (STANDING):  bromocriptine Capsule 10 milliGRAM(s) Oral two times a day  calcium carbonate 1250 mG  + Vitamin D (OsCal 500 + D) 1 Tablet(s) Oral daily  chlorhexidine 2% Cloths 1 Application(s) Topical daily  enoxaparin Injectable 30 milliGRAM(s) SubCutaneous every 24 hours  lidocaine   4% Patch 1 Patch Transdermal every 24 hours  multivitamin 1 Tablet(s) Oral daily  niMODipine Oral Solution 60 milliGRAM(s) Oral every 4 hours  nystatin Powder 1 Application(s) Topical two times a day  QUEtiapine 12.5 milliGRAM(s) Oral at bedtime  simvastatin 10 milliGRAM(s) Oral at bedtime  sodium chloride 0.9%. 1000 milliLiter(s) (50 mL/Hr) IV Continuous <Continuous>  tamsulosin 0.8 milliGRAM(s) Oral at bedtime    MEDICATIONS  (PRN):  acetaminophen     Tablet .. 650 milliGRAM(s) Oral every 6 hours PRN Temp greater or equal to 38C (100.4F)  hydrALAZINE Injectable 10 milliGRAM(s) IV Push every 2 hours PRN SBP > 160  labetalol Injectable 10 milliGRAM(s) IV Push every 2 hours PRN Systolic blood pressure > 160  ondansetron Injectable 4 milliGRAM(s) IV Push every 6 hours PRN Nausea and/or Vomiting      RADIOLOGY & ADDITIONAL TESTS:   CC: [employer] Aneurysm       INTERVAL HPI/OVERNIGHT EVENTS: Patient seen and examined. Family at bedside. Had multiple BM's yesterday.. Still having some lower back pain. Reported headache this morning improved with Tylenol.  No new neurologic complaints.  ROS reported negative unless otherwise noted.    Vital Signs Last 24 Hrs  T(C): 37.1 (21 Mar 2024 07:52), Max: 38.5 (20 Mar 2024 14:00)  T(F): 98.8 (21 Mar 2024 07:52), Max: 101.3 (20 Mar 2024 14:00)  HR: 96 (21 Mar 2024 07:52) (92 - 98)  BP: 136/71 (21 Mar 2024 07:52) (125/75 - 147/82)  BP(mean): 103 (20 Mar 2024 15:00) (103 - 105)  RR: 18 (21 Mar 2024 07:52) (17 - 22)  SpO2: 99% (21 Mar 2024 07:52) (96% - 100%)    Parameters below as of 21 Mar 2024 07:52  Patient On (Oxygen Delivery Method): room air    PHYSICAL EXAM:    GENERAL: NAD,  well-developed, sitting up in bed  HEAD: surgical site clean and dry   EYES: EOMI, PERRLA, conjunctiva and sclera clear  NECK: Supple, No JVD  NERVOUS SYSTEM:  Alert & Oriented X 4 , slight tremor B/L UE  CHEST/LUNG: CTA  b/l,  no rales, rhonchi, wheezing, or rubs  HEART: Regular rate and rhythm; No murmurs, rubs, or gallops  ABDOMEN: Softly distended, + BS, non tender  EXTREMITIES:  2+ Peripheral Pulses, No clubbing, cyanosis, or edema   SKIN: No rashes or lesions      I&O's Detail    20 Mar 2024 07:01  -  21 Mar 2024 07:00  --------------------------------------------------------  IN:    Oral Fluid: 420 mL    sodium chloride 0.9%: 625 mL    sodium chloride 0.9%: 150 mL  Total IN: 1195 mL    OUT:    Indwelling Catheter - Urethral (mL): 3740 mL  Total OUT: 3740 mL    Total NET: -2545 mL                                    8.4    12.50 )-----------( 465      ( 21 Mar 2024 04:40 )             24.4     21 Mar 2024 04:40    138    |  103    |  6.6    ----------------------------<  120    3.9     |  24.0   |  0.32     Ca    7.7        21 Mar 2024 04:40  Phos  3.0       20 Mar 2024 02:40  Mg     2.3       20 Mar 2024 02:40        CAPILLARY BLOOD GLUCOSE        Urinalysis (03.19.24 @ 10:30)    pH Urine: 6.5   Glucose Qualitative, Urine: Negative mg/dL   Blood, Urine: Small   Color: Yellow   Urine Appearance: Clear   Bilirubin: Negative   Ketone - Urine: Negative mg/dL   Specific Gravity: 1.010   Protein, Urine: Negative mg/dL   Urobilinogen: 0.2 mg/dL   Nitrite: Negative   Leukocyte Esterase Concentration: Negative          MEDICATIONS  (STANDING):  bromocriptine Capsule 10 milliGRAM(s) Oral two times a day  calcium carbonate 1250 mG  + Vitamin D (OsCal 500 + D) 1 Tablet(s) Oral daily  chlorhexidine 2% Cloths 1 Application(s) Topical daily  enoxaparin Injectable 30 milliGRAM(s) SubCutaneous every 24 hours  lidocaine   4% Patch 1 Patch Transdermal every 24 hours  multivitamin 1 Tablet(s) Oral daily  niMODipine Oral Solution 60 milliGRAM(s) Oral every 4 hours  nystatin Powder 1 Application(s) Topical two times a day  QUEtiapine 12.5 milliGRAM(s) Oral at bedtime  simvastatin 10 milliGRAM(s) Oral at bedtime  sodium chloride 0.9%. 1000 milliLiter(s) (50 mL/Hr) IV Continuous <Continuous>  tamsulosin 0.8 milliGRAM(s) Oral at bedtime    MEDICATIONS  (PRN):  acetaminophen     Tablet .. 650 milliGRAM(s) Oral every 6 hours PRN Temp greater or equal to 38C (100.4F)  hydrALAZINE Injectable 10 milliGRAM(s) IV Push every 2 hours PRN SBP > 160  labetalol Injectable 10 milliGRAM(s) IV Push every 2 hours PRN Systolic blood pressure > 160  ondansetron Injectable 4 milliGRAM(s) IV Push every 6 hours PRN Nausea and/or Vomiting    < from: Xray Kidney Ureter Bladder (03.19.24 @ 11:11) >    ACC: [id number] EXAM:  XR KUB 1 VIEW   ORDERED BY: VICENTA MCGUIRE     PROCEDURE DATE:  03/19/2024          INTERPRETATION:  Clinical history: 73-year-old female, rule out   ileus/constipation.    Portable view of the abdomen is compared to 3/13/2024 and is correlated   with the abdominal CT of 3/7/2024.    FINDINGS: Nonobstructive bowel gas pattern with a moderate amount of   feces in the descending colon and rectum.    No pneumoperitoneum or acute osseous finding.    IMPRESSION:  Nonobstructive bowel gas pattern/constipation    --- End of Report ---    < end of copied text >    RADIOLOGY & ADDITIONAL TESTS:  < from: US Duplex Venous Lower Ext Complete, Bilateral (03.20.24 @ 12:20) >    ACC: [id number] EXAM:  US DPLX LWR EXT VEINS COMPL BI   ORDERED BY: ROZINA BEAN     PROCEDURE DATE:  03/20/2024      < end of copied text >  < from: US Duplex Venous Lower Ext Complete, Bilateral (03.20.24 @ 12:20) >  IMPRESSION:  No evidence of deep venous thrombosis in either lower extremity.    --- End of Report ---    < end of copied text >    < from: Xray Sacrum + Coccyx (03.20.24 @ 11:28) >    ACC: [id number] EXAM:  XR SACRUM COCCYX MIN 2 VIEWS   ORDERED BY: ROZINA BEAN     PROCEDURE DATE:  03/20/2024          INTERPRETATION:  Clinical data: Low back pain    AP and lateral views of the sacrum and coccyx    FINDINGS: Bilateral SI joints intact. No gross fracture. Lumbosacral   junction is intact. Osteopenia. Pubic symphysis intact    IMPRESSION: Limited study without obvious fracture.    If warranted clinically obtain CT.    --- End of Report ---    < end of copied text >

## 2024-03-21 NOTE — PROGRESS NOTE ADULT - SUBJECTIVE AND OBJECTIVE BOX
FUNCTIONAL PROGRESS  3/20 PT   Therapeutic Interventions    Bed Mobility  Bed Mobility Training Sit-to-Supine: minimum assist (75% patient effort);  1 person assist;  nonverbal cues (demo/gestures);  verbal cues  Bed Mobility Training Supine-to-Sit: minimum assist (75% patient effort);  1 person assist;  nonverbal cues (demo/gestures);  verbal cues  Bed Mobility Training Limitations: decreased strength;  impaired postural control;  impaired coordination    Sit-Stand Transfer Training  Transfer Training Sit-to-Stand Transfer: minimum assist (75% patient effort);  1 person assist;  nonverbal cues (demo/gestures);  verbal cues;  rolling walker  Transfer Training Stand-to-Sit Transfer: minimum assist (75% patient effort);  1 person assist;  nonverbal cues (demo/gestures);  verbal cues;  rolling walker  Sit-to-Stand Transfer Training Transfer Safety Analysis: decreased strength;  impaired balance;  impaired coordination;  impaired motor control;  impaired postural control;  rolling walker    Gait Training  Gait Training: minimum assist (75% patient effort);  1 person assist;  nonverbal cues (demo/gestures);  verbal cues;  rolling walker;  25 feet  Gait Analysis: swing-to gait   decreased gayathri;  crouch;  decreased stride length;  decreased step length;  impaired balance;  impaired coordination;  impaired motor control;  impaired postural control;  decreased strength;  25 feet;  rolling walker    3/20 OT   Overall Progress Summary    Progress Summary: Chart reviewed, contents noted. OT treatment not performed, pt received with [employer] tech. OT will reattempt as schedule permits and pt medical condition allows.     VITALS  T(C): 37.1 (03-21-24 @ 07:52), Max: 38.5 (03-20-24 @ 14:00)  HR: 96 (03-21-24 @ 07:52) (92 - 110)  BP: 136/71 (03-21-24 @ 07:52) (125/75 - 160/78)  RR: 18 (03-21-24 @ 07:52) (17 - 22)  SpO2: 99% (03-21-24 @ 07:52) (96% - 100%)  Wt(kg): --    MEDICATIONS   acetaminophen     Tablet .. 650 milliGRAM(s) every 6 hours PRN  bromocriptine Capsule 10 milliGRAM(s) two times a day  calcium carbonate 1250 mG  + Vitamin D (OsCal 500 + D) 1 Tablet(s) daily  chlorhexidine 2% Cloths 1 Application(s) daily  enoxaparin Injectable 30 milliGRAM(s) every 24 hours  hydrALAZINE Injectable 10 milliGRAM(s) every 2 hours PRN  labetalol Injectable 10 milliGRAM(s) every 2 hours PRN  lidocaine   4% Patch 1 Patch every 24 hours  multivitamin 1 Tablet(s) daily  niMODipine Oral Solution 60 milliGRAM(s) every 4 hours  nystatin Powder 1 Application(s) two times a day  ondansetron Injectable 4 milliGRAM(s) every 6 hours PRN  QUEtiapine 12.5 milliGRAM(s) at bedtime  simvastatin 10 milliGRAM(s) at bedtime  sodium chloride 0.9%. 1000 milliLiter(s) <Continuous>  tamsulosin 0.8 milliGRAM(s) at bedtime      RECENT LABS/IMAGING  - Reviewed Today                        8.4    12.50 )-----------( 465      ( 21 Mar 2024 04:40 )             24.4     03-21    138  |  103  |  6.6<L>  ----------------------------<  120<H>  3.9   |  24.0  |  0.32<L>    Ca    7.7<L>      21 Mar 2024 04:40  Phos  3.0     03-20  Mg     2.3     03-20        Urinalysis Basic - ( 21 Mar 2024 04:40 )    Color: x / Appearance: x / SG: x / pH: x  Gluc: 120 mg/dL / Ketone: x  / Bili: x / Urobili: x   Blood: x / Protein: x / Nitrite: x   Leuk Esterase: x / RBC: x / WBC x   Sq Epi: x / Non Sq Epi: x / Bacteria: x      US Duplex Venous Lower Ext Complete, Bilateral 3/12 - No evidence of deep venous thrombosis in either lower extremity.    CT Head 3/10 - Mildly decreased bilateral mesial frontal parenchymal hemorrhages. Similar anterior interhemispheric acute subarachnoid hemorrhage.Mildly decreased intraventricular hemorrhage within the bilateral lateral    and third ventricles. Decreased ventricular size with near resolution of hydrocephalus.No large midline shift.  Basal cisterns are more well visualized on the current examination, this may be due to technique.    MR Brain, MR Angio Head 3/9 - MR brain: Intraparenchymal hemorrhage within the medial anterior frontal   lobes measuring approximately 3.5 cm, unchanged. There is surrounding edema and extension into the genuine body of corpus callosum, unchanged. Hemorrhagic clot within the LEFT lateral ventricle and third ventricle is unchanged. Minimal hemorrhage seen in the dependent portions of the RIGHT lateral ventricle unchanged. Minimal subarachnoid hemorrhage seen along the medial sulci of the BILATERAL frontal and parietal lobes as well as scattered throughout the sulci of the brain. RIGHT frontal ventriculostomy catheter tip is seen extending toward the body of the LEFT ventricle and third ventricle.  Coil material is seen in the region of the anterior communicating artery on the RIGHT.   Blood products are seen in the region of the known aneurysm possibly indicating partial patency.    CT Head 3/16 -  No change in mild ventricular dilatation with intraventricular hemorrhage and hemorrhage in the frontal lobe midline and corpus callosum after extraventricular drain removal since 3/15/2024.      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 - RECOMMEND DC Bromocriptine    Delirium - RECOMMEND Seroquel 12.5mg Q6PM PRN, RECOMMEND Melatonin 5mg HS  Pain - Tylenol  DVT PPX - SCDs, Lovenox   Rehab/Impaired mobility and function - Patient continues to require hospitalization for the above diagnoses and ongoing active management of comorbid complications (ICU level care) that are substantially impairing functional ability and impairing quality of life necessitating ongoing medical management of these complications.     Expect patient to need AR. At this time, medically being optimized.     Will continue to follow. Rehab recommendations are dependent on how functional progress changes as well as how patient continues to participate and tolerate therapeutic interventions, WHICH MAY CHANGE UPON FOLLOW UP EXAMINATIONS. Recommend ongoing mobilization by staff to maintain cardiopulmonary function and prevention of secondary complications related to debility. Discussed the specific management and recommendations above with rehab clinical care team/rehab liaison.                     No complaints today   Moving all four, but deconditioned   WBC 12.5   Temp, other vitals stable     FUNCTIONAL PROGRESS  3/20 PT   Therapeutic Interventions    Bed Mobility  Bed Mobility Training Sit-to-Supine: minimum assist (75% patient effort);  1 person assist;  nonverbal cues (demo/gestures);  verbal cues  Bed Mobility Training Supine-to-Sit: minimum assist (75% patient effort);  1 person assist;  nonverbal cues (demo/gestures);  verbal cues  Bed Mobility Training Limitations: decreased strength;  impaired postural control;  impaired coordination    Sit-Stand Transfer Training  Transfer Training Sit-to-Stand Transfer: minimum assist (75% patient effort);  1 person assist;  nonverbal cues (demo/gestures);  verbal cues;  rolling walker  Transfer Training Stand-to-Sit Transfer: minimum assist (75% patient effort);  1 person assist;  nonverbal cues (demo/gestures);  verbal cues;  rolling walker  Sit-to-Stand Transfer Training Transfer Safety Analysis: decreased strength;  impaired balance;  impaired coordination;  impaired motor control;  impaired postural control;  rolling walker    Gait Training  Gait Training: minimum assist (75% patient effort);  1 person assist;  nonverbal cues (demo/gestures);  verbal cues;  rolling walker;  25 feet  Gait Analysis: swing-to gait   decreased gayathri;  crouch;  decreased stride length;  decreased step length;  impaired balance;  impaired coordination;  impaired motor control;  impaired postural control;  decreased strength;  25 feet;  rolling walker    3/20 OT   Overall Progress Summary    Progress Summary: Chart reviewed, contents noted. OT treatment not performed, pt received with xray tech. OT will reattempt as schedule permits and pt medical condition allows.     VITALS  T(C): 37.1 (03-21-24 @ 07:52), Max: 38.5 (03-20-24 @ 14:00)  HR: 96 (03-21-24 @ 07:52) (92 - 110)  BP: 136/71 (03-21-24 @ 07:52) (125/75 - 160/78)  RR: 18 (03-21-24 @ 07:52) (17 - 22)  SpO2: 99% (03-21-24 @ 07:52) (96% - 100%)  Wt(kg): --    MEDICATIONS   acetaminophen     Tablet .. 650 milliGRAM(s) every 6 hours PRN  bromocriptine Capsule 10 milliGRAM(s) two times a day  calcium carbonate 1250 mG  + Vitamin D (OsCal 500 + D) 1 Tablet(s) daily  chlorhexidine 2% Cloths 1 Application(s) daily  enoxaparin Injectable 30 milliGRAM(s) every 24 hours  hydrALAZINE Injectable 10 milliGRAM(s) every 2 hours PRN  labetalol Injectable 10 milliGRAM(s) every 2 hours PRN  lidocaine   4% Patch 1 Patch every 24 hours  multivitamin 1 Tablet(s) daily  niMODipine Oral Solution 60 milliGRAM(s) every 4 hours  nystatin Powder 1 Application(s) two times a day  ondansetron Injectable 4 milliGRAM(s) every 6 hours PRN  QUEtiapine 12.5 milliGRAM(s) at bedtime  simvastatin 10 milliGRAM(s) at bedtime  sodium chloride 0.9%. 1000 milliLiter(s) <Continuous>  tamsulosin 0.8 milliGRAM(s) at bedtime      RECENT LABS/IMAGING  - Reviewed Today                        8.4    12.50 )-----------( 465      ( 21 Mar 2024 04:40 )             24.4     03-21    138  |  103  |  6.6<L>  ----------------------------<  120<H>  3.9   |  24.0  |  0.32<L>    Ca    7.7<L>      21 Mar 2024 04:40  Phos  3.0     03-20  Mg     2.3     03-20        Urinalysis Basic - ( 21 Mar 2024 04:40 )    Color: x / Appearance: x / SG: x / pH: x  Gluc: 120 mg/dL / Ketone: x  / Bili: x / Urobili: x   Blood: x / Protein: x / Nitrite: x   Leuk Esterase: x / RBC: x / WBC x   Sq Epi: x / Non Sq Epi: x / Bacteria: x      US Duplex Venous Lower Ext Complete, Bilateral 3/12 - No evidence of deep venous thrombosis in either lower extremity.    CT Head 3/10 - Mildly decreased bilateral mesial frontal parenchymal hemorrhages. Similar anterior interhemispheric acute subarachnoid hemorrhage.Mildly decreased intraventricular hemorrhage within the bilateral lateral    and third ventricles. Decreased ventricular size with near resolution of hydrocephalus.No large midline shift.  Basal cisterns are more well visualized on the current examination, this may be due to technique.    MR Brain, MR Angio Head 3/9 - MR brain: Intraparenchymal hemorrhage within the medial anterior frontal   lobes measuring approximately 3.5 cm, unchanged. There is surrounding edema and extension into the genuine body of corpus callosum, unchanged. Hemorrhagic clot within the LEFT lateral ventricle and third ventricle is unchanged. Minimal hemorrhage seen in the dependent portions of the RIGHT lateral ventricle unchanged. Minimal subarachnoid hemorrhage seen along the medial sulci of the BILATERAL frontal and parietal lobes as well as scattered throughout the sulci of the brain. RIGHT frontal ventriculostomy catheter tip is seen extending toward the body of the LEFT ventricle and third ventricle.  Coil material is seen in the region of the anterior communicating artery on the RIGHT.   Blood products are seen in the region of the known aneurysm possibly indicating partial patency.    CT Head 3/16 -  No change in mild ventricular dilatation with intraventricular hemorrhage and hemorrhage in the frontal lobe midline and corpus callosum after extraventricular drain removal since 3/15/2024.    US Duplex LE 3/20 - No evidence of deep venous thrombosis in either lower extremity.    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 - RECOMMEND DC Bromocriptine    Delirium - RECOMMEND Seroquel 12.5mg Q6PM PRN, RECOMMEND Melatonin 5mg HS  Pain - Tylenol, Lidocaine patch   Diet - Easy Chew   DVT PPX - SCDs, Lovenox   Rehab/Impaired mobility and function - Continuous hospitalization is crucial for managing the patient's acute medical issues (Infection?, SAH, Delirium), which have significantly impacted their mobility, quality of life, and function. Rehabilitation recommendations will be based on the patient's functional progress and their ability to participate in and tolerate therapeutic interventions, which may change over time. Maintaining ongoing mobilization by the staff is imperative to prevent secondary medical complications and associated health issues related to debility.    The patient would benefit from ACUTE inpatient rehabilitation where she would receive 3H/daily of PT/OT/Speech to maximize their functional outcomes. She would also be followed by a Rehabilitation specialist for medical management of ongoing comorbidities and safe discharge.     RECOMMEND:  Discontinue Bromocriptine, no clear indication for treatment given low likelyhood of central fever given white count and fever   Consider ordering another UA to assess for UTI given white count and fever     The patient will continue to actively engage in Physical Therapy (PT), Occupational Therapy (OT), and Speech Therapy (SLP) to optimize functional outcomes. The Physical Medicine and Rehabilitation (PM&R) team will closely monitor their progress, and discharge recommendations will depend on their functional improvement and overall medical stability.

## 2024-03-21 NOTE — PROGRESS NOTE ADULT - SUBJECTIVE AND OBJECTIVE BOX
Preliminary note, official recommendations pending attending review/signature   Seen and examined by Stroke team attending/team, assessment/ plan as discussed with stroke team attending/team as noted.   Translation per daughter at bedside as preferred.     [facility] Stroke Team  Progress Note     HPI:  72 y/o mandarin speaking female with PMH of HLD and osteoporosis presents to PST with complaints of having cerebral aneurysm. States in 10/2023 she started to have B/L hand numbness. At that time she had MRI brain, MRA head and neck. MRA showed an ACOMM aneurysm. She underwent a cerebral angiogram by José Manuel Villaseñor at [facility] on December 7th, 2023, with the intent to treat this month. The aneurysm is described as a 5.5x4.6x3.6mm wide necked acomm aneurysm that projects medio-superiorly. Reports occasional headaches, described as numbness, 5/10 in severity, worse with laying down, relieved with sitting up or standing. Patient underwent diagnostic angiogram 2/14/24 which confirmed acomm aneurysm. On 3/6 patient underwent aneurysm embolization with coiling complicated by aneurysm rupture, controlled with coils and balloon tamponade. NISHANT CT showed SAH b/l frontal lobes and R sylvian fissure along with contrast staining. Patient admitted to NSICU for post op care.. EVD now removed per neurosurgery 3/15. Transferred to neurosurgery service 3/16, stroke team asked to consult, transferred to Stroke service 3/18.     SUBJECTIVE: No events overnight. Reported headache this morning improved with Tylenol.  No new neurologic complaints.  ROS reported negative unless otherwise noted.    acetaminophen     Tablet .. 650 milliGRAM(s) Oral every 6 hours PRN  bromocriptine Capsule 10 milliGRAM(s) Oral two times a day  calcium carbonate 1250 mG  + Vitamin D (OsCal 500 + D) 1 Tablet(s) Oral daily  chlorhexidine 2% Cloths 1 Application(s) Topical daily  enoxaparin Injectable 30 milliGRAM(s) SubCutaneous every 24 hours  hydrALAZINE Injectable 10 milliGRAM(s) IV Push every 2 hours PRN  labetalol Injectable 10 milliGRAM(s) IV Push every 2 hours PRN  lidocaine   4% Patch 1 Patch Transdermal every 24 hours  multivitamin 1 Tablet(s) Oral daily  niMODipine Oral Solution 60 milliGRAM(s) Oral every 4 hours  nystatin Powder 1 Application(s) Topical two times a day  ondansetron Injectable 4 milliGRAM(s) IV Push every 6 hours PRN  QUEtiapine 12.5 milliGRAM(s) Oral at bedtime  simvastatin 10 milliGRAM(s) Oral at bedtime  sodium chloride 0.9%. 1000 milliLiter(s) IV Continuous <Continuous>  tamsulosin 0.8 milliGRAM(s) Oral at bedtime      PHYSICAL EXAM:   Vital Signs Last 24 Hrs  T(C): 37.1 (21 Mar 2024 07:52), Max: 38.5 (20 Mar 2024 14:00)  T(F): 98.8 (21 Mar 2024 07:52), Max: 101.3 (20 Mar 2024 14:00)  HR: 96 (21 Mar 2024 07:52) (92 - 110)  BP: 136/71 (21 Mar 2024 07:52) (125/75 - 160/78)  BP(mean): 103 (20 Mar 2024 15:00) (102 - 105)  RR: 18 (21 Mar 2024 07:52) (17 - 22)  SpO2: 99% (21 Mar 2024 07:52) (96% - 100%)    Parameters below as of 21 Mar 2024 07:52  Patient On (Oxygen Delivery Method): room air         General: No acute distress.   HEENT: Head normocephalic, atraumatic. Conjunctivae clear w/o exudates or hemorrhage. Sclera non-icteric. Nares are patent bilaterally. Mucous membranes pink and moist.    Cardiac: Normal rate and rhythm.   Respiratory: Lung sounds clear except minimal crackles at bases, no use of accessory muscles, respirations unlabored   Abdominal: Firm , distended, nontender. Bowel sounds diminished   Skin: Skin is warm, dry and intact without rashes or lesions.    Extremities: No edema       NEUROLOGICAL EXAM:  Mental status: The patient is awake and alert and has normal attention span.  The patient is fully oriented in 3 spheres. The patient is oriented to current events. The patient is able to name objects, follow commands, repeat sentences.    Cranial nerves: slight left facial palsy, Pupils equal and react symmetrically to light. There is no visual field deficit to confrontation. Extraocular motion is full with no nystagmus. There is no ptosis. Facial sensation is intact.   Motor: There is normal bulk and tone.  There is no tremor.  LUE pronator drift, strength 5-/5, LLE 5-/5. RUE and bilateral LE strength 5/5- some pain limitation in LE   Sensation: Intact to light touch  in 4 extremities  Slight tremor b/l upper extremities     LABS:                        8.4    12.50 )-----------( 465      ( 21 Mar 2024 04:40 )             24.4    03-21    138  |  103  |  6.6<L>  ----------------------------<  120<H>  3.9   |  24.0  |  0.32<L>    Ca    7.7<L>      21 Mar 2024 04:40  Phos  3.0     03-20  Mg     2.3     03-20 03-19 Chol 142 LDL -55- HDL 63 Trig 120    A1C:A1C with Estimated Average Glucose Result: 5.8 %    IMAGING: Reviewed by me.     CT Head No Cont (03.16.24 @ 03:22)   IMPRESSION: No change in mild ventricular dilatation with intraventricular hemorrhage and hemorrhage in the frontal lobe midline and corpus callosum after extraventricular drain removal since 3/15/2024.    CT Head No Cont (03.15.24 @ 05:38)   IMPRESSION: Stable follow-up CT study.     TTE W or WO Ultrasound Enhancing Agent (03.13.24 @ 12:03)   CONCLUSIONS:    1. Left ventricular cavity is normal in size. Left ventricular wall thickness is normal. Left ventricular systolic function is normal with an ejection fraction visually estimated at 65 to 70 %.   2. Normal left ventricular diastolic function.   3. Normal right ventricular cavity size and normal systolic function.   4. The left atrium is normal.   5. The right atrium is normal in size.   6. Fibrocalcific aortic valve sclerosis without stenosis.   7. Mild mitral valve leaflet calcification.   8. Trace mitral regurgitation.   9. Estimated pulmonary artery systolic pressure is 23 mmHg, normal pulmonary artery pressure.    US Duplex Venous Lower Ext Complete, Bilateral (03.12.24 @ 15:23) >  IMPRESSION: No evidence of deep venous thrombosis in either lower extremity.     IR Neuro (03.11.24 @ 09:01)   IMPRESSION:  1. 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 No interval aneurysm recanalization or evidence for pseudoaneurysm.  2. No vasospasm.      CT Head No Cont (03.10.24 @ 12:37)   IMPRESSION: Mildly decreased bilateral mesial frontal parenchymal hemorrhages. Similar anterior interhemispheric acute subarachnoid hemorrhage. Mildly decreased intraventricular hemorrhage within the bilateral lateral   and third ventricles. Decreased ventricular size with near resolution of hydrocephalus. No large midline shift. Basal cisterns are more well visualized on the current examination, this may be due to technique.       MR Angio Head w/ IV Cont (03.09.24 @ 18:57)   IMPRESSION:  MR brain: Intraparenchymal hemorrhage within the medial anterior frontal lobes measuring approximately 3.5 cm, unchanged. There is surrounding edema and extension into the genuine body of corpus callosum, unchanged. Hemorrhagic clot within the LEFT lateral ventricle and third ventricle is unchanged. Minimal hemorrhage seen in the dependent portions of the RIGHT lateral ventricle unchanged. Minimal subarachnoid hemorrhage seen along the medial sulci of the BILATERAL frontal and parietal lobes as well as scattered throughout the sulci of the brain. RIGHT frontal ventriculostomy catheter tip is seen extending toward the body of the   LEFT ventricle and third ventricle.  Coil material is seen in the region of the anterior communicating artery on the RIGHT.   Blood products are seen in the region of the known aneurysm possibly indicating partial patency.    MRA intracranial:   Coil material is seen in the region of the anterior communicating artery. There is 1 x 4 mm region of signal seen on the noncontrast but not the postcontrast which may reflect blood products within the aneurysm indicating patency.      CT Head No Cont (03.07.24 @ 05:56)   IMPRESSION:   persistent intracranial hemorrhage within the BILATERAL lateral and third ventricles, LEFT greater than RIGHT, with mild obstructive hydrocephalus slightly increased. RIGHT frontal shunt catheter tip seen in body of RIGHT lateral ventricle unchanged. Subarachnoid hemorrhage again noted in the BILATERAL medial frontal lobes with hemorrhage in the anterior corpus callosum, LEFT greaterthan RIGHT.     CT Head No Cont (03.06.24 @ 19:20)   IMPRESSION: Interval placement of right frontal approach ventriculostomy catheter since the prior study performed 3 hours ago, with improved hydrocephalus and slightly decreased sulcal/cisternal bilateral subarachnoid hemorrhage.    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 Intraventricular hemorrhage is present with hydrocephalus.    IR Neuro (03.06.24 @ 12:13)   IMPRESSION:  1. 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  2. Status postballoon assisted coil embolization, the aneurysm is nearly completely occluded other than a 2.2 mm x 1.4 mm x 4.3 mm aneurysmal neck remnant.    Study Date: 03-19-24  Duration: 22 minutes  EEG Classification / Summary:  Normal routine EEG in the awake state, within the limits of interpretation.  Interpretation significantly limited by continuous muscle artifact.  Clinical Impression:   Technically limited study due to continuous muscle artifact. No obvious abnormalities noted.  Consider repeating EEG if clinically warranted.            Translation per daughter at bedside as preferred.     [facility] Stroke Team  Progress Note     HPI:  74 y/o mandarin speaking female with PMH of HLD and osteoporosis presents to PST with complaints of having cerebral aneurysm. States in 10/2023 she started to have B/L hand numbness. At that time she had MRI brain, MRA head and neck. MRA showed an ACOMM aneurysm. She underwent a cerebral angiogram by José Manuel Villaseñor at [facility] on December 7th, 2023, with the intent to treat this month. The aneurysm is described as a 5.5x4.6x3.6mm wide necked acomm aneurysm that projects medio-superiorly. Reports occasional headaches, described as numbness, 5/10 in severity, worse with laying down, relieved with sitting up or standing. Patient underwent diagnostic angiogram 2/14/24 which confirmed acomm aneurysm. On 3/6 patient underwent aneurysm embolization with coiling complicated by aneurysm rupture, controlled with coils and balloon tamponade. NISHANT CT showed SAH b/l frontal lobes and R sylvian fissure along with contrast staining. Patient admitted to NSICU for post op care.. EVD now removed per neurosurgery 3/15. Transferred to neurosurgery service 3/16, stroke team asked to consult, transferred to Stroke service 3/18.     SUBJECTIVE: No events overnight. Reported headache this morning improved with Tylenol.  No new neurologic complaints.  ROS reported negative unless otherwise noted.    acetaminophen     Tablet .. 650 milliGRAM(s) Oral every 6 hours PRN  bromocriptine Capsule 10 milliGRAM(s) Oral two times a day  calcium carbonate 1250 mG  + Vitamin D (OsCal 500 + D) 1 Tablet(s) Oral daily  chlorhexidine 2% Cloths 1 Application(s) Topical daily  enoxaparin Injectable 30 milliGRAM(s) SubCutaneous every 24 hours  hydrALAZINE Injectable 10 milliGRAM(s) IV Push every 2 hours PRN  labetalol Injectable 10 milliGRAM(s) IV Push every 2 hours PRN  lidocaine   4% Patch 1 Patch Transdermal every 24 hours  multivitamin 1 Tablet(s) Oral daily  niMODipine Oral Solution 60 milliGRAM(s) Oral every 4 hours  nystatin Powder 1 Application(s) Topical two times a day  ondansetron Injectable 4 milliGRAM(s) IV Push every 6 hours PRN  QUEtiapine 12.5 milliGRAM(s) Oral at bedtime  simvastatin 10 milliGRAM(s) Oral at bedtime  sodium chloride 0.9%. 1000 milliLiter(s) IV Continuous <Continuous>  tamsulosin 0.8 milliGRAM(s) Oral at bedtime      PHYSICAL EXAM:   Vital Signs Last 24 Hrs  T(C): 37.1 (21 Mar 2024 07:52), Max: 38.5 (20 Mar 2024 14:00)  T(F): 98.8 (21 Mar 2024 07:52), Max: 101.3 (20 Mar 2024 14:00)  HR: 96 (21 Mar 2024 07:52) (92 - 110)  BP: 136/71 (21 Mar 2024 07:52) (125/75 - 160/78)  BP(mean): 103 (20 Mar 2024 15:00) (102 - 105)  RR: 18 (21 Mar 2024 07:52) (17 - 22)  SpO2: 99% (21 Mar 2024 07:52) (96% - 100%)    Parameters below as of 21 Mar 2024 07:52  Patient On (Oxygen Delivery Method): room air         General: No acute distress.   HEENT: Head normocephalic, atraumatic. Conjunctivae clear w/o exudates or hemorrhage. Sclera non-icteric. Nares are patent bilaterally. Mucous membranes pink and moist.    Cardiac: Normal rate and rhythm.   Respiratory: Lung sounds clear except minimal crackles at bases, no use of accessory muscles, respirations unlabored   Abdominal: Firm , distended, nontender. Bowel sounds diminished   Skin: Skin is warm, dry and intact without rashes or lesions.    Extremities: No edema       NEUROLOGICAL EXAM:  Mental status: The patient is awake and alert and has normal attention span.  The patient is fully oriented in 3 spheres. The patient is oriented to current events. The patient is able to name objects, follow commands, repeat sentences.    Cranial nerves: slight left facial palsy, Pupils equal and react symmetrically to light. There is no visual field deficit to confrontation. Extraocular motion is full with no nystagmus. There is no ptosis. Facial sensation is intact.   Motor: There is normal bulk and tone.  There is no tremor.  LUE pronator drift, strength 5-/5, LLE 5-/5. RUE and bilateral LE strength 5/5- some pain limitation in LE   Sensation: Intact to light touch  in 4 extremities  Slight tremor b/l upper extremities     LABS:                        8.4    12.50 )-----------( 465      ( 21 Mar 2024 04:40 )             24.4    03-21    138  |  103  |  6.6<L>  ----------------------------<  120<H>  3.9   |  24.0  |  0.32<L>    Ca    7.7<L>      21 Mar 2024 04:40  Phos  3.0     03-20  Mg     2.3     03-20 03-19 Chol 142 LDL -55- HDL 63 Trig 120    A1C:A1C with Estimated Average Glucose Result: 5.8 %    IMAGING: Reviewed by me.     CT Head No Cont (03.16.24 @ 03:22)   IMPRESSION: No change in mild ventricular dilatation with intraventricular hemorrhage and hemorrhage in the frontal lobe midline and corpus callosum after extraventricular drain removal since 3/15/2024.    CT Head No Cont (03.15.24 @ 05:38)   IMPRESSION: Stable follow-up CT study.     TTE W or WO Ultrasound Enhancing Agent (03.13.24 @ 12:03)   CONCLUSIONS:    1. Left ventricular cavity is normal in size. Left ventricular wall thickness is normal. Left ventricular systolic function is normal with an ejection fraction visually estimated at 65 to 70 %.   2. Normal left ventricular diastolic function.   3. Normal right ventricular cavity size and normal systolic function.   4. The left atrium is normal.   5. The right atrium is normal in size.   6. Fibrocalcific aortic valve sclerosis without stenosis.   7. Mild mitral valve leaflet calcification.   8. Trace mitral regurgitation.   9. Estimated pulmonary artery systolic pressure is 23 mmHg, normal pulmonary artery pressure.    US Duplex Venous Lower Ext Complete, Bilateral (03.12.24 @ 15:23) >  IMPRESSION: No evidence of deep venous thrombosis in either lower extremity.     IR Neuro (03.11.24 @ 09:01)   IMPRESSION:  1. 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 No interval aneurysm recanalization or evidence for pseudoaneurysm.  2. No vasospasm.      CT Head No Cont (03.10.24 @ 12:37)   IMPRESSION: Mildly decreased bilateral mesial frontal parenchymal hemorrhages. Similar anterior interhemispheric acute subarachnoid hemorrhage. Mildly decreased intraventricular hemorrhage within the bilateral lateral   and third ventricles. Decreased ventricular size with near resolution of hydrocephalus. No large midline shift. Basal cisterns are more well visualized on the current examination, this may be due to technique.       MR Angio Head w/ IV Cont (03.09.24 @ 18:57)   IMPRESSION:  MR brain: Intraparenchymal hemorrhage within the medial anterior frontal lobes measuring approximately 3.5 cm, unchanged. There is surrounding edema and extension into the genuine body of corpus callosum, unchanged. Hemorrhagic clot within the LEFT lateral ventricle and third ventricle is unchanged. Minimal hemorrhage seen in the dependent portions of the RIGHT lateral ventricle unchanged. Minimal subarachnoid hemorrhage seen along the medial sulci of the BILATERAL frontal and parietal lobes as well as scattered throughout the sulci of the brain. RIGHT frontal ventriculostomy catheter tip is seen extending toward the body of the   LEFT ventricle and third ventricle.  Coil material is seen in the region of the anterior communicating artery on the RIGHT.   Blood products are seen in the region of the known aneurysm possibly indicating partial patency.    MRA intracranial:   Coil material is seen in the region of the anterior communicating artery. There is 1 x 4 mm region of signal seen on the noncontrast but not the postcontrast which may reflect blood products within the aneurysm indicating patency.      CT Head No Cont (03.07.24 @ 05:56)   IMPRESSION:   persistent intracranial hemorrhage within the BILATERAL lateral and third ventricles, LEFT greater than RIGHT, with mild obstructive hydrocephalus slightly increased. RIGHT frontal shunt catheter tip seen in body of RIGHT lateral ventricle unchanged. Subarachnoid hemorrhage again noted in the BILATERAL medial frontal lobes with hemorrhage in the anterior corpus callosum, LEFT greaterthan RIGHT.     CT Head No Cont (03.06.24 @ 19:20)   IMPRESSION: Interval placement of right frontal approach ventriculostomy catheter since the prior study performed 3 hours ago, with improved hydrocephalus and slightly decreased sulcal/cisternal bilateral subarachnoid hemorrhage.    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 Intraventricular hemorrhage is present with hydrocephalus.    IR Neuro (03.06.24 @ 12:13)   IMPRESSION:  1. 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  2. Status postballoon assisted coil embolization, the aneurysm is nearly completely occluded other than a 2.2 mm x 1.4 mm x 4.3 mm aneurysmal neck remnant.    Study Date: 03-19-24  Duration: 22 minutes  EEG Classification / Summary:  Normal routine EEG in the awake state, within the limits of interpretation.  Interpretation significantly limited by continuous muscle artifact.  Clinical Impression:   Technically limited study due to continuous muscle artifact. No obvious abnormalities noted.  Consider repeating EEG if clinically warranted.

## 2024-03-21 NOTE — CONSULT NOTE ADULT - SUBJECTIVE AND OBJECTIVE BOX
[facility]  INFECTIOUS DISEASES at [place] / [place] / [place]  =======================================================                               Carlos Johnson MD#   Mony Amaya MD*                             Josephine Lackey MD*   Layne Cho MD*                              Professor Emeritus:  Dr Cuba Levin MD^            Diplomates American Board of Internal Medicine & Infectious Diseases                # [place] Office - Appt - Tel  [phone] Fax [phone]                * [place] Office - Appt - Tel [phone] Fax [phone]               ^ [place] Office - Appt - Tel  [phone] Fax [phone]                                  Hospital Consult line:  [phone]  =======================================================      [id number]  KASHMIR CARBAJAL    CC: Patient is a 73y old  Female who presents with a chief complaint of ACOMM Aneurysm (18 Mar 2024 14:12)      73y  Female with h/o HLD and osteoporosis. Patient presented  3/6 for cerebral angiogram with aneurysm embolization via balloon assisted coiling. Patient had MRI / MRA brain 2/2 hand numbness which had incidental finding of acomm aneurysm. Patient underwent diagnostic angiogram 2/14/24 which confirmed acomm aneurysm. On 3/6 patient underwent aneurysm embolization with coiling complicated by aneurysm rupture, controlled with coils and balloon tamponade. NISHANT CT showed SAH b/l frontal lobes and R sylvian fissure along with contrast staining. Patient admitted to NSICU for post op care, EVD. PAtient had EVD removed 3/15. Hospital course with fever on 3/8 and was started on Zosyn 3/9 and completed 7 days on 3/16. Transferred to neurosurgery service 3/16, stroke team asked to consult, transferred to Stroke service 3/18. Patient had been having low grade fevers up until fever to 101.3 on 3/20. ID input requested         Past Medical & Surgical Hx:  HLD (hyperlipidemia)  Cerebral aneurysm  Osteoporosis  History of cerebral angiography      Social Hx:  Denies smoking       FAMILY HISTORY:  Family history of CVA      Allergies  No Known Allergies  Intolerances  lactose (Diarrhea)       REVIEW OF SYSTEMS:  CONSTITUTIONAL:  + Fever No chills  HEENT:  No diplopia or blurred vision.  No earache, sore throat or runny nose.  CARDIOVASCULAR:  No chest pain  RESPIRATORY:  No cough, shortness of breath  GASTROINTESTINAL:  No nausea, vomiting or diarrhea.  GENITOURINARY:  + Dangelo   MUSCULOSKELETAL:  + Back pain   SKIN:  No change in skin, hair or nails.  NEUROLOGIC:  No Headaches      Physical Exam:  GEN: NAD  HEENT: normocephalic and atraumatic. EOMI. PERRL.    NECK: Supple.   LUNGS: CTA B/L.  HEART: RRR  ABDOMEN: Soft, NT, ND.  +BS.    : +Dangelo  EXTREMITIES: Without  edema.  MSK: No joint swelling  NEUROLOGIC: No Focal Deficits   SKIN: No rash      Vitals:  T(F): 98.8 (21 Mar 2024 07:52), Max: 101.3 (20 Mar 2024 14:00)  HR: 96 (21 Mar 2024 07:52)  BP: 136/71 (21 Mar 2024 07:52)  RR: 18 (21 Mar 2024 07:52)  SpO2: 99% (21 Mar 2024 07:52) (96% - 100%)  temp max in last 48H T(F): , Max: 101.3 (03-20-24 @ 14:00)      Current Antibiotics:    Other medications:  bromocriptine Capsule 10 milliGRAM(s) Oral two times a day  calcium carbonate 1250 mG  + Vitamin D (OsCal 500 + D) 1 Tablet(s) Oral daily  chlorhexidine 2% Cloths 1 Application(s) Topical daily  enoxaparin Injectable 30 milliGRAM(s) SubCutaneous every 24 hours  lidocaine   4% Patch 1 Patch Transdermal every 24 hours  multivitamin 1 Tablet(s) Oral daily  niMODipine Oral Solution 60 milliGRAM(s) Oral every 4 hours  nystatin Powder 1 Application(s) Topical two times a day  QUEtiapine 12.5 milliGRAM(s) Oral at bedtime  simvastatin 10 milliGRAM(s) Oral at bedtime  sodium chloride 0.9%. 1000 milliLiter(s) IV Continuous <Continuous>  tamsulosin 0.8 milliGRAM(s) Oral at bedtime                            8.4    12.50 )-----------( 465      ( 21 Mar 2024 04:40 )             24.4     03-21    138  |  103  |  6.6<L>  ----------------------------<  120<H>  3.9   |  24.0  |  0.32<L>    Ca    7.7<L>      21 Mar 2024 04:40  Phos  3.0     03-20  Mg     2.3     03-20      RECENT CULTURES:  03-12 @ 12:45 .CSF CSF     No growth at 5 days  No polymorphonuclear cells seen  No organisms seen  by cytocentrifuge    03-12 @ 12:00 .Blood Blood-Peripheral     No growth at 5 days    03-12 @ 11:45 .Blood Blood-Peripheral     No growth at 5 days    03-09 @ 10:13    RVP  NotDetec    03-09 @ 08:20 .Sputum Sputum     Normal Respiratory Dhara present  No Squamous epithelial cells seen per low power field  Rare polymorphonuclear leukocytes seen per low power field  Moderate Gram positive cocci in pairs seen per oil power field  Few Gram Negative Rods seen per oil power field      WBC Count: 12.50 K/uL (03-21-24 @ 04:40)  WBC Count: 8.60 K/uL (03-20-24 @ 02:40)  WBC Count: 11.67 K/uL (03-19-24 @ 21:45)  WBC Count: 12.57 K/uL (03-19-24 @ 04:00)  WBC Count: 9.54 K/uL (03-18-24 @ 04:00)  WBC Count: 11.64 K/uL (03-17-24 @ 02:22)  WBC Count: 13.30 K/uL (03-16-24 @ 20:04)    Creatinine: 0.32 mg/dL (03-21-24 @ 04:40)  Creatinine: 0.37 mg/dL (03-20-24 @ 17:20)  Creatinine: 0.32 mg/dL (03-20-24 @ 09:30)  Creatinine: 0.27 mg/dL (03-20-24 @ 02:40)  Creatinine: 0.31 mg/dL (03-19-24 @ 21:45)  Creatinine: 0.40 mg/dL (03-19-24 @ 15:51)  Creatinine: 0.34 mg/dL (03-19-24 @ 04:00)  Creatinine: 0.34 mg/dL (03-19-24 @ 00:10)  Creatinine: 0.45 mg/dL (03-18-24 @ 18:55)  Creatinine: 0.37 mg/dL (03-18-24 @ 04:00)  Creatinine: 0.37 mg/dL (03-17-24 @ 02:22)  Creatinine: 0.41 mg/dL (03-16-24 @ 20:04)    Procalcitonin, Serum: 0.18 ng/mL (03-12-24 @ 02:45)  Procalcitonin, Serum: 0.28 ng/mL (03-08-24 @ 20:00)     SARS-CoV-2: NotDetec (03-12-24 @ 11:45)  SARS-CoV-2: NotDetec (03-09-24 @ 10:13)      Urinalysis (03.19.24 @ 10:30)    Glucose Qualitative, Urine: Negative mg/dL   Blood, Urine: Small   pH Urine: 6.5   Color: Yellow   Urine Appearance: Clear   Bilirubin: Negative   Ketone - Urine: Negative mg/dL   Specific Gravity: 1.010   Protein, Urine: Negative mg/dL   Urobilinogen: 0.2 mg/dL   Nitrite: Negative   Leukocyte Esterase Concentration: Negative  Urine Microscopic-Add On (NC) (03.19.24 @ 10:30)    White Blood Cell - Urine: 1 /HPF   Red Blood Cell - Urine: 1 /HPF   Cast: 1 /LPF   Yeast-like Cells: Present   Bacteria: Negative /HPF   Epithelial Cells: 0 /HPF            < from: Xray Chest 1 View- PORTABLE-Urgent (Xray Chest 1 View- PORTABLE-Urgent .) (03.19.24 @ 16:37) >    ACC: [id number] EXAM:  XR CHEST PORTABLE URGENT 1V   ORDERED BY: ROZINA BEAN     PROCEDURE DATE:  03/19/2024      INTERPRETATION:  Clinical history: 73-year-old female, chest pain.    Portable view of the chest is compared to 3/12/2024.    FINDINGS: Normal cardiac silhouette and normal pulmonary vasculature with   no consolidation, effusion, pneumothorax or acute osseous finding.    IMPRESSION:    No acute findings, clearing of interstitial infiltrate at the right base    --- End of Report ---    < end of copied text >        < from: US Duplex Venous Lower Ext Complete, Bilateral (03.20.24 @ 12:20) >  ACC: [id number] EXAM:  US DPLX LWR EXT VEINS COMPL BI   ORDERED BY: ROZINA BEAN     PROCEDURE DATE:  03/20/2024      INTERPRETATION:  CLINICAL INFORMATION: History of stroke.    COMPARISON: Bilateral lower extremity venous Doppler 3/12/2024    TECHNIQUE: Duplex sonography of the BILATERAL LOWER extremity veins with   color and spectral Doppler, with and without compression.    FINDINGS:    RIGHT:  Normal compressibility of the RIGHT common femoral, femoral and popliteal   veins.  Dopplerexamination shows normal spontaneous and phasic flow.  No RIGHT calf vein thrombosis is detected.    LEFT:  Normal compressibility of the LEFT common femoral, femoral and popliteal   veins.  Doppler examination shows normal spontaneous and phasic flow.  No LEFT calf vein thrombosis is detected.    IMPRESSION:  No evidence of deep venous thrombosis in either lower extremity.    --- End of Report ---    < end of copied text >

## 2024-03-21 NOTE — PROGRESS NOTE ADULT - ATTENDING COMMENTS
Patient seen and examined, discussed patient with Dr. Edwards and agree with recommendations.    Rehab/Impaired mobility and function - Patient continues to require hospitalization for the above diagnoses and ongoing active management of comorbid complications that are substantially impairing functional ability and impairing quality of life necessitating ongoing medical management of these complications necessitating acute rehab.     When medically optimized, based on the patient's diagnosis, current functional status and potential for progress, recommend ACUTE inpatient rehabilitation for the functional deficits consisting of 3 hours of therapy/day & 24 hour RN/daily PMR physician for comorbid medical management. Patient will be able to tolerate 3 hours a day.     Will continue to follow. Rehab recommendations are dependent on how functional progress changes as well as how patient continues to participate and tolerate therapeutic interventions, WHICH MAY CHANGE UPON FOLLOW UP EVALUATIONS. Recommend ongoing mobilization by staff to maintain cardiopulmonary function and prevention of secondary complications related to debility. Discussed the specific management and recommendations above with rehab clinical care team/rehab liaison.

## 2024-03-21 NOTE — CONSULT NOTE ADULT - TIME BILLING
This includes chart review, patient assessment, discussion with Stroke team. Antibiotic dosing and management with clinical pharmacy.

## 2024-03-21 NOTE — PROGRESS NOTE ADULT - ASSESSMENT
ASSESSMENT: 72 y/o mandarin speaking female with PMH of HLD and osteoporosis presents to [facility] with complaints of having cerebral aneurysm. States in 10/2023 she started to have B/L hand numbness. At that time she had MRI brain, MRA head and neck. MRA showed an ACOMM aneurysm. She underwent a cerebral angiogram by José Manuel Villaseñor at [facility] on December 7th, 2023, with the intent to treat this month. The aneurysm is described as a 5.5x4.6x3.6mm wide necked acomm aneurysm that projects medio-superiorly. Reports occasional headaches, described as numbness, 5/10 in severity, worse with laying down, relieved with sitting up or standing. Patient underwent diagnostic angiogram 2/14/24 which confirmed acomm aneurysm. On 3/6 patient underwent aneurysm embolization with coiling complicated by aneurysm rupture, controlled with coils and balloon tamponade. NISHANT CT showed SAH b/l frontal lobes and R sylvian fissure along with contrast staining. EVD placed  3/6, removed by neurosurgery 3/15. Transferred to Stroke service 3/18.   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       Neurologically intact.  Still having low grade fevers of unclear etiology    NEURO:   #ACOMM aneurysm elective procedure complicated by aneurysm rupture controlled with coils/balloon tamponade  #SAH  #EVD 3/6, removed 3/15  # Metabolic encephalopathy  # Tremors  -Neurologically without acute change   -Continue close monitoring for neurologic deterioration    -Stroke neuro checks q4hrs   - EEG without evidence of seizure   -MRI Brain w/o, MRA Head w/o and Neck w/contrast results as above   -will need repeat MRI brain w/wo contrast in 4-6 weeks  -s/p Keppra  -nimodipine 60mg dc'd on 3/21- per Neurosurgery  -bromocriptine tid due to ? central fevers, will begin to titrate.   -Dysphagia screen: pass, on easy to chew diet  -Physical therapy/OT/Speech eval/treatment.            #Stroke prevention:  -SBP goal  per neuro IR recommendations, avoiding rapid fluctuations and hypotension   -ANTITHROMBOTIC THERAPY: n/a   -LDL- 55 , continue home regimen if applicable   -HA1C 5.8  -TTE noted above    CARDIOVASCULAR:   -TTE noted above  -cardiac monitoring w/ telemetry for now, further evaluation pending findings of noted workup                              HEMATOLOGY:   H/H with anemia and thrombocytosis (plt [phone]) , continue close monitoring, no active bleeding noted,   -patient should have all age and risk appropriate malignancy screenings with PCP or sooner if clinically suspected      DVT ppx: Heparin s.c [] LMWH [X]     PULMONARY:   -most recent CXR 3/19 with persistent right base infiltrate  -s/p zosyn for possible pneumonia  -consider CT chest if fever persists   -protecting airway, saturating well     RENAL:   #Urinary retention  #Hyponatremia   BUN/Cr without acute change   -maintain adequate hydration  -tamsulosin 0.8mg bedtime   -urinary retention s/p void trial- feliciano re-inserted   -hyponatremia, likely due to diarrhea, continue to monitor, IVF in place with gradual improvement - titrated to 75cc/hr , monitor closely      Na Goal:  135-145     Feliciano: replaced     ID:   -ID consulted  -s/p zosyn for possible pneumonia  -fluctuating fevers - Tmax 101.1,  possible central fevers but will need to screen for alternate causes and have ID follow up.   - leukocytosis resolved   -monitor and screen for si/sx of infection   -Abdomen distended, diarrhea- abdominal imaging revealed fecal.  enema and mag citrate as recommended by hospitalist ACP team.     GI:  #diarrhea  -Abdominal XR 3/13 noted  -s/p rectal tube, removed 3/17  -monitor BM, loose stool possible side effect of nimodipine  _Abdominal xray pending     Psych:  -Quetiapine 12.5mg at bedtime  -Delirium precautions    Ortho: xray lumbar /sacral pending read given back pain. Possible meningeal irritation.  Will give lidocaine patch.    - MRI L spine pending    OTHER:  condition and plan of care d/w patient, questions and concerns addressed. Discussed with family at bedside, medical team at bedside as well present for further evaluation and recommendations.     DISPOSITION: PT/OT recommending acute rehab once medically stable for discharge    CORE MEASURES:        Admission NIHSS: n/a      Tenecteplase : [] YES [X] NO      LDL/HDL/A1C: pending lipid panel/ HA1C 5.8     Depression Screen- if depression hx and/or present      Statin Therapy: simvastatin 10mg     Dysphagia Screen: [X] PASS [] FAIL     Smoking [] YES [X] NO      Afib [] YES [X] NO     Stroke Education [] YES [] NO [X] pending    Obtain screening lower extremity venous ultrasound in patients who meet 1 or more of the following criteria as patient is high risk for DVT/PE on admission:   [] History of DVT/PE  []Hypercoagulable states (Factor V Leiden, Cancer, OCP, etc. )  []Prolonged immobility (hemiplegia/hemiparesis/post operative or any other extended immobilization)  [] Transferred from outside facility (Rehab or Long term care)  [] Age </= to 50

## 2024-03-21 NOTE — CONSULT NOTE ADULT - ASSESSMENT
73y  Female with h/o HLD and osteoporosis. Patient presented  3/6 for cerebral angiogram with aneurysm embolization via balloon assisted coiling. Patient had MRI / MRA brain 2/2 hand numbness which had incidental finding of acomm aneurysm. Patient underwent diagnostic angiogram 2/14/24 which confirmed acomm aneurysm. On 3/6 patient underwent aneurysm embolization with coiling complicated by aneurysm rupture, controlled with coils and balloon tamponade. NISHANT CT showed SAH b/l frontal lobes and R sylvian fissure along with contrast staining. Patient admitted to NSICU for post op care, EVD. PAtient had EVD removed 3/15. Hospital course with fever on 3/8 and was started on Zosyn 3/9 and completed 7 days on 3/16. Transferred to neurosurgery service 3/16, stroke team asked to consult, transferred to Stroke service 3/18. Patient had been having low grade fevers up until fever to 101.3 on 3/20.       Fever  Leukocytosis  SIRS  Back pain       - Blood cultures 3/12 no growth   - Repeat blood cultures ordered   - RVP/COVID 19 PCR 3/9 negative   - Urine Cx pending   - CXR 3/19 wit no PNA   - Check spine imaging given back pain   - UA 3/19 with no pyuria   - Procalcitonin level 0.18  - Monitor off antibiotics fo now since clinically non toxic   - Hold off on PICC/Midline for now unless needed for reasons other than infectious diseases  - Follow up cultures  - Trend Fever  - Trend WBC      Thank you for allowing me to participate in the care of your patient.   Will Follow    Discussed treatment plan with: [place] team

## 2024-03-22 ENCOUNTER — TRANSCRIPTION ENCOUNTER (OUTPATIENT)
Age: 74
End: 2024-03-22

## 2024-03-22 LAB
ALBUMIN SERPL ELPH-MCNC: 3.4 G/DL — SIGNIFICANT CHANGE UP (ref 3.3–5.2)
ALP SERPL-CCNC: 55 U/L — SIGNIFICANT CHANGE UP (ref 40–120)
ALT FLD-CCNC: 34 U/L — HIGH
ANION GAP SERPL CALC-SCNC: 14 MMOL/L — SIGNIFICANT CHANGE UP (ref 5–17)
AST SERPL-CCNC: 24 U/L — SIGNIFICANT CHANGE UP
BILIRUB SERPL-MCNC: 0.3 MG/DL — LOW (ref 0.4–2)
BUN SERPL-MCNC: 8.6 MG/DL — SIGNIFICANT CHANGE UP (ref 8–20)
CA-I BLD-SCNC: 1.05 MMOL/L — LOW (ref 1.15–1.33)
CALCIUM SERPL-MCNC: 7.7 MG/DL — LOW (ref 8.4–10.5)
CHLORIDE SERPL-SCNC: 98 MMOL/L — SIGNIFICANT CHANGE UP (ref 96–108)
CO2 SERPL-SCNC: 23 MMOL/L — SIGNIFICANT CHANGE UP (ref 22–29)
CREAT SERPL-MCNC: 0.44 MG/DL — LOW (ref 0.5–1.3)
EGFR: 103 ML/MIN/1.73M2 — SIGNIFICANT CHANGE UP
GLUCOSE SERPL-MCNC: 155 MG/DL — HIGH (ref 70–99)
MAGNESIUM SERPL-MCNC: 2.2 MG/DL — SIGNIFICANT CHANGE UP (ref 1.6–2.6)
PHOSPHATE SERPL-MCNC: 1.4 MG/DL — LOW (ref 2.4–4.7)
POTASSIUM SERPL-MCNC: 2.9 MMOL/L — CRITICAL LOW (ref 3.5–5.3)
POTASSIUM SERPL-SCNC: 2.9 MMOL/L — CRITICAL LOW (ref 3.5–5.3)
PROT SERPL-MCNC: 6.2 G/DL — LOW (ref 6.6–8.7)
SODIUM SERPL-SCNC: 135 MMOL/L — SIGNIFICANT CHANGE UP (ref 135–145)

## 2024-03-22 PROCEDURE — 99233 SBSQ HOSP IP/OBS HIGH 50: CPT

## 2024-03-22 PROCEDURE — 99232 SBSQ HOSP IP/OBS MODERATE 35: CPT

## 2024-03-22 PROCEDURE — 99233 SBSQ HOSP IP/OBS HIGH 50: CPT | Mod: GC

## 2024-03-22 RX ORDER — POTASSIUM CHLORIDE 20 MEQ
10 PACKET (EA) ORAL
Refills: 0 | Status: COMPLETED | OUTPATIENT
Start: 2024-03-22 | End: 2024-03-22

## 2024-03-22 RX ORDER — LACTOBACILLUS ACIDOPHILUS 100MM CELL
1 CAPSULE ORAL DAILY
Refills: 0 | Status: DISCONTINUED | OUTPATIENT
Start: 2024-03-22 | End: 2024-03-27

## 2024-03-22 RX ORDER — IOHEXOL 300 MG/ML
30 INJECTION, SOLUTION INTRAVENOUS ONCE
Refills: 0 | Status: DISCONTINUED | OUTPATIENT
Start: 2024-03-22 | End: 2024-03-27

## 2024-03-22 RX ORDER — POTASSIUM CHLORIDE 20 MEQ
40 PACKET (EA) ORAL ONCE
Refills: 0 | Status: COMPLETED | OUTPATIENT
Start: 2024-03-22 | End: 2024-03-22

## 2024-03-22 RX ORDER — BROMOCRIPTINE MESYLATE 5 MG/1
10 CAPSULE ORAL DAILY
Refills: 0 | Status: DISCONTINUED | OUTPATIENT
Start: 2024-03-22 | End: 2024-03-23

## 2024-03-22 RX ORDER — CALCIUM GLUCONATE 100 MG/ML
2 VIAL (ML) INTRAVENOUS ONCE
Refills: 0 | Status: DISCONTINUED | OUTPATIENT
Start: 2024-03-22 | End: 2024-03-23

## 2024-03-22 RX ADMIN — TAMSULOSIN HYDROCHLORIDE 0.8 MILLIGRAM(S): 0.4 CAPSULE ORAL at 21:52

## 2024-03-22 RX ADMIN — NIMODIPINE 60 MILLIGRAM(S): 60 SOLUTION ORAL at 05:41

## 2024-03-22 RX ADMIN — BROMOCRIPTINE MESYLATE 10 MILLIGRAM(S): 5 CAPSULE ORAL at 05:41

## 2024-03-22 RX ADMIN — LIDOCAINE 1 PATCH: 4 CREAM TOPICAL at 07:30

## 2024-03-22 RX ADMIN — SIMVASTATIN 10 MILLIGRAM(S): 20 TABLET, FILM COATED ORAL at 21:52

## 2024-03-22 RX ADMIN — LIDOCAINE 1 PATCH: 4 CREAM TOPICAL at 19:00

## 2024-03-22 RX ADMIN — NIMODIPINE 60 MILLIGRAM(S): 60 SOLUTION ORAL at 13:17

## 2024-03-22 RX ADMIN — QUETIAPINE FUMARATE 12.5 MILLIGRAM(S): 200 TABLET, FILM COATED ORAL at 21:53

## 2024-03-22 RX ADMIN — NYSTATIN CREAM 1 APPLICATION(S): 100000 CREAM TOPICAL at 05:40

## 2024-03-22 RX ADMIN — NIMODIPINE 60 MILLIGRAM(S): 60 SOLUTION ORAL at 09:28

## 2024-03-22 RX ADMIN — ENOXAPARIN SODIUM 30 MILLIGRAM(S): 100 INJECTION SUBCUTANEOUS at 21:52

## 2024-03-22 RX ADMIN — Medication 100 MILLIEQUIVALENT(S): at 22:08

## 2024-03-22 RX ADMIN — Medication 100 MILLIEQUIVALENT(S): at 20:09

## 2024-03-22 RX ADMIN — LIDOCAINE 1 PATCH: 4 CREAM TOPICAL at 17:17

## 2024-03-22 RX ADMIN — SODIUM CHLORIDE 50 MILLILITER(S): 9 INJECTION INTRAMUSCULAR; INTRAVENOUS; SUBCUTANEOUS at 17:12

## 2024-03-22 RX ADMIN — Medication 100 MILLIEQUIVALENT(S): at 21:49

## 2024-03-22 RX ADMIN — NIMODIPINE 60 MILLIGRAM(S): 60 SOLUTION ORAL at 21:50

## 2024-03-22 RX ADMIN — Medication 40 MILLIEQUIVALENT(S): at 18:54

## 2024-03-22 RX ADMIN — NIMODIPINE 60 MILLIGRAM(S): 60 SOLUTION ORAL at 17:17

## 2024-03-22 RX ADMIN — Medication 1 TABLET(S): at 13:17

## 2024-03-22 RX ADMIN — NYSTATIN CREAM 1 APPLICATION(S): 100000 CREAM TOPICAL at 17:17

## 2024-03-22 NOTE — PROGRESS NOTE ADULT - SUBJECTIVE AND OBJECTIVE BOX
FUNCTIONAL PROGRESS  3/20 PT   Therapeutic Interventions    Bed Mobility  Bed Mobility Training Sit-to-Supine: minimum assist (75% patient effort);  1 person assist;  nonverbal cues (demo/gestures);  verbal cues  Bed Mobility Training Supine-to-Sit: minimum assist (75% patient effort);  1 person assist;  nonverbal cues (demo/gestures);  verbal cues  Bed Mobility Training Limitations: decreased strength;  impaired postural control;  impaired coordination    Sit-Stand Transfer Training  Transfer Training Sit-to-Stand Transfer: minimum assist (75% patient effort);  1 person assist;  nonverbal cues (demo/gestures);  verbal cues;  rolling walker  Transfer Training Stand-to-Sit Transfer: minimum assist (75% patient effort);  1 person assist;  nonverbal cues (demo/gestures);  verbal cues;  rolling walker  Sit-to-Stand Transfer Training Transfer Safety Analysis: decreased strength;  impaired balance;  impaired coordination;  impaired motor control;  impaired postural control;  rolling walker    Gait Training  Gait Training: minimum assist (75% patient effort);  1 person assist;  nonverbal cues (demo/gestures);  verbal cues;  rolling walker;  25 feet  Gait Analysis: swing-to gait   decreased gayathri;  crouch;  decreased stride length;  decreased step length;  impaired balance;  impaired coordination;  impaired motor control;  impaired postural control;  decreased strength;  25 feet;  rolling walker    3/20 OT   Overall Progress Summary    Progress Summary: Chart reviewed, contents noted. OT treatment not performed, pt received with [employer] tech. OT will reattempt as schedule permits and pt medical condition allows.       VITALS  T(C): 37.5 (03-22-24 @ 09:25), Max: 37.6 (03-22-24 @ 08:22)  HR: 88 (03-22-24 @ 09:25) (76 - 93)  BP: 134/65 (03-22-24 @ 09:25) (107/63 - 146/71)  RR: 18 (03-22-24 @ 08:22) (16 - 18)  SpO2: 95% (03-22-24 @ 08:22) (95% - 100%)  Wt(kg): --    MEDICATIONS   acetaminophen     Tablet .. 650 milliGRAM(s) every 6 hours PRN  bromocriptine Capsule 10 milliGRAM(s) daily  calcium carbonate 1250 mG  + Vitamin D (OsCal 500 + D) 1 Tablet(s) daily  chlorhexidine 2% Cloths 1 Application(s) daily  enoxaparin Injectable 30 milliGRAM(s) every 24 hours  hydrALAZINE Injectable 10 milliGRAM(s) every 2 hours PRN  labetalol Injectable 10 milliGRAM(s) every 2 hours PRN  lidocaine   4% Patch 1 Patch every 24 hours  multivitamin 1 Tablet(s) daily  niMODipine Oral Solution 60 milliGRAM(s) every 4 hours  nystatin Powder 1 Application(s) two times a day  ondansetron Injectable 4 milliGRAM(s) every 6 hours PRN  QUEtiapine 12.5 milliGRAM(s) at bedtime  simvastatin 10 milliGRAM(s) at bedtime  sodium chloride 0.9%. 1000 milliLiter(s) <Continuous>  tamsulosin 0.8 milliGRAM(s) at bedtime      RECENT LABS/IMAGING  - Reviewed Today                        8.4    12.50 )-----------( 465      ( 21 Mar 2024 04:40 )             24.4     03-21    138  |  103  |  6.6<L>  ----------------------------<  120<H>  3.9   |  24.0  |  0.32<L>    Ca    7.7<L>      21 Mar 2024 04:40        Urinalysis Basic - ( 21 Mar 2024 04:40 )    Color: x / Appearance: x / SG: x / pH: x  Gluc: 120 mg/dL / Ketone: x  / Bili: x / Urobili: x   Blood: x / Protein: x / Nitrite: x   Leuk Esterase: x / RBC: x / WBC x   Sq Epi: x / Non Sq Epi: x / Bacteria: x        US Duplex Venous Lower Ext Complete, Bilateral 3/12 - No evidence of deep venous thrombosis in either lower extremity.    CT Head 3/10 - Mildly decreased bilateral mesial frontal parenchymal hemorrhages. Similar anterior interhemispheric acute subarachnoid hemorrhage.Mildly decreased intraventricular hemorrhage within the bilateral lateral    and third ventricles. Decreased ventricular size with near resolution of hydrocephalus.No large midline shift.  Basal cisterns are more well visualized on the current examination, this may be due to technique.    MR Brain, MR Angio Head 3/9 - MR brain: Intraparenchymal hemorrhage within the medial anterior frontal   lobes measuring approximately 3.5 cm, unchanged. There is surrounding edema and extension into the genuine body of corpus callosum, unchanged. Hemorrhagic clot within the LEFT lateral ventricle and third ventricle is unchanged. Minimal hemorrhage seen in the dependent portions of the RIGHT lateral ventricle unchanged. Minimal subarachnoid hemorrhage seen along the medial sulci of the BILATERAL frontal and parietal lobes as well as scattered throughout the sulci of the brain. RIGHT frontal ventriculostomy catheter tip is seen extending toward the body of the LEFT ventricle and third ventricle.  Coil material is seen in the region of the anterior communicating artery on the RIGHT.   Blood products are seen in the region of the known aneurysm possibly indicating partial patency.    CT Head 3/16 -  No change in mild ventricular dilatation with intraventricular hemorrhage and hemorrhage in the frontal lobe midline and corpus callosum after extraventricular drain removal since 3/15/2024.    US Duplex LE 3/20 - No evidence of deep venous thrombosis in either lower extremity.    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 - RECOMMEND DC Bromocriptine    Delirium - RECOMMEND Seroquel 12.5mg Q6PM PRN, RECOMMEND Melatonin 5mg HS  Pain - Tylenol, Lidocaine patch   Diet - Easy Chew   DVT PPX - SCDs, Lovenox   Rehab/Impaired mobility and function - Continuous hospitalization is crucial for managing the patient's acute medical issues (Infection?, SAH, Delirium), which have significantly impacted their mobility, quality of life, and function. Rehabilitation recommendations will be based on the patient's functional progress and their ability to participate in and tolerate therapeutic interventions, which may change over time. Maintaining ongoing mobilization by the staff is imperative to prevent secondary medical complications and associated health issues related to debility.    The patient would benefit from ACUTE inpatient rehabilitation where she would receive 3H/daily of PT/OT/Speech to maximize their functional outcomes. She would also be followed by a Rehabilitation specialist for medical management of ongoing comorbidities and safe discharge.     RECOMMEND:  Discontinue Bromocriptine, no clear indication for treatment given low likelyhood of central fever given white count and fever   Consider ordering another UA to assess for UTI given white count and fever     The patient will continue to actively engage in Physical Therapy (PT), Occupational Therapy (OT), and Speech Therapy (SLP) to optimize functional outcomes. The Physical Medicine and Rehabilitation (PM&R) team will closely monitor their progress, and discharge recommendations will depend on their functional improvement and overall medical stability.               No complaints   Updated family by bedside   Pending AIR   VSS Afebrile     FUNCTIONAL PROGRESS  3/20 PT   Therapeutic Interventions    Bed Mobility  Bed Mobility Training Sit-to-Supine: minimum assist (75% patient effort);  1 person assist;  nonverbal cues (demo/gestures);  verbal cues  Bed Mobility Training Supine-to-Sit: minimum assist (75% patient effort);  1 person assist;  nonverbal cues (demo/gestures);  verbal cues  Bed Mobility Training Limitations: decreased strength;  impaired postural control;  impaired coordination    Sit-Stand Transfer Training  Transfer Training Sit-to-Stand Transfer: minimum assist (75% patient effort);  1 person assist;  nonverbal cues (demo/gestures);  verbal cues;  rolling walker  Transfer Training Stand-to-Sit Transfer: minimum assist (75% patient effort);  1 person assist;  nonverbal cues (demo/gestures);  verbal cues;  rolling walker  Sit-to-Stand Transfer Training Transfer Safety Analysis: decreased strength;  impaired balance;  impaired coordination;  impaired motor control;  impaired postural control;  rolling walker    Gait Training  Gait Training: minimum assist (75% patient effort);  1 person assist;  nonverbal cues (demo/gestures);  verbal cues;  rolling walker;  25 feet  Gait Analysis: swing-to gait   decreased gayathri;  crouch;  decreased stride length;  decreased step length;  impaired balance;  impaired coordination;  impaired motor control;  impaired postural control;  decreased strength;  25 feet;  rolling walker    3/20 OT   Overall Progress Summary    Progress Summary: Chart reviewed, contents noted. OT treatment not performed, pt received with xray tech. OT will reattempt as schedule permits and pt medical condition allows.       VITALS  T(C): 37.5 (03-22-24 @ 09:25), Max: 37.6 (03-22-24 @ 08:22)  HR: 88 (03-22-24 @ 09:25) (76 - 93)  BP: 134/65 (03-22-24 @ 09:25) (107/63 - 146/71)  RR: 18 (03-22-24 @ 08:22) (16 - 18)  SpO2: 95% (03-22-24 @ 08:22) (95% - 100%)  Wt(kg): --    MEDICATIONS   acetaminophen     Tablet .. 650 milliGRAM(s) every 6 hours PRN  bromocriptine Capsule 10 milliGRAM(s) daily  calcium carbonate 1250 mG  + Vitamin D (OsCal 500 + D) 1 Tablet(s) daily  chlorhexidine 2% Cloths 1 Application(s) daily  enoxaparin Injectable 30 milliGRAM(s) every 24 hours  hydrALAZINE Injectable 10 milliGRAM(s) every 2 hours PRN  labetalol Injectable 10 milliGRAM(s) every 2 hours PRN  lidocaine   4% Patch 1 Patch every 24 hours  multivitamin 1 Tablet(s) daily  niMODipine Oral Solution 60 milliGRAM(s) every 4 hours  nystatin Powder 1 Application(s) two times a day  ondansetron Injectable 4 milliGRAM(s) every 6 hours PRN  QUEtiapine 12.5 milliGRAM(s) at bedtime  simvastatin 10 milliGRAM(s) at bedtime  sodium chloride 0.9%. 1000 milliLiter(s) <Continuous>  tamsulosin 0.8 milliGRAM(s) at bedtime      RECENT LABS/IMAGING  - Reviewed Today                        8.4    12.50 )-----------( 465      ( 21 Mar 2024 04:40 )             24.4     03-21    138  |  103  |  6.6<L>  ----------------------------<  120<H>  3.9   |  24.0  |  0.32<L>    Ca    7.7<L>      21 Mar 2024 04:40        Urinalysis Basic - ( 21 Mar 2024 04:40 )    Color: x / Appearance: x / SG: x / pH: x  Gluc: 120 mg/dL / Ketone: x  / Bili: x / Urobili: x   Blood: x / Protein: x / Nitrite: x   Leuk Esterase: x / RBC: x / WBC x   Sq Epi: x / Non Sq Epi: x / Bacteria: x        US Duplex Venous Lower Ext Complete, Bilateral 3/12 - No evidence of deep venous thrombosis in either lower extremity.    CT Head 3/10 - Mildly decreased bilateral mesial frontal parenchymal hemorrhages. Similar anterior interhemispheric acute subarachnoid hemorrhage.Mildly decreased intraventricular hemorrhage within the bilateral lateral    and third ventricles. Decreased ventricular size with near resolution of hydrocephalus.No large midline shift.  Basal cisterns are more well visualized on the current examination, this may be due to technique.    MR Brain, MR Angio Head 3/9 - MR brain: Intraparenchymal hemorrhage within the medial anterior frontal   lobes measuring approximately 3.5 cm, unchanged. There is surrounding edema and extension into the genuine body of corpus callosum, unchanged. Hemorrhagic clot within the LEFT lateral ventricle and third ventricle is unchanged. Minimal hemorrhage seen in the dependent portions of the RIGHT lateral ventricle unchanged. Minimal subarachnoid hemorrhage seen along the medial sulci of the BILATERAL frontal and parietal lobes as well as scattered throughout the sulci of the brain. RIGHT frontal ventriculostomy catheter tip is seen extending toward the body of the LEFT ventricle and third ventricle.  Coil material is seen in the region of the anterior communicating artery on the RIGHT.   Blood products are seen in the region of the known aneurysm possibly indicating partial patency.    CT Head 3/16 -  No change in mild ventricular dilatation with intraventricular hemorrhage and hemorrhage in the frontal lobe midline and corpus callosum after extraventricular drain removal since 3/15/2024.    US Duplex LE 3/20 - No evidence of deep venous thrombosis in either lower extremity.    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 - RECOMMEND DC Bromocriptine   Delirium - RECOMMEND Seroquel 12.5mg Q6PM PRN, RECOMMEND Melatonin 5mg HS  Pain - Tylenol, Lidocaine patch   Diet - Easy Chew   DVT PPX - SCDs, Lovenox   Rehab/Impaired mobility and function - Continuous hospitalization is crucial for managing the patient's acute medical issues (Infection?, SAH, Delirium), which have significantly impacted their mobility, quality of life, and function. Rehabilitation recommendations will be based on the patient's functional progress and their ability to participate in and tolerate therapeutic interventions, which may change over time. Maintaining ongoing mobilization by the staff is imperative to prevent secondary medical complications and associated health issues related to debility.    The patient would benefit from ACUTE inpatient rehabilitation where she would receive 3H/daily of PT/OT/Speech to maximize their functional outcomes. She would also be followed by a Rehabilitation specialist for medical management of ongoing comorbidities and safe discharge.     RECOMMEND:  Discontinue Bromocriptine   Start Melatonin 5mg qhs     The patient will continue to actively engage in Physical Therapy (PT), Occupational Therapy (OT), and Speech Therapy (SLP) to optimize functional outcomes. The Physical Medicine and Rehabilitation (PM&R) team will closely monitor their progress, and discharge recommendations will depend on their functional improvement and overall medical stability.

## 2024-03-22 NOTE — DISCHARGE NOTE PROVIDER - NSDCCPCAREPLAN_GEN_ALL_CORE_FT
PRINCIPAL DISCHARGE DIAGNOSIS  Diagnosis: Ruptured aneurysm of intracranial region  Assessment and Plan of Treatment: Please follow up with Dr. Dowd after discharge for further monitoring.      SECONDARY DISCHARGE DIAGNOSES  Diagnosis: Constipation  Assessment and Plan of Treatment: Maintain aggressive bowel regimen    Diagnosis: Anemia, unspecified  Assessment and Plan of Treatment: Follow up with heme/onc and GI after discharge for further monitoring and work up.    Diagnosis: Hyponatremia  Assessment and Plan of Treatment: Keep on 1200mL fluid restricted diet    Diagnosis: Fecal occult blood test positive  Assessment and Plan of Treatment: Follow up with heme/onc and GI after discharge for further monitoring and work up.

## 2024-03-22 NOTE — DISCHARGE NOTE PROVIDER - NSFOLLOWUPCLINICS_GEN_ALL_ED_FT
[facility]  Gastroenterology  [address] [place] [address] [address]  [place] [address]  Phone: [phone]  Fax:   Follow Up Time: 1 month    [facility]  Hematology/Oncology  [address] [place] [id number]  Phone: [phone]  Fax:   Follow Up Time: 1 month

## 2024-03-22 NOTE — DISCHARGE NOTE PROVIDER - CARE PROVIDER_API CALL
Tommy Dowd.  Vascular Neurology  [address] [place] [id number]  Phone: [phone]  Fax: [phone]  Follow Up Time: 1 month    Your Primary Care Provider,   Phone: (   )    -  Fax: (   )    -  Follow Up Time: 1 week

## 2024-03-22 NOTE — DISCHARGE NOTE PROVIDER - HOSPITAL COURSE
72 y/o mandarin speaking female with PMH of HLD and osteoporosis presents to [facility] with complaints of having cerebral aneurysm. States in 10/2023 she started to have B/L hand numbness. At that time she had MRI brain, MRA head and neck. MRA showed an ACOMM aneurysm. She underwent a cerebral angiogram by José Manuel Villaseñor at [facility] on December 7th, 2023, with the intent to treat this month. The aneurysm is described as a 5.5x4.6x3.6mm wide necked acomm aneurysm that projects medio-superiorly. Reports occasional headaches, described as numbness, 5/10 in severity, worse with laying down, relieved with sitting up or standing. Patient underwent diagnostic angiogram 2/14/24 which confirmed acomm aneurysm. On 3/6 patient underwent aneurysm embolization with coiling complicated by aneurysm rupture, controlled with coils and balloon tamponade. NISHANT CT showed SAH b/l frontal lobes and R sylvian fissure along with contrast staining. Patient admitted to NSICU for post op care.. EVD now removed per neurosurgery 3/15. Transferred to neurosurgery service 3/16, stroke team asked to consult, transferred to Stroke service 3/18.     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 Patient with persistent fevers due to unidentified source s/p zosyn for PNA, Infectious Disease consulted     72 y/o mandarin speaking female with PMH of HLD and osteoporosis presents to [facility] with complaints of having cerebral aneurysm. States in 10/2023 she started to have B/L hand numbness. At that time she had MRI brain, MRA head and neck. MRA showed an ACOMM aneurysm. She underwent a cerebral angiogram by José Manuel Villaseñor at [facility] on December 7th, 2023, with the intent to treat this month. The aneurysm is described as a 5.5x4.6x3.6mm wide necked acomm aneurysm that projects medio-superiorly. Reports occasional headaches, described as numbness, 5/10 in severity, worse with laying down, relieved with sitting up or standing. Patient underwent diagnostic angiogram 2/14/24 which confirmed acomm aneurysm. On 3/6 patient underwent aneurysm embolization with coiling with subsequent aneurysm rupture, controlled with coils and balloon tamponade. NISHANT CT showed SAH b/l frontal lobes and R sylvian fissure along with contrast staining. Patient admitted to NSICU for post op care.. EVD now removed per neurosurgery 3/15. Transferred to neurosurgery service 3/16, stroke team asked to consult, transferred to Stroke service 3/18.     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 Patient with persistent fevers due to unidentified source s/p zosyn for PNA, Infectious Disease consulted, fevers thought to be central in nature, no further infectious work up/treatment recommended.    Hospital course complicated by persistent hyponatremia, medicine consulted with recommendations to start sodium chloride tablets three times per day and initiate 1200mL fluid restricted diet. On 3/27/24 labs sodium was 135, salt tablets were dc'd, patient should remain on fluid restriction and have her sodium levels monitored closely.     Patient also with persistent anemia, Hg on 3/27/24 was 8.1, hematology consulted who thought anemia was due to immunosuppression, however patient with positive fecal occult blood. GI consulted, no acute intervention recommended, follow up with GI and heme/onc outpatient for further work up.     Constipation with severe stool burden during admission, treated with aggressive bowel regimen, now on Miralax.      Patient will need to follow up with neurosurgery and neuro IR after discharge, as well as her primary care provider, GI and hematology.     LDL=55  A1C=5.8    Imaging:  ECHO:   TTE W or WO Ultrasound Enhancing Agent (03.13.24 @ 12:03)   CONCLUSIONS:    1. Left ventricular cavity is normal in size. Left ventricular wall thickness is normal. Left ventricular systolic function is normal with an ejection fraction visually estimated at 65 to 70 %.   2. Normal left ventricular diastolic function.   3. Normal right ventricular cavity size and normal systolic function.   4. The left atrium is normal.   5. The right atrium is normal in size.   6. Fibrocalcific aortic valve sclerosis without stenosis.   7. Mild mitral valve leaflet calcification.   8. Trace mitral regurgitation.   9. Estimated pulmonary artery systolic pressure is 23 mmHg, normal pulmonary artery pressure.    CT Abdomen and Pelvis w/ Oral Cont (03.23.24 @ 22:02)  BILE DUCTS: Mild intrahepatic and extrahepatic biliary ductal dilatation   with CBD measuring up to 8 to 9 mm.  IMPRESSION:  Mild proctitis, new.  Mild biliary ductal dilatation. Correlation with lab markers would be   helpful.    CT Head No Cont (03.16.24 @ 03:22)   IMPRESSION: No change in mild ventricular dilatation with intraventricular hemorrhage and hemorrhage in the frontal lobe midline and corpus callosum after extraventricular drain removal since 3/15/2024.    IR Neuro (03.11.24 @ 09:01)   IMPRESSION:  1. 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 No interval aneurysm recanalization or evidence for pseudoaneurysm.  2. No vasospasm.    CT Head No Cont (03.10.24 @ 12:37)   IMPRESSION: Mildly decreased bilateral mesial frontal parenchymal hemorrhages. Similar anterior interhemispheric acute subarachnoid hemorrhage. Mildly decreased intraventricular hemorrhage within the bilateral lateral   and third ventricles. Decreased ventricular size with near resolution of hydrocephalus. No large midline shift. Basal cisterns are more well visualized on the current examination, this may be due to technique.    MR Angio Head w/ IV Cont (03.09.24 @ 18:57)   IMPRESSION:  MR brain: Intraparenchymal hemorrhage within the medial anterior frontal lobes measuring approximately 3.5 cm, unchanged. There is surrounding edema and extension into the genuine body of corpus callosum, unchanged. Hemorrhagic clot within the LEFT lateral ventricle and third ventricle is unchanged. Minimal hemorrhage seen in the dependent portions of the RIGHT lateral ventricle unchanged. Minimal subarachnoid hemorrhage seen along the medial sulci of the BILATERAL frontal and parietal lobes as well as scattered throughout the sulci of the brain. RIGHT frontal ventriculostomy catheter tip is seen extending toward the body of the   LEFT ventricle and third ventricle.  Coil material is seen in the region of the anterior communicating artery on the RIGHT.   Blood products are seen in the region of the known aneurysm possibly indicating partial patency.    MRA intracranial:   Coil material is seen in the region of the anterior communicating artery. There is 1 x 4 mm region of signal seen on the noncontrast but not the postcontrast which may reflect blood products within the aneurysm indicating patency.    CT Head No Cont (03.07.24 @ 05:56)   IMPRESSION:   persistent intracranial hemorrhage within the BILATERAL lateral and third ventricles, LEFT greater than RIGHT, with mild obstructive hydrocephalus slightly increased. RIGHT frontal shunt catheter tip seen in body of RIGHT lateral ventricle unchanged. Subarachnoid hemorrhage again noted in the BILATERAL medial frontal lobes with hemorrhage in the anterior corpus callosum, LEFT greaterthan RIGHT.     CT Head No Cont (03.06.24 @ 19:20)   IMPRESSION: Interval placement of right frontal approach ventriculostomy catheter since the prior study performed 3 hours ago, with improved hydrocephalus and slightly decreased sulcal/cisternal bilateral subarachnoid hemorrhage.    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 Intraventricular hemorrhage is present with hydrocephalus.

## 2024-03-22 NOTE — DISCHARGE NOTE PROVIDER - NSDCMRMEDTOKEN_GEN_ALL_CORE_FT
simvastatin 10 mg oral tablet: 1 tab(s) orally once a day (at bedtime)  Vascepa 1 g oral capsule: 1 cap(s) orally once a day  Vitamin B, C, D, calcium: once a day   acetaminophen 325 mg oral tablet: 2 tab(s) orally every 6 hours As needed Temp greater or equal to 38C (100.4F)  lactobacillus acidophilus oral capsule: 1 orally once a day  Multiple Vitamins oral tablet: 1 tab(s) orally once a day  pantoprazole 40 mg oral delayed release tablet: 1 tab(s) orally once a day (before a meal)  polyethylene glycol 3350 oral powder for reconstitution: 17 gram(s) orally once a day  QUEtiapine: 12.5 milligram(s) orally once a day (at bedtime)  tamsulosin 0.4 mg oral capsule: 2 cap(s) orally once a day (at bedtime)  Vascepa 1 g oral capsule: 1 cap(s) orally once a day  Zocor 10 mg oral tablet: 1 tab(s) orally once a day (at bedtime)   Satisfactory

## 2024-03-22 NOTE — PROGRESS NOTE ADULT - ASSESSMENT
74 y/o mandarin speaking female with PMH of HLD and osteoporosis presents to [facility] with complaints of having cerebral aneurysm. States in 10/2023 she started to have B/L hand numbness. At that time she had MRI brain, MRA head and neck. MRA showed an ACOMM aneurysm. She underwent a cerebral angiogram by José Manuel Villaseñor at [facility] on December 7th, 2023, with the intent to treat this month. The aneurysm is described as a 5.5x4.6x3.6mm wide necked acomm aneurysm that projects medio-superiorly. Reports occasional headaches, described as numbness.   S/P  aneurysm embolization with coiling on 3/6  complicated by aneurysm rupture, controlled with coils and balloon tamponade. NISHANT CT showed SAH b/l frontal lobes and R sylvian fissure along with contrast staining. Patient admitted to NSICU for post op care.   Downgraded to Step down on 3/16. Medicine consulted for medical mgmt .       # ACOMM aneurism , S/P  aneurysm embolization with coiling on 3/6  complicated by aneurysm rupture,   controlled with coils and balloon tamponade.    NISHANT CT showed SAH b/l frontal lobes and R sylvian fissure along with contrast staining.    Patient admitted to NSICU for post op care. Now downgraded to step down and Neuro stroke service  - Neuro checks q4  - Bromocriptine 10mg TID- for likely central fevers   - Seroquel 12.5mg nightly  - Pain control- Tylenol, Tramadol  - pt/ot  - Nimodipine 60mg q4hrs for SAH protocol  as per NS   - Avoid hypotension, rapid changes in BP  - New tremors noted: EEG : Clinical Impression:   Technically limited study due to continuous muscle artifact. No obvious abnormalities noted.  Consider repeating EEG if clinically warranted.       #anemia- probably dilutional, multiple blood draws - Hg -8.6 , FOBT - negative ,   Anemia work up with normal folate/Iron , elevated B12 , monitor CBC,   transfuse if Hg < 7 , H&H stable.       # HLD - Simvastatin 10mg daily    # Postop urinary retention -  -Flomax 0.8mg  qhs   -Continue Dangelo Cath, failed TOV on 3/18 ( 3rd attempt)  -Ambulate to bathroom  - Reattempt TOV after multiple BM's ensured     # Diarrhea ,likely due to overflow 2/2 constipation    rectal tube  removed ( 3/17/24)  Abdominal XR ( 3/13) noted with  Fecal material   localized to the mid left descending colon.   Repeat KUB ( 3/20/24)  with Constipation, received laxative/enema had multiple BM with formed stool  d/c Calcium to prevent constipation   Please order stool count, have RN's document frequency and consistency of bowel movements  Would do Lactulose BID for 2 days then repeat KUB to ensure resolution of constipation.        # Low grade temps till 3/20 with Tmax 101.3 work up NTD-   Completed course of Zosyn for possible PNA   CXR - unremarkable   US of LE - no dvt   COVID/RVP  (3/12/24) - negative - REPEAT   c/o low back pain , MRI spine ordered    Blood cultures repeated 3/21  ID consulted and appreciated, observe off antibiotics    # Hypokalemia -  supplemented   monitor electrolytes     #Hypophosphatemia - replace as needed    # Hyponatremia - Resolved, monitor. Na Goal: 135-145     DVT prophylaxis  -on Lovenox  74 y/o mandarin speaking female with PMH of HLD and osteoporosis presents to [facility] with complaints of having cerebral aneurysm. States in 10/2023 she started to have B/L hand numbness. At that time she had MRI brain, MRA head and neck. MRA showed an ACOMM aneurysm. She underwent a cerebral angiogram by José Manuel Villaseñor at [facility] on December 7th, 2023, with the intent to treat this month. The aneurysm is described as a 5.5x4.6x3.6mm wide necked acomm aneurysm that projects medio-superiorly. Reports occasional headaches, described as numbness.   S/P  aneurysm embolization with coiling on 3/6  complicated by aneurysm rupture, controlled with coils and balloon tamponade. NISHANT CT showed SAH b/l frontal lobes and R sylvian fissure along with contrast staining. Patient admitted to NSICU for post op care.   Downgraded to Step down on 3/16. Medicine consulted for medical mgmt .       # ACOMM aneurism , S/P  aneurysm embolization with coiling on 3/6  complicated by aneurysm rupture,   controlled with coils and balloon tamponade.    NISHANT CT showed SAH b/l frontal lobes and R sylvian fissure along with contrast staining.    Patient admitted to NSICU for post op care. Now downgraded to step down and Neuro stroke service  - Neuro checks q4  - Bromocriptine 10mg TID- for likely central fevers   - Seroquel 12.5mg nightly  - Pain control- Tylenol, Tramadol  - pt/ot  - Nimodipine 60mg q4hrs for SAH protocol  as per NS   - Avoid hypotension, rapid changes in BP  - New tremors noted: EEG : Clinical Impression:   Technically limited study due to continuous muscle artifact. No obvious abnormalities noted.  Consider repeating EEG if clinically warranted.       #anemia- probably dilutional, multiple blood draws - Hg -8.6 , FOBT - negative ,   Anemia work up with normal folate/Iron , elevated B12 , monitor CBC,   transfuse if Hg < 7 , H&H stable.       # HLD - Simvastatin 10mg daily    # Postop urinary retention -  -Flomax 0.8mg  qhs   -Continue Dangelo Cath, failed TOV on 3/18 ( 3rd attempt)  -Ambulate to bathroom  - Reattempt TOV after multiple BM's ensured     # Diarrhea ,likely due to overflow 2/2 constipation    rectal tube  removed ( 3/17/24)  Abdominal XR ( 3/13) noted with  Fecal material   localized to the mid left descending colon.   Repeat KUB ( 3/20/24)  with Constipation, received laxative/enema had multiple BM with formed stool  d/c Calcium to prevent constipation   Please order stool count, have RN's document frequency and consistency of bowel movements  Would do Lactulose BID for 2 days then repeat KUB to ensure resolution of constipation.        # Low grade temps till 3/20 with Tmax 101.3 on 3/20 ,  work up NTD-   Completed course of Zosyn for possible PNA   CXR - unremarkable   US of LE - no dvt   COVID/RVP  (3/12/24) - negative - REPEAT COVID/ RVP   c/o low back pain , MRI spine ordered   Blood cultures repeated 3/21- FOLLOW UP   Check UA   ID consulted and appreciated, observe off antibiotics    # Hypokalemia -  supplemented   monitor electrolytes     #Hypophosphatemia - replace as needed    # Hyponatremia - Resolved, monitor. Na Goal: 135-145     DVT prophylaxis  -on Lovenox

## 2024-03-22 NOTE — PROGRESS NOTE ADULT - SUBJECTIVE AND OBJECTIVE BOX
Preliminary note, offical recommendations pending attending review/signature   PENDING EXAM/VISIT    [facility] Stroke Team  Progress Note     HPI:  74 y/o mandarin speaking female with PMH of HLD and osteoporosis presents to PST with complaints of having cerebral aneurysm. States in 10/2023 she started to have B/L hand numbness. At that time she had MRI brain, MRA head and neck. MRA showed an ACOMM aneurysm. She underwent a cerebral angiogram by José Manuel Villaseñor at [facility] on December 7th, 2023, with the intent to treat this month. The aneurysm is described as a 5.5x4.6x3.6mm wide necked acomm aneurysm that projects medio-superiorly. Reports occasional headaches, described as numbness, 5/10 in severity, worse with laying down, relieved with sitting up or standing. Patient underwent diagnostic angiogram 2/14/24 which confirmed acomm aneurysm. On 3/6 patient underwent aneurysm embolization with coiling complicated by aneurysm rupture, controlled with coils and balloon tamponade. NISHANT CT showed SAH b/l frontal lobes and R sylvian fissure along with contrast staining. Patient admitted to NSICU for post op care.. EVD now removed per neurosurgery 3/15. Transferred to neurosurgery service 3/16, stroke team asked to consult, transferred to Stroke service 3/18.     SUBJECTIVE: No events overnight.  No new neurologic complaints.  ROS reported negative unless otherwise noted.    MEDICATIONS:  acetaminophen     Tablet .. 650 milliGRAM(s) Oral every 6 hours PRN  bromocriptine Capsule 10 milliGRAM(s) Oral two times a day  calcium carbonate 1250 mG  + Vitamin D (OsCal 500 + D) 1 Tablet(s) Oral daily  chlorhexidine 2% Cloths 1 Application(s) Topical daily  enoxaparin Injectable 30 milliGRAM(s) SubCutaneous every 24 hours  hydrALAZINE Injectable 10 milliGRAM(s) IV Push every 2 hours PRN  labetalol Injectable 10 milliGRAM(s) IV Push every 2 hours PRN  lidocaine   4% Patch 1 Patch Transdermal every 24 hours  multivitamin 1 Tablet(s) Oral daily  niMODipine Oral Solution 60 milliGRAM(s) Oral every 4 hours  nystatin Powder 1 Application(s) Topical two times a day  ondansetron Injectable 4 milliGRAM(s) IV Push every 6 hours PRN  QUEtiapine 12.5 milliGRAM(s) Oral at bedtime  simvastatin 10 milliGRAM(s) Oral at bedtime  sodium chloride 0.9%. 1000 milliLiter(s) IV Continuous <Continuous>  tamsulosin 0.8 milliGRAM(s) Oral at bedtime      Vital Signs Last 24 Hrs  T(C): 36.9 (22 Mar 2024 03:43), Max: 37.5 (21 Mar 2024 21:28)  T(F): 98.5 (22 Mar 2024 03:43), Max: 99.5 (21 Mar 2024 21:28)  HR: 77 (22 Mar 2024 03:43) (76 - 93)  BP: 122/68 (22 Mar 2024 03:43) (107/63 - 146/71)  BP(mean): 86 (22 Mar 2024 03:43) (78 - 86)  RR: 18 (22 Mar 2024 03:43) (16 - 18)  SpO2: 99% (22 Mar 2024 03:43) (98% - 100%)    Parameters below as of 22 Mar 2024 03:43  Patient On (Oxygen Delivery Method): room air    PHYSICAL EXAM:    PENDING EXAM  General: No acute distress    NEUROLOGICAL EXAM:  Mental status: The patient is awake and alert and has normal attention span.  The patient is fully oriented in 3 spheres. The patient is oriented to current events. The patient is able to name objects, follow commands, repeat sentences.    Cranial nerves: slight left facial palsy, Pupils equal and react symmetrically to light. There is no visual field deficit to confrontation. Extraocular motion is full with no nystagmus. There is no ptosis. Facial sensation is intact.   Motor: There is normal bulk and tone.  There is no tremor.  LUE pronator drift, strength 5-/5, LLE 5-/5. RUE and bilateral LE strength 5/5- some pain limitation in LE   Sensation: Intact to light touch  in 4 extremities  Slight tremor b/l upper extremities     LABS:                        8.4    12.50 )-----------( 465      ( 21 Mar 2024 04:40 )             24.4    03-21    138  |  103  |  6.6<L>  ----------------------------<  120<H>  3.9   |  24.0  |  0.32<L>    Ca    7.7<L>      21 Mar 2024 04:40        IMAGING:     CT Head No Cont (03.16.24 @ 03:22)   IMPRESSION: No change in mild ventricular dilatation with intraventricular hemorrhage and hemorrhage in the frontal lobe midline and corpus callosum after extraventricular drain removal since 3/15/2024.    CT Head No Cont (03.15.24 @ 05:38)   IMPRESSION: Stable follow-up CT study.     TTE W or WO Ultrasound Enhancing Agent (03.13.24 @ 12:03)   CONCLUSIONS:    1. Left ventricular cavity is normal in size. Left ventricular wall thickness is normal. Left ventricular systolic function is normal with an ejection fraction visually estimated at 65 to 70 %.   2. Normal left ventricular diastolic function.   3. Normal right ventricular cavity size and normal systolic function.   4. The left atrium is normal.   5. The right atrium is normal in size.   6. Fibrocalcific aortic valve sclerosis without stenosis.   7. Mild mitral valve leaflet calcification.   8. Trace mitral regurgitation.   9. Estimated pulmonary artery systolic pressure is 23 mmHg, normal pulmonary artery pressure.    US Duplex Venous Lower Ext Complete, Bilateral (03.12.24 @ 15:23) >  IMPRESSION: No evidence of deep venous thrombosis in either lower extremity.     IR Neuro (03.11.24 @ 09:01)   IMPRESSION:  1. 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 No interval aneurysm recanalization or evidence for pseudoaneurysm.  2. No vasospasm.    CT Head No Cont (03.10.24 @ 12:37)   IMPRESSION: Mildly decreased bilateral mesial frontal parenchymal hemorrhages. Similar anterior interhemispheric acute subarachnoid hemorrhage. Mildly decreased intraventricular hemorrhage within the bilateral lateral   and third ventricles. Decreased ventricular size with near resolution of hydrocephalus. No large midline shift. Basal cisterns are more well visualized on the current examination, this may be due to technique.    MR Angio Head w/ IV Cont (03.09.24 @ 18:57)   IMPRESSION:  MR brain: Intraparenchymal hemorrhage within the medial anterior frontal lobes measuring approximately 3.5 cm, unchanged. There is surrounding edema and extension into the genuine body of corpus callosum, unchanged. Hemorrhagic clot within the LEFT lateral ventricle and third ventricle is unchanged. Minimal hemorrhage seen in the dependent portions of the RIGHT lateral ventricle unchanged. Minimal subarachnoid hemorrhage seen along the medial sulci of the BILATERAL frontal and parietal lobes as well as scattered throughout the sulci of the brain. RIGHT frontal ventriculostomy catheter tip is seen extending toward the body of the   LEFT ventricle and third ventricle.  Coil material is seen in the region of the anterior communicating artery on the RIGHT.   Blood products are seen in the region of the known aneurysm possibly indicating partial patency.    MRA intracranial:   Coil material is seen in the region of the anterior communicating artery. There is 1 x 4 mm region of signal seen on the noncontrast but not the postcontrast which may reflect blood products within the aneurysm indicating patency.    CT Head No Cont (03.07.24 @ 05:56)   IMPRESSION:   persistent intracranial hemorrhage within the BILATERAL lateral and third ventricles, LEFT greater than RIGHT, with mild obstructive hydrocephalus slightly increased. RIGHT frontal shunt catheter tip seen in body of RIGHT lateral ventricle unchanged. Subarachnoid hemorrhage again noted in the BILATERAL medial frontal lobes with hemorrhage in the anterior corpus callosum, LEFT greaterthan RIGHT.     CT Head No Cont (03.06.24 @ 19:20)   IMPRESSION: Interval placement of right frontal approach ventriculostomy catheter since the prior study performed 3 hours ago, with improved hydrocephalus and slightly decreased sulcal/cisternal bilateral subarachnoid hemorrhage.    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 Intraventricular hemorrhage is present with hydrocephalus.    IR Neuro (03.06.24 @ 12:13)   IMPRESSION:  1. 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  2. Status postballoon assisted coil embolization, the aneurysm is nearly completely occluded other than a 2.2 mm x 1.4 mm x 4.3 mm aneurysmal neck remnant.    Study Date: 03-19-24  Duration: 22 minutes  EEG Classification / Summary:  Normal routine EEG in the awake state, within the limits of interpretation.  Interpretation significantly limited by continuous muscle artifact.  Clinical Impression:   Technically limited study due to continuous muscle artifact. No obvious abnormalities noted.  Consider repeating EEG if clinically warranted.    Preliminary note, official recommendations pending attending review/signature     [facility] Stroke Team  Progress Note     : Daughter at bedside per patient request  HPI:  72 y/o mandarin speaking female with PMH of HLD and osteoporosis presents to PST with complaints of having cerebral aneurysm. States in 10/2023 she started to have B/L hand numbness. At that time she had MRI brain, MRA head and neck. MRA showed an ACOMM aneurysm. She underwent a cerebral angiogram by José Manuel Villaseñor at [facility] on December 7th, 2023, with the intent to treat this month. The aneurysm is described as a 5.5x4.6x3.6mm wide necked acomm aneurysm that projects medio-superiorly. Reports occasional headaches, described as numbness, 5/10 in severity, worse with laying down, relieved with sitting up or standing. Patient underwent diagnostic angiogram 2/14/24 which confirmed acomm aneurysm. On 3/6 patient underwent aneurysm embolization with coiling complicated by aneurysm rupture, controlled with coils and balloon tamponade. NISHANT CT showed SAH b/l frontal lobes and R sylvian fissure along with contrast staining. Patient admitted to NSICU for post op care.. EVD now removed per neurosurgery 3/15. Transferred to neurosurgery service 3/16, stroke team asked to consult, transferred to Stroke service 3/18.     SUBJECTIVE: Pt seen and examined at bedside. Pt lying in bed, daughter and  at bedside. No events overnight.  No new neurologic complaints. Patient reports LLQ and generalized abdominal pain. ROS reported negative unless otherwise noted.    MEDICATIONS:  acetaminophen     Tablet .. 650 milliGRAM(s) Oral every 6 hours PRN  bromocriptine Capsule 10 milliGRAM(s) Oral two times a day  calcium carbonate 1250 mG  + Vitamin D (OsCal 500 + D) 1 Tablet(s) Oral daily  chlorhexidine 2% Cloths 1 Application(s) Topical daily  enoxaparin Injectable 30 milliGRAM(s) SubCutaneous every 24 hours  hydrALAZINE Injectable 10 milliGRAM(s) IV Push every 2 hours PRN  labetalol Injectable 10 milliGRAM(s) IV Push every 2 hours PRN  lidocaine   4% Patch 1 Patch Transdermal every 24 hours  multivitamin 1 Tablet(s) Oral daily  niMODipine Oral Solution 60 milliGRAM(s) Oral every 4 hours  nystatin Powder 1 Application(s) Topical two times a day  ondansetron Injectable 4 milliGRAM(s) IV Push every 6 hours PRN  QUEtiapine 12.5 milliGRAM(s) Oral at bedtime  simvastatin 10 milliGRAM(s) Oral at bedtime  sodium chloride 0.9%. 1000 milliLiter(s) IV Continuous <Continuous>  tamsulosin 0.8 milliGRAM(s) Oral at bedtime      Vital Signs Last 24 Hrs  T(C): 36.9 (22 Mar 2024 03:43), Max: 37.5 (21 Mar 2024 21:28)  T(F): 98.5 (22 Mar 2024 03:43), Max: 99.5 (21 Mar 2024 21:28)  HR: 77 (22 Mar 2024 03:43) (76 - 93)  BP: 122/68 (22 Mar 2024 03:43) (107/63 - 146/71)  BP(mean): 86 (22 Mar 2024 03:43) (78 - 86)  RR: 18 (22 Mar 2024 03:43) (16 - 18)  SpO2: 99% (22 Mar 2024 03:43) (98% - 100%)    Parameters below as of 22 Mar 2024 03:43  Patient On (Oxygen Delivery Method): room air    PHYSICAL EXAM:  General: No acute distress.   HEENT: Head normocephalic, atraumatic. Conjunctivae clear w/o exudates or hemorrhage. Sclera non-icteric. Nares are patent bilaterally. Mucous membranes pink and moist.  No tonsillar swelling or exudates.    Neck: Supple, no adenopathy. Trachea midline. No JVD.  Cardiac: Normal rate and rhythm. S1, S2 auscultated. No murmurs, gallops, or rubs.     Respiratory: Lung sounds clear in all fields. Chest wall symmetric, nontender.   Abdominal: Soft, nondistended, nontender. Bowel sounds normoactive x 4 quadrants. No masses, hepatomegaly, or splenomegaly.   Skin: Skin is warm, dry and intact without rashes or lesions. Appropriate color for ethnicity. Nailbeds pink with no cyanosis or clubbing.   Extremities: No edema, 2+ peripheral pulses in b/l upper and b/l lower extremities. Full range of motion in all joints.     NEUROLOGICAL EXAM:  Mental status: The patient is awake and alert and has normal attention span.  The patient is fully oriented in 3 spheres. The patient is oriented to current events. The patient is able to name objects, follow commands, repeat sentences.    Cranial nerves: slight left facial palsy, Pupils equal and react symmetrically to light. There is no visual field deficit to confrontation. Extraocular motion is full with no nystagmus. There is no ptosis. Facial sensation is intact.   Motor: There is normal bulk and tone. There is no tremor. LUE pronator drift, strength 5-/5, LLE 5-/5. RUE and bilateral LE strength 5/5- some pain limitation in LE.  Sensation: Intact to light touch in 4 extremities  Slight tremor b/l upper extremities     LABS:                        8.4    12.50 )-----------( 465      ( 21 Mar 2024 04:40 )             24.4    03-21    138  |  103  |  6.6<L>  ----------------------------<  120<H>  3.9   |  24.0  |  0.32<L>    Ca    7.7<L>      21 Mar 2024 04:40        IMAGING:     CT Head No Cont (03.16.24 @ 03:22)   IMPRESSION: No change in mild ventricular dilatation with intraventricular hemorrhage and hemorrhage in the frontal lobe midline and corpus callosum after extraventricular drain removal since 3/15/2024.    CT Head No Cont (03.15.24 @ 05:38)   IMPRESSION: Stable follow-up CT study.     TTE W or WO Ultrasound Enhancing Agent (03.13.24 @ 12:03)   CONCLUSIONS:    1. Left ventricular cavity is normal in size. Left ventricular wall thickness is normal. Left ventricular systolic function is normal with an ejection fraction visually estimated at 65 to 70 %.   2. Normal left ventricular diastolic function.   3. Normal right ventricular cavity size and normal systolic function.   4. The left atrium is normal.   5. The right atrium is normal in size.   6. Fibrocalcific aortic valve sclerosis without stenosis.   7. Mild mitral valve leaflet calcification.   8. Trace mitral regurgitation.   9. Estimated pulmonary artery systolic pressure is 23 mmHg, normal pulmonary artery pressure.    US Duplex Venous Lower Ext Complete, Bilateral (03.12.24 @ 15:23) >  IMPRESSION: No evidence of deep venous thrombosis in either lower extremity.     IR Neuro (03.11.24 @ 09:01)   IMPRESSION:  1. 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 No interval aneurysm recanalization or evidence for pseudoaneurysm.  2. No vasospasm.    CT Head No Cont (03.10.24 @ 12:37)   IMPRESSION: Mildly decreased bilateral mesial frontal parenchymal hemorrhages. Similar anterior interhemispheric acute subarachnoid hemorrhage. Mildly decreased intraventricular hemorrhage within the bilateral lateral   and third ventricles. Decreased ventricular size with near resolution of hydrocephalus. No large midline shift. Basal cisterns are more well visualized on the current examination, this may be due to technique.    MR Angio Head w/ IV Cont (03.09.24 @ 18:57)   IMPRESSION:  MR brain: Intraparenchymal hemorrhage within the medial anterior frontal lobes measuring approximately 3.5 cm, unchanged. There is surrounding edema and extension into the genuine body of corpus callosum, unchanged. Hemorrhagic clot within the LEFT lateral ventricle and third ventricle is unchanged. Minimal hemorrhage seen in the dependent portions of the RIGHT lateral ventricle unchanged. Minimal subarachnoid hemorrhage seen along the medial sulci of the BILATERAL frontal and parietal lobes as well as scattered throughout the sulci of the brain. RIGHT frontal ventriculostomy catheter tip is seen extending toward the body of the   LEFT ventricle and third ventricle.  Coil material is seen in the region of the anterior communicating artery on the RIGHT.   Blood products are seen in the region of the known aneurysm possibly indicating partial patency.    MRA intracranial:   Coil material is seen in the region of the anterior communicating artery. There is 1 x 4 mm region of signal seen on the noncontrast but not the postcontrast which may reflect blood products within the aneurysm indicating patency.    CT Head No Cont (03.07.24 @ 05:56)   IMPRESSION:   persistent intracranial hemorrhage within the BILATERAL lateral and third ventricles, LEFT greater than RIGHT, with mild obstructive hydrocephalus slightly increased. RIGHT frontal shunt catheter tip seen in body of RIGHT lateral ventricle unchanged. Subarachnoid hemorrhage again noted in the BILATERAL medial frontal lobes with hemorrhage in the anterior corpus callosum, LEFT greaterthan RIGHT.     CT Head No Cont (03.06.24 @ 19:20)   IMPRESSION: Interval placement of right frontal approach ventriculostomy catheter since the prior study performed 3 hours ago, with improved hydrocephalus and slightly decreased sulcal/cisternal bilateral subarachnoid hemorrhage.    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 Intraventricular hemorrhage is present with hydrocephalus.    IR Neuro (03.06.24 @ 12:13)   IMPRESSION:  1. 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  2. Status postballoon assisted coil embolization, the aneurysm is nearly completely occluded other than a 2.2 mm x 1.4 mm x 4.3 mm aneurysmal neck remnant.    Study Date: 03-19-24  Duration: 22 minutes  EEG Classification / Summary:  Normal routine EEG in the awake state, within the limits of interpretation.  Interpretation significantly limited by continuous muscle artifact.  Clinical Impression:   Technically limited study due to continuous muscle artifact. No obvious abnormalities noted.  Consider repeating EEG if clinically warranted.    Preliminary note, official recommendations pending attending review/signature     [facility] Stroke Team  Progress Note     : Daughter at bedside per patient request  HPI:  72 y/o mandarin speaking female with PMH of HLD and osteoporosis presents to PST with complaints of having cerebral aneurysm. States in 10/2023 she started to have B/L hand numbness. At that time she had MRI brain, MRA head and neck. MRA showed an ACOMM aneurysm. She underwent a cerebral angiogram by José Manuel Villaseñor at [facility] on December 7th, 2023, with the intent to treat this month. The aneurysm is described as a 5.5x4.6x3.6mm wide necked acomm aneurysm that projects medio-superiorly. Reports occasional headaches, described as numbness, 5/10 in severity, worse with laying down, relieved with sitting up or standing. Patient underwent diagnostic angiogram 2/14/24 which confirmed acomm aneurysm. On 3/6 patient underwent aneurysm embolization with coiling complicated by aneurysm rupture, controlled with coils and balloon tamponade. NISHANT CT showed SAH b/l frontal lobes and R sylvian fissure along with contrast staining. Patient admitted to NSICU for post op care.. EVD now removed per neurosurgery 3/15. Transferred to neurosurgery service 3/16, stroke team asked to consult, transferred to Stroke service 3/18.     SUBJECTIVE: Pt seen and examined at bedside. Pt lying in bed, daughter and  at bedside. No events overnight.  No new neurologic complaints. Patient reports LLQ abdominal pain radiating to the umbilical region. ROS reported negative unless otherwise noted.    MEDICATIONS:  acetaminophen     Tablet .. 650 milliGRAM(s) Oral every 6 hours PRN  bromocriptine Capsule 10 milliGRAM(s) Oral two times a day  calcium carbonate 1250 mG  + Vitamin D (OsCal 500 + D) 1 Tablet(s) Oral daily  chlorhexidine 2% Cloths 1 Application(s) Topical daily  enoxaparin Injectable 30 milliGRAM(s) SubCutaneous every 24 hours  hydrALAZINE Injectable 10 milliGRAM(s) IV Push every 2 hours PRN  labetalol Injectable 10 milliGRAM(s) IV Push every 2 hours PRN  lidocaine   4% Patch 1 Patch Transdermal every 24 hours  multivitamin 1 Tablet(s) Oral daily  niMODipine Oral Solution 60 milliGRAM(s) Oral every 4 hours  nystatin Powder 1 Application(s) Topical two times a day  ondansetron Injectable 4 milliGRAM(s) IV Push every 6 hours PRN  QUEtiapine 12.5 milliGRAM(s) Oral at bedtime  simvastatin 10 milliGRAM(s) Oral at bedtime  sodium chloride 0.9%. 1000 milliLiter(s) IV Continuous <Continuous>  tamsulosin 0.8 milliGRAM(s) Oral at bedtime      Vital Signs Last 24 Hrs  T(C): 36.9 (22 Mar 2024 03:43), Max: 37.5 (21 Mar 2024 21:28)  T(F): 98.5 (22 Mar 2024 03:43), Max: 99.5 (21 Mar 2024 21:28)  HR: 77 (22 Mar 2024 03:43) (76 - 93)  BP: 122/68 (22 Mar 2024 03:43) (107/63 - 146/71)  BP(mean): 86 (22 Mar 2024 03:43) (78 - 86)  RR: 18 (22 Mar 2024 03:43) (16 - 18)  SpO2: 99% (22 Mar 2024 03:43) (98% - 100%)    Parameters below as of 22 Mar 2024 03:43  Patient On (Oxygen Delivery Method): room air    PHYSICAL EXAM:  General: No acute distress.   HEENT: Head normocephalic, atraumatic. Conjunctivae clear w/o exudates or hemorrhage. Sclera non-icteric. Nares are patent bilaterally. Mucous membranes pink and moist.  No tonsillar swelling or exudates.    Neck: Supple, no adenopathy. Trachea midline. No JVD.  Cardiac: Normal rate and rhythm. S1, S2 auscultated. No murmurs, gallops, or rubs.     Respiratory: Lung sounds clear in all fields. Chest wall symmetric, nontender.   Abdominal: Soft, nondistended, nontender. Bowel sounds normoactive x 4 quadrants. No masses, hepatomegaly, or splenomegaly.   Skin: Skin is warm, dry and intact without rashes or lesions. Appropriate color for ethnicity. Nailbeds pink with no cyanosis or clubbing.   Extremities: No edema, 2+ peripheral pulses in b/l upper and b/l lower extremities. Full range of motion in all joints.     NEUROLOGICAL EXAM:  Mental status: The patient is awake and alert and has normal attention span.  The patient is fully oriented in 3 spheres. The patient is oriented to current events. The patient is able to name objects, follow commands, repeat sentences.    Cranial nerves: slight left facial palsy, Pupils equal and react symmetrically to light. There is no visual field deficit to confrontation. Extraocular motion is full with no nystagmus. There is no ptosis. Facial sensation is intact.   Motor: There is normal bulk and tone. There is no tremor. LUE pronator drift, strength 5-/5, LLE 5-/5. RUE and bilateral LE strength 5/5- some pain limitation in LE.  Sensation: Intact to light touch in 4 extremities  Slight tremor b/l upper extremities     LABS:                        8.4    12.50 )-----------( 465      ( 21 Mar 2024 04:40 )             24.4    03-21    138  |  103  |  6.6<L>  ----------------------------<  120<H>  3.9   |  24.0  |  0.32<L>    Ca    7.7<L>      21 Mar 2024 04:40        IMAGING:     CT Head No Cont (03.16.24 @ 03:22)   IMPRESSION: No change in mild ventricular dilatation with intraventricular hemorrhage and hemorrhage in the frontal lobe midline and corpus callosum after extraventricular drain removal since 3/15/2024.    CT Head No Cont (03.15.24 @ 05:38)   IMPRESSION: Stable follow-up CT study.     TTE W or WO Ultrasound Enhancing Agent (03.13.24 @ 12:03)   CONCLUSIONS:    1. Left ventricular cavity is normal in size. Left ventricular wall thickness is normal. Left ventricular systolic function is normal with an ejection fraction visually estimated at 65 to 70 %.   2. Normal left ventricular diastolic function.   3. Normal right ventricular cavity size and normal systolic function.   4. The left atrium is normal.   5. The right atrium is normal in size.   6. Fibrocalcific aortic valve sclerosis without stenosis.   7. Mild mitral valve leaflet calcification.   8. Trace mitral regurgitation.   9. Estimated pulmonary artery systolic pressure is 23 mmHg, normal pulmonary artery pressure.    US Duplex Venous Lower Ext Complete, Bilateral (03.12.24 @ 15:23) >  IMPRESSION: No evidence of deep venous thrombosis in either lower extremity.     IR Neuro (03.11.24 @ 09:01)   IMPRESSION:  1. 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 No interval aneurysm recanalization or evidence for pseudoaneurysm.  2. No vasospasm.    CT Head No Cont (03.10.24 @ 12:37)   IMPRESSION: Mildly decreased bilateral mesial frontal parenchymal hemorrhages. Similar anterior interhemispheric acute subarachnoid hemorrhage. Mildly decreased intraventricular hemorrhage within the bilateral lateral   and third ventricles. Decreased ventricular size with near resolution of hydrocephalus. No large midline shift. Basal cisterns are more well visualized on the current examination, this may be due to technique.    MR Angio Head w/ IV Cont (03.09.24 @ 18:57)   IMPRESSION:  MR brain: Intraparenchymal hemorrhage within the medial anterior frontal lobes measuring approximately 3.5 cm, unchanged. There is surrounding edema and extension into the genuine body of corpus callosum, unchanged. Hemorrhagic clot within the LEFT lateral ventricle and third ventricle is unchanged. Minimal hemorrhage seen in the dependent portions of the RIGHT lateral ventricle unchanged. Minimal subarachnoid hemorrhage seen along the medial sulci of the BILATERAL frontal and parietal lobes as well as scattered throughout the sulci of the brain. RIGHT frontal ventriculostomy catheter tip is seen extending toward the body of the   LEFT ventricle and third ventricle.  Coil material is seen in the region of the anterior communicating artery on the RIGHT.   Blood products are seen in the region of the known aneurysm possibly indicating partial patency.    MRA intracranial:   Coil material is seen in the region of the anterior communicating artery. There is 1 x 4 mm region of signal seen on the noncontrast but not the postcontrast which may reflect blood products within the aneurysm indicating patency.    CT Head No Cont (03.07.24 @ 05:56)   IMPRESSION:   persistent intracranial hemorrhage within the BILATERAL lateral and third ventricles, LEFT greater than RIGHT, with mild obstructive hydrocephalus slightly increased. RIGHT frontal shunt catheter tip seen in body of RIGHT lateral ventricle unchanged. Subarachnoid hemorrhage again noted in the BILATERAL medial frontal lobes with hemorrhage in the anterior corpus callosum, LEFT greaterthan RIGHT.     CT Head No Cont (03.06.24 @ 19:20)   IMPRESSION: Interval placement of right frontal approach ventriculostomy catheter since the prior study performed 3 hours ago, with improved hydrocephalus and slightly decreased sulcal/cisternal bilateral subarachnoid hemorrhage.    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 Intraventricular hemorrhage is present with hydrocephalus.    IR Neuro (03.06.24 @ 12:13)   IMPRESSION:  1. 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  2. Status postballoon assisted coil embolization, the aneurysm is nearly completely occluded other than a 2.2 mm x 1.4 mm x 4.3 mm aneurysmal neck remnant.    Study Date: 03-19-24  Duration: 22 minutes  EEG Classification / Summary:  Normal routine EEG in the awake state, within the limits of interpretation.  Interpretation significantly limited by continuous muscle artifact.  Clinical Impression:   Technically limited study due to continuous muscle artifact. No obvious abnormalities noted.  Consider repeating EEG if clinically warranted.        [facility] Stroke Team  Progress Note     : Daughter at bedside per patient request  HPI:  72 y/o mandarin speaking female with PMH of HLD and osteoporosis presents to PST with complaints of having cerebral aneurysm. States in 10/2023 she started to have B/L hand numbness. At that time she had MRI brain, MRA head and neck. MRA showed an ACOMM aneurysm. She underwent a cerebral angiogram by José Manuel Villaseñor at [facility] on December 7th, 2023, with the intent to treat this month. The aneurysm is described as a 5.5x4.6x3.6mm wide necked acomm aneurysm that projects medio-superiorly. Reports occasional headaches, described as numbness, 5/10 in severity, worse with laying down, relieved with sitting up or standing. Patient underwent diagnostic angiogram 2/14/24 which confirmed acomm aneurysm. On 3/6 patient underwent aneurysm embolization with coiling complicated by aneurysm rupture, controlled with coils and balloon tamponade. NISHANT CT showed SAH b/l frontal lobes and R sylvian fissure along with contrast staining. Patient admitted to NSICU for post op care.. EVD now removed per neurosurgery 3/15. Transferred to neurosurgery service 3/16, stroke team asked to consult, transferred to Stroke service 3/18.     SUBJECTIVE: Pt seen and examined at bedside. Pt lying in bed, daughter and  at bedside. No events overnight.  No new neurologic complaints. Patient reports LLQ abdominal pain radiating to the umbilical region. ROS reported negative unless otherwise noted.    MEDICATIONS:  acetaminophen     Tablet .. 650 milliGRAM(s) Oral every 6 hours PRN  bromocriptine Capsule 10 milliGRAM(s) Oral two times a day  calcium carbonate 1250 mG  + Vitamin D (OsCal 500 + D) 1 Tablet(s) Oral daily  chlorhexidine 2% Cloths 1 Application(s) Topical daily  enoxaparin Injectable 30 milliGRAM(s) SubCutaneous every 24 hours  hydrALAZINE Injectable 10 milliGRAM(s) IV Push every 2 hours PRN  labetalol Injectable 10 milliGRAM(s) IV Push every 2 hours PRN  lidocaine   4% Patch 1 Patch Transdermal every 24 hours  multivitamin 1 Tablet(s) Oral daily  niMODipine Oral Solution 60 milliGRAM(s) Oral every 4 hours  nystatin Powder 1 Application(s) Topical two times a day  ondansetron Injectable 4 milliGRAM(s) IV Push every 6 hours PRN  QUEtiapine 12.5 milliGRAM(s) Oral at bedtime  simvastatin 10 milliGRAM(s) Oral at bedtime  sodium chloride 0.9%. 1000 milliLiter(s) IV Continuous <Continuous>  tamsulosin 0.8 milliGRAM(s) Oral at bedtime      Vital Signs Last 24 Hrs  T(C): 36.9 (22 Mar 2024 03:43), Max: 37.5 (21 Mar 2024 21:28)  T(F): 98.5 (22 Mar 2024 03:43), Max: 99.5 (21 Mar 2024 21:28)  HR: 77 (22 Mar 2024 03:43) (76 - 93)  BP: 122/68 (22 Mar 2024 03:43) (107/63 - 146/71)  BP(mean): 86 (22 Mar 2024 03:43) (78 - 86)  RR: 18 (22 Mar 2024 03:43) (16 - 18)  SpO2: 99% (22 Mar 2024 03:43) (98% - 100%)    Parameters below as of 22 Mar 2024 03:43  Patient On (Oxygen Delivery Method): room air    PHYSICAL EXAM:  General: No acute distress.   HEENT: Head normocephalic, atraumatic. Conjunctivae clear w/o exudates or hemorrhage. Sclera non-icteric. Nares are patent bilaterally. Mucous membranes pink and moist.  No tonsillar swelling or exudates.    Neck: Supple, no adenopathy. Trachea midline. No JVD.  Cardiac: Normal rate and rhythm. S1, S2 auscultated. No murmurs, gallops, or rubs.     Respiratory: Lung sounds clear in all fields. Chest wall symmetric, nontender.   Abdominal: Soft, nondistended, nontender. Bowel sounds normoactive x 4 quadrants. No masses, hepatomegaly, or splenomegaly.   Skin: Skin is warm, dry and intact without rashes or lesions. Appropriate color for ethnicity. Nailbeds pink with no cyanosis or clubbing.   Extremities: No edema, 2+ peripheral pulses in b/l upper and b/l lower extremities. Full range of motion in all joints.     NEUROLOGICAL EXAM:  Mental status: The patient is awake and alert and has normal attention span.  The patient is fully oriented in 3 spheres. The patient is oriented to current events. The patient is able to name objects, follow commands, repeat sentences.    Cranial nerves: slight left facial palsy, Pupils equal and react symmetrically to light. There is no visual field deficit to confrontation. Extraocular motion is full with no nystagmus. There is no ptosis. Facial sensation is intact.   Motor: There is normal bulk and tone. There is no tremor. LUE pronator drift, strength 5-/5, LLE 5-/5. RUE and bilateral LE strength 5/5- some pain limitation in LE.  Sensation: Intact to light touch in 4 extremities  Slight tremor b/l upper extremities     LABS:                        8.4    12.50 )-----------( 465      ( 21 Mar 2024 04:40 )             24.4    03-21    138  |  103  |  6.6<L>  ----------------------------<  120<H>  3.9   |  24.0  |  0.32<L>    Ca    7.7<L>      21 Mar 2024 04:40        IMAGING:     CT Head No Cont (03.16.24 @ 03:22)   IMPRESSION: No change in mild ventricular dilatation with intraventricular hemorrhage and hemorrhage in the frontal lobe midline and corpus callosum after extraventricular drain removal since 3/15/2024.    CT Head No Cont (03.15.24 @ 05:38)   IMPRESSION: Stable follow-up CT study.     TTE W or WO Ultrasound Enhancing Agent (03.13.24 @ 12:03)   CONCLUSIONS:    1. Left ventricular cavity is normal in size. Left ventricular wall thickness is normal. Left ventricular systolic function is normal with an ejection fraction visually estimated at 65 to 70 %.   2. Normal left ventricular diastolic function.   3. Normal right ventricular cavity size and normal systolic function.   4. The left atrium is normal.   5. The right atrium is normal in size.   6. Fibrocalcific aortic valve sclerosis without stenosis.   7. Mild mitral valve leaflet calcification.   8. Trace mitral regurgitation.   9. Estimated pulmonary artery systolic pressure is 23 mmHg, normal pulmonary artery pressure.    US Duplex Venous Lower Ext Complete, Bilateral (03.12.24 @ 15:23) >  IMPRESSION: No evidence of deep venous thrombosis in either lower extremity.     IR Neuro (03.11.24 @ 09:01)   IMPRESSION:  1. 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 No interval aneurysm recanalization or evidence for pseudoaneurysm.  2. No vasospasm.    CT Head No Cont (03.10.24 @ 12:37)   IMPRESSION: Mildly decreased bilateral mesial frontal parenchymal hemorrhages. Similar anterior interhemispheric acute subarachnoid hemorrhage. Mildly decreased intraventricular hemorrhage within the bilateral lateral   and third ventricles. Decreased ventricular size with near resolution of hydrocephalus. No large midline shift. Basal cisterns are more well visualized on the current examination, this may be due to technique.    MR Angio Head w/ IV Cont (03.09.24 @ 18:57)   IMPRESSION:  MR brain: Intraparenchymal hemorrhage within the medial anterior frontal lobes measuring approximately 3.5 cm, unchanged. There is surrounding edema and extension into the genuine body of corpus callosum, unchanged. Hemorrhagic clot within the LEFT lateral ventricle and third ventricle is unchanged. Minimal hemorrhage seen in the dependent portions of the RIGHT lateral ventricle unchanged. Minimal subarachnoid hemorrhage seen along the medial sulci of the BILATERAL frontal and parietal lobes as well as scattered throughout the sulci of the brain. RIGHT frontal ventriculostomy catheter tip is seen extending toward the body of the   LEFT ventricle and third ventricle.  Coil material is seen in the region of the anterior communicating artery on the RIGHT.   Blood products are seen in the region of the known aneurysm possibly indicating partial patency.    MRA intracranial:   Coil material is seen in the region of the anterior communicating artery. There is 1 x 4 mm region of signal seen on the noncontrast but not the postcontrast which may reflect blood products within the aneurysm indicating patency.    CT Head No Cont (03.07.24 @ 05:56)   IMPRESSION:   persistent intracranial hemorrhage within the BILATERAL lateral and third ventricles, LEFT greater than RIGHT, with mild obstructive hydrocephalus slightly increased. RIGHT frontal shunt catheter tip seen in body of RIGHT lateral ventricle unchanged. Subarachnoid hemorrhage again noted in the BILATERAL medial frontal lobes with hemorrhage in the anterior corpus callosum, LEFT greaterthan RIGHT.     CT Head No Cont (03.06.24 @ 19:20)   IMPRESSION: Interval placement of right frontal approach ventriculostomy catheter since the prior study performed 3 hours ago, with improved hydrocephalus and slightly decreased sulcal/cisternal bilateral subarachnoid hemorrhage.    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 Intraventricular hemorrhage is present with hydrocephalus.    IR Neuro (03.06.24 @ 12:13)   IMPRESSION:  1. 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  2. Status postballoon assisted coil embolization, the aneurysm is nearly completely occluded other than a 2.2 mm x 1.4 mm x 4.3 mm aneurysmal neck remnant.    Study Date: 03-19-24  Duration: 22 minutes  EEG Classification / Summary:  Normal routine EEG in the awake state, within the limits of interpretation.  Interpretation significantly limited by continuous muscle artifact.  Clinical Impression:   Technically limited study due to continuous muscle artifact. No obvious abnormalities noted.  Consider repeating EEG if clinically warranted.

## 2024-03-22 NOTE — PROGRESS NOTE ADULT - ASSESSMENT
Assessment: 73F with PMH HLD who presented for elective acomm aneurysm embolization with balloon-assisted coiling, complicated by aneurysm rupture controlled with coils and balloon tamponade.   - PBD 16, POD 16  - Exam stable  - Fever w/u negative to date   -Still complaining of back pain, x-rays negative     Plan:  - Discussed and examined with Dr. Dowd  - Q4 hour neuro checks  - SBP goal   - Maintain normothermia  - Maintain euvolemia, strict I&Os, supplement for net negative PRN  - DVT prophylaxis: SCDs, lovenox  - No AED required  - PM&R recommending acute rehab, family wants to take patient home with home PT

## 2024-03-22 NOTE — PROGRESS NOTE ADULT - SUBJECTIVE AND OBJECTIVE BOX
Patient is a 73y old  Female who presents with a chief complaint of aneurysmal embolization (22 Mar 2024 14:45)    HPI: 73F with PMH HLD, osteoporosis who presents for cerebral angiogram with aneurysm embolization. Patient had MRI / MRA brain 2/2 hand numbness which had incidental finding of acomm aneurysm. Patient underwent diagnostic angiogram 2/14/24 which confirmed acomm aneurysm. Patient presents today for cerebral angiogram with aneurysm embolization via balloon assisted coiling. Patient started on ASA 81 in preparation for the procedure. Patient underwent aneurysm embolization with coiling complicated by aneurysm rupture, controlled with coils and balloon tamponade. NISHANT CT showed SAH b/l frontal lobes and R sylvian fissure along with contrast staining. Course complicated by worsening SAH, IVH requiring EVD placement.     Interval history: Pt seen and examined at bedside with Dr. Dowd, pt resting comfortably, still complaining of mild back pain, pending d/c home vs acute rehab, fever w/u negative at this time     Vital Signs Last 24 Hrs  T(C): 37.4 (22 Mar 2024 15:30), Max: 37.6 (22 Mar 2024 08:22)  T(F): 99.3 (22 Mar 2024 15:30), Max: 99.6 (22 Mar 2024 08:22)  HR: 92 (22 Mar 2024 17:00) (77 - 93)  BP: 130/66 (22 Mar 2024 17:00) (107/63 - 157/80)  BP(mean): 86 (22 Mar 2024 03:43) (78 - 86)  RR: 17 (22 Mar 2024 15:30) (16 - 18)  SpO2: 96% (22 Mar 2024 15:30) (95% - 99%)    Parameters below as of 22 Mar 2024 15:30  Patient On (Oxygen Delivery Method): room air    Physical Exam:  Constitutional: NAD, lying in bed  Neuro  * Mental Status:  GCS 15: Awake, alert, oriented to conversation. No aphasia or difficulty speaking. No dysarthria.   * Cranial Nerves: Cnii-Cnxii grossly intact. PERRL, EOMI, tongue midline, no gaze deviation  * Motor: RUE 5/5, LUE 5/5, RLE 5/5, LLE 5/5, no drift   * Sensory: Sensation intact to light touch  * Reflexes: not assessed     LABS:                        8.4    12.50 )-----------( 465      ( 21 Mar 2024 04:40 )             24.4     03-21    138  |  103  |  6.6<L>  ----------------------------<  120<H>  3.9   |  24.0  |  0.32<L>    Ca    7.7<L>      21 Mar 2024 04:40      Urinalysis Basic - ( 21 Mar 2024 04:40 )  Color: x / Appearance: x / SG: x / pH: x  Gluc: 120 mg/dL / Ketone: x  / Bili: x / Urobili: x   Blood: x / Protein: x / Nitrite: x   Leuk Esterase: x / RBC: x / WBC x   Sq Epi: x / Non Sq Epi: x / Bacteria: x    CULTURES:  Culture Results:   No growth at 5 days (03-12 @ 12:45)  Culture Results:   No growth at 5 days (03-12 @ 12:00)    I&O:     I&O's Summary    21 Mar 2024 07:01  -  22 Mar 2024 07:00  --------------------------------------------------------  IN: 0 mL / OUT: 3100 mL / NET: -3100 mL    22 Mar 2024 07:01  -  22 Mar 2024 17:21  --------------------------------------------------------  IN: 0 mL / OUT: 1200 mL / NET: -1200 mL    Medications:  MEDICATIONS  (STANDING):  bromocriptine Capsule 10 milliGRAM(s) Oral daily  enoxaparin Injectable 30 milliGRAM(s) SubCutaneous every 24 hours  lidocaine   4% Patch 1 Patch Transdermal every 24 hours  multivitamin 1 Tablet(s) Oral daily  niMODipine Oral Solution 60 milliGRAM(s) Oral every 4 hours  nystatin Powder 1 Application(s) Topical two times a day  QUEtiapine 12.5 milliGRAM(s) Oral at bedtime  simvastatin 10 milliGRAM(s) Oral at bedtime  sodium chloride 0.9%. 1000 milliLiter(s) (50 mL/Hr) IV Continuous <Continuous>  tamsulosin 0.8 milliGRAM(s) Oral at bedtime    MEDICATIONS  (PRN):  acetaminophen     Tablet .. 650 milliGRAM(s) Oral every 6 hours PRN Temp greater or equal to 38C (100.4F)  hydrALAZINE Injectable 10 milliGRAM(s) IV Push every 2 hours PRN SBP > 160  iohexol 300 mG (iodine)/mL Oral Solution 30 milliLiter(s) Oral once PRN Prior to CT Abdomen & Pelvis per CT protocol, thank you  labetalol Injectable 10 milliGRAM(s) IV Push every 2 hours PRN Systolic blood pressure > 160  ondansetron Injectable 4 milliGRAM(s) IV Push every 6 hours PRN Nausea and/or Vomiting    RADIOLOGY & ADDITIONAL STUDIES:  < from: Xray Lumbosacral Spine (03.20.24 @ 11:29) >  IMPRESSION: No fracture or subluxation.    Limited examination.    --- End of Report ---    LUIS GRIGGS DO; Attending Radiologist  This document has been electronically signed. Mar 21 2024  1:28PM    < end of copied text >    < from: Xray Sacrum + Coccyx (03.20.24 @ 11:28) >    IMPRESSION: Limited study without obvious fracture.    If warranted clinically obtain CT.    --- End of Report ---    LUIS GRIGGS DO; Attending Radiologist  This document has been electronically signed. Mar 21 2024  1:29PM    < end of copied text >

## 2024-03-22 NOTE — PROGRESS NOTE ADULT - ASSESSMENT
73y  Female with h/o HLD and osteoporosis. Patient presented  3/6 for cerebral angiogram with aneurysm embolization via balloon assisted coiling. Patient had MRI / MRA brain 2/2 hand numbness which had incidental finding of acomm aneurysm. Patient underwent diagnostic angiogram 2/14/24 which confirmed acomm aneurysm. On 3/6 patient underwent aneurysm embolization with coiling complicated by aneurysm rupture, controlled with coils and balloon tamponade. NISHANT CT showed SAH b/l frontal lobes and R sylvian fissure along with contrast staining. Patient admitted to NSICU for post op care, EVD. PAtient had EVD removed 3/15. Hospital course with fever on 3/8 and was started on Zosyn 3/9 and completed 7 days on 3/16. Transferred to neurosurgery service 3/16, stroke team asked to consult, transferred to Stroke service 3/18. Patient had been having low grade fevers up until fever to 101.3 on 3/20.       Fever  Leukocytosis  SIRS  Back pain       - Blood cultures 3/12 no growth   - Repeat blood cultures ordered   - RVP/COVID 19 PCR 3/9 negative   - Urine Cx pending   - CXR 3/19 wit no PNA   - Check spine imaging given back pain   - UA 3/19 with no pyuria   - Procalcitonin level 0.18  - Monitor off antibiotics fo now since clinically non toxic   - Hold off on PICC/Midline for now unless needed for reasons other than infectious diseases  - Follow up cultures  - Trend Fever  - Trend WBC      Will Follow    Discussed treatment plan with: [facility] team

## 2024-03-22 NOTE — DISCHARGE NOTE PROVIDER - CARE PROVIDERS DIRECT ADDRESSES
[email or web address]

## 2024-03-22 NOTE — DISCHARGE NOTE PROVIDER - PROVIDER TOKENS
PROVIDER:[TOKEN:[[id number]],FOLLOWUP:[1 month]],FREE:[LAST:[Your Primary Care Provider],PHONE:[(   )    -],FAX:[(   )    -],FOLLOWUP:[1 week]]

## 2024-03-22 NOTE — DISCHARGE NOTE PROVIDER - DETAILS OF MALNUTRITION DIAGNOSIS/DIAGNOSES
This patient has been assessed with a concern for Malnutrition and was treated during this hospitalization for the following Nutrition diagnosis/diagnoses:     -  03/08/2024: Moderate protein-calorie malnutrition   -  03/08/2024: Underweight (BMI < 19)

## 2024-03-22 NOTE — PROGRESS NOTE ADULT - ASSESSMENT
PENDING  ASSESSMENT: 74 y/o mandarin speaking female with PMH of HLD and osteoporosis presents to [facility] with complaints of having cerebral aneurysm. States in 10/2023 she started to have B/L hand numbness. At that time she had MRI brain, MRA head and neck. MRA showed an ACOMM aneurysm. She underwent a cerebral angiogram by José Manuel Villaseñor at [facility] on December 7th, 2023, with the intent to treat this month. The aneurysm is described as a 5.5x4.6x3.6mm wide necked acomm aneurysm that projects medio-superiorly. Reports occasional headaches, described as numbness, 5/10 in severity, worse with laying down, relieved with sitting up or standing. Patient underwent diagnostic angiogram 2/14/24 which confirmed acomm aneurysm. On 3/6 patient underwent aneurysm embolization with coiling complicated by aneurysm rupture, controlled with coils and balloon tamponade. NISHANT CT showed SAH b/l frontal lobes and R sylvian fissure along with contrast staining. EVD placed  3/6, removed by neurosurgery 3/15. Transferred to Stroke service 3/18.   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       Neurologically intact.  Still having low grade fevers of unclear etiology    NEURO:   #ACOMM aneurysm elective procedure complicated by aneurysm rupture controlled with coils/balloon tamponade  #SAH  #EVD 3/6, removed 3/15  # Metabolic encephalopathy  # Tremors  -Neurologically without acute change   -Continue close monitoring for neurologic deterioration    -Stroke neuro checks q4hrs   - EEG without evidence of seizure   -MRI Brain w/o, MRA Head w/o and Neck w/contrast results as above   -will need repeat MRI brain w/wo contrast in 4-6 weeks  -s/p Keppra  -nimodipine 60mg dc'd on 3/21- per Neurosurgery  -bromocriptine tid due to ? central fevers, will begin to titrate.   -Dysphagia screen: pass, on easy to chew diet  -Physical therapy/OT/Speech eval/treatment.            #Stroke prevention:  -SBP goal  per neuro IR recommendations, avoiding rapid fluctuations and hypotension   -ANTITHROMBOTIC THERAPY: n/a   -LDL- 55 , continue home regimen if applicable   -HA1C 5.8  -TTE noted above    CARDIOVASCULAR:   -TTE noted above  -cardiac monitoring w/ telemetry for now, further evaluation pending findings of noted workup                              HEMATOLOGY:   H/H with anemia and thrombocytosis (plt [phone]) , continue close monitoring, no active bleeding noted,   -patient should have all age and risk appropriate malignancy screenings with PCP or sooner if clinically suspected      DVT ppx: Heparin s.c [] LMWH [X]     PULMONARY:   -most recent CXR 3/19 with persistent right base infiltrate  -s/p zosyn for possible pneumonia  -consider CT chest if fever persists   -protecting airway, saturating well     RENAL:   #Urinary retention  #Hyponatremia   BUN/Cr without acute change   -maintain adequate hydration  -tamsulosin 0.8mg bedtime   -urinary retention s/p void trial- feliciano re-inserted   -hyponatremia, likely due to diarrhea, continue to monitor, IVF in place with gradual improvement - titrated to 75cc/hr , monitor closely      Na Goal:  135-145     Feliciano: replaced     ID:   -ID consulted  -s/p zosyn for possible pneumonia  -fluctuating fevers - Tmax 101.1,  possible central fevers but will need to screen for alternate causes and have ID follow up.   - leukocytosis resolved   -monitor and screen for si/sx of infection   -Abdomen distended, diarrhea- abdominal imaging revealed fecal.  enema and mag citrate as recommended by hospitalist ACP team.     GI:  #diarrhea  -Abdominal XR 3/13 noted  -s/p rectal tube, removed 3/17  -monitor BM, loose stool possible side effect of nimodipine  -Diet: Easy to Chew, Lactose Restricted, Ensure Clear TID    Psych:  -Quetiapine 12.5mg at bedtime  -Delirium precautions    Ortho: xray lumbar /sacral pending read given back pain. Possible meningeal irritation.  Will give lidocaine patch.    - MRI L spine pending    OTHER: Condition and plan of care d/w patient, questions and concerns addressed. Discussed with family at bedside, medical team at bedside as well present for further evaluation and recommendations.     DISPOSITION: PT/OT recommending acute rehab once medically stable for discharge    CORE MEASURES:        Admission NIHSS: n/a      Tenecteplase : [] YES [X] NO      LDL/HDL/A1C: pending lipid panel/ HA1C 5.8     Depression Screen- if depression hx and/or present      Statin Therapy: simvastatin 10mg     Dysphagia Screen: [X] PASS [] FAIL     Smoking [] YES [X] NO      Afib [] YES [X] NO     Stroke Education [] YES [] NO [X] pending ASSESSMENT: 74 y/o mandarin speaking female with PMH of HLD and osteoporosis presents to [facility] with complaints of having cerebral aneurysm. States in 10/2023 she started to have B/L hand numbness. At that time she had MRI brain, MRA head and neck. MRA showed an ACOMM aneurysm. She underwent a cerebral angiogram by José Manuel Villaseñor at [facility] on December 7th, 2023, with the intent to treat this month. The aneurysm is described as a 5.5x4.6x3.6mm wide necked acomm aneurysm that projects medio-superiorly. Reports occasional headaches, described as numbness, 5/10 in severity, worse with laying down, relieved with sitting up or standing. Patient underwent diagnostic angiogram 2/14/24 which confirmed acomm aneurysm. On 3/6 patient underwent aneurysm embolization with coiling complicated by aneurysm rupture, controlled with coils and balloon tamponade. NISHANT CT showed SAH b/l frontal lobes and R sylvian fissure along with contrast staining. EVD placed  3/6, removed by neurosurgery 3/15. Transferred to Stroke service 3/18.   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       Neurologically intact.  Still having low grade fevers of unclear etiology. Patient reporting abdominal pain today and continued diarrhea.    NEURO:   #ACOMM aneurysm elective procedure complicated by aneurysm rupture controlled with coils/balloon tamponade  #SAH  #EVD 3/6, removed 3/15  #Metabolic encephalopathy  #Tremors  -Neurologically without acute change   -Continue close monitoring for neurologic deterioration    -Stroke neuro checks q4hrs   -EEG without evidence of seizure   -MRI Brain w/o, MRA Head w/o and Neck w/contrast results as above   -Will need repeat MRI brain w/wo contrast in 4-6 weeks (4/3/24 is 4-week anneliese from aneurysm embolization)  -s/p Keppra  -nimodipine 60mg dc'd on 3/21- per Neurosurgery  -bromocriptine tid due to ? central fevers, will begin to titrate.   -Dysphagia screen: pass, on easy to chew diet  -Physical therapy/OT/Speech eval/treatment.            #Stroke prevention:  -SBP goal  per neuro IR recommendations, avoiding rapid fluctuations and hypotension   -ANTITHROMBOTIC THERAPY: n/a   -LDL- 55 , continue home regimen if applicable   -HA1C 5.8  -TTE noted above    CARDIOVASCULAR:   -TTE noted above  -cardiac monitoring w/ telemetry for now, further evaluation pending findings of noted workup                              HEMATOLOGY:   -Normocytic anemia, H/H 8.4/24.4  -Guiac negative   -Thromocytosis w/ platelets to 465  -Continue close monitoring, CBC q daily, no active bleeding noted  -Patient should have all age and risk appropriate malignancy screenings with PCP or sooner if clinically suspected   -DVT ppx: Heparin s.c [] LMWH [X]     PULMONARY:   -most recent CXR 3/19 with persistent right base infiltrate  -s/p zosyn for possible pneumonia  -consider CT chest if fever persists   -protecting airway, saturating well     RENAL:   #Urinary retention  #Hyponatremia   BUN/Cr without acute change   -maintain adequate hydration  -tamsulosin 0.8mg bedtime   -urinary retention s/p void trial- feliciano re-inserted   -hyponatremia, likely due to diarrhea, continue to monitor, IVF in place with gradual improvement - titrated to 75cc/hr , monitor closely      Na Goal:  135-145     Feliciano: replaced     ID:   -ID consulted  -s/p zosyn for possible pneumonia  -fluctuating fevers - Tmax 101.1,  possible central fevers but will need to screen for alternate causes and have ID follow up.   -Leukocytosis resolved   -monitor and screen for si/sx of infection   -Abdomen distended, diarrhea- abdominal imaging revealed fecal.  enema and mag citrate as recommended by hospitalist ACP team.     GI:  #diarrhea  -Abdominal XR 3/13 noted  -s/p rectal tube, removed 3/17  -monitor BM, loose stool possible side effect of nimodipine  -PO calcium d/c'd   -CT Abdomen & Pelvis w/ Oral Contrast ordered to rule out infectious process/cause  -GI PCR ordered and pending   -Diet: Easy to Chew, Lactose Restricted, Ensure Clear TID    PSYCH:  -Quetiapine 12.5mg at bedtime  -Delirium precautions    ORTHo;  -XR lumbar /sacral as noted  -Possible meningeal irritation, Will give lidocaine patch.    -MRI L spine pending    OTHER: Condition and plan of care d/w patient, questions and concerns addressed. Discussed with family at bedside, medical team at bedside as well present for further evaluation and recommendations.     DISPOSITION: PT/OT recommending acute rehab once medically stable for discharge    CORE MEASURES:        Admission NIHSS: n/a      Tenecteplase : [] YES [X] NO      LDL/HDL/A1C: pending lipid panel/ HA1C 5.8     Depression Screen- if depression hx and/or present      Statin Therapy: simvastatin 10mg     Dysphagia Screen: [X] PASS [] FAIL     Smoking [] YES [X] NO      Afib [] YES [X] NO     Stroke Education [] YES [] NO [X] pending ASSESSMENT: 72 y/o mandarin speaking female with PMH of HLD and osteoporosis presents to [facility] with complaints of having cerebral aneurysm. States in 10/2023 she started to have B/L hand numbness. At that time she had MRI brain, MRA head and neck. MRA showed an ACOMM aneurysm. She underwent a cerebral angiogram by José Manuel Villaseñor at [facility] on December 7th, 2023, with the intent to treat this month. The aneurysm is described as a 5.5x4.6x3.6mm wide necked acomm aneurysm that projects medio-superiorly. Reports occasional headaches, described as numbness, 5/10 in severity, worse with laying down, relieved with sitting up or standing. Patient underwent diagnostic angiogram 2/14/24 which confirmed acomm aneurysm. On 3/6 patient underwent aneurysm embolization with coiling complicated by aneurysm rupture, controlled with coils and balloon tamponade. NISHANT CT showed SAH b/l frontal lobes and R sylvian fissure along with contrast staining. EVD placed  3/6, removed by neurosurgery 3/15. Transferred to Stroke service 3/18.   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       Neurologically intact.  Still having low grade fevers of unclear etiology. Patient reporting abdominal pain today and continued diarrhea.    NEURO:   #ACOMM aneurysm elective procedure complicated by aneurysm rupture controlled with coils/balloon tamponade  #SAH  #EVD 3/6, removed 3/15  #Metabolic encephalopathy  #Tremors  -Neurologically without acute change   -Continue close monitoring for neurologic deterioration    -Stroke neuro checks q4hrs   -EEG without evidence of seizure   -MRI Brain w/o, MRA Head w/o and Neck w/contrast results as above   -Will need repeat MRI brain w/wo contrast in 4-6 weeks (4/3/24 is 4-week anneliese from aneurysm embolization)  -s/p Keppra  -nimodipine 60mg dc'd on 3/21- per Neurosurgery  -bromocriptine tid due to ? central fevers, will continue to titrate.   -Dysphagia screen: pass, on easy to chew diet  -Physical therapy/OT/Speech eval/treatment.            #Stroke prevention:  -SBP goal  per neuro IR recommendations, avoiding rapid fluctuations and hypotension   -ANTITHROMBOTIC THERAPY: n/a   -LDL- 55 , continue home regimen if applicable   -HA1C 5.8  -TTE noted above    CARDIOVASCULAR:   -TTE noted above  -cardiac monitoring w/ telemetry for now, further evaluation pending findings of noted workup                              HEMATOLOGY:   -Normocytic anemia, H/H 8.4/24.4  -Guiac negative   -Thromocytosis w/ platelets to 465  -Continue close monitoring, CBC q daily, no active bleeding noted  -Patient should have all age and risk appropriate malignancy screenings with PCP or sooner if clinically suspected   -DVT ppx: Heparin s.c [] LMWH [X]     PULMONARY:   -most recent CXR 3/19 with persistent right base infiltrate  -s/p zosyn for possible pneumonia  -Lung sounds clear on exam today  -Protecting airway, saturating well   -Consider CT chest if fever persists     RENAL/METABOLIC:   #Urinary retention  -Urinary retention s/p void trial- feliciano re-inserted 3/19/24  #Hyponatremia  -Hyponatremia, likely due to diarrhea, continue to monitor, IVF in place with gradual improvement - titrated to 75cc/hr , monitor closely   -Na improving gradually, 129-->138   -BUN/Cr without acute change   -Maintain adequate hydration  -Tamsulosin 0.8mg bedtime   -Na Goal:  135-145    ID:   -ID consulted  -s/p zosyn for possible pneumonia  -Fluctuating fevers - Tmax 101.1,  possible central fevers but will need to screen for alternate causes and have ID follow up.   -Leukocytosis resolved   -Monitor and screen for si/sx of infection   -Abdomen distended, diarrhea- abdominal imaging revealed fecal.  enema and mag citrate as recommended by hospitalist ACP team.     GI:  #diarrhea  -Abdominal XR 3/13 noted  -s/p rectal tube, removed 3/17  -monitor BM, loose stool possible side effect of nimodipine  -PO calcium d/c'd   -CT Abdomen & Pelvis w/ Oral Contrast ordered to rule out infectious process/cause  -GI PCR ordered and pending   -Diet: Easy to Chew, Lactose Restricted, Ensure Clear TID    PSYCH:  -Quetiapine 12.5mg at bedtime  -Delirium precautions    ORTHo;  -XR lumbar /sacral as noted  -Possible meningeal irritation, Will give lidocaine patch.    -MRI L spine pending    OTHER: Condition and plan of care d/w patient, questions and concerns addressed. Discussed with family at bedside, medical team at bedside as well present for further evaluation and recommendations.     DISPOSITION: PT/OT recommending acute rehab once medically stable for discharge    CORE MEASURES:        Admission NIHSS: n/a      Tenecteplase : [] YES [X] NO      LDL/HDL/A1C: pending lipid panel/ HA1C 5.8     Depression Screen- if depression hx and/or present      Statin Therapy: simvastatin 10mg     Dysphagia Screen: [X] PASS [] FAIL     Smoking [] YES [X] NO      Afib [] YES [X] NO     Stroke Education [] YES [] NO [X] pending

## 2024-03-22 NOTE — PROGRESS NOTE ADULT - SUBJECTIVE AND OBJECTIVE BOX
[facility]  INFECTIOUS DISEASES at [place] / [place] / [place]  =======================================================                               Carlos Johnson MD#   Mony Amaya MD*                             Josephine Lackey MD*   Layne Cho MD*                              Professor Emeritus:  Dr Cuba Levin MD^            Diplomates American Board of Internal Medicine & Infectious Diseases                # [place] Office - Appt - Tel  [phone] Fax [phone]                * [place] Office - Appt - Tel [phone] Fax [phone]                      ^[place] Office - Tel  [phone] Fax [phone]                                  Hospital Consult line:  [phone]  =======================================================    KASHMIR CARBAJAL [id number]    Follow up:      Allergies:  lactose (Diarrhea)  No Known Allergies           REVIEW OF SYSTEMS:  CONSTITUTIONAL:  No Fever or chills  HEENT:   No diplopia or blurred vision.  No earache, sore throat or runny nose.  CARDIOVASCULAR:  No Chest Pain  RESPIRATORY:  No cough, shortness of breath  GASTROINTESTINAL:  No nausea, vomiting or diarrhea.  GENITOURINARY:  No dysuria, frequency or urgency. No Blood in urine  MUSCULOSKELETAL:  no joint aches, no muscle pain  SKIN:  No change in skin, hair or nails.  NEUROLOGIC:  No Headaches, seizures   PSYCHIATRIC:  No disorder of thought or mood.  ENDOCRINE:  No heat or cold intolerance  HEMATOLOGICAL:  No easy bruising or bleeding.       Physical Exam:  GEN: NAD  HEENT: normocephalic and atraumatic. EOMI. PERRL.    NECK: Supple.   LUNGS: CTA B/L.  HEART: RRR  ABDOMEN: Soft, NT, ND.  +BS.    : No CVA tenderness  EXTREMITIES: Without  edema.  MSK: No joint swelling  NEUROLOGIC: No Focal Deficits   PSYCHIATRIC: Appropriate affect .  SKIN: No rash      Vitals:  T(F): 99 (22 Mar 2024 13:15), Max: 99.6 (22 Mar 2024 08:22)  HR: 92 (22 Mar 2024 13:15)  BP: 157/80 (22 Mar 2024 13:15)  RR: 16 (22 Mar 2024 13:15)  SpO2: 99% (22 Mar 2024 13:15) (95% - 100%)  temp max in last 48H T(F): , Max: 99.6 (03-22-24 @ 08:22)    Current Antibiotics:    Other medications:  bromocriptine Capsule 10 milliGRAM(s) Oral daily  enoxaparin Injectable 30 milliGRAM(s) SubCutaneous every 24 hours  lidocaine   4% Patch 1 Patch Transdermal every 24 hours  multivitamin 1 Tablet(s) Oral daily  niMODipine Oral Solution 60 milliGRAM(s) Oral every 4 hours  nystatin Powder 1 Application(s) Topical two times a day  QUEtiapine 12.5 milliGRAM(s) Oral at bedtime  simvastatin 10 milliGRAM(s) Oral at bedtime  sodium chloride 0.9%. 1000 milliLiter(s) IV Continuous <Continuous>  tamsulosin 0.8 milliGRAM(s) Oral at bedtime                            8.4    12.50 )-----------( 465      ( 21 Mar 2024 04:40 )             24.4     03-21    138  |  103  |  6.6<L>  ----------------------------<  120<H>  3.9   |  24.0  |  0.32<L>    Ca    7.7<L>      21 Mar 2024 04:40      RECENT CULTURES:  03-12 @ 12:45 .CSF CSF     No growth at 5 days  No polymorphonuclear cells seen  No organisms seen  by cytocentrifuge    03-12 @ 12:00 .Blood Blood-Peripheral     No growth at 5 days    03-12 @ 11:45 .Blood Blood-Peripheral     No growth at 5 days    03-09 @ 08:20 .Sputum Sputum     Normal Respiratory Dhara present  No Squamous epithelial cells seen per low power field  Rare polymorphonuclear leukocytes seen per low power field  Moderate Gram positive cocci in pairs seen per oil power field  Few Gram Negative Rods seen per oil power field      WBC Count: 12.50 K/uL (03-21-24 @ 04:40)  WBC Count: 8.60 K/uL (03-20-24 @ 02:40)  WBC Count: 11.67 K/uL (03-19-24 @ 21:45)  WBC Count: 12.57 K/uL (03-19-24 @ 04:00)  WBC Count: 9.54 K/uL (03-18-24 @ 04:00)    Creatinine: 0.32 mg/dL (03-21-24 @ 04:40)  Creatinine: 0.37 mg/dL (03-20-24 @ 17:20)  Creatinine: 0.32 mg/dL (03-20-24 @ 09:30)  Creatinine: 0.27 mg/dL (03-20-24 @ 02:40)  Creatinine: 0.31 mg/dL (03-19-24 @ 21:45)  Creatinine: 0.40 mg/dL (03-19-24 @ 15:51)  Creatinine: 0.34 mg/dL (03-19-24 @ 04:00)  Creatinine: 0.34 mg/dL (03-19-24 @ 00:10)  Creatinine: 0.45 mg/dL (03-18-24 @ 18:55)  Creatinine: 0.37 mg/dL (03-18-24 @ 04:00)    Procalcitonin, Serum: 0.18 ng/mL (03-12-24 @ 02:45)  Procalcitonin, Serum: 0.28 ng/mL (03-08-24 @ 20:00)     SARS-CoV-2: NotDetec (03-12-24 @ 11:45)  SARS-CoV-2: NotDetec (03-09-24 @ 10:13)

## 2024-03-22 NOTE — PROGRESS NOTE ADULT - SUBJECTIVE AND OBJECTIVE BOX
CC: ACOMM Aneurysm     INTERVAL HPI/OVERNIGHT EVENTS: Patient seen and examined.  and daughter in law at bedside. Concerned over waxing and waning memory. No overnight events. No fever last 24 hrs.     Vital Signs Last 24 Hrs  T(C): 37.5 (22 Mar 2024 09:25), Max: 37.6 (22 Mar 2024 08:22)  T(F): 99.5 (22 Mar 2024 09:25), Max: 99.6 (22 Mar 2024 08:22)  HR: 88 (22 Mar 2024 09:25) (76 - 93)  BP: 134/65 (22 Mar 2024 09:25) (107/63 - 146/71)  BP(mean): 86 (22 Mar 2024 03:43) (78 - 86)  RR: 18 (22 Mar 2024 08:22) (16 - 18)  SpO2: 95% (22 Mar 2024 08:22) (95% - 100%)    Parameters below as of 22 Mar 2024 08:22  Patient On (Oxygen Delivery Method): room air    ROS:  Constitutional, Eyes, ENT, Cardiovascular, Respiratory,  Gastrointestinal, Genitourinary, Musculoskeletal, Neurological,  Integumentary, Psychiatric, Endocrine, Heme/Lymph are otherwise negative.    PHYSICAL EXAM:    GENERAL: NAD,  well-developed, sitting up in bed  HEAD: surgical site clean and dry   EYES: EOMI, PERRLA, conjunctiva and sclera clear  NECK: Supple, No JVD  NERVOUS SYSTEM:  Alert & Oriented X 4 , slight tremor B/L UE  CHEST/LUNG: CTA  b/l,  no rales, rhonchi, wheezing, or rubs  HEART: Regular rate and rhythm; No murmurs, rubs, or gallops  ABDOMEN: Softly distended, + BS, non tender  EXTREMITIES:  2+ Peripheral Pulses, No clubbing, cyanosis, or edema   SKIN: No rashes or lesions    I&O's Detail    21 Mar 2024 07:01  -  22 Mar 2024 07:00  --------------------------------------------------------  IN:  Total IN: 0 mL    OUT:    Indwelling Catheter - Urethral (mL): 3100 mL  Total OUT: 3100 mL    Total NET: -3100 mL                                    8.4    12.50 )-----------( 465      ( 21 Mar 2024 04:40 )             24.4     21 Mar 2024 04:40    138    |  103    |  6.6    ----------------------------<  120    3.9     |  24.0   |  0.32     Ca    7.7        21 Mar 2024 04:40        CAPILLARY BLOOD GLUCOSE          Urinalysis Basic - ( 21 Mar 2024 04:40 )    Color: x / Appearance: x / SG: x / pH: x  Gluc: 120 mg/dL / Ketone: x  / Bili: x / Urobili: x   Blood: x / Protein: x / Nitrite: x   Leuk Esterase: x / RBC: x / WBC x   Sq Epi: x / Non Sq Epi: x / Bacteria: x        MEDICATIONS  (STANDING):  bromocriptine Capsule 10 milliGRAM(s) Oral daily  calcium carbonate 1250 mG  + Vitamin D (OsCal 500 + D) 1 Tablet(s) Oral daily  chlorhexidine 2% Cloths 1 Application(s) Topical daily  enoxaparin Injectable 30 milliGRAM(s) SubCutaneous every 24 hours  lidocaine   4% Patch 1 Patch Transdermal every 24 hours  multivitamin 1 Tablet(s) Oral daily  niMODipine Oral Solution 60 milliGRAM(s) Oral every 4 hours  nystatin Powder 1 Application(s) Topical two times a day  QUEtiapine 12.5 milliGRAM(s) Oral at bedtime  simvastatin 10 milliGRAM(s) Oral at bedtime  sodium chloride 0.9%. 1000 milliLiter(s) (50 mL/Hr) IV Continuous <Continuous>  tamsulosin 0.8 milliGRAM(s) Oral at bedtime    MEDICATIONS  (PRN):  acetaminophen     Tablet .. 650 milliGRAM(s) Oral every 6 hours PRN Temp greater or equal to 38C (100.4F)  hydrALAZINE Injectable 10 milliGRAM(s) IV Push every 2 hours PRN SBP > 160  labetalol Injectable 10 milliGRAM(s) IV Push every 2 hours PRN Systolic blood pressure > 160  ondansetron Injectable 4 milliGRAM(s) IV Push every 6 hours PRN Nausea and/or Vomiting      RADIOLOGY & ADDITIONAL TESTS:   CC: ACOMM Aneurysm     INTERVAL HPI/OVERNIGHT EVENTS: Patient seen and examined.  and daughter in law at bedside. Concerned over waxing and waning memory. No overnight events. No fever last 24 hrs.   Had BM last night , mild abdominal discomfort + .     Vital Signs Last 24 Hrs  T(C): 37.5 (22 Mar 2024 09:25), Max: 37.6 (22 Mar 2024 08:22)  T(F): 99.5 (22 Mar 2024 09:25), Max: 99.6 (22 Mar 2024 08:22)  HR: 88 (22 Mar 2024 09:25) (76 - 93)  BP: 134/65 (22 Mar 2024 09:25) (107/63 - 146/71)  BP(mean): 86 (22 Mar 2024 03:43) (78 - 86)  RR: 18 (22 Mar 2024 08:22) (16 - 18)  SpO2: 95% (22 Mar 2024 08:22) (95% - 100%)    Parameters below as of 22 Mar 2024 08:22  Patient On (Oxygen Delivery Method): room air    ROS:  Constitutional, Eyes, ENT, Cardiovascular, Respiratory,  Gastrointestinal, Genitourinary,   Musculoskeletal, Neurological,  Integumentary, Psychiatric, Endocrine, Heme/Lymph are otherwise negative.    PHYSICAL EXAM:    GENERAL: NAD,  well-developed, sitting up in bed  HEAD: surgical site clean and dry   EYES: EOMI, PERRLA, conjunctiva and sclera clear  NECK: Supple, No JVD  NERVOUS SYSTEM:  Alert & Oriented X 4 , slight tremor B/L UE  CHEST/LUNG: CTA  b/l,  no rales, rhonchi, wheezing, or rubs  HEART: Regular rate and rhythm; No murmurs, rubs, or gallops  ABDOMEN: Softly distended, + BS, non tender  EXTREMITIES:  2+ Peripheral Pulses, No clubbing, cyanosis, or edema   SKIN: No rashes or lesions    I&O's Detail    21 Mar 2024 07:01  -  22 Mar 2024 07:00  --------------------------------------------------------  IN:  Total IN: 0 mL    OUT:    Indwelling Catheter - Urethral (mL): 3100 mL  Total OUT: 3100 mL    Total NET: -3100 mL                                    8.4    12.50 )-----------( 465      ( 21 Mar 2024 04:40 )             24.4     21 Mar 2024 04:40    138    |  103    |  6.6    ----------------------------<  120    3.9     |  24.0   |  0.32     Ca    7.7        21 Mar 2024 04:40        CAPILLARY BLOOD GLUCOSE  Urinalysis (03.19.24 @ 10:30)    pH Urine: 6.5   Glucose Qualitative, Urine: Negative mg/dL   Blood, Urine: Small   Color: Yellow   Urine Appearance: Clear   Bilirubin: Negative   Ketone - Urine: Negative mg/dL   Specific Gravity: 1.010   Protein, Urine: Negative mg/dL   Urobilinogen: 0.2 mg/dL   Nitrite: Negative   Leukocyte Esterase Concentration: Negative          MEDICATIONS  (STANDING):  bromocriptine Capsule 10 milliGRAM(s) Oral daily  calcium carbonate 1250 mG  + Vitamin D (OsCal 500 + D) 1 Tablet(s) Oral daily  chlorhexidine 2% Cloths 1 Application(s) Topical daily  enoxaparin Injectable 30 milliGRAM(s) SubCutaneous every 24 hours  lidocaine   4% Patch 1 Patch Transdermal every 24 hours  multivitamin 1 Tablet(s) Oral daily  niMODipine Oral Solution 60 milliGRAM(s) Oral every 4 hours  nystatin Powder 1 Application(s) Topical two times a day  QUEtiapine 12.5 milliGRAM(s) Oral at bedtime  simvastatin 10 milliGRAM(s) Oral at bedtime  sodium chloride 0.9%. 1000 milliLiter(s) (50 mL/Hr) IV Continuous <Continuous>  tamsulosin 0.8 milliGRAM(s) Oral at bedtime    MEDICATIONS  (PRN):  acetaminophen     Tablet .. 650 milliGRAM(s) Oral every 6 hours PRN Temp greater or equal to 38C (100.4F)  hydrALAZINE Injectable 10 milliGRAM(s) IV Push every 2 hours PRN SBP > 160  labetalol Injectable 10 milliGRAM(s) IV Push every 2 hours PRN Systolic blood pressure > 160  ondansetron Injectable 4 milliGRAM(s) IV Push every 6 hours PRN Nausea and/or Vomiting      RADIOLOGY & ADDITIONAL TESTS:

## 2024-03-23 LAB
ALBUMIN SERPL ELPH-MCNC: 3.1 G/DL — LOW (ref 3.3–5.2)
ALP SERPL-CCNC: 47 U/L — SIGNIFICANT CHANGE UP (ref 40–120)
ALT FLD-CCNC: 30 U/L — SIGNIFICANT CHANGE UP
ANION GAP SERPL CALC-SCNC: 13 MMOL/L — SIGNIFICANT CHANGE UP (ref 5–17)
AST SERPL-CCNC: 21 U/L — SIGNIFICANT CHANGE UP
BILIRUB SERPL-MCNC: 0.4 MG/DL — SIGNIFICANT CHANGE UP (ref 0.4–2)
BUN SERPL-MCNC: 7.3 MG/DL — LOW (ref 8–20)
CALCIUM SERPL-MCNC: 7.5 MG/DL — LOW (ref 8.4–10.5)
CHLORIDE SERPL-SCNC: 101 MMOL/L — SIGNIFICANT CHANGE UP (ref 96–108)
CO2 SERPL-SCNC: 22 MMOL/L — SIGNIFICANT CHANGE UP (ref 22–29)
CREAT SERPL-MCNC: 0.37 MG/DL — LOW (ref 0.5–1.3)
EGFR: 108 ML/MIN/1.73M2 — SIGNIFICANT CHANGE UP
GLUCOSE SERPL-MCNC: 132 MG/DL — HIGH (ref 70–99)
HCT VFR BLD CALC: 23.1 % — LOW (ref 34.5–45)
HCYS SERPL-MCNC: 4.7 UMOL/L — SIGNIFICANT CHANGE UP
HGB BLD-MCNC: 7.8 G/DL — LOW (ref 11.5–15.5)
MAGNESIUM SERPL-MCNC: 2.1 MG/DL — SIGNIFICANT CHANGE UP (ref 1.6–2.6)
MCHC RBC-ENTMCNC: 31.2 PG — SIGNIFICANT CHANGE UP (ref 27–34)
MCHC RBC-ENTMCNC: 33.8 GM/DL — SIGNIFICANT CHANGE UP (ref 32–36)
MCV RBC AUTO: 92.4 FL — SIGNIFICANT CHANGE UP (ref 80–100)
OB PNL STL: POSITIVE
PHOSPHATE SERPL-MCNC: 1.1 MG/DL — LOW (ref 2.4–4.7)
PHOSPHATE SERPL-MCNC: 3 MG/DL — SIGNIFICANT CHANGE UP (ref 2.4–4.7)
PLATELET # BLD AUTO: 448 K/UL — HIGH (ref 150–400)
POTASSIUM SERPL-MCNC: 3.5 MMOL/L — SIGNIFICANT CHANGE UP (ref 3.5–5.3)
POTASSIUM SERPL-SCNC: 3.5 MMOL/L — SIGNIFICANT CHANGE UP (ref 3.5–5.3)
PROT SERPL-MCNC: 5.8 G/DL — LOW (ref 6.6–8.7)
RBC # BLD: 2.5 M/UL — LOW (ref 3.8–5.2)
RBC # FLD: 14.9 % — HIGH (ref 10.3–14.5)
SODIUM SERPL-SCNC: 136 MMOL/L — SIGNIFICANT CHANGE UP (ref 135–145)
WBC # BLD: 11.16 K/UL — HIGH (ref 3.8–10.5)
WBC # FLD AUTO: 11.16 K/UL — HIGH (ref 3.8–10.5)

## 2024-03-23 PROCEDURE — 99232 SBSQ HOSP IP/OBS MODERATE 35: CPT

## 2024-03-23 PROCEDURE — 74176 CT ABD & PELVIS W/O CONTRAST: CPT | Mod: 26

## 2024-03-23 PROCEDURE — 99222 1ST HOSP IP/OBS MODERATE 55: CPT

## 2024-03-23 RX ORDER — POTASSIUM PHOSPHATE, MONOBASIC POTASSIUM PHOSPHATE, DIBASIC 236; 224 MG/ML; MG/ML
30 INJECTION, SOLUTION INTRAVENOUS ONCE
Refills: 0 | Status: COMPLETED | OUTPATIENT
Start: 2024-03-23 | End: 2024-03-23

## 2024-03-23 RX ORDER — SODIUM,POTASSIUM PHOSPHATES 278-250MG
1 POWDER IN PACKET (EA) ORAL ONCE
Refills: 0 | Status: COMPLETED | OUTPATIENT
Start: 2024-03-23 | End: 2024-03-23

## 2024-03-23 RX ORDER — CALCIUM GLUCONATE 100 MG/ML
2 VIAL (ML) INTRAVENOUS ONCE
Refills: 0 | Status: COMPLETED | OUTPATIENT
Start: 2024-03-23 | End: 2024-03-23

## 2024-03-23 RX ORDER — BROMOCRIPTINE MESYLATE 5 MG/1
5 CAPSULE ORAL DAILY
Refills: 0 | Status: DISCONTINUED | OUTPATIENT
Start: 2024-03-24 | End: 2024-03-25

## 2024-03-23 RX ADMIN — LIDOCAINE 1 PATCH: 4 CREAM TOPICAL at 19:15

## 2024-03-23 RX ADMIN — NIMODIPINE 60 MILLIGRAM(S): 60 SOLUTION ORAL at 06:44

## 2024-03-23 RX ADMIN — Medication 1 TABLET(S): at 13:01

## 2024-03-23 RX ADMIN — Medication 1 PACKET(S): at 09:19

## 2024-03-23 RX ADMIN — NYSTATIN CREAM 1 APPLICATION(S): 100000 CREAM TOPICAL at 19:15

## 2024-03-23 RX ADMIN — Medication 650 MILLIGRAM(S): at 14:01

## 2024-03-23 RX ADMIN — NIMODIPINE 60 MILLIGRAM(S): 60 SOLUTION ORAL at 22:28

## 2024-03-23 RX ADMIN — SODIUM CHLORIDE 50 MILLILITER(S): 9 INJECTION INTRAMUSCULAR; INTRAVENOUS; SUBCUTANEOUS at 09:19

## 2024-03-23 RX ADMIN — Medication 200 GRAM(S): at 06:57

## 2024-03-23 RX ADMIN — POTASSIUM PHOSPHATE, MONOBASIC POTASSIUM PHOSPHATE, DIBASIC 83.33 MILLIMOLE(S): 236; 224 INJECTION, SOLUTION INTRAVENOUS at 09:16

## 2024-03-23 RX ADMIN — BROMOCRIPTINE MESYLATE 10 MILLIGRAM(S): 5 CAPSULE ORAL at 13:00

## 2024-03-23 RX ADMIN — NYSTATIN CREAM 1 APPLICATION(S): 100000 CREAM TOPICAL at 06:45

## 2024-03-23 RX ADMIN — NIMODIPINE 60 MILLIGRAM(S): 60 SOLUTION ORAL at 09:31

## 2024-03-23 RX ADMIN — NIMODIPINE 60 MILLIGRAM(S): 60 SOLUTION ORAL at 03:22

## 2024-03-23 RX ADMIN — LIDOCAINE 1 PATCH: 4 CREAM TOPICAL at 05:00

## 2024-03-23 RX ADMIN — QUETIAPINE FUMARATE 12.5 MILLIGRAM(S): 200 TABLET, FILM COATED ORAL at 22:28

## 2024-03-23 RX ADMIN — NIMODIPINE 60 MILLIGRAM(S): 60 SOLUTION ORAL at 19:15

## 2024-03-23 RX ADMIN — Medication 650 MILLIGRAM(S): at 13:01

## 2024-03-23 RX ADMIN — NIMODIPINE 60 MILLIGRAM(S): 60 SOLUTION ORAL at 13:00

## 2024-03-23 RX ADMIN — ENOXAPARIN SODIUM 30 MILLIGRAM(S): 100 INJECTION SUBCUTANEOUS at 22:28

## 2024-03-23 RX ADMIN — SIMVASTATIN 10 MILLIGRAM(S): 20 TABLET, FILM COATED ORAL at 22:28

## 2024-03-23 RX ADMIN — TAMSULOSIN HYDROCHLORIDE 0.8 MILLIGRAM(S): 0.4 CAPSULE ORAL at 22:28

## 2024-03-23 NOTE — PROGRESS NOTE ADULT - ASSESSMENT
72 y/o mandarin speaking female with PMH of HLD and osteoporosis presents to PST with complaints of having cerebral aneurysm. States in 10/2023 she started to have B/L hand numbness. At that time she had MRI brain, MRA head and neck. MRA showed an ACOMM aneurysm. She underwent a cerebral angiogram by José Manuel Villaseñor at [facility] on December 7th, 2023, with the intent to treat this month. The aneurysm is described as a 5.5x4.6x3.6mm wide necked acomm aneurysm that projects medio-superiorly. Reports occasional headaches, described as numbness, S/P  aneurysm embolization with coiling on 3/6  complicated by aneurysm rupture, controlled with coils and balloon tamponade. NISHANT CT showed SAH b/l frontal lobes and R sylvian fissure along with contrast staining. Patient admitted to NSICU for post op care. Downgraded to Step down on 3/16. Medicine consulted for medical mgmt .     Plan:     # ACOMM aneurism , S/P  aneurysm embolization with coiling on 3/6  complicated by aneurysm rupture,   controlled with coils and balloon tamponade.    NISHANT CT showed SAH b/l frontal lobes and R sylvian fissure along with contrast staining.    Patient admitted to NSICU for post op care. Now downgraded to step down and Neuro stroke service  - Neuro checks being done   - Bromocriptine 10mg TID- for likely central fevers, being titrated   - Seroquel 12.5mg nightly  - Pain control- Tylenol, Tramadol  - pt/ot  - Nimodipine 60mg q4hrs for SAH protocol  as per NS   - Avoid hypotension, rapid changes in BP  - New tremors noted: EEG : Clinical Impression:   Technically limited study due to continuous muscle artifact. No obvious abnormalities noted.  Consider repeating EEG if clinically warranted.    serial Imaging and further workup as per Primary team      #anemia- probably dilutional, multiple blood draws - Hg -8.6 , FOBT - negative ,   Anemia work up with normal folate/Iron , elevated B12 , monitor CBC,   transfuse if Hg < 7 , H&H stable.   Hb today is 7.8, will monitor CBC       # HLD - Simvastatin 10mg daily    # Postop urinary retention -  -Flomax 0.8mg  qhs   -Continue Dangelo Cath, failed TOV on 3/18 ( 3rd attempt)  -Ambulate to bathroom  - Reattempt TOV after multiple BM's ensured     # Diarrhea ,likely due to overflow 2/2 constipation    rectal tube  removed ( 3/17/24)  Abdominal XR ( 3/13) noted with  Fecal material   localized to the mid left descending colon.   Repeat KUB ( 3/20/24)  with Constipation, received laxative/enema had multiple BM with formed stool  d/c Calcium to prevent constipation   Please order stool count, have RN's document frequency and consistency of bowel movements  Would do Lactulose BID for 2 days then repeat KUB to ensure resolution of constipation.        # Low grade temps till 3/20 with Tmax 101.3 on 3/20 ,  work up NTD  Completed course of Zosyn for possible PNA   CXR - unremarkable   US of LE - no dvt   COVID/RVP  (3/12/24) - negative - REPEAT COVID/ RVP   c/o low back pain , MRI spine ordered   Blood cultures repeated 3/21- FOLLOW UP   Check UA   ID consulted and appreciated, observe off antibiotics  no further fever spikes    #Hypokalemia: Supplemented   monitor electrolytes     #Hypophosphatemia - replace as needed    #Hyponatremia - Resolved, monitor. Na Goal: 135-145     DVT prophylaxis  -on Lovenox      72 y/o mandarin speaking female with PMH of HLD and osteoporosis presents to PST with complaints of having cerebral aneurysm. States in 10/2023 she started to have B/L hand numbness. At that time she had MRI brain, MRA head and neck. MRA showed an ACOMM aneurysm. She underwent a cerebral angiogram by José Manuel Villaseñor at [facility] on December 7th, 2023, with the intent to treat this month. The aneurysm is described as a 5.5x4.6x3.6mm wide necked acomm aneurysm that projects medio-superiorly. Reports occasional headaches, described as numbness, S/P  aneurysm embolization with coiling on 3/6  complicated by aneurysm rupture, controlled with coils and balloon tamponade. NISHANT CT showed SAH b/l frontal lobes and R sylvian fissure along with contrast staining. Patient admitted to NSICU for post op care. Downgraded to Step down on 3/16. Medicine consulted for medical mgmt .     Plan:     # ACOMM aneurism , S/P  aneurysm embolization with coiling on 3/6  complicated by aneurysm rupture,   controlled with coils and balloon tamponade.    NISHANT CT showed SAH b/l frontal lobes and R sylvian fissure along with contrast staining.    Patient admitted to NSICU for post op care. Now downgraded to step down and Neuro stroke service  - Neuro checks being done   - Bromocriptine 10mg TID- for likely central fevers, being titrated   - Seroquel 12.5mg nightly  - Pain control- Tylenol, Tramadol  - pt/ot  - Nimodipine 60mg q4hrs for SAH protocol  as per NS   - Avoid hypotension, rapid changes in BP  - New tremors noted: EEG : Clinical Impression:   Technically limited study due to continuous muscle artifact. No obvious abnormalities noted.  Consider repeating EEG if clinically warranted.    serial Imaging and further workup as per Primary team      #anemia- probably dilutional, multiple blood draws - Hg -8.6 , FOBT - negative ,   Anemia work up with normal folate/Iron , elevated B12 , monitor CBC,   transfuse if Hg < 7 , H&H stable.   Hb today is 7.8, will monitor CBC       # HLD - Simvastatin 10mg daily    # Postop urinary retention -  -Flomax 0.8mg  qhs   -Continue Dangelo Cath, failed TOV on 3/18 ( 3rd attempt)  -Ambulate to bathroom  - Reattempt TOV after multiple BM's ensured     # Diarrhea ,likely due to overflow 2/2 constipation    rectal tube  removed ( 3/17/24)  Abdominal XR ( 3/13) noted with  Fecal material   localized to the mid left descending colon.   Repeat KUB ( 3/20/24)  with Constipation, received laxative/enema had multiple BM with formed stool  d/c Calcium to prevent constipation   Please order stool count, have RN's document frequency and consistency of bowel movements  Would do Lactulose BID for 2 days then repeat KUB to ensure resolution of constipation.        # Low grade temps till 3/20 with Tmax 101.3 on 3/20 ,  work up NTD  Completed course of Zosyn for possible PNA   CXR - unremarkable   US of LE - no dvt   COVID/RVP  (3/12/24) - negative - REPEAT COVID/ RVP   c/o low back pain , MRI spine ordered   Blood cultures repeated 3/21- FOLLOW UP   Check UA   ID consulted and appreciated, observe off antibiotics  no further fever spikes    #Hypokalemia: Supplemented   monitor electrolytes     #Hypophosphatemia - replace as needed    #Hyponatremia - Resolved, monitor. Na Goal: 135-145     Leucocytosis: trending down, will monitor     DVT prophylaxis  -on Lovenox     Back is likely form laying, PT and Out of the bed.

## 2024-03-23 NOTE — PROGRESS NOTE ADULT - SUBJECTIVE AND OBJECTIVE BOX
[facility]  INFECTIOUS DISEASES at [place] / [place] / [place]  =======================================================                               Carlos Johnson MD#   Mony Amaya MD*                             Josephine Lackey MD*   Layne Cho MD*                              Professor Emeritus:  Dr Cuba Levin MD^            Diplomates American Board of Internal Medicine & Infectious Diseases                # [place] Office - Appt - Tel  [phone] Fax [phone]                * [place] Office - Appt - Tel [phone] Fax [phone]                      ^[place] Office - Tel  [phone] Fax [phone]                                  Hospital Consult line:  [phone]  =======================================================    KASHMIR CARBAJAL [id number]    Follow up: fever  afebrile  denies complaints  translated by daughter at bedside      Allergies:  lactose (Diarrhea)  No Known Allergies           REVIEW OF SYSTEMS:  CONSTITUTIONAL:  No Fever or chills  HEENT:   No diplopia or blurred vision.  No earache, sore throat or runny nose.  CARDIOVASCULAR:  No Chest Pain  RESPIRATORY:  No cough, shortness of breath  GASTROINTESTINAL:  No nausea, vomiting or diarrhea.  GENITOURINARY:  No dysuria, frequency or urgency. No Blood in urine  MUSCULOSKELETAL:  no joint aches, no muscle pain  SKIN:  No change in skin, hair or nails.  NEUROLOGIC:  No Headaches, seizures   PSYCHIATRIC:  No disorder of thought or mood.  ENDOCRINE:  No heat or cold intolerance  HEMATOLOGICAL:  No easy bruising or bleeding.       Physical Exam:  GEN: NAD  HEENT: normocephalic and atraumatic.   NECK: Supple.   LUNGS: CTA B/L.  HEART: RRR  ABDOMEN: Soft, NT, ND.  +BS.    : No CVA tenderness  EXTREMITIES: Without  edema.  MSK: No joint swelling  NEUROLOGIC: No Focal Deficits   PSYCHIATRIC: Appropriate affect .  SKIN: No rash      Vitals:  Vital Signs Last 24 Hrs  T(C): 36.9 (23 Mar 2024 15:09), Max: 37.4 (23 Mar 2024 00:19)  T(F): 98.5 (23 Mar 2024 15:09), Max: 99.4 (23 Mar 2024 00:19)  HR: 67 (23 Mar 2024 19:13) (67 - 98)  BP: 133/70 (23 Mar 2024 19:13) (102/57 - 146/75)  BP(mean): 91 (23 Mar 2024 19:13) (91 - 98)  RR: 18 (23 Mar 2024 15:09) (18 - 18)  SpO2: 97% (23 Mar 2024 15:09) (97% - 99%)    Parameters below as of 23 Mar 2024 15:09  Patient On (Oxygen Delivery Method): room air        Current Antibiotics:    Other medications:  bromocriptine Capsule 10 milliGRAM(s) Oral daily  enoxaparin Injectable 30 milliGRAM(s) SubCutaneous every 24 hours  lidocaine   4% Patch 1 Patch Transdermal every 24 hours  multivitamin 1 Tablet(s) Oral daily  niMODipine Oral Solution 60 milliGRAM(s) Oral every 4 hours  nystatin Powder 1 Application(s) Topical two times a day  QUEtiapine 12.5 milliGRAM(s) Oral at bedtime  simvastatin 10 milliGRAM(s) Oral at bedtime  sodium chloride 0.9%. 1000 milliLiter(s) IV Continuous <Continuous>  tamsulosin 0.8 milliGRAM(s) Oral at bedtime                          7.8    11.16 )-----------( 448      ( 23 Mar 2024 04:41 )             23.1       03-23    136  |  101  |  7.3<L>  ----------------------------<  132<H>  3.5   |  22.0  |  0.37<L>    Ca    7.5<L>      23 Mar 2024 04:41  Phos  3.0     03-23  Mg     2.1     03-23    TPro  5.8<L>  /  Alb  3.1<L>  /  TBili  0.4  /  DBili  x   /  AST  21  /  ALT  30  /  AlkPhos  47  03-23              Urinalysis Basic - ( 23 Mar 2024 04:41 )    Color: x / Appearance: x / SG: x / pH: x  Gluc: 132 mg/dL / Ketone: x  / Bili: x / Urobili: x   Blood: x / Protein: x / Nitrite: x   Leuk Esterase: x / RBC: x / WBC x   Sq Epi: x / Non Sq Epi: x / Bacteria: x                  CAPILLARY BLOOD GLUCOSE                    RECENT CULTURES:  03-12 @ 12:45 .CSF CSF     No growth at 5 days  No polymorphonuclear cells seen  No organisms seen  by cytocentrifuge    03-12 @ 12:00 .Blood Blood-Peripheral     No growth at 5 days    03-12 @ 11:45 .Blood Blood-Peripheral     No growth at 5 days    03-09 @ 08:20 .Sputum Sputum     Normal Respiratory Dhara present  No Squamous epithelial cells seen per low power field  Rare polymorphonuclear leukocytes seen per low power field  Moderate Gram positive cocci in pairs seen per oil power field  Few Gram Negative Rods seen per oil power field      WBC Count: 12.50 K/uL (03-21-24 @ 04:40)  WBC Count: 8.60 K/uL (03-20-24 @ 02:40)  WBC Count: 11.67 K/uL (03-19-24 @ 21:45)  WBC Count: 12.57 K/uL (03-19-24 @ 04:00)  WBC Count: 9.54 K/uL (03-18-24 @ 04:00)    Creatinine: 0.32 mg/dL (03-21-24 @ 04:40)  Creatinine: 0.37 mg/dL (03-20-24 @ 17:20)  Creatinine: 0.32 mg/dL (03-20-24 @ 09:30)  Creatinine: 0.27 mg/dL (03-20-24 @ 02:40)  Creatinine: 0.31 mg/dL (03-19-24 @ 21:45)  Creatinine: 0.40 mg/dL (03-19-24 @ 15:51)  Creatinine: 0.34 mg/dL (03-19-24 @ 04:00)  Creatinine: 0.34 mg/dL (03-19-24 @ 00:10)  Creatinine: 0.45 mg/dL (03-18-24 @ 18:55)  Creatinine: 0.37 mg/dL (03-18-24 @ 04:00)    Procalcitonin, Serum: 0.18 ng/mL (03-12-24 @ 02:45)  Procalcitonin, Serum: 0.28 ng/mL (03-08-24 @ 20:00)     SARS-CoV-2: NotDetec (03-12-24 @ 11:45)  SARS-CoV-2: NotDetec (03-09-24 @ 10:13)      < from: Xray Lumbosacral Spine (03.20.24 @ 11:29) >  Findings:    There is no fracture, subluxation or paravertebral soft tissue swelling.  The pedicles and sacroiliac joints are intact.  Straightening of lordosis may be positional versus muscle spasm  The sacrum and visualized coccyx are intact    Large amount of stool in the visualized colon without gross bowel   obstruction.    IMPRESSION: No fracture or subluxation.    Limited examination.    < end of copied text >    < from: Xray Sacrum + Coccyx (03.20.24 @ 11:28) >  FINDINGS: Bilateral SI joints intact. No gross fracture. Lumbosacral   junction is intact. Osteopenia. Pubic symphysis intact    IMPRESSION: Limited study without obvious fracture.    If warranted clinically obtain CT.    --- End of Report ---    < end of copied text >

## 2024-03-23 NOTE — PROGRESS NOTE ADULT - SUBJECTIVE AND OBJECTIVE BOX
KASHMIR CARBAJAL    [id number]    73y      Female    Patient is a 73y old  Female who presents with a chief complaint of aneurysm embolization (23 Mar 2024 07:57)      INTERVAL HPI/OVERNIGHT EVENTS:          LABS:                        7.8    11.16 )-----------( 448      ( 23 Mar 2024 04:41 )             23.1     03-23    136  |  101  |  7.3<L>  ----------------------------<  132<H>  3.5   |  22.0  |  0.37<L>    Ca    7.5<L>      23 Mar 2024 04:41  Phos  1.1     03-23  Mg     2.1     03-23    TPro  5.8<L>  /  Alb  3.1<L>  /  TBili  0.4  /  DBili  x   /  AST  21  /  ALT  30  /  AlkPhos  47  03-23          I&O's Summary    22 Mar 2024 07:01  -  23 Mar 2024 07:00  --------------------------------------------------------  IN: 720 mL / OUT: 2350 mL / NET: -1630 mL        MEDICATIONS  (STANDING):  bromocriptine Capsule 10 milliGRAM(s) Oral daily  enoxaparin Injectable 30 milliGRAM(s) SubCutaneous every 24 hours  lactobacillus acidophilus 1 Tablet(s) Oral daily  lidocaine   4% Patch 1 Patch Transdermal every 24 hours  multivitamin 1 Tablet(s) Oral daily  niMODipine Oral Solution 60 milliGRAM(s) Oral every 4 hours  nystatin Powder 1 Application(s) Topical two times a day  potassium phosphate / sodium phosphate Powder (PHOS-NaK) 1 Packet(s) Oral once  potassium phosphate IVPB 30 milliMole(s) IV Intermittent once  QUEtiapine 12.5 milliGRAM(s) Oral at bedtime  simvastatin 10 milliGRAM(s) Oral at bedtime  sodium chloride 0.9%. 1000 milliLiter(s) (50 mL/Hr) IV Continuous <Continuous>  tamsulosin 0.8 milliGRAM(s) Oral at bedtime    MEDICATIONS  (PRN):  acetaminophen     Tablet .. 650 milliGRAM(s) Oral every 6 hours PRN Temp greater or equal to 38C (100.4F)  hydrALAZINE Injectable 10 milliGRAM(s) IV Push every 2 hours PRN SBP > 160  iohexol 300 mG (iodine)/mL Oral Solution 30 milliLiter(s) Oral once PRN Prior to CT Abdomen & Pelvis per CT protocol, thank you  labetalol Injectable 10 milliGRAM(s) IV Push every 2 hours PRN Systolic blood pressure > 160  ondansetron Injectable 4 milliGRAM(s) IV Push every 6 hours PRN Nausea and/or Vomiting         KASHMIR CARBAJAL    [id number]    73y      Female    Patient is a 73y old  Female who presents with a chief complaint of aneurysm embolization (23 Mar 2024 07:57)      INTERVAL HPI/OVERNIGHT EVENTS:    no further fever spikes, diarrhea is improving, denies further weakness, numbness     Vital Signs Last 24 Hrs  T(C): 36.9 (23 Mar 2024 08:01), Max: 37.4 (22 Mar 2024 15:30)  T(F): 98.4 (23 Mar 2024 08:01), Max: 99.4 (22 Mar 2024 19:46)  HR: 81 (23 Mar 2024 09:30) (78 - 98)  BP: 128/79 (23 Mar 2024 09:30) (102/57 - 157/80)  BP(mean): 95 (23 Mar 2024 09:30) (95 - 95)  RR: 18 (23 Mar 2024 08:01) (16 - 18)  SpO2: 99% (23 Mar 2024 08:01) (96% - 100%)    Parameters below as of 23 Mar 2024 08:01  Patient On (Oxygen Delivery Method): room air        PHYSICAL EXAM:    GENERAL: Elderly female looking comfortable   NECK: soft, Supple, No JVD   CHEST/LUNG: Clear to auscultate bilaterally; No wheezing  HEART: S1S2+, Regular rate and rhythm; No murmurs  ABDOMEN: Soft, Nontender, Nondistended; Bowel sounds present  EXTREMITIES:  1+ Peripheral Pulses, No edema  SKIN: No rashes or lesions  NEURO: AAOX3  PSYCH: normal mood      LABS:                        7.8    11.16 )-----------( 448      ( 23 Mar 2024 04:41 )             23.1     03-23    136  |  101  |  7.3<L>  ----------------------------<  132<H>  3.5   |  22.0  |  0.37<L>    Ca    7.5<L>      23 Mar 2024 04:41  Phos  1.1     03-23  Mg     2.1     03-23    TPro  5.8<L>  /  Alb  3.1<L>  /  TBili  0.4  /  DBili  x   /  AST  21  /  ALT  30  /  AlkPhos  47  03-23          I&O's Summary    22 Mar 2024 07:01  -  23 Mar 2024 07:00  --------------------------------------------------------  IN: 720 mL / OUT: 2350 mL / NET: -1630 mL        MEDICATIONS  (STANDING):  bromocriptine Capsule 10 milliGRAM(s) Oral daily  enoxaparin Injectable 30 milliGRAM(s) SubCutaneous every 24 hours  lactobacillus acidophilus 1 Tablet(s) Oral daily  lidocaine   4% Patch 1 Patch Transdermal every 24 hours  multivitamin 1 Tablet(s) Oral daily  niMODipine Oral Solution 60 milliGRAM(s) Oral every 4 hours  nystatin Powder 1 Application(s) Topical two times a day  potassium phosphate / sodium phosphate Powder (PHOS-NaK) 1 Packet(s) Oral once  potassium phosphate IVPB 30 milliMole(s) IV Intermittent once  QUEtiapine 12.5 milliGRAM(s) Oral at bedtime  simvastatin 10 milliGRAM(s) Oral at bedtime  sodium chloride 0.9%. 1000 milliLiter(s) (50 mL/Hr) IV Continuous <Continuous>  tamsulosin 0.8 milliGRAM(s) Oral at bedtime    MEDICATIONS  (PRN):  acetaminophen     Tablet .. 650 milliGRAM(s) Oral every 6 hours PRN Temp greater or equal to 38C (100.4F)  hydrALAZINE Injectable 10 milliGRAM(s) IV Push every 2 hours PRN SBP > 160  iohexol 300 mG (iodine)/mL Oral Solution 30 milliLiter(s) Oral once PRN Prior to CT Abdomen & Pelvis per CT protocol, thank you  labetalol Injectable 10 milliGRAM(s) IV Push every 2 hours PRN Systolic blood pressure > 160  ondansetron Injectable 4 milliGRAM(s) IV Push every 6 hours PRN Nausea and/or Vomiting

## 2024-03-23 NOTE — PROGRESS NOTE ADULT - NS ATTEND OPT1A GEN_ALL_CORE
Medical decision making
History/Exam/Medical decision making
Medical decision making
Medical decision making

## 2024-03-23 NOTE — PROGRESS NOTE ADULT - SUBJECTIVE AND OBJECTIVE BOX
Preliminary note, official recommendations pending attending review/signature   PENDING EXAM/VISIT    [facility] Stroke Team  Progress Note     :  HPI:  74 y/o mandarin speaking female with PMH of HLD and osteoporosis presents to PST with complaints of having cerebral aneurysm. States in 10/2023 she started to have B/L hand numbness. At that time she had MRI brain, MRA head and neck. MRA showed an ACOMM aneurysm. She underwent a cerebral angiogram by José Manuel Villaseñor at [facility] on December 7th, 2023, with the intent to treat this month. The aneurysm is described as a 5.5x4.6x3.6mm wide necked acomm aneurysm that projects medio-superiorly. Reports occasional headaches, described as numbness, 5/10 in severity, worse with laying down, relieved with sitting up or standing. Patient underwent diagnostic angiogram 2/14/24 which confirmed acomm aneurysm. On 3/6 patient underwent aneurysm embolization with coiling complicated by aneurysm rupture, controlled with coils and balloon tamponade. NISHANT CT showed SAH b/l frontal lobes and R sylvian fissure along with contrast staining. Patient admitted to NSICU for post op care.. EVD now removed per neurosurgery 3/15. Transferred to neurosurgery service 3/16, stroke team asked to consult, transferred to Stroke service 3/18.     SUBJECTIVE: Patient seen and examined at bedside. No events overnight.  No new neurologic complaints.  ROS reported negative unless otherwise noted.    MEDICATIONS:  acetaminophen     Tablet .. 650 milliGRAM(s) Oral every 6 hours PRN  bromocriptine Capsule 10 milliGRAM(s) Oral daily  enoxaparin Injectable 30 milliGRAM(s) SubCutaneous every 24 hours  hydrALAZINE Injectable 10 milliGRAM(s) IV Push every 2 hours PRN  iohexol 300 mG (iodine)/mL Oral Solution 30 milliLiter(s) Oral once PRN  labetalol Injectable 10 milliGRAM(s) IV Push every 2 hours PRN  lactobacillus acidophilus 1 Tablet(s) Oral daily  lidocaine   4% Patch 1 Patch Transdermal every 24 hours  multivitamin 1 Tablet(s) Oral daily  niMODipine Oral Solution 60 milliGRAM(s) Oral every 4 hours  nystatin Powder 1 Application(s) Topical two times a day  ondansetron Injectable 4 milliGRAM(s) IV Push every 6 hours PRN  potassium phosphate / sodium phosphate Powder (PHOS-NaK) 1 Packet(s) Oral once  potassium phosphate IVPB 30 milliMole(s) IV Intermittent once  QUEtiapine 12.5 milliGRAM(s) Oral at bedtime  simvastatin 10 milliGRAM(s) Oral at bedtime  sodium chloride 0.9%. 1000 milliLiter(s) IV Continuous <Continuous>  tamsulosin 0.8 milliGRAM(s) Oral at bedtime      PHYSICAL EXAM:   Vital Signs Last 24 Hrs  T(C): 36.4 (23 Mar 2024 04:09), Max: 37.6 (22 Mar 2024 08:22)  T(F): 97.5 (23 Mar 2024 04:09), Max: 99.6 (22 Mar 2024 08:22)  HR: 91 (23 Mar 2024 04:09) (88 - 98)  BP: 102/57 (23 Mar 2024 04:09) (102/57 - 157/80)  BP(mean): --  RR: 18 (23 Mar 2024 04:09) (16 - 18)  SpO2: 97% (23 Mar 2024 04:09) (95% - 100%)    Parameters below as of 23 Mar 2024 04:09  Patient On (Oxygen Delivery Method): room air      PHYSICAL EXAM:  General: No acute distress.   HEENT: Head normocephalic, atraumatic. Conjunctivae clear w/o exudates or hemorrhage. Sclera non-icteric. Nares are patent bilaterally. Mucous membranes pink and moist.  No tonsillar swelling or exudates.    Neck: Supple, no adenopathy. Trachea midline. No JVD.  Cardiac: Normal rate and rhythm. S1, S2 auscultated. No murmurs, gallops, or rubs.     Respiratory: Lung sounds clear in all fields. Chest wall symmetric, nontender.   Abdominal: Soft, nondistended, nontender. Bowel sounds normoactive x 4 quadrants. No masses, hepatomegaly, or splenomegaly.   Skin: Skin is warm, dry and intact without rashes or lesions. Appropriate color for ethnicity. Nailbeds pink with no cyanosis or clubbing.   Extremities: No edema, 2+ peripheral pulses in b/l upper and b/l lower extremities. Full range of motion in all joints.     NEUROLOGICAL EXAM:  Mental status: The patient is awake and alert and has normal attention span.  The patient is fully oriented in 3 spheres. The patient is oriented to current events. The patient is able to name objects, follow commands, repeat sentences.    Cranial nerves: slight left facial palsy, Pupils equal and react symmetrically to light. There is no visual field deficit to confrontation. Extraocular motion is full with no nystagmus. There is no ptosis. Facial sensation is intact.   Motor: There is normal bulk and tone. There is no tremor. LUE pronator drift, strength 5-/5, LLE 5-/5. RUE and bilateral LE strength 5/5- some pain limitation in LE.  Sensation: Intact to light touch in 4 extremities  Slight tremor b/l upper extremities       LABS:                        7.8    11.16 )-----------( 448      ( 23 Mar 2024 04:41 )             23.1    03-23    136  |  101  |  7.3<L>  ----------------------------<  132<H>  3.5   |  22.0  |  0.37<L>    Ca    7.5<L>      23 Mar 2024 04:41  Phos  1.1     03-23  Mg     2.1     03-23    TPro  5.8<L>  /  Alb  3.1<L>  /  TBili  0.4  /  DBili  x   /  AST  21  /  ALT  30  /  AlkPhos  47  03-23        IMAGING:     Xray Sacrum + Coccyx (03.20.24 @ 11:28)  IMPRESSION: Limited study without obvious fracture.    Xray Lumbosacral Spine (03.20.24 @ 11:29)   Findings:    There is no fracture, subluxation or paravertebral soft tissue swelling.  The pedicles and sacroiliac joints are intact.  Straightening of lordosis may be positional versus muscle spasm  The sacrum and visualized coccyx are intact    Large amount of stool in the visualized colon without gross bowel   obstruction.    IMPRESSION: No fracture or subluxation.    US Duplex Venous Lower Ext Complete, Bilateral (03.20.24 @ 12:20)  IMPRESSION:  No evidence of deep venous thrombosis in either lower extremity.    CT Head No Cont (03.16.24 @ 03:22)   IMPRESSION: No change in mild ventricular dilatation with intraventricular hemorrhage and hemorrhage in the frontal lobe midline and corpus callosum after extraventricular drain removal since 3/15/2024.    CT Head No Cont (03.15.24 @ 05:38)   IMPRESSION: Stable follow-up CT study.     TTE W or WO Ultrasound Enhancing Agent (03.13.24 @ 12:03)   CONCLUSIONS:    1. Left ventricular cavity is normal in size. Left ventricular wall thickness is normal. Left ventricular systolic function is normal with an ejection fraction visually estimated at 65 to 70 %.   2. Normal left ventricular diastolic function.   3. Normal right ventricular cavity size and normal systolic function.   4. The left atrium is normal.   5. The right atrium is normal in size.   6. Fibrocalcific aortic valve sclerosis without stenosis.   7. Mild mitral valve leaflet calcification.   8. Trace mitral regurgitation.   9. Estimated pulmonary artery systolic pressure is 23 mmHg, normal pulmonary artery pressure.    US Duplex Venous Lower Ext Complete, Bilateral (03.12.24 @ 15:23) >  IMPRESSION: No evidence of deep venous thrombosis in either lower extremity.     IR Neuro (03.11.24 @ 09:01)   IMPRESSION:  1. 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 No interval aneurysm recanalization or evidence for pseudoaneurysm.  2. No vasospasm.    CT Head No Cont (03.10.24 @ 12:37)   IMPRESSION: Mildly decreased bilateral mesial frontal parenchymal hemorrhages. Similar anterior interhemispheric acute subarachnoid hemorrhage. Mildly decreased intraventricular hemorrhage within the bilateral lateral   and third ventricles. Decreased ventricular size with near resolution of hydrocephalus. No large midline shift. Basal cisterns are more well visualized on the current examination, this may be due to technique.    MR Angio Head w/ IV Cont (03.09.24 @ 18:57)   IMPRESSION:  MR brain: Intraparenchymal hemorrhage within the medial anterior frontal lobes measuring approximately 3.5 cm, unchanged. There is surrounding edema and extension into the genuine body of corpus callosum, unchanged. Hemorrhagic clot within the LEFT lateral ventricle and third ventricle is unchanged. Minimal hemorrhage seen in the dependent portions of the RIGHT lateral ventricle unchanged. Minimal subarachnoid hemorrhage seen along the medial sulci of the BILATERAL frontal and parietal lobes as well as scattered throughout the sulci of the brain. RIGHT frontal ventriculostomy catheter tip is seen extending toward the body of the   LEFT ventricle and third ventricle.  Coil material is seen in the region of the anterior communicating artery on the RIGHT.   Blood products are seen in the region of the known aneurysm possibly indicating partial patency.    MRA intracranial:   Coil material is seen in the region of the anterior communicating artery. There is 1 x 4 mm region of signal seen on the noncontrast but not the postcontrast which may reflect blood products within the aneurysm indicating patency.    CT Head No Cont (03.07.24 @ 05:56)   IMPRESSION:   persistent intracranial hemorrhage within the BILATERAL lateral and third ventricles, LEFT greater than RIGHT, with mild obstructive hydrocephalus slightly increased. RIGHT frontal shunt catheter tip seen in body of RIGHT lateral ventricle unchanged. Subarachnoid hemorrhage again noted in the BILATERAL medial frontal lobes with hemorrhage in the anterior corpus callosum, LEFT greaterthan RIGHT.     CT Head No Cont (03.06.24 @ 19:20)   IMPRESSION: Interval placement of right frontal approach ventriculostomy catheter since the prior study performed 3 hours ago, with improved hydrocephalus and slightly decreased sulcal/cisternal bilateral subarachnoid hemorrhage.    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 Intraventricular hemorrhage is present with hydrocephalus.    IR Neuro (03.06.24 @ 12:13)   IMPRESSION:  1. 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  2. Status postballoon assisted coil embolization, the aneurysm is nearly completely occluded other than a 2.2 mm x 1.4 mm x 4.3 mm aneurysmal neck remnant.    Study Date: 03-19-24  Duration: 22 minutes  EEG Classification / Summary:  Normal routine EEG in the awake state, within the limits of interpretation.  Interpretation significantly limited by continuous muscle artifact.  Clinical Impression:   Technically limited study due to continuous muscle artifact. No obvious abnormalities noted.  Consider repeating EEG if clinically warranted.      Preliminary note, official recommendations pending attending review/signature   PENDING EXAM/VISIT    [facility] Stroke Team  Progress Note     : Son & daughter at bedside per patient request  HPI:  72 y/o mandarin speaking female with PMH of HLD and osteoporosis presents to PST with complaints of having cerebral aneurysm. States in 10/2023 she started to have B/L hand numbness. At that time she had MRI brain, MRA head and neck. MRA showed an ACOMM aneurysm. She underwent a cerebral angiogram by José Manuel Villaseñor at [facility] on December 7th, 2023, with the intent to treat this month. The aneurysm is described as a 5.5x4.6x3.6mm wide necked acomm aneurysm that projects medio-superiorly. Reports occasional headaches, described as numbness, 5/10 in severity, worse with laying down, relieved with sitting up or standing. Patient underwent diagnostic angiogram 2/14/24 which confirmed acomm aneurysm. On 3/6 patient underwent aneurysm embolization with coiling complicated by aneurysm rupture, controlled with coils and balloon tamponade. NISHANT CT showed SAH b/l frontal lobes and R sylvian fissure along with contrast staining. Patient admitted to NSICU for post op care.. EVD now removed per neurosurgery 3/15. Transferred to neurosurgery service 3/16, stroke team asked to consult, transferred to Stroke service 3/18.     SUBJECTIVE: Patient seen and examined at bedside. Patient sitting up in bed, smiling, appears in NAD. Offers no complaints. No events overnight.  No new neurologic complaints.  ROS reported negative unless otherwise noted.    MEDICATIONS:  acetaminophen     Tablet .. 650 milliGRAM(s) Oral every 6 hours PRN  bromocriptine Capsule 10 milliGRAM(s) Oral daily  enoxaparin Injectable 30 milliGRAM(s) SubCutaneous every 24 hours  hydrALAZINE Injectable 10 milliGRAM(s) IV Push every 2 hours PRN  iohexol 300 mG (iodine)/mL Oral Solution 30 milliLiter(s) Oral once PRN  labetalol Injectable 10 milliGRAM(s) IV Push every 2 hours PRN  lactobacillus acidophilus 1 Tablet(s) Oral daily  lidocaine   4% Patch 1 Patch Transdermal every 24 hours  multivitamin 1 Tablet(s) Oral daily  niMODipine Oral Solution 60 milliGRAM(s) Oral every 4 hours  nystatin Powder 1 Application(s) Topical two times a day  ondansetron Injectable 4 milliGRAM(s) IV Push every 6 hours PRN  potassium phosphate / sodium phosphate Powder (PHOS-NaK) 1 Packet(s) Oral once  potassium phosphate IVPB 30 milliMole(s) IV Intermittent once  QUEtiapine 12.5 milliGRAM(s) Oral at bedtime  simvastatin 10 milliGRAM(s) Oral at bedtime  sodium chloride 0.9%. 1000 milliLiter(s) IV Continuous <Continuous>  tamsulosin 0.8 milliGRAM(s) Oral at bedtime      PHYSICAL EXAM:   Vital Signs Last 24 Hrs  T(C): 36.4 (23 Mar 2024 04:09), Max: 37.6 (22 Mar 2024 08:22)  T(F): 97.5 (23 Mar 2024 04:09), Max: 99.6 (22 Mar 2024 08:22)  HR: 91 (23 Mar 2024 04:09) (88 - 98)  BP: 102/57 (23 Mar 2024 04:09) (102/57 - 157/80)  BP(mean): --  RR: 18 (23 Mar 2024 04:09) (16 - 18)  SpO2: 97% (23 Mar 2024 04:09) (95% - 100%)    Parameters below as of 23 Mar 2024 04:09  Patient On (Oxygen Delivery Method): room air      PHYSICAL EXAM:  General: No acute distress.   HEENT: Head normocephalic, atraumatic. Conjunctivae clear w/o exudates or hemorrhage. Sclera non-icteric. Nares are patent bilaterally. Mucous membranes pink and moist.  No tonsillar swelling or exudates.    Neck: Supple, no adenopathy. Trachea midline. No JVD.  Cardiac: Normal rate and rhythm. S1, S2 auscultated. No murmurs, gallops, or rubs.     Respiratory: Lung sounds clear in all fields. Chest wall symmetric, nontender.   Abdominal: Soft, nondistended, nontender. Bowel sounds normoactive x 4 quadrants. No masses, hepatomegaly, or splenomegaly.   Skin: Skin is warm, dry and intact without rashes or lesions. Appropriate color for ethnicity. Nailbeds pink with no cyanosis or clubbing.   Extremities: No edema, 2+ peripheral pulses in b/l upper and b/l lower extremities. Full range of motion in all joints.     NEUROLOGICAL EXAM:  Mental status: The patient is awake and alert and has normal attention span.  The patient is fully oriented in 3 spheres. The patient is oriented to current events. The patient is able to name objects, follow commands, repeat sentences.    Cranial nerves: slight left facial palsy, Pupils equal and react symmetrically to light. There is no visual field deficit to confrontation. Extraocular motion is full with no nystagmus. There is no ptosis. Facial sensation is intact.   Motor: There is normal bulk and tone. There is no tremor. LUE pronator drift, strength 5-/5, LLE 5-/5. RUE and bilateral LE strength 5/5- some pain limitation in LE.  Sensation: Intact to light touch in 4 extremities  Slight tremor b/l upper extremities       LABS:                        7.8    11.16 )-----------( 448      ( 23 Mar 2024 04:41 )             23.1    03-23    136  |  101  |  7.3<L>  ----------------------------<  132<H>  3.5   |  22.0  |  0.37<L>    Ca    7.5<L>      23 Mar 2024 04:41  Phos  1.1     03-23  Mg     2.1     03-23    TPro  5.8<L>  /  Alb  3.1<L>  /  TBili  0.4  /  DBili  x   /  AST  21  /  ALT  30  /  AlkPhos  47  03-23        IMAGING:     Xray Sacrum + Coccyx (03.20.24 @ 11:28)  IMPRESSION: Limited study without obvious fracture.    Xray Lumbosacral Spine (03.20.24 @ 11:29)   Findings:    There is no fracture, subluxation or paravertebral soft tissue swelling.  The pedicles and sacroiliac joints are intact.  Straightening of lordosis may be positional versus muscle spasm  The sacrum and visualized coccyx are intact    Large amount of stool in the visualized colon without gross bowel   obstruction.    IMPRESSION: No fracture or subluxation.    US Duplex Venous Lower Ext Complete, Bilateral (03.20.24 @ 12:20)  IMPRESSION:  No evidence of deep venous thrombosis in either lower extremity.    CT Head No Cont (03.16.24 @ 03:22)   IMPRESSION: No change in mild ventricular dilatation with intraventricular hemorrhage and hemorrhage in the frontal lobe midline and corpus callosum after extraventricular drain removal since 3/15/2024.    CT Head No Cont (03.15.24 @ 05:38)   IMPRESSION: Stable follow-up CT study.     TTE W or WO Ultrasound Enhancing Agent (03.13.24 @ 12:03)   CONCLUSIONS:    1. Left ventricular cavity is normal in size. Left ventricular wall thickness is normal. Left ventricular systolic function is normal with an ejection fraction visually estimated at 65 to 70 %.   2. Normal left ventricular diastolic function.   3. Normal right ventricular cavity size and normal systolic function.   4. The left atrium is normal.   5. The right atrium is normal in size.   6. Fibrocalcific aortic valve sclerosis without stenosis.   7. Mild mitral valve leaflet calcification.   8. Trace mitral regurgitation.   9. Estimated pulmonary artery systolic pressure is 23 mmHg, normal pulmonary artery pressure.    US Duplex Venous Lower Ext Complete, Bilateral (03.12.24 @ 15:23) >  IMPRESSION: No evidence of deep venous thrombosis in either lower extremity.     IR Neuro (03.11.24 @ 09:01)   IMPRESSION:  1. 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 No interval aneurysm recanalization or evidence for pseudoaneurysm.  2. No vasospasm.    CT Head No Cont (03.10.24 @ 12:37)   IMPRESSION: Mildly decreased bilateral mesial frontal parenchymal hemorrhages. Similar anterior interhemispheric acute subarachnoid hemorrhage. Mildly decreased intraventricular hemorrhage within the bilateral lateral   and third ventricles. Decreased ventricular size with near resolution of hydrocephalus. No large midline shift. Basal cisterns are more well visualized on the current examination, this may be due to technique.    MR Angio Head w/ IV Cont (03.09.24 @ 18:57)   IMPRESSION:  MR brain: Intraparenchymal hemorrhage within the medial anterior frontal lobes measuring approximately 3.5 cm, unchanged. There is surrounding edema and extension into the genuine body of corpus callosum, unchanged. Hemorrhagic clot within the LEFT lateral ventricle and third ventricle is unchanged. Minimal hemorrhage seen in the dependent portions of the RIGHT lateral ventricle unchanged. Minimal subarachnoid hemorrhage seen along the medial sulci of the BILATERAL frontal and parietal lobes as well as scattered throughout the sulci of the brain. RIGHT frontal ventriculostomy catheter tip is seen extending toward the body of the   LEFT ventricle and third ventricle.  Coil material is seen in the region of the anterior communicating artery on the RIGHT.   Blood products are seen in the region of the known aneurysm possibly indicating partial patency.    MRA intracranial:   Coil material is seen in the region of the anterior communicating artery. There is 1 x 4 mm region of signal seen on the noncontrast but not the postcontrast which may reflect blood products within the aneurysm indicating patency.    CT Head No Cont (03.07.24 @ 05:56)   IMPRESSION:   persistent intracranial hemorrhage within the BILATERAL lateral and third ventricles, LEFT greater than RIGHT, with mild obstructive hydrocephalus slightly increased. RIGHT frontal shunt catheter tip seen in body of RIGHT lateral ventricle unchanged. Subarachnoid hemorrhage again noted in the BILATERAL medial frontal lobes with hemorrhage in the anterior corpus callosum, LEFT greaterthan RIGHT.     CT Head No Cont (03.06.24 @ 19:20)   IMPRESSION: Interval placement of right frontal approach ventriculostomy catheter since the prior study performed 3 hours ago, with improved hydrocephalus and slightly decreased sulcal/cisternal bilateral subarachnoid hemorrhage.    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 Intraventricular hemorrhage is present with hydrocephalus.    IR Neuro (03.06.24 @ 12:13)   IMPRESSION:  1. 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  2. Status postballoon assisted coil embolization, the aneurysm is nearly completely occluded other than a 2.2 mm x 1.4 mm x 4.3 mm aneurysmal neck remnant.    Study Date: 03-19-24  Duration: 22 minutes  EEG Classification / Summary:  Normal routine EEG in the awake state, within the limits of interpretation.  Interpretation significantly limited by continuous muscle artifact.  Clinical Impression:   Technically limited study due to continuous muscle artifact. No obvious abnormalities noted.  Consider repeating EEG if clinically warranted.          [facility] Stroke Team  Progress Note     : Son & daughter at bedside per patient request  HPI:  72 y/o mandarin speaking female with PMH of HLD and osteoporosis presents to PST with complaints of having cerebral aneurysm. States in 10/2023 she started to have B/L hand numbness. At that time she had MRI brain, MRA head and neck. MRA showed an ACOMM aneurysm. She underwent a cerebral angiogram by José Manuel Villaseñor at [facility] on December 7th, 2023, with the intent to treat this month. The aneurysm is described as a 5.5x4.6x3.6mm wide necked acomm aneurysm that projects medio-superiorly. Reports occasional headaches, described as numbness, 5/10 in severity, worse with laying down, relieved with sitting up or standing. Patient underwent diagnostic angiogram 2/14/24 which confirmed acomm aneurysm. On 3/6 patient underwent aneurysm embolization with coiling complicated by aneurysm rupture, controlled with coils and balloon tamponade. NISHANT CT showed SAH b/l frontal lobes and R sylvian fissure along with contrast staining. Patient admitted to NSICU for post op care.. EVD now removed per neurosurgery 3/15. Transferred to neurosurgery service 3/16, stroke team asked to consult, transferred to Stroke service 3/18.     SUBJECTIVE: Patient seen and examined at bedside. Patient sitting up in bed, smiling, appears in NAD. Offers no complaints. No events overnight.  No new neurologic complaints.  ROS reported negative unless otherwise noted.    MEDICATIONS:  acetaminophen     Tablet .. 650 milliGRAM(s) Oral every 6 hours PRN  bromocriptine Capsule 10 milliGRAM(s) Oral daily  enoxaparin Injectable 30 milliGRAM(s) SubCutaneous every 24 hours  hydrALAZINE Injectable 10 milliGRAM(s) IV Push every 2 hours PRN  iohexol 300 mG (iodine)/mL Oral Solution 30 milliLiter(s) Oral once PRN  labetalol Injectable 10 milliGRAM(s) IV Push every 2 hours PRN  lactobacillus acidophilus 1 Tablet(s) Oral daily  lidocaine   4% Patch 1 Patch Transdermal every 24 hours  multivitamin 1 Tablet(s) Oral daily  niMODipine Oral Solution 60 milliGRAM(s) Oral every 4 hours  nystatin Powder 1 Application(s) Topical two times a day  ondansetron Injectable 4 milliGRAM(s) IV Push every 6 hours PRN  potassium phosphate / sodium phosphate Powder (PHOS-NaK) 1 Packet(s) Oral once  potassium phosphate IVPB 30 milliMole(s) IV Intermittent once  QUEtiapine 12.5 milliGRAM(s) Oral at bedtime  simvastatin 10 milliGRAM(s) Oral at bedtime  sodium chloride 0.9%. 1000 milliLiter(s) IV Continuous <Continuous>  tamsulosin 0.8 milliGRAM(s) Oral at bedtime      PHYSICAL EXAM:   Vital Signs Last 24 Hrs  T(C): 36.4 (23 Mar 2024 04:09), Max: 37.6 (22 Mar 2024 08:22)  T(F): 97.5 (23 Mar 2024 04:09), Max: 99.6 (22 Mar 2024 08:22)  HR: 91 (23 Mar 2024 04:09) (88 - 98)  BP: 102/57 (23 Mar 2024 04:09) (102/57 - 157/80)  BP(mean): --  RR: 18 (23 Mar 2024 04:09) (16 - 18)  SpO2: 97% (23 Mar 2024 04:09) (95% - 100%)    Parameters below as of 23 Mar 2024 04:09  Patient On (Oxygen Delivery Method): room air      PHYSICAL EXAM:  General: No acute distress.   HEENT: Head normocephalic, atraumatic. Conjunctivae clear w/o exudates or hemorrhage. Sclera non-icteric. Nares are patent bilaterally. Mucous membranes pink and moist.  No tonsillar swelling or exudates.    Neck: Supple, no adenopathy. Trachea midline. No JVD.  Cardiac: Normal rate and rhythm. S1, S2 auscultated. No murmurs, gallops, or rubs.     Respiratory: Lung sounds clear in all fields. Chest wall symmetric, nontender.   Abdominal: Soft, nondistended, nontender. Bowel sounds normoactive x 4 quadrants. No masses, hepatomegaly, or splenomegaly.   Skin: Skin is warm, dry and intact without rashes or lesions. Appropriate color for ethnicity. Nailbeds pink with no cyanosis or clubbing.   Extremities: No edema, 2+ peripheral pulses in b/l upper and b/l lower extremities. Full range of motion in all joints.     NEUROLOGICAL EXAM:  Mental status: The patient is awake and alert and has normal attention span.  The patient is fully oriented in 3 spheres. The patient is oriented to current events. The patient is able to name objects, follow commands, repeat sentences.    Cranial nerves: slight left facial palsy, Pupils equal and react symmetrically to light. There is no visual field deficit to confrontation. Extraocular motion is full with no nystagmus. There is no ptosis. Facial sensation is intact.   Motor: There is normal bulk and tone. There is no tremor. LUE pronator drift, strength 5-/5, LLE 5-/5. RUE and bilateral LE strength 5/5- some pain limitation in LE.  Sensation: Intact to light touch in 4 extremities  Slight tremor b/l upper extremities       LABS:                        7.8    11.16 )-----------( 448      ( 23 Mar 2024 04:41 )             23.1    03-23    136  |  101  |  7.3<L>  ----------------------------<  132<H>  3.5   |  22.0  |  0.37<L>    Ca    7.5<L>      23 Mar 2024 04:41  Phos  1.1     03-23  Mg     2.1     03-23    TPro  5.8<L>  /  Alb  3.1<L>  /  TBili  0.4  /  DBili  x   /  AST  21  /  ALT  30  /  AlkPhos  47  03-23        IMAGING:     Xray Sacrum + Coccyx (03.20.24 @ 11:28)  IMPRESSION: Limited study without obvious fracture.    Xray Lumbosacral Spine (03.20.24 @ 11:29)   Findings:    There is no fracture, subluxation or paravertebral soft tissue swelling.  The pedicles and sacroiliac joints are intact.  Straightening of lordosis may be positional versus muscle spasm  The sacrum and visualized coccyx are intact    Large amount of stool in the visualized colon without gross bowel   obstruction.    IMPRESSION: No fracture or subluxation.    US Duplex Venous Lower Ext Complete, Bilateral (03.20.24 @ 12:20)  IMPRESSION:  No evidence of deep venous thrombosis in either lower extremity.    CT Head No Cont (03.16.24 @ 03:22)   IMPRESSION: No change in mild ventricular dilatation with intraventricular hemorrhage and hemorrhage in the frontal lobe midline and corpus callosum after extraventricular drain removal since 3/15/2024.    CT Head No Cont (03.15.24 @ 05:38)   IMPRESSION: Stable follow-up CT study.     TTE W or WO Ultrasound Enhancing Agent (03.13.24 @ 12:03)   CONCLUSIONS:    1. Left ventricular cavity is normal in size. Left ventricular wall thickness is normal. Left ventricular systolic function is normal with an ejection fraction visually estimated at 65 to 70 %.   2. Normal left ventricular diastolic function.   3. Normal right ventricular cavity size and normal systolic function.   4. The left atrium is normal.   5. The right atrium is normal in size.   6. Fibrocalcific aortic valve sclerosis without stenosis.   7. Mild mitral valve leaflet calcification.   8. Trace mitral regurgitation.   9. Estimated pulmonary artery systolic pressure is 23 mmHg, normal pulmonary artery pressure.    US Duplex Venous Lower Ext Complete, Bilateral (03.12.24 @ 15:23) >  IMPRESSION: No evidence of deep venous thrombosis in either lower extremity.     IR Neuro (03.11.24 @ 09:01)   IMPRESSION:  1. 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 No interval aneurysm recanalization or evidence for pseudoaneurysm.  2. No vasospasm.    CT Head No Cont (03.10.24 @ 12:37)   IMPRESSION: Mildly decreased bilateral mesial frontal parenchymal hemorrhages. Similar anterior interhemispheric acute subarachnoid hemorrhage. Mildly decreased intraventricular hemorrhage within the bilateral lateral   and third ventricles. Decreased ventricular size with near resolution of hydrocephalus. No large midline shift. Basal cisterns are more well visualized on the current examination, this may be due to technique.    MR Angio Head w/ IV Cont (03.09.24 @ 18:57)   IMPRESSION:  MR brain: Intraparenchymal hemorrhage within the medial anterior frontal lobes measuring approximately 3.5 cm, unchanged. There is surrounding edema and extension into the genuine body of corpus callosum, unchanged. Hemorrhagic clot within the LEFT lateral ventricle and third ventricle is unchanged. Minimal hemorrhage seen in the dependent portions of the RIGHT lateral ventricle unchanged. Minimal subarachnoid hemorrhage seen along the medial sulci of the BILATERAL frontal and parietal lobes as well as scattered throughout the sulci of the brain. RIGHT frontal ventriculostomy catheter tip is seen extending toward the body of the   LEFT ventricle and third ventricle.  Coil material is seen in the region of the anterior communicating artery on the RIGHT.   Blood products are seen in the region of the known aneurysm possibly indicating partial patency.    MRA intracranial:   Coil material is seen in the region of the anterior communicating artery. There is 1 x 4 mm region of signal seen on the noncontrast but not the postcontrast which may reflect blood products within the aneurysm indicating patency.    CT Head No Cont (03.07.24 @ 05:56)   IMPRESSION:   persistent intracranial hemorrhage within the BILATERAL lateral and third ventricles, LEFT greater than RIGHT, with mild obstructive hydrocephalus slightly increased. RIGHT frontal shunt catheter tip seen in body of RIGHT lateral ventricle unchanged. Subarachnoid hemorrhage again noted in the BILATERAL medial frontal lobes with hemorrhage in the anterior corpus callosum, LEFT greaterthan RIGHT.     CT Head No Cont (03.06.24 @ 19:20)   IMPRESSION: Interval placement of right frontal approach ventriculostomy catheter since the prior study performed 3 hours ago, with improved hydrocephalus and slightly decreased sulcal/cisternal bilateral subarachnoid hemorrhage.    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 Intraventricular hemorrhage is present with hydrocephalus.    IR Neuro (03.06.24 @ 12:13)   IMPRESSION:  1. 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  2. Status postballoon assisted coil embolization, the aneurysm is nearly completely occluded other than a 2.2 mm x 1.4 mm x 4.3 mm aneurysmal neck remnant.    Study Date: 03-19-24  Duration: 22 minutes  EEG Classification / Summary:  Normal routine EEG in the awake state, within the limits of interpretation.  Interpretation significantly limited by continuous muscle artifact.  Clinical Impression:   Technically limited study due to continuous muscle artifact. No obvious abnormalities noted.  Consider repeating EEG if clinically warranted.

## 2024-03-23 NOTE — PROGRESS NOTE ADULT - ASSESSMENT
73y  Female with h/o HLD and osteoporosis. Patient presented  3/6 for cerebral angiogram with aneurysm embolization via balloon assisted coiling. Patient had MRI / MRA brain 2/2 hand numbness which had incidental finding of acomm aneurysm. Patient underwent diagnostic angiogram 2/14/24 which confirmed acomm aneurysm. On 3/6 patient underwent aneurysm embolization with coiling complicated by aneurysm rupture, controlled with coils and balloon tamponade. NISHANT CT showed SAH b/l frontal lobes and R sylvian fissure along with contrast staining. Patient admitted to NSICU for post op care, EVD. PAtient had EVD removed 3/15. Hospital course with fever on 3/8 and was started on Zosyn 3/9 and completed 7 days on 3/16. Transferred to neurosurgery service 3/16, stroke team asked to consult, transferred to Stroke service 3/18. Patient had been having low grade fevers up until fever to 101.3 on 3/20.       Fever  Leukocytosis  SIRS  Back pain       - Blood cultures 3/12 no growth   - Repeat blood cultures ordered   - RVP/COVID 19 PCR 3/9 negative   - Urine Cx pending   - CXR 3/19 wit no PNA   -  imaging as above- +osteopenia, no fractures  - UA 3/19 with no pyuria   - Procalcitonin level 0.18  - Monitor off antibiotics fo now since clinically non toxic   - Hold off on PICC/Midline for now unless needed for reasons other than infectious diseases  - Follow up cultures  - Trend Fever  - Trend WBC      Will Follow

## 2024-03-23 NOTE — PROGRESS NOTE ADULT - ASSESSMENT
ASSESSMENT: 74 y/o mandarin speaking female with PMH of HLD and osteoporosis presents to [facility] with complaints of having cerebral aneurysm. States in 10/2023 she started to have B/L hand numbness. At that time she had MRI brain, MRA head and neck. MRA showed an ACOMM aneurysm. She underwent a cerebral angiogram by José Manuel Villaseñor at [facility] on December 7th, 2023, with the intent to treat this month. The aneurysm is described as a 5.5x4.6x3.6mm wide necked acomm aneurysm that projects medio-superiorly. Reports occasional headaches, described as numbness, 5/10 in severity, worse with laying down, relieved with sitting up or standing. Patient underwent diagnostic angiogram 2/14/24 which confirmed acomm aneurysm. On 3/6 patient underwent aneurysm embolization with coiling complicated by aneurysm rupture, controlled with coils and balloon tamponade. NISHANT CT showed SAH b/l frontal lobes and R sylvian fissure along with contrast staining. EVD placed  3/6, removed by neurosurgery 3/15. Transferred to Stroke service 3/18.   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       Neurologically intact.  Still having low grade fevers of unclear etiology. Patient reporting abdominal pain today and continued diarrhea.    NEURO:   #ACOMM aneurysm elective procedure complicated by aneurysm rupture controlled with coils/balloon tamponade  #SAH  #EVD 3/6, removed 3/15  #Metabolic encephalopathy  #Tremors  -Neurologically without acute change   -Continue close monitoring for neurologic deterioration    -Stroke neuro checks q4hrs   -EEG without evidence of seizure   -MRI Brain w/o, MRA Head w/o and Neck w/contrast results as above   -Will need repeat MRI brain w/wo contrast in 4-6 weeks (4/3/24 is 4-week anneliese from aneurysm embolization)  -s/p Keppra  -nimodipine 60mg dc'd on 3/21- per Neurosurgery  -bromocriptine tid due to ? central fevers, will continue to titrate.   -Dysphagia screen: pass, on easy to chew diet  -Physical therapy/OT/Speech eval/treatment.            #Stroke prevention:  -SBP goal  per neuro IR recommendations, avoiding rapid fluctuations and hypotension   -ANTITHROMBOTIC THERAPY: n/a   -LDL- 55 , continue home regimen if applicable   -HA1C 5.8  -TTE noted above    CARDIOVASCULAR:   -TTE noted above  -cardiac monitoring w/ telemetry for now, further evaluation pending findings of noted workup                              HEMATOLOGY:   -Normocytic anemia, H/H 8.4/24.4  -Guiac negative   -Thromocytosis w/ platelets to 465  -Continue close monitoring, CBC q daily, no active bleeding noted  -Patient should have all age and risk appropriate malignancy screenings with PCP or sooner if clinically suspected   -DVT ppx: Heparin s.c [] LMWH [X]     PULMONARY:   -most recent CXR 3/19 with persistent right base infiltrate  -s/p zosyn for possible pneumonia  -Lung sounds clear on exam today  -Protecting airway, saturating well   -Consider CT chest if fever persists     RENAL/METABOLIC:   #Urinary retention  -Urinary retention s/p void trial- feliciano re-inserted 3/19/24  #Hyponatremia #Hypokalemia #Hypophosphatemia #Hypocalcemia  -Electrolyte derangements likely due to diarrhea, continue to monitor  -IVF in place with gradual improvement in hyponatremia - titrated to 75cc/hr , monitor closely   -Na improving gradually, 129-->138   -Potassium 2.9 --> 3.5  -Phosphorus 1.4, repletion ordered, 1.1 today, additional PO and IV repletion ordered and pending   -Calcium 7.5, albumin 3.1, corrected calcium 8.2, repletion ordered and pending   -BUN/Cr without acute change   -Maintain adequate hydration  -Tamsulosin 0.8mg bedtime   -Na Goal:  135-145    ID:   -ID consulted  -s/p zosyn for possible pneumonia  -Fluctuating fevers - Tmax 101.1,  possible central fevers but will need to screen for alternate causes and have ID follow up.   -Leukocytosis resolved   -Monitor and screen for si/sx of infection   -Abdomen distended, diarrhea- abdominal imaging revealed fecal.  enema and mag citrate as recommended by hospitalist ACP team.     GI:  #diarrhea  -Abdominal XR 3/13 noted  -s/p rectal tube, removed 3/17  -monitor BM, loose stool possible side effect of nimodipine  -PO calcium d/c'd   -CT Abdomen & Pelvis w/ Oral Contrast ordered to rule out infectious process/cause  -GI PCR ordered and pending   -Diet: Easy to Chew, Lactose Restricted, Ensure Clear TID    PSYCH:  -Quetiapine 12.5mg at bedtime  -Delirium precautions    ORTHo;  -XR lumbar /sacral as noted  -Possible meningeal irritation, Will give lidocaine patch.    -MRI L spine pending    OTHER: Condition and plan of care d/w patient, questions and concerns addressed. Discussed with family at bedside, medical team at bedside as well present for further evaluation and recommendations.     DISPOSITION: PT/OT recommending acute rehab once medically stable for discharge    CORE MEASURES:        Admission NIHSS: n/a      Tenecteplase : [] YES [X] NO      LDL/HDL/A1C: pending lipid panel/ HA1C 5.8     Depression Screen- if depression hx and/or present      Statin Therapy: simvastatin 10mg     Dysphagia Screen: [X] PASS [] FAIL     Smoking [] YES [X] NO      Afib [] YES [X] NO     Stroke Education [] YES [] NO [X] pending       ASSESSMENT: 72 y/o mandarin speaking female with PMH of HLD and osteoporosis presents to [facility] with complaints of having cerebral aneurysm. States in 10/2023 she started to have B/L hand numbness. At that time she had MRI brain, MRA head and neck. MRA showed an ACOMM aneurysm. She underwent a cerebral angiogram by José Manuel Villaseñor at [facility] on December 7th, 2023, with the intent to treat this month. The aneurysm is described as a 5.5x4.6x3.6mm wide necked acomm aneurysm that projects medio-superiorly. Reports occasional headaches, described as numbness, 5/10 in severity, worse with laying down, relieved with sitting up or standing. Patient underwent diagnostic angiogram 2/14/24 which confirmed acomm aneurysm. On 3/6 patient underwent aneurysm embolization with coiling complicated by aneurysm rupture, controlled with coils and balloon tamponade. NISHANT CT showed SAH b/l frontal lobes and R sylvian fissure along with contrast staining. EVD placed  3/6, removed by neurosurgery 3/15. Transferred to Stroke service 3/18.   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       Neurologically intact.  Still having low grade fevers of unclear etiology. Patient reporting abdominal pain today and continued diarrhea.    NEURO:   #ACOMM aneurysm elective procedure complicated by aneurysm rupture controlled with coils/balloon tamponade  #SAH  #EVD 3/6, removed 3/15  #Metabolic encephalopathy  #Tremors  -Neurologically without acute change   -Continue close monitoring for neurologic deterioration    -Stroke neuro checks q4hrs   -EEG without evidence of seizure   -MRI Brain w/o, MRA Head w/o and Neck w/contrast results as above   -Will need repeat MRI brain w/wo contrast in 4-6 weeks (4/3/24 is 4-week anneliese from aneurysm embolization)  -s/p Keppra  -nimodipine 60mg dc'd on 3/21- per Neurosurgery  -bromocriptine tid due to ? central fevers, will continue to titrate.   -Dysphagia screen: pass, on easy to chew diet  -Physical therapy/OT/Speech eval/treatment.            #Stroke prevention:  -SBP goal  per neuro IR recommendations, avoiding rapid fluctuations and hypotension   -ANTITHROMBOTIC THERAPY: n/a   -LDL- 55 , continue home regimen if applicable   -HA1C 5.8  -TTE noted above    CARDIOVASCULAR:   -TTE noted above  -cardiac monitoring w/ telemetry for now, further evaluation pending findings of noted workup                              HEMATOLOGY:   -Normocytic anemia, H/H 7.8/23.1  -Repeat Guiac pending  -Heme-onc c/s called, awaiting recs  -Iron studies, B12, Folate WNL  -Pending homocysteine and MMA   -Thromocytosis w/ platelets to 448  -Continue close monitoring, CBC q daily, no active bleeding noted  -Patient should have all age and risk appropriate malignancy screenings with PCP or sooner if clinically suspected   -DVT ppx: Heparin s.c [] LMWH [X]     PULMONARY:   -most recent CXR 3/19 with persistent right base infiltrate  -s/p zosyn for possible pneumonia  -Lung sounds clear on exam today  -Protecting airway, saturating well   -Consider CT chest if fever persists     RENAL/METABOLIC:   #Urinary retention  -Urinary retention s/p void trial- feliciano re-inserted 3/19/24  #Hyponatremia #Hypokalemia #Hypophosphatemia #Hypocalcemia  -Electrolyte derangements likely due to diarrhea, continue to monitor  -IVF in place with gradual improvement in hyponatremia - titrated to 75cc/hr , monitor closely   -Na improving gradually, 129-->138   -Potassium 2.9 --> 3.5  -Phosphorus 1.4, repletion ordered, 1.1 today, additional PO and IV repletion ordered and pending   -Calcium 7.5, albumin 3.1, corrected calcium 8.2, repletion ordered and pending   -BUN/Cr without acute change   -Maintain adequate hydration  -Tamsulosin 0.8mg bedtime   -Na Goal:  135-145    ID:   -ID consulted  -s/p zosyn for possible pneumonia  -Fluctuating fevers - Tmax 101.1,  possible central fevers but will need to screen for alternate causes and have ID follow up.   -Leukocytosis resolved   -Monitor and screen for si/sx of infection   -Abdomen distended, diarrhea- abdominal imaging revealed fecal.  enema and mag citrate as recommended by hospitalist ACP team.     GI:  #diarrhea  -Abdominal XR 3/13 noted  -s/p rectal tube, removed 3/17  -monitor BM, loose stool possible side effect of nimodipine  -PO calcium d/c'd   -CT Abdomen & Pelvis w/ Oral Contrast ordered to rule out infectious process/cause  -GI PCR ordered and pending   -Diet: Easy to Chew, Lactose Restricted, Ensure Clear TID    PSYCH:  -Quetiapine 12.5mg at bedtime  -Delirium precautions    ORTHo;  -XR lumbar /sacral as noted  -MRI L spine d/c'd, patient reports her back feels better today    OTHER: Condition and plan of care d/w patient, questions and concerns addressed. Discussed with family at bedside, medical team at bedside as well present for further evaluation and recommendations.     DISPOSITION: PT/OT recs for Acute Rehab -- patient and family in agreement     CORE MEASURES:        Admission NIHSS: n/a      Tenecteplase : [] YES [X] NO      LDL/HDL/A1C: pending lipid panel/ HA1C 5.8     Depression Screen- if depression hx and/or present      Statin Therapy: simvastatin 10mg     Dysphagia Screen: [X] PASS [] FAIL     Smoking [] YES [X] NO      Afib [] YES [X] NO     Stroke Education [] YES [] NO [X] pending       ASSESSMENT: 74 y/o mandarin speaking female with PMH of HLD and osteoporosis presents to [facility] with complaints of having cerebral aneurysm. States in 10/2023 she started to have B/L hand numbness. At that time she had MRI brain, MRA head and neck. MRA showed an ACOMM aneurysm. She underwent a cerebral angiogram by José Manuel Villaseñor at [facility] on December 7th, 2023, with the intent to treat this month. The aneurysm is described as a 5.5x4.6x3.6mm wide necked acomm aneurysm that projects medio-superiorly. Reports occasional headaches, described as numbness, 5/10 in severity, worse with laying down, relieved with sitting up or standing. Patient underwent diagnostic angiogram 2/14/24 which confirmed acomm aneurysm. On 3/6 patient underwent aneurysm embolization with coiling complicated by aneurysm rupture, controlled with coils and balloon tamponade. NISHANT CT showed SAH b/l frontal lobes and R sylvian fissure along with contrast staining. EVD placed  3/6, removed by neurosurgery 3/15. Transferred to Stroke service 3/18.   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       Neurologically intact.  Still having low grade fevers of unclear etiology. Patient reporting decreased abdominal pain today and decreased but continued diarrhea.  Patient encouraged to ambulate in hallway and room and get out of bed to chair.    NEURO:   #ACOMM aneurysm elective procedure complicated by aneurysm rupture controlled with coils/balloon tamponade  #SAH  #EVD 3/6, removed 3/15  #Metabolic encephalopathy  #Tremors  -Neurologically without acute change   -Continue close monitoring for neurologic deterioration    -Stroke neuro checks q4hrs   -EEG without evidence of seizure   -MRI Brain w/o, MRA Head w/o and Neck w/contrast results as above   -Will need repeat MRI brain w/wo contrast in 4-6 weeks (4/3/24 is 4-week anneliese from aneurysm embolization)  -s/p Keppra  -nimodipine 60mg dc'd on 3/21- per Neurosurgery  -bromocriptine tid due to ? central fevers, will continue to titrate.   -Dysphagia screen: pass, on easy to chew diet  -Physical therapy/OT/Speech eval/treatment.            #Stroke prevention:  -SBP goal  per neuro IR recommendations, avoiding rapid fluctuations and hypotension   -ANTITHROMBOTIC THERAPY: n/a   -LDL- 55 , continue home regimen if applicable   -HA1C 5.8  -TTE noted above    CARDIOVASCULAR:   -TTE noted above  -cardiac monitoring w/ telemetry for now, further evaluation pending findings of noted workup                              HEMATOLOGY:   -Acute normocytic anemia, H/H 7.8/23.1  -Repeat Guiac pending  -Heme-onc c/s called, awaiting recs  -Iron studies, B12, Folate WNL  -Pending homocysteine and MMA   -Thromocytosis w/ platelets to 448  -Continue close monitoring, CBC q daily, no active bleeding noted  -Patient should have all age and risk appropriate malignancy screenings with PCP or sooner if clinically suspected   -DVT ppx: Heparin s.c [] LMWH [X]     PULMONARY:   -most recent CXR 3/19 with persistent right base infiltrate  -s/p zosyn for possible pneumonia  -Lung sounds clear on exam today  -Protecting airway, saturating well   -Consider CT chest if fever persists     RENAL/METABOLIC:   #Urinary retention  -Urinary retention s/p void trial- feliciano re-inserted 3/19/24  #Hyponatremia #Hypokalemia #Hypophosphatemia #Hypocalcemia  -Electrolyte derangements likely due to diarrhea, continue to monitor  -IVF in place with gradual improvement in hyponatremia - titrated to 75cc/hr , monitor closely   -Na improving gradually, 129-->138   -Potassium 2.9 --> 3.5  -Phosphorus 1.4, repletion ordered, 1.1 today, additional PO and IV repletion ordered and pending   -Calcium 7.5, albumin 3.1, corrected calcium 8.2, repletion ordered and pending   -BUN/Cr without acute change   -Maintain adequate hydration  -Tamsulosin 0.8mg bedtime   -Na Goal:  135-145    ID:   -ID consulted  -s/p zosyn for possible pneumonia  -Fluctuating fevers - Tmax 101.1,  possible central fevers but will need to screen for alternate causes and have ID follow up.   -Leukocytosis resolved   -Monitor and screen for si/sx of infection   -Abdomen distended, diarrhea- abdominal imaging revealed fecal.  enema and mag citrate as recommended by hospitalist ACP team.     GI:  #diarrhea  -Abdominal XR 3/13 noted  -s/p rectal tube, removed 3/17  -monitor BM, loose stool possible side effect of nimodipine  -PO calcium d/c'd   -CT Abdomen & Pelvis w/ Oral Contrast ordered to rule out infectious process/cause  -GI PCR ordered and pending   -Diet: Easy to Chew, Lactose Restricted, Ensure Clear TID    PSYCH:  -Quetiapine 12.5mg at bedtime  -Delirium precautions    ORTHo;  -XR lumbar /sacral as noted  -MRI L spine d/c'd, patient reports her back feels better today    OTHER: Condition and plan of care d/w patient, questions and concerns addressed. Discussed with family at bedside, medical team at bedside as well present for further evaluation and recommendations.     DISPOSITION: PT/OT recs for Acute Rehab -- patient and family in agreement     CORE MEASURES:        Admission NIHSS: n/a      Tenecteplase : [] YES [X] NO      LDL/HDL/A1C: pending lipid panel/ HA1C 5.8     Depression Screen- if depression hx and/or present      Statin Therapy: simvastatin 10mg     Dysphagia Screen: [X] PASS [] FAIL     Smoking [] YES [X] NO      Afib [] YES [X] NO     Stroke Education [] YES [] NO [X] pending

## 2024-03-23 NOTE — CONSULT NOTE ADULT - SUBJECTIVE AND OBJECTIVE BOX
HPI:  72 y/o mandarin speaking female with PMH of HLD and osteoporosis presents to [facility] with complaints of having cerebral aneurysm. States in 10/2023 she started to have B/L hand numbness. At that time she had MRI brain, MRA head and neck. MRA showed an ACOMM aneurysm. She underwent a cerebral angiogram by José Manuel Villaseñor at [facility] on December 7th, 2023, with the intent to treat this month. The aneurysm is described as a 5.5x4.6x3.6mm wide necked acomm aneurysm that projects medio-superiorly. Reports occasional headaches, described as numbness, 5/10 in severity, worse with laying down, relieved with sitting up or standing.   Patient underwent diagnostic angiogram 2/14/24 which confirmed acomm aneurysm. On 3/6 patient underwent aneurysm embolization with coiling complicated by aneurysm rupture, controlled with coils and balloon tamponade. NISHANT CT showed SAH b/l frontal lobes and R sylvian fissure along with contrast staining. VD now removed per neurosurgery 3/15. Transferred to Stroke service 3/18.     Daugther in law/son at her bedside.   She has been having fevers since 3/19, better for the last day.  Mentally she has improved over the last 2 days.   No bleeding noted, no melena/BRBPR.  CXR negative, blood cultures 3/21 negative.        Allergies    No Known Allergies    Intolerances    lactose (Diarrhea)      MEDICATIONS  (STANDING):  bromocriptine Capsule 10 milliGRAM(s) Oral daily  enoxaparin Injectable 30 milliGRAM(s) SubCutaneous every 24 hours  lactobacillus acidophilus 1 Tablet(s) Oral daily  lidocaine   4% Patch 1 Patch Transdermal every 24 hours  multivitamin 1 Tablet(s) Oral daily  niMODipine Oral Solution 60 milliGRAM(s) Oral every 4 hours  nystatin Powder 1 Application(s) Topical two times a day  QUEtiapine 12.5 milliGRAM(s) Oral at bedtime  simvastatin 10 milliGRAM(s) Oral at bedtime  sodium chloride 0.9%. 1000 milliLiter(s) (50 mL/Hr) IV Continuous <Continuous>  tamsulosin 0.8 milliGRAM(s) Oral at bedtime    MEDICATIONS  (PRN):  acetaminophen     Tablet .. 650 milliGRAM(s) Oral every 6 hours PRN Temp greater or equal to 38C (100.4F)  hydrALAZINE Injectable 10 milliGRAM(s) IV Push every 2 hours PRN SBP > 160  iohexol 300 mG (iodine)/mL Oral Solution 30 milliLiter(s) Oral once PRN Prior to CT Abdomen & Pelvis per CT protocol, thank you  labetalol Injectable 10 milliGRAM(s) IV Push every 2 hours PRN Systolic blood pressure > 160  ondansetron Injectable 4 milliGRAM(s) IV Push every 6 hours PRN Nausea and/or Vomiting      PAST MEDICAL & SURGICAL HISTORY:  HLD (hyperlipidemia)      Cerebral aneurysm      Osteoporosis      History of cerebral angiography          FAMILY HISTORY:  Family history of CVA        SOCIAL HISTORY: No EtOH, no tobacco    REVIEW OF SYSTEMS:    negative per HPI      T(F): 98.5 (03-23-24 @ 15:09), Max: 99.4 (03-22-24 @ 19:46)  HR: 76 (03-23-24 @ 15:09)  BP: 108/66 (03-23-24 @ 15:09)  RR: 18 (03-23-24 @ 15:09)  SpO2: 97% (03-23-24 @ 15:09)  Wt(kg): --    GENERAL: NAD  HEAD:  wound healed  EYES: anicteric  NECK: supple  CHEST/LUNG: Clear to auscultation bilaterally; No wheeze  HEART: Regular rate and rhythm  ABDOMEN: Soft, Nontender, Nondistended; Bowel sounds present  EXTREMITIES: no edema  SKIN: No rashes, scattered ecchymosis. small spot of erythema on forearm                          7.8    11.16 )-----------( 448      ( 23 Mar 2024 04:41 )             23.1       03-23    136  |  101  |  7.3<L>  ----------------------------<  132<H>  3.5   |  22.0  |  0.37<L>    Ca    7.5<L>      23 Mar 2024 04:41  Phos  1.1     03-23  Mg     2.1     03-23    TPro  5.8<L>  /  Alb  3.1<L>  /  TBili  0.4  /  DBili  x   /  AST  21  /  ALT  30  /  AlkPhos  47  03-23      Magnesium: 2.1 mg/dL (03-23 @ 04:41)  Phosphorus: 1.1 mg/dL (03-23 @ 04:41)  Magnesium: 2.2 mg/dL (03-22 @ 16:45)  Phosphorus: 1.4 mg/dL (03-22 @ 16:45)

## 2024-03-23 NOTE — CONSULT NOTE ADULT - ASSESSMENT
This is a 73 year old female with a history of HLD and osteoporosis, admitted with aneurysm, s/p embolization/coiling complicated by rupture, SAH.    Now with recent fevers since 3/19, better.     She has a normocytic anemia that has developed with the hospitalization.  Suspect related to suppression from acute illness, need to ensure no bleeding.  No anemia prior to this.   No evidence of iron/B12/folate deficiency.   Will check her LDH, haptoglobin, SPEP/MG to rule out hemolysis/myeloma.  Check stool occult blood.   If her Hg continues to trend to below 7, plan for transfusion and consider GI evaluation.   If she spikes temp, strongly encourage CT imaging.     Will follow.   Thank you      Chana Ocampo D.O.   Hematology/Oncology  [facility] [place]  [phone]           This is a 73 year old female with a history of HLD and osteoporosis, admitted with aneurysm, s/p embolization/coiling complicated by rupture, SAH.    Now with recent fevers since 3/19, better.     She has a normocytic anemia that has developed with the hospitalization.  Suspect related to suppression from acute illness, need to ensure no bleeding.  No anemia prior to this.   No evidence of iron/B12/folate deficiency.   Will check her LDH, haptoglobin, SPEP/MG to rule out hemolysis/myeloma.  Check stool occult blood.   If her Hg continues to trend to below 7, plan for transfusion and consider GI evaluation.   If she spikes temp, strongly encourage CT imaging.     Leukocytosis/thrombocytosis reactive.    Will follow.   Thank you      Chana Ocampo D.O.   Hematology/Oncology  [facility] [place]  [phone]

## 2024-03-24 LAB
ALBUMIN SERPL ELPH-MCNC: 3.1 G/DL — LOW (ref 3.3–5.2)
ALP SERPL-CCNC: 47 U/L — SIGNIFICANT CHANGE UP (ref 40–120)
ALT FLD-CCNC: 30 U/L — SIGNIFICANT CHANGE UP
ANION GAP SERPL CALC-SCNC: 10 MMOL/L — SIGNIFICANT CHANGE UP (ref 5–17)
ANION GAP SERPL CALC-SCNC: 10 MMOL/L — SIGNIFICANT CHANGE UP (ref 5–17)
ANION GAP SERPL CALC-SCNC: 8 MMOL/L — SIGNIFICANT CHANGE UP (ref 5–17)
AST SERPL-CCNC: 24 U/L — SIGNIFICANT CHANGE UP
BILIRUB SERPL-MCNC: 0.3 MG/DL — LOW (ref 0.4–2)
BUN SERPL-MCNC: 10 MG/DL — SIGNIFICANT CHANGE UP (ref 8–20)
BUN SERPL-MCNC: 6 MG/DL — LOW (ref 8–20)
BUN SERPL-MCNC: 6.3 MG/DL — LOW (ref 8–20)
CALCIUM SERPL-MCNC: 7.5 MG/DL — LOW (ref 8.4–10.5)
CALCIUM SERPL-MCNC: 7.6 MG/DL — LOW (ref 8.4–10.5)
CALCIUM SERPL-MCNC: 7.7 MG/DL — LOW (ref 8.4–10.5)
CHLORIDE SERPL-SCNC: 96 MMOL/L — SIGNIFICANT CHANGE UP (ref 96–108)
CHLORIDE SERPL-SCNC: 96 MMOL/L — SIGNIFICANT CHANGE UP (ref 96–108)
CHLORIDE SERPL-SCNC: 97 MMOL/L — SIGNIFICANT CHANGE UP (ref 96–108)
CO2 SERPL-SCNC: 23 MMOL/L — SIGNIFICANT CHANGE UP (ref 22–29)
CO2 SERPL-SCNC: 24 MMOL/L — SIGNIFICANT CHANGE UP (ref 22–29)
CO2 SERPL-SCNC: 24 MMOL/L — SIGNIFICANT CHANGE UP (ref 22–29)
CREAT SERPL-MCNC: 0.27 MG/DL — LOW (ref 0.5–1.3)
CREAT SERPL-MCNC: 0.3 MG/DL — LOW (ref 0.5–1.3)
CREAT SERPL-MCNC: 0.33 MG/DL — LOW (ref 0.5–1.3)
EGFR: 111 ML/MIN/1.73M2 — SIGNIFICANT CHANGE UP
EGFR: 113 ML/MIN/1.73M2 — SIGNIFICANT CHANGE UP
EGFR: 116 ML/MIN/1.73M2 — SIGNIFICANT CHANGE UP
GLUCOSE SERPL-MCNC: 102 MG/DL — HIGH (ref 70–99)
GLUCOSE SERPL-MCNC: 94 MG/DL — SIGNIFICANT CHANGE UP (ref 70–99)
GLUCOSE SERPL-MCNC: 99 MG/DL — SIGNIFICANT CHANGE UP (ref 70–99)
HAPTOGLOB SERPL-MCNC: 250 MG/DL — HIGH (ref 34–200)
HCT VFR BLD CALC: 21.9 % — LOW (ref 34.5–45)
HCT VFR BLD CALC: 23 % — LOW (ref 34.5–45)
HGB BLD-MCNC: 7.5 G/DL — LOW (ref 11.5–15.5)
HGB BLD-MCNC: 7.8 G/DL — LOW (ref 11.5–15.5)
LDH SERPL L TO P-CCNC: 285 U/L — HIGH (ref 98–192)
MAGNESIUM SERPL-MCNC: 2 MG/DL — SIGNIFICANT CHANGE UP (ref 1.8–2.6)
MCHC RBC-ENTMCNC: 31.3 PG — SIGNIFICANT CHANGE UP (ref 27–34)
MCHC RBC-ENTMCNC: 31.4 PG — SIGNIFICANT CHANGE UP (ref 27–34)
MCHC RBC-ENTMCNC: 33.9 GM/DL — SIGNIFICANT CHANGE UP (ref 32–36)
MCHC RBC-ENTMCNC: 34.2 GM/DL — SIGNIFICANT CHANGE UP (ref 32–36)
MCV RBC AUTO: 91.6 FL — SIGNIFICANT CHANGE UP (ref 80–100)
MCV RBC AUTO: 92.4 FL — SIGNIFICANT CHANGE UP (ref 80–100)
PHOSPHATE SERPL-MCNC: 1.8 MG/DL — LOW (ref 2.4–4.7)
PLATELET # BLD AUTO: 381 K/UL — SIGNIFICANT CHANGE UP (ref 150–400)
PLATELET # BLD AUTO: 429 K/UL — HIGH (ref 150–400)
POTASSIUM SERPL-MCNC: 3.2 MMOL/L — LOW (ref 3.5–5.3)
POTASSIUM SERPL-MCNC: 3.7 MMOL/L — SIGNIFICANT CHANGE UP (ref 3.5–5.3)
POTASSIUM SERPL-MCNC: 3.7 MMOL/L — SIGNIFICANT CHANGE UP (ref 3.5–5.3)
POTASSIUM SERPL-SCNC: 3.2 MMOL/L — LOW (ref 3.5–5.3)
POTASSIUM SERPL-SCNC: 3.7 MMOL/L — SIGNIFICANT CHANGE UP (ref 3.5–5.3)
POTASSIUM SERPL-SCNC: 3.7 MMOL/L — SIGNIFICANT CHANGE UP (ref 3.5–5.3)
PROT SERPL-MCNC: 5.4 G/DL — LOW (ref 6.6–8.7)
RBC # BLD: 2.39 M/UL — LOW (ref 3.8–5.2)
RBC # BLD: 2.49 M/UL — LOW (ref 3.8–5.2)
RBC # FLD: 14.7 % — HIGH (ref 10.3–14.5)
RBC # FLD: 14.9 % — HIGH (ref 10.3–14.5)
SODIUM SERPL-SCNC: 128 MMOL/L — LOW (ref 135–145)
SODIUM SERPL-SCNC: 129 MMOL/L — LOW (ref 135–145)
SODIUM SERPL-SCNC: 131 MMOL/L — LOW (ref 135–145)
WBC # BLD: 6.61 K/UL — SIGNIFICANT CHANGE UP (ref 3.8–10.5)
WBC # BLD: 9 K/UL — SIGNIFICANT CHANGE UP (ref 3.8–10.5)
WBC # FLD AUTO: 6.61 K/UL — SIGNIFICANT CHANGE UP (ref 3.8–10.5)
WBC # FLD AUTO: 9 K/UL — SIGNIFICANT CHANGE UP (ref 3.8–10.5)

## 2024-03-24 PROCEDURE — 99232 SBSQ HOSP IP/OBS MODERATE 35: CPT

## 2024-03-24 PROCEDURE — 99223 1ST HOSP IP/OBS HIGH 75: CPT

## 2024-03-24 RX ORDER — PANTOPRAZOLE SODIUM 20 MG/1
40 TABLET, DELAYED RELEASE ORAL
Refills: 0 | Status: DISCONTINUED | OUTPATIENT
Start: 2024-03-24 | End: 2024-03-25

## 2024-03-24 RX ORDER — POTASSIUM CHLORIDE 20 MEQ
40 PACKET (EA) ORAL EVERY 4 HOURS
Refills: 0 | Status: COMPLETED | OUTPATIENT
Start: 2024-03-24 | End: 2024-03-24

## 2024-03-24 RX ORDER — CALCIUM GLUCONATE 100 MG/ML
1 VIAL (ML) INTRAVENOUS ONCE
Refills: 0 | Status: COMPLETED | OUTPATIENT
Start: 2024-03-24 | End: 2024-03-24

## 2024-03-24 RX ADMIN — QUETIAPINE FUMARATE 12.5 MILLIGRAM(S): 200 TABLET, FILM COATED ORAL at 21:38

## 2024-03-24 RX ADMIN — NIMODIPINE 60 MILLIGRAM(S): 60 SOLUTION ORAL at 01:42

## 2024-03-24 RX ADMIN — TAMSULOSIN HYDROCHLORIDE 0.8 MILLIGRAM(S): 0.4 CAPSULE ORAL at 21:39

## 2024-03-24 RX ADMIN — NYSTATIN CREAM 1 APPLICATION(S): 100000 CREAM TOPICAL at 05:00

## 2024-03-24 RX ADMIN — Medication 1 TABLET(S): at 12:32

## 2024-03-24 RX ADMIN — Medication 40 MILLIEQUIVALENT(S): at 20:52

## 2024-03-24 RX ADMIN — SODIUM CHLORIDE 50 MILLILITER(S): 9 INJECTION INTRAMUSCULAR; INTRAVENOUS; SUBCUTANEOUS at 04:54

## 2024-03-24 RX ADMIN — SIMVASTATIN 10 MILLIGRAM(S): 20 TABLET, FILM COATED ORAL at 21:38

## 2024-03-24 RX ADMIN — PANTOPRAZOLE SODIUM 40 MILLIGRAM(S): 20 TABLET, DELAYED RELEASE ORAL at 17:49

## 2024-03-24 RX ADMIN — Medication 40 MILLIEQUIVALENT(S): at 12:32

## 2024-03-24 RX ADMIN — ENOXAPARIN SODIUM 30 MILLIGRAM(S): 100 INJECTION SUBCUTANEOUS at 21:39

## 2024-03-24 RX ADMIN — NIMODIPINE 60 MILLIGRAM(S): 60 SOLUTION ORAL at 12:33

## 2024-03-24 RX ADMIN — Medication 40 MILLIEQUIVALENT(S): at 16:35

## 2024-03-24 RX ADMIN — NIMODIPINE 60 MILLIGRAM(S): 60 SOLUTION ORAL at 20:45

## 2024-03-24 RX ADMIN — BROMOCRIPTINE MESYLATE 5 MILLIGRAM(S): 5 CAPSULE ORAL at 12:32

## 2024-03-24 RX ADMIN — Medication 100 GRAM(S): at 17:49

## 2024-03-24 RX ADMIN — NIMODIPINE 60 MILLIGRAM(S): 60 SOLUTION ORAL at 16:36

## 2024-03-24 RX ADMIN — LIDOCAINE 1 PATCH: 4 CREAM TOPICAL at 17:54

## 2024-03-24 RX ADMIN — LIDOCAINE 1 PATCH: 4 CREAM TOPICAL at 21:00

## 2024-03-24 RX ADMIN — NYSTATIN CREAM 1 APPLICATION(S): 100000 CREAM TOPICAL at 17:48

## 2024-03-24 RX ADMIN — PANTOPRAZOLE SODIUM 40 MILLIGRAM(S): 20 TABLET, DELAYED RELEASE ORAL at 05:00

## 2024-03-24 RX ADMIN — NIMODIPINE 60 MILLIGRAM(S): 60 SOLUTION ORAL at 05:00

## 2024-03-24 NOTE — CHART NOTE - NSCHARTNOTESELECT_GEN_ALL_CORE
CSF sample collection/Event Note
Downgrade/Transfer Note
EVD removal/Event Note
Event Note
Neuro Interventional Surgery/Event Note
Neurointerventional Surgery Post-Procedure Note/Event Note
Neurointerventional Surgery Pre Procedure Note/Event Note
Neurointerventional Surgery Pre-Procedure Note/Event Note
Nutrition Services
Nutrition Services
broken evd drain/Event Note
Neurointerventional Surgery Post Procedure Note/Event Note

## 2024-03-24 NOTE — CONSULT NOTE ADULT - SUBJECTIVE AND OBJECTIVE BOX
[place]  Gayla [id number]  HISTORY OF PRESENT ILLNESS: This is a 73y old woman with a past medical history significant for cerebral aneurysm s/p rupture with coiling with bilateral SAH. GI consulted for occult positive stool. The patient has no evidence of active GIB, the stool occult was sent in the work up for anemia. She denies nausea, vomiting, abdominal pain, melena, diarrhea or rectal bleeding.  Of note, she had a previously placed rectal temperature probe and received enemas for constipation.     Her last colonoscopy/endoscopy performed by Dr. Quick in [place] was 5 years prior? h/o polyps.     She denies nsaid use.   She denies family history of GI malignancies.    PAST MEDICAL/SURGICAL HISTORY:  HLD (hyperlipidemia)    Cerebral aneurysm    Osteoporosis    History of cerebral angiography      SOCIAL HISTORY:  - TOBACCO: Denies  - ALCOHOL: Denies  - ILLICIT DRUG USE: Denies    FAMILY HISTORY:  No known history of gastrointestinal or liver disease;  Family history of CVA        HOME MEDICATIONS:  simvastatin 10 mg oral tablet: 1 tab(s) orally once a day (at bedtime) (06 Mar 2024 08:30)  Vascepa 1 g oral capsule: 1 cap(s) orally once a day (07 Mar 2024 09:13)  Vitamin B, C, D, calcium: once a day (06 Mar 2024 08:30)    INPATIENT MEDICATIONS:  MEDICATIONS  (STANDING):  bromocriptine Capsule 5 milliGRAM(s) Oral daily  calcium gluconate IVPB 1 Gram(s) IV Intermittent once  enoxaparin Injectable 30 milliGRAM(s) SubCutaneous every 24 hours  lactobacillus acidophilus 1 Tablet(s) Oral daily  lidocaine   4% Patch 1 Patch Transdermal every 24 hours  multivitamin 1 Tablet(s) Oral daily  niMODipine Oral Solution 60 milliGRAM(s) Oral every 4 hours  nystatin Powder 1 Application(s) Topical two times a day  pantoprazole  Injectable 40 milliGRAM(s) IV Push two times a day  potassium chloride    Tablet ER 40 milliEquivalent(s) Oral every 4 hours  QUEtiapine 12.5 milliGRAM(s) Oral at bedtime  simvastatin 10 milliGRAM(s) Oral at bedtime  sodium chloride 0.9%. 1000 milliLiter(s) (50 mL/Hr) IV Continuous <Continuous>  tamsulosin 0.8 milliGRAM(s) Oral at bedtime    MEDICATIONS  (PRN):  acetaminophen     Tablet .. 650 milliGRAM(s) Oral every 6 hours PRN Temp greater or equal to 38C (100.4F)  hydrALAZINE Injectable 10 milliGRAM(s) IV Push every 2 hours PRN SBP > 160  iohexol 300 mG (iodine)/mL Oral Solution 30 milliLiter(s) Oral once PRN Prior to CT Abdomen & Pelvis per CT protocol, thank you  labetalol Injectable 10 milliGRAM(s) IV Push every 2 hours PRN Systolic blood pressure > 160  ondansetron Injectable 4 milliGRAM(s) IV Push every 6 hours PRN Nausea and/or Vomiting    ALLERGIES:  No Known Allergies    T(C): 37.1 (03-24-24 @ 09:03), Max: 37.3 (03-23-24 @ 23:58)  HR: 71 (03-24-24 @ 09:03) (67 - 82)  BP: 133/75 (03-24-24 @ 09:03) (108/66 - 146/75)  RR: 18 (03-24-24 @ 09:03) (18 - 18)  SpO2: 99% (03-24-24 @ 09:03) (96% - 99%)    03-23-24 @ 07:01  -  03-24-24 @ 07:00  --------------------------------------------------------  IN: 650 mL / OUT: 1700 mL / NET: -1050 mL        PHYSICAL EXAM:  Constitutional:  in no apparent distress  Eyes: Sclerae anicteric, conjunctivae normal  ENMT: Mucus membranes moist, no oropharyngeal thrush noted  Respiratory: Breathing nonlabored; clear to auscultation  Cardiovascular: Regular rate  Gastrointestinal: Soft, nontender, nondistended, normoactive bowel sounds; no hepatosplenomegaly appreciated; no rebound tenderness or involuntary guarding  Patient being changed at time of exam, brown bowel movement no blood   Extremities: No edema  Neurological: Alert and oriented to person, place and time;   Skin: No jaundice  Musculoskeletal: No significant peripheral atrophy  Psychiatric: Affect and mood appropriate      LABS:             7.8    6.61  )-----------( 429      ( 03-24 @ 07:00 )             23.0                7.8    11.16 )-----------( 448      ( 03-23 @ 04:41 )             23.1         03-24    129<L>  |  96  |  6.0<L>  ----------------------------<  99  3.2<L>   |  23.0  |  0.30<L>    Ca    7.6<L>      24 Mar 2024 07:00  Phos  1.8     03-24  Mg     2.0     03-24    TPro  5.4<L>  /  Alb  3.1<L>  /  TBili  0.3<L>  /  DBili  x   /  AST  24  /  ALT  30  /  AlkPhos  47  03-24    LIVER FUNCTIONS - ( 24 Mar 2024 07:00 )  Alb: 3.1 g/dL / Pro: 5.4 g/dL / ALK PHOS: 47 U/L / ALT: 30 U/L / AST: 24 U/L / GGT: x               % Saturation, Iron: 14 % (03-21-24 @ 04:40)  Iron - Total Binding Capacity.: 287 ug/dL (03-21-24 @ 04:40)  Iron Total: 40 ug/dL (03-21-24 @ 04:40)  Ferritin: 383 ng/mL *H* (03-21-24 @ 04:40)      Urinalysis Basic - ( 24 Mar 2024 07:00 )    Color: x / Appearance: x / SG: x / pH: x  Gluc: 99 mg/dL / Ketone: x  / Bili: x / Urobili: x   Blood: x / Protein: x / Nitrite: x   Leuk Esterase: x / RBC: x / WBC x   Sq Epi: x / Non Sq Epi: x / Bacteria: x        Culture - Blood (collected 21 Mar 2024 14:42)  Source: .Blood Blood-Peripheral  Preliminary Report (23 Mar 2024 23:01):    No growth at 48 Hours    Culture - Blood (collected 21 Mar 2024 14:39)  Source: .Blood Blood-Peripheral  Preliminary Report (23 Mar 2024 23:01):    No growth at 48 Hours      IMAGING: I personally reviewed the CTAP 3/7 reviewed , and I agree with the radiologist's interpretation as described below: no active bleeding

## 2024-03-24 NOTE — CHART NOTE - NSCHARTNOTEFT_GEN_A_CORE
Patient noted to have +fecal occult in chart. Patient currently followed primarily by Neuro/stroke team however also followed by ID, Heme/Onc, and Medicine. As per chart patient has had abdominal pain, distention, and diarrhea. Abdominal imaging revealed fecal matter for which enema and mag citrate were given. Rectal tube removed on 3/17.     CT Abdomen/Pelvis with oral contrast complete overnight, [facility] called, no acute findings, official read pending. Will endorse day team to follow up. Patient remains afebrile overnight.     A/P: Patient with +fecal occult, awaiting final read on CT ab/pelvis with oral contrast, VSS, NAD.  -CBC/T&S  -Protonix 40 IV BID  -GI consult for AM  -Transfuse <7    Will endorse to day team to follow up

## 2024-03-24 NOTE — PROGRESS NOTE ADULT - SUBJECTIVE AND OBJECTIVE BOX
INCOMPLETE NOTE   Preliminary note, official recommendations pending attending review/signature   PENDING EXAM/VISIT    [facility] Stroke Team  Progress Note       HPI:  74 y/o mandarin speaking female with PMH of HLD and osteoporosis presents to PST with complaints of having cerebral aneurysm. States in 10/2023 she started to have B/L hand numbness. At that time she had MRI brain, MRA head and neck. MRA showed an ACOMM aneurysm. She underwent a cerebral angiogram by José Manuel Villaseñor at [facility] on December 7th, 2023, with the intent to treat this month. The aneurysm is described as a 5.5x4.6x3.6mm wide necked acomm aneurysm that projects medio-superiorly. Reports occasional headaches, described as numbness, 5/10 in severity, worse with laying down, relieved with sitting up or standing. Patient underwent diagnostic angiogram 2/14/24 which confirmed acomm aneurysm. On 3/6 patient underwent aneurysm embolization with coiling complicated by aneurysm rupture, controlled with coils and balloon tamponade. NISHANT CT showed SAH b/l frontal lobes and R sylvian fissure along with contrast staining. Patient admitted to NSICU for post op care.. EVD now removed per neurosurgery 3/15. Transferred to neurosurgery service 3/16, stroke team asked to consult, transferred to Stroke service 3/18.     :       SUBJECTIVE: Had a positive fecal occult, FCT abdomen/pelvis pending official read, but unrevealing per wet read. GI consulted No new neurologic complaints.  ROS reported negative unless otherwise noted.      MEDICATIONS:  acetaminophen     Tablet .. 650 milliGRAM(s) Oral every 6 hours PRN  bromocriptine Capsule 10 milliGRAM(s) Oral daily  enoxaparin Injectable 30 milliGRAM(s) SubCutaneous every 24 hours  hydrALAZINE Injectable 10 milliGRAM(s) IV Push every 2 hours PRN  iohexol 300 mG (iodine)/mL Oral Solution 30 milliLiter(s) Oral once PRN  labetalol Injectable 10 milliGRAM(s) IV Push every 2 hours PRN  lactobacillus acidophilus 1 Tablet(s) Oral daily  lidocaine   4% Patch 1 Patch Transdermal every 24 hours  multivitamin 1 Tablet(s) Oral daily  niMODipine Oral Solution 60 milliGRAM(s) Oral every 4 hours  nystatin Powder 1 Application(s) Topical two times a day  ondansetron Injectable 4 milliGRAM(s) IV Push every 6 hours PRN  potassium phosphate / sodium phosphate Powder (PHOS-NaK) 1 Packet(s) Oral once  potassium phosphate IVPB 30 milliMole(s) IV Intermittent once  QUEtiapine 12.5 milliGRAM(s) Oral at bedtime  simvastatin 10 milliGRAM(s) Oral at bedtime  sodium chloride 0.9%. 1000 milliLiter(s) IV Continuous <Continuous>  tamsulosin 0.8 milliGRAM(s) Oral at bedtime      PHYSICAL EXAM:   Vital Signs Last 24 Hrs  T(C): 37 (24 Mar 2024 04:03), Max: 37.3 (23 Mar 2024 23:58)  T(F): 98.6 (24 Mar 2024 04:03), Max: 99.1 (23 Mar 2024 23:58)  HR: 78 (24 Mar 2024 05:00) (67 - 82)  BP: 122/66 (24 Mar 2024 05:00) (108/66 - 146/75)  BP(mean): 91 (23 Mar 2024 19:13) (91 - 98)  RR: 18 (24 Mar 2024 04:03) (18 - 18)  SpO2: 98% (24 Mar 2024 04:03) (96% - 99%)    Parameters below as of 24 Mar 2024 04:03  Patient On (Oxygen Delivery Method): room air    EXAM PENDING       PHYSICAL EXAM:  General: No acute distress.   HEENT: Head normocephalic, atraumatic. Conjunctivae clear w/o exudates or hemorrhage. Sclera non-icteric. Nares are patent bilaterally. Mucous membranes pink and moist.  No tonsillar swelling or exudates.    Neck: Supple, no adenopathy. Trachea midline. No JVD.  Cardiac: Normal rate and rhythm. S1, S2 auscultated. No murmurs, gallops, or rubs.     Respiratory: Lung sounds clear in all fields. Chest wall symmetric, nontender.   Abdominal: Soft, nondistended, nontender. Bowel sounds normoactive x 4 quadrants. No masses, hepatomegaly, or splenomegaly.   Skin: Skin is warm, dry and intact without rashes or lesions. Appropriate color for ethnicity. Nailbeds pink with no cyanosis or clubbing.   Extremities: No edema, 2+ peripheral pulses in b/l upper and b/l lower extremities. Full range of motion in all joints.     NEUROLOGICAL EXAM:  Mental status: The patient is awake and alert and has normal attention span.  The patient is fully oriented in 3 spheres. The patient is oriented to current events. The patient is able to name objects, follow commands, repeat sentences.    Cranial nerves: slight left facial palsy, Pupils equal and react symmetrically to light. There is no visual field deficit to confrontation. Extraocular motion is full with no nystagmus. There is no ptosis. Facial sensation is intact.   Motor: There is normal bulk and tone. There is no tremor. LUE pronator drift, strength 5-/5, LLE 5-/5. RUE and bilateral LE strength 5/5- some pain limitation in LE.  Sensation: Intact to light touch in 4 extremities  Slight tremor b/l upper extremities       LABS:                        7.8    11.16 )-----------( 448      ( 23 Mar 2024 04:41 )             23.1    03-23    136  |  101  |  7.3<L>  ----------------------------<  132<H>  3.5   |  22.0  |  0.37<L>    Ca    7.5<L>      23 Mar 2024 04:41  Phos  1.1     03-23  Mg     2.1     03-23    TPro  5.8<L>  /  Alb  3.1<L>  /  TBili  0.4  /  DBili  x   /  AST  21  /  ALT  30  /  AlkPhos  47  03-23        IMAGING:     Xray Sacrum + Coccyx (03.20.24 @ 11:28)  IMPRESSION: Limited study without obvious fracture.    Xray Lumbosacral Spine (03.20.24 @ 11:29)   Findings:    There is no fracture, subluxation or paravertebral soft tissue swelling.  The pedicles and sacroiliac joints are intact.  Straightening of lordosis may be positional versus muscle spasm  The sacrum and visualized coccyx are intact    Large amount of stool in the visualized colon without gross bowel   obstruction.    IMPRESSION: No fracture or subluxation.    US Duplex Venous Lower Ext Complete, Bilateral (03.20.24 @ 12:20)  IMPRESSION:  No evidence of deep venous thrombosis in either lower extremity.    CT Head No Cont (03.16.24 @ 03:22)   IMPRESSION: No change in mild ventricular dilatation with intraventricular hemorrhage and hemorrhage in the frontal lobe midline and corpus callosum after extraventricular drain removal since 3/15/2024.    CT Head No Cont (03.15.24 @ 05:38)   IMPRESSION: Stable follow-up CT study.     TTE W or WO Ultrasound Enhancing Agent (03.13.24 @ 12:03)   CONCLUSIONS:    1. Left ventricular cavity is normal in size. Left ventricular wall thickness is normal. Left ventricular systolic function is normal with an ejection fraction visually estimated at 65 to 70 %.   2. Normal left ventricular diastolic function.   3. Normal right ventricular cavity size and normal systolic function.   4. The left atrium is normal.   5. The right atrium is normal in size.   6. Fibrocalcific aortic valve sclerosis without stenosis.   7. Mild mitral valve leaflet calcification.   8. Trace mitral regurgitation.   9. Estimated pulmonary artery systolic pressure is 23 mmHg, normal pulmonary artery pressure.    US Duplex Venous Lower Ext Complete, Bilateral (03.12.24 @ 15:23) >  IMPRESSION: No evidence of deep venous thrombosis in either lower extremity.     IR Neuro (03.11.24 @ 09:01)   IMPRESSION:  1. 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 No interval aneurysm recanalization or evidence for pseudoaneurysm.  2. No vasospasm.    CT Head No Cont (03.10.24 @ 12:37)   IMPRESSION: Mildly decreased bilateral mesial frontal parenchymal hemorrhages. Similar anterior interhemispheric acute subarachnoid hemorrhage. Mildly decreased intraventricular hemorrhage within the bilateral lateral   and third ventricles. Decreased ventricular size with near resolution of hydrocephalus. No large midline shift. Basal cisterns are more well visualized on the current examination, this may be due to technique.    MR Angio Head w/ IV Cont (03.09.24 @ 18:57)   IMPRESSION:  MR brain: Intraparenchymal hemorrhage within the medial anterior frontal lobes measuring approximately 3.5 cm, unchanged. There is surrounding edema and extension into the genuine body of corpus callosum, unchanged. Hemorrhagic clot within the LEFT lateral ventricle and third ventricle is unchanged. Minimal hemorrhage seen in the dependent portions of the RIGHT lateral ventricle unchanged. Minimal subarachnoid hemorrhage seen along the medial sulci of the BILATERAL frontal and parietal lobes as well as scattered throughout the sulci of the brain. RIGHT frontal ventriculostomy catheter tip is seen extending toward the body of the   LEFT ventricle and third ventricle.  Coil material is seen in the region of the anterior communicating artery on the RIGHT.   Blood products are seen in the region of the known aneurysm possibly indicating partial patency.    MRA intracranial:   Coil material is seen in the region of the anterior communicating artery. There is 1 x 4 mm region of signal seen on the noncontrast but not the postcontrast which may reflect blood products within the aneurysm indicating patency.    CT Head No Cont (03.07.24 @ 05:56)   IMPRESSION:   persistent intracranial hemorrhage within the BILATERAL lateral and third ventricles, LEFT greater than RIGHT, with mild obstructive hydrocephalus slightly increased. RIGHT frontal shunt catheter tip seen in body of RIGHT lateral ventricle unchanged. Subarachnoid hemorrhage again noted in the BILATERAL medial frontal lobes with hemorrhage in the anterior corpus callosum, LEFT greaterthan RIGHT.     CT Head No Cont (03.06.24 @ 19:20)   IMPRESSION: Interval placement of right frontal approach ventriculostomy catheter since the prior study performed 3 hours ago, with improved hydrocephalus and slightly decreased sulcal/cisternal bilateral subarachnoid hemorrhage.    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 Intraventricular hemorrhage is present with hydrocephalus.    IR Neuro (03.06.24 @ 12:13)   IMPRESSION:  1. 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  2. Status postballoon assisted coil embolization, the aneurysm is nearly completely occluded other than a 2.2 mm x 1.4 mm x 4.3 mm aneurysmal neck remnant.    Study Date: 03-19-24  Duration: 22 minutes  EEG Classification / Summary:  Normal routine EEG in the awake state, within the limits of interpretation.  Interpretation significantly limited by continuous muscle artifact.  Clinical Impression:   Technically limited study due to continuous muscle artifact. No obvious abnormalities noted.  Consider repeating EEG if clinically warranted.        Preliminary note, official recommendations pending attending review/signature   PENDING EXAM/VISIT    [facility] Stroke Team  Progress Note       HPI:  72 y/o mandarin speaking female with PMH of HLD and osteoporosis presents to PST with complaints of having cerebral aneurysm. States in 10/2023 she started to have B/L hand numbness. At that time she had MRI brain, MRA head and neck. MRA showed an ACOMM aneurysm. She underwent a cerebral angiogram by José Manuel Villaseñor at [facility] on December 7th, 2023, with the intent to treat this month. The aneurysm is described as a 5.5x4.6x3.6mm wide necked acomm aneurysm that projects medio-superiorly. Reports occasional headaches, described as numbness, 5/10 in severity, worse with laying down, relieved with sitting up or standing. Patient underwent diagnostic angiogram 2/14/24 which confirmed acomm aneurysm. On 3/6 patient underwent aneurysm embolization with coiling complicated by aneurysm rupture, controlled with coils and balloon tamponade. NISHANT CT showed SAH b/l frontal lobes and R sylvian fissure along with contrast staining. Patient admitted to NSICU for post op care.. EVD now removed per neurosurgery 3/15. Transferred to neurosurgery service 3/16, stroke team asked to consult, transferred to Stroke service 3/18.     : family bedside helping translate     SUBJECTIVE: Had a positive fecal occult, FCT abdomen/pelvis pending official read, but unrevealing per wet read. GI consulted and rpt bloodwork.  No new neurologic complaints.  ROS reported negative unless otherwise noted.      MEDICATIONS:  acetaminophen     Tablet .. 650 milliGRAM(s) Oral every 6 hours PRN  bromocriptine Capsule 10 milliGRAM(s) Oral daily  enoxaparin Injectable 30 milliGRAM(s) SubCutaneous every 24 hours  hydrALAZINE Injectable 10 milliGRAM(s) IV Push every 2 hours PRN  iohexol 300 mG (iodine)/mL Oral Solution 30 milliLiter(s) Oral once PRN  labetalol Injectable 10 milliGRAM(s) IV Push every 2 hours PRN  lactobacillus acidophilus 1 Tablet(s) Oral daily  lidocaine   4% Patch 1 Patch Transdermal every 24 hours  multivitamin 1 Tablet(s) Oral daily  niMODipine Oral Solution 60 milliGRAM(s) Oral every 4 hours  nystatin Powder 1 Application(s) Topical two times a day  ondansetron Injectable 4 milliGRAM(s) IV Push every 6 hours PRN  potassium phosphate / sodium phosphate Powder (PHOS-NaK) 1 Packet(s) Oral once  potassium phosphate IVPB 30 milliMole(s) IV Intermittent once  QUEtiapine 12.5 milliGRAM(s) Oral at bedtime  simvastatin 10 milliGRAM(s) Oral at bedtime  sodium chloride 0.9%. 1000 milliLiter(s) IV Continuous <Continuous>  tamsulosin 0.8 milliGRAM(s) Oral at bedtime      PHYSICAL EXAM:   Vital Signs Last 24 Hrs  T(C): 37 (24 Mar 2024 04:03), Max: 37.3 (23 Mar 2024 23:58)  T(F): 98.6 (24 Mar 2024 04:03), Max: 99.1 (23 Mar 2024 23:58)  HR: 78 (24 Mar 2024 05:00) (67 - 82)  BP: 122/66 (24 Mar 2024 05:00) (108/66 - 146/75)  BP(mean): 91 (23 Mar 2024 19:13) (91 - 98)  RR: 18 (24 Mar 2024 04:03) (18 - 18)  SpO2: 98% (24 Mar 2024 04:03) (96% - 99%)    Parameters below as of 24 Mar 2024 04:03  Patient On (Oxygen Delivery Method): room air    EXAM PENDING       PHYSICAL EXAM:  General: No acute distress.   HEENT: Head normocephalic, atraumatic. Conjunctivae clear w/o exudates or hemorrhage. Sclera non-icteric. Nares are patent bilaterally. Mucous membranes pink and moist.  No tonsillar swelling or exudates.    Neck: Supple, no adenopathy. Trachea midline. No JVD.  Cardiac: Normal rate and rhythm. S1, S2 auscultated. No murmurs, gallops, or rubs.     Respiratory: Lung sounds clear in all fields. Chest wall symmetric, nontender.   Abdominal: Soft, nondistended, nontender. No masses, hepatomegaly, or splenomegaly.   Skin: Skin is warm, dry and intact without rashes or lesions. Appropriate color for ethnicity.  Extremities: No edema, Full range of motion in all joints.     NEUROLOGICAL EXAM:  Mental status: The patient is awake and alert and has normal attention span.  The patient is fully oriented in 3 spheres. The patient is oriented to current events. The patient is able to name objects, follow commands, repeat sentences.    Cranial nerves: slight left facial palsy, Pupils equal and react symmetrically to light. There is no visual field deficit to confrontation. Extraocular motion is full with no nystagmus. There is no ptosis. Facial sensation is intact.   Motor: There is normal bulk and tone. There is no tremor. LUE pronator drift, strength 5-/5, LLE 5-/5. RUE and bilateral LE strength 5/5- some pain limitation in LE.  Sensation: Intact to light touch in 4 extremities  Slight tremor b/l upper extremities       LABS:                        7.8    11.16 )-----------( 448      ( 23 Mar 2024 04:41 )             23.1    03-23    136  |  101  |  7.3<L>  ----------------------------<  132<H>  3.5   |  22.0  |  0.37<L>    Ca    7.5<L>      23 Mar 2024 04:41  Phos  1.1     03-23  Mg     2.1     03-23    TPro  5.8<L>  /  Alb  3.1<L>  /  TBili  0.4  /  DBili  x   /  AST  21  /  ALT  30  /  AlkPhos  47  03-23        IMAGING:     Xray Sacrum + Coccyx (03.20.24 @ 11:28)  IMPRESSION: Limited study without obvious fracture.    Xray Lumbosacral Spine (03.20.24 @ 11:29)   Findings:    There is no fracture, subluxation or paravertebral soft tissue swelling.  The pedicles and sacroiliac joints are intact.  Straightening of lordosis may be positional versus muscle spasm  The sacrum and visualized coccyx are intact    Large amount of stool in the visualized colon without gross bowel   obstruction.    IMPRESSION: No fracture or subluxation.    US Duplex Venous Lower Ext Complete, Bilateral (03.20.24 @ 12:20)  IMPRESSION:  No evidence of deep venous thrombosis in either lower extremity.    CT Head No Cont (03.16.24 @ 03:22)   IMPRESSION: No change in mild ventricular dilatation with intraventricular hemorrhage and hemorrhage in the frontal lobe midline and corpus callosum after extraventricular drain removal since 3/15/2024.    CT Head No Cont (03.15.24 @ 05:38)   IMPRESSION: Stable follow-up CT study.     TTE W or WO Ultrasound Enhancing Agent (03.13.24 @ 12:03)   CONCLUSIONS:    1. Left ventricular cavity is normal in size. Left ventricular wall thickness is normal. Left ventricular systolic function is normal with an ejection fraction visually estimated at 65 to 70 %.   2. Normal left ventricular diastolic function.   3. Normal right ventricular cavity size and normal systolic function.   4. The left atrium is normal.   5. The right atrium is normal in size.   6. Fibrocalcific aortic valve sclerosis without stenosis.   7. Mild mitral valve leaflet calcification.   8. Trace mitral regurgitation.   9. Estimated pulmonary artery systolic pressure is 23 mmHg, normal pulmonary artery pressure.    US Duplex Venous Lower Ext Complete, Bilateral (03.12.24 @ 15:23) >  IMPRESSION: No evidence of deep venous thrombosis in either lower extremity.     IR Neuro (03.11.24 @ 09:01)   IMPRESSION:  1. 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 No interval aneurysm recanalization or evidence for pseudoaneurysm.  2. No vasospasm.    CT Head No Cont (03.10.24 @ 12:37)   IMPRESSION: Mildly decreased bilateral mesial frontal parenchymal hemorrhages. Similar anterior interhemispheric acute subarachnoid hemorrhage. Mildly decreased intraventricular hemorrhage within the bilateral lateral   and third ventricles. Decreased ventricular size with near resolution of hydrocephalus. No large midline shift. Basal cisterns are more well visualized on the current examination, this may be due to technique.    MR Angio Head w/ IV Cont (03.09.24 @ 18:57)   IMPRESSION:  MR brain: Intraparenchymal hemorrhage within the medial anterior frontal lobes measuring approximately 3.5 cm, unchanged. There is surrounding edema and extension into the genuine body of corpus callosum, unchanged. Hemorrhagic clot within the LEFT lateral ventricle and third ventricle is unchanged. Minimal hemorrhage seen in the dependent portions of the RIGHT lateral ventricle unchanged. Minimal subarachnoid hemorrhage seen along the medial sulci of the BILATERAL frontal and parietal lobes as well as scattered throughout the sulci of the brain. RIGHT frontal ventriculostomy catheter tip is seen extending toward the body of the   LEFT ventricle and third ventricle.  Coil material is seen in the region of the anterior communicating artery on the RIGHT.   Blood products are seen in the region of the known aneurysm possibly indicating partial patency.    MRA intracranial:   Coil material is seen in the region of the anterior communicating artery. There is 1 x 4 mm region of signal seen on the noncontrast but not the postcontrast which may reflect blood products within the aneurysm indicating patency.    CT Head No Cont (03.07.24 @ 05:56)   IMPRESSION:   persistent intracranial hemorrhage within the BILATERAL lateral and third ventricles, LEFT greater than RIGHT, with mild obstructive hydrocephalus slightly increased. RIGHT frontal shunt catheter tip seen in body of RIGHT lateral ventricle unchanged. Subarachnoid hemorrhage again noted in the BILATERAL medial frontal lobes with hemorrhage in the anterior corpus callosum, LEFT greaterthan RIGHT.     CT Head No Cont (03.06.24 @ 19:20)   IMPRESSION: Interval placement of right frontal approach ventriculostomy catheter since the prior study performed 3 hours ago, with improved hydrocephalus and slightly decreased sulcal/cisternal bilateral subarachnoid hemorrhage.    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 Intraventricular hemorrhage is present with hydrocephalus.    IR Neuro (03.06.24 @ 12:13)   IMPRESSION:  1. 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  2. Status postballoon assisted coil embolization, the aneurysm is nearly completely occluded other than a 2.2 mm x 1.4 mm x 4.3 mm aneurysmal neck remnant.    Study Date: 03-19-24  Duration: 22 minutes  EEG Classification / Summary:  Normal routine EEG in the awake state, within the limits of interpretation.  Interpretation significantly limited by continuous muscle artifact.  Clinical Impression:   Technically limited study due to continuous muscle artifact. No obvious abnormalities noted.  Consider repeating EEG if clinically warranted.        Preliminary note, official recommendations pending attending review/signature   PENDING EXAM/VISIT    [facility] Stroke Team  Progress Note       HPI:  72 y/o mandarin speaking female with PMH of HLD and osteoporosis presents to PST with complaints of having cerebral aneurysm. States in 10/2023 she started to have B/L hand numbness. At that time she had MRI brain, MRA head and neck. MRA showed an ACOMM aneurysm. She underwent a cerebral angiogram by José Manuel Villaseñor at [facility] on December 7th, 2023, with the intent to treat this month. The aneurysm is described as a 5.5x4.6x3.6mm wide necked acomm aneurysm that projects medio-superiorly. Reports occasional headaches, described as numbness, 5/10 in severity, worse with laying down, relieved with sitting up or standing. Patient underwent diagnostic angiogram 2/14/24 which confirmed acomm aneurysm. On 3/6 patient underwent aneurysm embolization with coiling complicated by aneurysm rupture, controlled with coils and balloon tamponade. NISHANT CT showed SAH b/l frontal lobes and R sylvian fissure along with contrast staining. Patient admitted to NSICU for post op care.. EVD now removed per neurosurgery 3/15. Transferred to neurosurgery service 3/16, stroke team asked to consult, transferred to Stroke service 3/18.     : family bedside helping translate     SUBJECTIVE: Had a positive fecal occult, FCT abdomen/pelvis pending official read, but unrevealing per wet read. GI consulted and rpt bloodwork.  No new neurologic complaints.  ROS reported negative unless otherwise noted.      MEDICATIONS:  acetaminophen     Tablet .. 650 milliGRAM(s) Oral every 6 hours PRN  bromocriptine Capsule 10 milliGRAM(s) Oral daily  enoxaparin Injectable 30 milliGRAM(s) SubCutaneous every 24 hours  hydrALAZINE Injectable 10 milliGRAM(s) IV Push every 2 hours PRN  iohexol 300 mG (iodine)/mL Oral Solution 30 milliLiter(s) Oral once PRN  labetalol Injectable 10 milliGRAM(s) IV Push every 2 hours PRN  lactobacillus acidophilus 1 Tablet(s) Oral daily  lidocaine   4% Patch 1 Patch Transdermal every 24 hours  multivitamin 1 Tablet(s) Oral daily  niMODipine Oral Solution 60 milliGRAM(s) Oral every 4 hours  nystatin Powder 1 Application(s) Topical two times a day  ondansetron Injectable 4 milliGRAM(s) IV Push every 6 hours PRN  potassium phosphate / sodium phosphate Powder (PHOS-NaK) 1 Packet(s) Oral once  potassium phosphate IVPB 30 milliMole(s) IV Intermittent once  QUEtiapine 12.5 milliGRAM(s) Oral at bedtime  simvastatin 10 milliGRAM(s) Oral at bedtime  sodium chloride 0.9%. 1000 milliLiter(s) IV Continuous <Continuous>  tamsulosin 0.8 milliGRAM(s) Oral at bedtime      PHYSICAL EXAM:   Vital Signs Last 24 Hrs  T(C): 37 (24 Mar 2024 04:03), Max: 37.3 (23 Mar 2024 23:58)  T(F): 98.6 (24 Mar 2024 04:03), Max: 99.1 (23 Mar 2024 23:58)  HR: 78 (24 Mar 2024 05:00) (67 - 82)  BP: 122/66 (24 Mar 2024 05:00) (108/66 - 146/75)  BP(mean): 91 (23 Mar 2024 19:13) (91 - 98)  RR: 18 (24 Mar 2024 04:03) (18 - 18)  SpO2: 98% (24 Mar 2024 04:03) (96% - 99%)    Parameters below as of 24 Mar 2024 04:03  Patient On (Oxygen Delivery Method): room air      PHYSICAL EXAM:  General: No acute distress.   HEENT: Head normocephalic, atraumatic. Conjunctivae clear w/o exudates or hemorrhage. Sclera non-icteric. Nares are patent bilaterally. Mucous membranes pink and moist.  No tonsillar swelling or exudates.    Neck: Supple, no adenopathy. Trachea midline. No JVD.  Cardiac: Normal rate and rhythm. S1, S2 auscultated. No murmurs, gallops, or rubs.     Respiratory: Lung sounds clear in all fields. Chest wall symmetric, nontender.   Abdominal: Soft, nondistended, nontender. No masses, hepatomegaly, or splenomegaly.   Skin: Skin is warm, dry and intact without rashes or lesions. Appropriate color for ethnicity.  Extremities: No edema, Full range of motion in all joints.     NEUROLOGICAL EXAM:  Mental status: The patient is awake and alert and has normal attention span.  The patient is fully oriented in 3 spheres. The patient is oriented to current events. The patient is able to name objects, follow commands, repeat sentences.    Cranial nerves: slight left facial palsy, Pupils equal and react symmetrically to light. There is no visual field deficit to confrontation. Extraocular motion is full with no nystagmus. There is no ptosis. Facial sensation is intact.   Motor: There is normal bulk and tone. There is no tremor. LUE pronator drift, strength 5-/5, LLE 5-/5. RUE and bilateral LE strength 5/5- some pain limitation in LE.  Sensation: Intact to light touch in 4 extremities  Slight tremor b/l upper extremities       LABS:                        7.8    11.16 )-----------( 448      ( 23 Mar 2024 04:41 )             23.1    03-23    136  |  101  |  7.3<L>  ----------------------------<  132<H>  3.5   |  22.0  |  0.37<L>    Ca    7.5<L>      23 Mar 2024 04:41  Phos  1.1     03-23  Mg     2.1     03-23    TPro  5.8<L>  /  Alb  3.1<L>  /  TBili  0.4  /  DBili  x   /  AST  21  /  ALT  30  /  AlkPhos  47  03-23        IMAGING:     Xray Sacrum + Coccyx (03.20.24 @ 11:28)  IMPRESSION: Limited study without obvious fracture.    Xray Lumbosacral Spine (03.20.24 @ 11:29)   Findings:    There is no fracture, subluxation or paravertebral soft tissue swelling.  The pedicles and sacroiliac joints are intact.  Straightening of lordosis may be positional versus muscle spasm  The sacrum and visualized coccyx are intact    Large amount of stool in the visualized colon without gross bowel   obstruction.    IMPRESSION: No fracture or subluxation.    US Duplex Venous Lower Ext Complete, Bilateral (03.20.24 @ 12:20)  IMPRESSION:  No evidence of deep venous thrombosis in either lower extremity.    CT Head No Cont (03.16.24 @ 03:22)   IMPRESSION: No change in mild ventricular dilatation with intraventricular hemorrhage and hemorrhage in the frontal lobe midline and corpus callosum after extraventricular drain removal since 3/15/2024.    CT Head No Cont (03.15.24 @ 05:38)   IMPRESSION: Stable follow-up CT study.     TTE W or WO Ultrasound Enhancing Agent (03.13.24 @ 12:03)   CONCLUSIONS:    1. Left ventricular cavity is normal in size. Left ventricular wall thickness is normal. Left ventricular systolic function is normal with an ejection fraction visually estimated at 65 to 70 %.   2. Normal left ventricular diastolic function.   3. Normal right ventricular cavity size and normal systolic function.   4. The left atrium is normal.   5. The right atrium is normal in size.   6. Fibrocalcific aortic valve sclerosis without stenosis.   7. Mild mitral valve leaflet calcification.   8. Trace mitral regurgitation.   9. Estimated pulmonary artery systolic pressure is 23 mmHg, normal pulmonary artery pressure.    US Duplex Venous Lower Ext Complete, Bilateral (03.12.24 @ 15:23) >  IMPRESSION: No evidence of deep venous thrombosis in either lower extremity.     IR Neuro (03.11.24 @ 09:01)   IMPRESSION:  1. 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 No interval aneurysm recanalization or evidence for pseudoaneurysm.  2. No vasospasm.    CT Head No Cont (03.10.24 @ 12:37)   IMPRESSION: Mildly decreased bilateral mesial frontal parenchymal hemorrhages. Similar anterior interhemispheric acute subarachnoid hemorrhage. Mildly decreased intraventricular hemorrhage within the bilateral lateral   and third ventricles. Decreased ventricular size with near resolution of hydrocephalus. No large midline shift. Basal cisterns are more well visualized on the current examination, this may be due to technique.    MR Angio Head w/ IV Cont (03.09.24 @ 18:57)   IMPRESSION:  MR brain: Intraparenchymal hemorrhage within the medial anterior frontal lobes measuring approximately 3.5 cm, unchanged. There is surrounding edema and extension into the genuine body of corpus callosum, unchanged. Hemorrhagic clot within the LEFT lateral ventricle and third ventricle is unchanged. Minimal hemorrhage seen in the dependent portions of the RIGHT lateral ventricle unchanged. Minimal subarachnoid hemorrhage seen along the medial sulci of the BILATERAL frontal and parietal lobes as well as scattered throughout the sulci of the brain. RIGHT frontal ventriculostomy catheter tip is seen extending toward the body of the   LEFT ventricle and third ventricle.  Coil material is seen in the region of the anterior communicating artery on the RIGHT.   Blood products are seen in the region of the known aneurysm possibly indicating partial patency.    MRA intracranial:   Coil material is seen in the region of the anterior communicating artery. There is 1 x 4 mm region of signal seen on the noncontrast but not the postcontrast which may reflect blood products within the aneurysm indicating patency.    CT Head No Cont (03.07.24 @ 05:56)   IMPRESSION:   persistent intracranial hemorrhage within the BILATERAL lateral and third ventricles, LEFT greater than RIGHT, with mild obstructive hydrocephalus slightly increased. RIGHT frontal shunt catheter tip seen in body of RIGHT lateral ventricle unchanged. Subarachnoid hemorrhage again noted in the BILATERAL medial frontal lobes with hemorrhage in the anterior corpus callosum, LEFT greaterthan RIGHT.     CT Head No Cont (03.06.24 @ 19:20)   IMPRESSION: Interval placement of right frontal approach ventriculostomy catheter since the prior study performed 3 hours ago, with improved hydrocephalus and slightly decreased sulcal/cisternal bilateral subarachnoid hemorrhage.    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 Intraventricular hemorrhage is present with hydrocephalus.    IR Neuro (03.06.24 @ 12:13)   IMPRESSION:  1. 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  2. Status postballoon assisted coil embolization, the aneurysm is nearly completely occluded other than a 2.2 mm x 1.4 mm x 4.3 mm aneurysmal neck remnant.    Study Date: 03-19-24  Duration: 22 minutes  EEG Classification / Summary:  Normal routine EEG in the awake state, within the limits of interpretation.  Interpretation significantly limited by continuous muscle artifact.  Clinical Impression:   Technically limited study due to continuous muscle artifact. No obvious abnormalities noted.  Consider repeating EEG if clinically warranted.

## 2024-03-24 NOTE — PROGRESS NOTE ADULT - SUBJECTIVE AND OBJECTIVE BOX
KASHMIR CARBAJAL    [id number]    73y      Female    Patient is a 73y old  Female who presents with a chief complaint of cerebral aneurysm (24 Mar 2024 09:58)      INTERVAL HPI/OVERNIGHT EVENTS:    Patient is doing ok, her back pain is better, she is more alert, denies any weakness or numbness, has no fever, chills, chest pain       Vital Signs Last 24 Hrs  T(C): 37.1 (24 Mar 2024 09:03), Max: 37.3 (23 Mar 2024 23:58)  T(F): 98.8 (24 Mar 2024 09:03), Max: 99.1 (23 Mar 2024 23:58)  HR: 71 (24 Mar 2024 09:03) (67 - 82)  BP: 133/75 (24 Mar 2024 09:03) (108/66 - 146/75)  BP(mean): 91 (23 Mar 2024 19:13) (91 - 98)  RR: 18 (24 Mar 2024 09:03) (18 - 18)  SpO2: 99% (24 Mar 2024 09:03) (96% - 99%)    Parameters below as of 24 Mar 2024 09:03  Patient On (Oxygen Delivery Method): room air        PHYSICAL EXAM:    GENERAL: Elderly female looking comfortable   NECK: soft, Supple, No JVD   CHEST/LUNG: Clear to auscultate bilaterally; No wheezing  HEART: S1S2+, Regular rate and rhythm; No murmurs  ABDOMEN: Soft, Nontender, Nondistended; Bowel sounds present  EXTREMITIES:  1+ Peripheral Pulses, No edema  SKIN: No rashes or lesions  NEURO: AAOX3  PSYCH: normal mood      LABS:                        7.8    6.61  )-----------( 429      ( 24 Mar 2024 07:00 )             23.0     03-24    129<L>  |  96  |  6.0<L>  ----------------------------<  99  3.2<L>   |  23.0  |  0.30<L>    Ca    7.6<L>      24 Mar 2024 07:00  Phos  1.8     03-24  Mg     2.0     03-24    TPro  5.4<L>  /  Alb  3.1<L>  /  TBili  0.3<L>  /  DBili  x   /  AST  24  /  ALT  30  /  AlkPhos  47  03-24      I&O's Summary    23 Mar 2024 07:01  -  24 Mar 2024 07:00  --------------------------------------------------------  IN: 650 mL / OUT: 1700 mL / NET: -1050 mL        MEDICATIONS  (STANDING):  bromocriptine Capsule 5 milliGRAM(s) Oral daily  calcium gluconate IVPB 1 Gram(s) IV Intermittent once  enoxaparin Injectable 30 milliGRAM(s) SubCutaneous every 24 hours  lactobacillus acidophilus 1 Tablet(s) Oral daily  lidocaine   4% Patch 1 Patch Transdermal every 24 hours  multivitamin 1 Tablet(s) Oral daily  niMODipine Oral Solution 60 milliGRAM(s) Oral every 4 hours  nystatin Powder 1 Application(s) Topical two times a day  pantoprazole  Injectable 40 milliGRAM(s) IV Push two times a day  potassium chloride    Tablet ER 40 milliEquivalent(s) Oral every 4 hours  QUEtiapine 12.5 milliGRAM(s) Oral at bedtime  simvastatin 10 milliGRAM(s) Oral at bedtime  sodium chloride 0.9%. 1000 milliLiter(s) (50 mL/Hr) IV Continuous <Continuous>  tamsulosin 0.8 milliGRAM(s) Oral at bedtime    MEDICATIONS  (PRN):  acetaminophen     Tablet .. 650 milliGRAM(s) Oral every 6 hours PRN Temp greater or equal to 38C (100.4F)  hydrALAZINE Injectable 10 milliGRAM(s) IV Push every 2 hours PRN SBP > 160  iohexol 300 mG (iodine)/mL Oral Solution 30 milliLiter(s) Oral once PRN Prior to CT Abdomen & Pelvis per CT protocol, thank you  labetalol Injectable 10 milliGRAM(s) IV Push every 2 hours PRN Systolic blood pressure > 160  ondansetron Injectable 4 milliGRAM(s) IV Push every 6 hours PRN Nausea and/or Vomiting

## 2024-03-24 NOTE — CONSULT NOTE ADULT - PROVIDER SPECIALTY LIST ADULT
Heme/Onc
Hospitalist
Infectious Disease
NSICU
Neurology
Gastroenterology
Brain Injury Medicine
Neurosurgery

## 2024-03-24 NOTE — CONSULT NOTE ADULT - ASSESSMENT
73 year old female with cerebral aneurysm s/p rupture and coiling now with anemia    Anemia  In the setting of recent instrumentation, an occult stool provides no value, fortunately she has brown stool on exam  Her BUN is not elevated. She is not having an acute GIB  Recommend pantoprazole 40mg daily for stress prophylaxis in light of recent neurologic events  Continue diet as tolerated  Avoid nsaids   No role for endoscopic evaluation at this time

## 2024-03-24 NOTE — CONSULT NOTE ADULT - CONSULT REQUESTED DATE/TIME
06-Mar-2024 14:34
16-Mar-2024 15:13
18-Mar-2024 09:13
06-Mar-2024 09:30
23-Mar-2024
24-Mar-2024
17-Mar-2024 11:46
21-Mar-2024 13:32

## 2024-03-24 NOTE — PROGRESS NOTE ADULT - ASSESSMENT
72 y/o mandarin speaking female with PMH of HLD and osteoporosis presents to [facility] with complaints of having cerebral aneurysm. States in 10/2023 she started to have B/L hand numbness. At that time she had MRI brain, MRA head and neck. MRA showed an ACOMM aneurysm. She underwent a cerebral angiogram by José Manuel Villaseñor at [facility] on December 7th, 2023, with the intent to treat this month. The aneurysm is described as a 5.5x4.6x3.6mm wide necked acomm aneurysm that projects medio-superiorly. Reports occasional headaches, described as numbness, S/P  aneurysm embolization with coiling on 3/6  complicated by aneurysm rupture, controlled with coils and balloon tamponade. NISHANT CT showed SAH b/l frontal lobes and R sylvian fissure along with contrast staining. Patient admitted to NSICU for post op care. Downgraded to Step down on 3/16. Medicine consulted for medical mgmt .     Plan:     # ACOMM aneurism , S/P  aneurysm embolization with coiling on 3/6  complicated by aneurysm rupture,   controlled with coils and balloon tamponade.    NISHANT CT showed SAH b/l frontal lobes and R sylvian fissure along with contrast staining.    Patient admitted to NSICU for post op care. Now downgraded to step down and Neuro stroke service  - Neuro checks being done   - Bromocriptine 10mg TID- for likely central fevers, being titrated   - Seroquel 12.5mg nightly  - Pain control- Tylenol, Tramadol  - pt/ot  - Nimodipine 60mg q4hrs for SAH protocol  as per NS   - Avoid hypotension, rapid changes in BP  - New tremors noted: EEG : Clinical Impression:   Technically limited study due to continuous muscle artifact. No obvious abnormalities noted.  Consider repeating EEG if clinically warranted.    serial Imaging and further workup as per Primary team      #anemia- probably dilutional, multiple blood draws - Hg -8.6 , FOBT - positive seen by GI  Anemia work up with normal folate/Iron , elevated B12 , monitor CBC,   transfuse if Hg < 7 , H&H stable.   Hb today is 7.8, will monitor CBC       # HLD - Simvastatin 10mg daily    # Postop urinary retention -  -Flomax 0.8mg  qhs   -Continue Dangelo Cath, failed TOV on 3/18 ( 3rd attempt)  -Ambulate to bathroom  - Reattempt TOV after multiple BM's ensured     # Diarrhea ,likely due to overflow 2/2 constipation    rectal tube  removed ( 3/17/24)  Abdominal XR ( 3/13) noted with  Fecal material   localized to the mid left descending colon.   Repeat KUB ( 3/20/24)  with Constipation, received laxative/enema had multiple BM with formed stool  d/c Calcium to prevent constipation   Please order stool count, have RN's document frequency and consistency of bowel movements  Would do Lactulose BID for 2 days then repeat KUB to ensure resolution of constipation.        # Low grade temps till 3/20 with Tmax 101.3 on 3/20 ,  work up NTD  Completed course of Zosyn for possible PNA   CXR - unremarkable   US of LE - no dvt   COVID/RVP  (3/12/24) - negative - REPEAT COVID/ RVP   c/o low back pain , MRI spine ordered   Blood cultures repeated 3/21- FOLLOW UP   Check UA   ID consulted and appreciated, observe off antibiotics  no further fever spikes    #Hypokalemia: Being Supplemented   monitor electrolytes     #Hypophosphatemia - replace as needed    #Hyponatremia - Resolved, monitor. Na Goal: 135-145, now Sodium is 129 today from 136, will repeat again     Leucocytosis: trending down, will monitor     DVT prophylaxis  -on Lovenox     Back is likely form laying, PT and Out of the bed, better now

## 2024-03-24 NOTE — CONSULT NOTE ADULT - CONSULT REASON
Anemia
occult positive stool
AR per PT/OT
SAH
Medical co mgmt requested ,
acomm aneurysm embo
Fever
IPH

## 2024-03-24 NOTE — PROGRESS NOTE ADULT - ASSESSMENT
ASSESSMENT: 72 y/o mandarin speaking female with PMH of HLD and osteoporosis presents to [facility] with complaints of having cerebral aneurysm. States in 10/2023 she started to have B/L hand numbness. At that time she had MRI brain, MRA head and neck. MRA showed an ACOMM aneurysm. She underwent a cerebral angiogram by José Manuel Villaseñor at [facility] on December 7th, 2023, with the intent to treat this month. The aneurysm is described as a 5.5x4.6x3.6mm wide necked acomm aneurysm that projects medio-superiorly. Reports occasional headaches, described as numbness, 5/10 in severity, worse with laying down, relieved with sitting up or standing. Patient underwent diagnostic angiogram 2/14/24 which confirmed acomm aneurysm. On 3/6 patient underwent aneurysm embolization with coiling complicated by aneurysm rupture, controlled with coils and balloon tamponade. NISHANT CT showed SAH b/l frontal lobes and R sylvian fissure along with contrast staining. EVD placed  3/6, removed by neurosurgery 3/15. Transferred to Stroke service 3/18.   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       Neurologically intact.  patient admits to a BM today, with absence of diarrhea.  Patient encouraged to ambulate in hallway and room and get out of bed to chair.    NEURO:   #ACOMM aneurysm elective procedure complicated by aneurysm rupture controlled with coils/balloon tamponade  #SAH  #EVD 3/6, removed 3/15  #Metabolic encephalopathy  #Tremors  -Neurologically without acute change   -Continue close monitoring for neurologic deterioration    -Stroke neuro checks q4hrs   -EEG without evidence of seizure   -MRI Brain w/o, MRA Head w/o and Neck w/contrast results as above   -Will need repeat MRI brain w/wo contrast in 4-6 weeks (4/3/24 is 4-week anneliese from aneurysm embolization)  -s/p Keppra  -nimodipine 60mg dc'd on 3/21- per Neurosurgery  -bromocriptine tid due to ? central fevers, will continue to titrate. currently daily   -Dysphagia screen: pass, on easy to chew diet  -Physical therapy/OT/Speech eval/treatment.              #Stroke prevention:  -SBP goal  per neuro IR recommendations, avoiding rapid fluctuations and hypotension   -ANTITHROMBOTIC THERAPY: n/a   -LDL- 55 , continue home regimen if applicable   -HA1C 5.8  -TTE noted above    CARDIOVASCULAR:   -TTE noted above  -cardiac monitoring w/ telemetry for now, further evaluation pending findings of noted workup                              HEMATOLOGY:   -Acute normocytic anemia, H/H 7.8/23.0   -Positive fecal occult blood - Per GI stable H/H - likely due to recent instrumentation,   -Monitor for now - recs appreciated   -Heme-onc c/s called, awaiting recs  -Iron studies, B12, Folate WNL  -Pending homocysteine and MMA   -Thromocytosis w/ platelets to 448  -Continue close monitoring, CBC q daily, no active bleeding noted  -Patient should have all age and risk appropriate malignancy screenings with PCP or sooner if clinically suspected   -DVT ppx: Heparin s.c [] LMWH [X]     PULMONARY:   -most recent CXR 3/19 with persistent right base infiltrate  -s/p zosyn for possible pneumonia  -Lung sounds clear on exam today  -Protecting airway, saturating well   -Consider CT chest if fever persists     RENAL/METABOLIC:   #Urinary retention  -Urinary retention s/p void trial- feliciano re-inserted 3/19/24  #Hyponatremia #Hypokalemia #Hypophosphatemia #Hypocalcemia  -Electrolyte derangements likely due to diarrhea, continue to monitor  -IVF in place with gradual improvement in hyponatremia - titrated to 75cc/hr , monitor closely   -Na improving gradually, 129-->138 --> 129 (03/24) rpt labs pending  -Potassium 2.9 --> 3.5 --> 3.2 (03/23) replenishing   -Phosphorus 1.4, repletion ordered, 1.1 today, additional PO and IV repletion ordered and pending   -Calcium 7.5, albumin 3.1, corrected calcium 8.2 now 7.6 (03/23), repletion ordered and pending   -BUN/Cr without acute change   -Maintain adequate hydration  -Tamsulosin 0.8mg bedtime       ID:   -ID consulted  -s/p zosyn for possible pneumonia  -Fluctuating fevers - Tmax 101.1,  possible central fevers but will need to screen for alternate causes and have ID follow up.   -Leukocytosis resolved   -Monitor and screen for si/sx of infection   -Abdomen distended, diarrhea- abdominal imaging revealed fecal.  enema and mag citrate as recommended by hospitalist ACP team.     GI:  #diarrhea  -Abdominal XR 3/13 noted  -s/p rectal tube, removed 3/17  -monitor BM, loose stool possible side effect of nimodipine  -PO calcium d/c'd   -CT Abdomen & Pelvis w/ Oral Contrast ordered to rule out infectious process/cause  -GI PCR ordered and pending   -Diet: Easy to Chew, Lactose Restricted, Ensure Clear TID    PSYCH:  -Quetiapine 12.5mg at bedtime  -Delirium precautions    ORTHo;  -XR lumbar /sacral as noted  -MRI L spine d/c'd, patient reports her back feels better today    OTHER: Condition and plan of care d/w patient, questions and concerns addressed. Discussed with family at bedside, medical team at bedside as well present for further evaluation and recommendations.     DISPOSITION: PT/OT recs for Acute Rehab -- patient and family in agreement     CORE MEASURES:        Admission NIHSS: n/a      Tenecteplase : [] YES [X] NO      LDL/HDL/A1C: pending lipid panel/ HA1C 5.8     Depression Screen- if depression hx and/or present      Statin Therapy: simvastatin 10mg     Dysphagia Screen: [X] PASS [] FAIL     Smoking [] YES [X] NO      Afib [] YES [X] NO     Stroke Education [] YES [] NO [X] pending

## 2024-03-25 LAB
ALBUMIN SERPL ELPH-MCNC: 3.3 G/DL — SIGNIFICANT CHANGE UP (ref 3.3–5.2)
ALP SERPL-CCNC: 46 U/L — SIGNIFICANT CHANGE UP (ref 40–120)
ALT FLD-CCNC: 27 U/L — SIGNIFICANT CHANGE UP
ANION GAP SERPL CALC-SCNC: 10 MMOL/L — SIGNIFICANT CHANGE UP (ref 5–17)
ANION GAP SERPL CALC-SCNC: 8 MMOL/L — SIGNIFICANT CHANGE UP (ref 5–17)
AST SERPL-CCNC: 20 U/L — SIGNIFICANT CHANGE UP
BASOPHILS # BLD AUTO: 0.02 K/UL — SIGNIFICANT CHANGE UP (ref 0–0.2)
BASOPHILS NFR BLD AUTO: 0.3 % — SIGNIFICANT CHANGE UP (ref 0–2)
BILIRUB SERPL-MCNC: 0.3 MG/DL — LOW (ref 0.4–2)
BUN SERPL-MCNC: 13.3 MG/DL — SIGNIFICANT CHANGE UP (ref 8–20)
BUN SERPL-MCNC: 7.7 MG/DL — LOW (ref 8–20)
CALCIUM SERPL-MCNC: 8.1 MG/DL — LOW (ref 8.4–10.5)
CALCIUM SERPL-MCNC: 8.5 MG/DL — SIGNIFICANT CHANGE UP (ref 8.4–10.5)
CHLORIDE SERPL-SCNC: 95 MMOL/L — LOW (ref 96–108)
CHLORIDE SERPL-SCNC: 98 MMOL/L — SIGNIFICANT CHANGE UP (ref 96–108)
CO2 SERPL-SCNC: 23 MMOL/L — SIGNIFICANT CHANGE UP (ref 22–29)
CO2 SERPL-SCNC: 25 MMOL/L — SIGNIFICANT CHANGE UP (ref 22–29)
CREAT SERPL-MCNC: 0.32 MG/DL — LOW (ref 0.5–1.3)
CREAT SERPL-MCNC: 0.42 MG/DL — LOW (ref 0.5–1.3)
EGFR: 105 ML/MIN/1.73M2 — SIGNIFICANT CHANGE UP
EGFR: 112 ML/MIN/1.73M2 — SIGNIFICANT CHANGE UP
EOSINOPHIL # BLD AUTO: 0.07 K/UL — SIGNIFICANT CHANGE UP (ref 0–0.5)
EOSINOPHIL NFR BLD AUTO: 0.9 % — SIGNIFICANT CHANGE UP (ref 0–6)
GLUCOSE SERPL-MCNC: 90 MG/DL — SIGNIFICANT CHANGE UP (ref 70–99)
GLUCOSE SERPL-MCNC: 94 MG/DL — SIGNIFICANT CHANGE UP (ref 70–99)
HCT VFR BLD CALC: 23.8 % — LOW (ref 34.5–45)
HGB BLD-MCNC: 8 G/DL — LOW (ref 11.5–15.5)
IGA FLD-MCNC: 187 MG/DL — SIGNIFICANT CHANGE UP (ref 84–499)
IGG FLD-MCNC: 646 MG/DL — SIGNIFICANT CHANGE UP (ref 610–1660)
IGM SERPL-MCNC: 42 MG/DL — SIGNIFICANT CHANGE UP (ref 35–242)
IMM GRANULOCYTES NFR BLD AUTO: 0.7 % — SIGNIFICANT CHANGE UP (ref 0–0.9)
KAPPA LC SER QL IFE: 0.93 MG/DL — SIGNIFICANT CHANGE UP (ref 0.33–1.94)
KAPPA/LAMBDA FREE LIGHT CHAIN RATIO, SERUM: 0.78 RATIO — SIGNIFICANT CHANGE UP (ref 0.26–1.65)
LAMBDA LC SER QL IFE: 1.19 MG/DL — SIGNIFICANT CHANGE UP (ref 0.57–2.63)
LIDOCAIN IGE QN: 94 U/L — HIGH (ref 22–51)
LYMPHOCYTES # BLD AUTO: 1.42 K/UL — SIGNIFICANT CHANGE UP (ref 1–3.3)
LYMPHOCYTES # BLD AUTO: 19.2 % — SIGNIFICANT CHANGE UP (ref 13–44)
MAGNESIUM SERPL-MCNC: 2 MG/DL — SIGNIFICANT CHANGE UP (ref 1.6–2.6)
MCHC RBC-ENTMCNC: 30.8 PG — SIGNIFICANT CHANGE UP (ref 27–34)
MCHC RBC-ENTMCNC: 33.6 GM/DL — SIGNIFICANT CHANGE UP (ref 32–36)
MCV RBC AUTO: 91.5 FL — SIGNIFICANT CHANGE UP (ref 80–100)
MONOCYTES # BLD AUTO: 0.48 K/UL — SIGNIFICANT CHANGE UP (ref 0–0.9)
MONOCYTES NFR BLD AUTO: 6.5 % — SIGNIFICANT CHANGE UP (ref 2–14)
NEUTROPHILS # BLD AUTO: 5.36 K/UL — SIGNIFICANT CHANGE UP (ref 1.8–7.4)
NEUTROPHILS NFR BLD AUTO: 72.4 % — SIGNIFICANT CHANGE UP (ref 43–77)
PHOSPHATE SERPL-MCNC: 2.4 MG/DL — SIGNIFICANT CHANGE UP (ref 2.4–4.7)
PLATELET # BLD AUTO: 399 K/UL — SIGNIFICANT CHANGE UP (ref 150–400)
POTASSIUM SERPL-MCNC: 4.4 MMOL/L — SIGNIFICANT CHANGE UP (ref 3.5–5.3)
POTASSIUM SERPL-MCNC: 4.5 MMOL/L — SIGNIFICANT CHANGE UP (ref 3.5–5.3)
POTASSIUM SERPL-SCNC: 4.4 MMOL/L — SIGNIFICANT CHANGE UP (ref 3.5–5.3)
POTASSIUM SERPL-SCNC: 4.5 MMOL/L — SIGNIFICANT CHANGE UP (ref 3.5–5.3)
PROT SERPL-MCNC: 5.4 G/DL — LOW (ref 6.6–8.7)
RBC # BLD: 2.6 M/UL — LOW (ref 3.8–5.2)
RBC # BLD: 2.6 M/UL — LOW (ref 3.8–5.2)
RBC # FLD: 15.1 % — HIGH (ref 10.3–14.5)
RETICS #: 158.9 K/UL — HIGH (ref 25–125)
RETICS/RBC NFR: 6.1 % — HIGH (ref 0.5–2.5)
SODIUM SERPL-SCNC: 129 MMOL/L — LOW (ref 135–145)
SODIUM SERPL-SCNC: 129 MMOL/L — LOW (ref 135–145)
WBC # BLD: 7.4 K/UL — SIGNIFICANT CHANGE UP (ref 3.8–10.5)
WBC # FLD AUTO: 7.4 K/UL — SIGNIFICANT CHANGE UP (ref 3.8–10.5)

## 2024-03-25 PROCEDURE — 99233 SBSQ HOSP IP/OBS HIGH 50: CPT

## 2024-03-25 PROCEDURE — 99232 SBSQ HOSP IP/OBS MODERATE 35: CPT

## 2024-03-25 PROCEDURE — 76705 ECHO EXAM OF ABDOMEN: CPT | Mod: 26

## 2024-03-25 RX ORDER — SODIUM CHLORIDE 9 MG/ML
1 INJECTION INTRAMUSCULAR; INTRAVENOUS; SUBCUTANEOUS ONCE
Refills: 0 | Status: COMPLETED | OUTPATIENT
Start: 2024-03-25 | End: 2024-03-25

## 2024-03-25 RX ORDER — SODIUM CHLORIDE 9 MG/ML
1000 INJECTION INTRAMUSCULAR; INTRAVENOUS; SUBCUTANEOUS
Refills: 0 | Status: DISCONTINUED | OUTPATIENT
Start: 2024-03-25 | End: 2024-03-25

## 2024-03-25 RX ORDER — PANTOPRAZOLE SODIUM 20 MG/1
40 TABLET, DELAYED RELEASE ORAL
Refills: 0 | Status: DISCONTINUED | OUTPATIENT
Start: 2024-03-26 | End: 2024-03-27

## 2024-03-25 RX ORDER — POLYETHYLENE GLYCOL 3350 17 G/17G
17 POWDER, FOR SOLUTION ORAL DAILY
Refills: 0 | Status: DISCONTINUED | OUTPATIENT
Start: 2024-03-25 | End: 2024-03-27

## 2024-03-25 RX ADMIN — POLYETHYLENE GLYCOL 3350 17 GRAM(S): 17 POWDER, FOR SOLUTION ORAL at 12:39

## 2024-03-25 RX ADMIN — LIDOCAINE 1 PATCH: 4 CREAM TOPICAL at 05:37

## 2024-03-25 RX ADMIN — ENOXAPARIN SODIUM 30 MILLIGRAM(S): 100 INJECTION SUBCUTANEOUS at 21:16

## 2024-03-25 RX ADMIN — LIDOCAINE 1 PATCH: 4 CREAM TOPICAL at 17:12

## 2024-03-25 RX ADMIN — SODIUM CHLORIDE 1 GRAM(S): 9 INJECTION INTRAMUSCULAR; INTRAVENOUS; SUBCUTANEOUS at 09:14

## 2024-03-25 RX ADMIN — Medication 1 TABLET(S): at 12:38

## 2024-03-25 RX ADMIN — SIMVASTATIN 10 MILLIGRAM(S): 20 TABLET, FILM COATED ORAL at 21:11

## 2024-03-25 RX ADMIN — NIMODIPINE 60 MILLIGRAM(S): 60 SOLUTION ORAL at 03:58

## 2024-03-25 RX ADMIN — TAMSULOSIN HYDROCHLORIDE 0.8 MILLIGRAM(S): 0.4 CAPSULE ORAL at 21:11

## 2024-03-25 RX ADMIN — NIMODIPINE 60 MILLIGRAM(S): 60 SOLUTION ORAL at 00:34

## 2024-03-25 RX ADMIN — LIDOCAINE 1 PATCH: 4 CREAM TOPICAL at 19:00

## 2024-03-25 RX ADMIN — QUETIAPINE FUMARATE 12.5 MILLIGRAM(S): 200 TABLET, FILM COATED ORAL at 21:11

## 2024-03-25 RX ADMIN — NYSTATIN CREAM 1 APPLICATION(S): 100000 CREAM TOPICAL at 17:12

## 2024-03-25 RX ADMIN — PANTOPRAZOLE SODIUM 40 MILLIGRAM(S): 20 TABLET, DELAYED RELEASE ORAL at 05:30

## 2024-03-25 RX ADMIN — NYSTATIN CREAM 1 APPLICATION(S): 100000 CREAM TOPICAL at 05:30

## 2024-03-25 NOTE — PROGRESS NOTE ADULT - SUBJECTIVE AND OBJECTIVE BOX
CC: aneurysm embolization     INTERVAL HPI/OVERNIGHT EVENTS: Patient seen and examined. Family at bedside. Seen by SLP and diet can be advanced to regular. Sodium noted to be 129. Patient endorses drinking a large amount of water. Water pitcher with straw is at bedside, with multiple cups of water on bedside tray. RN reports small BM this morning. Denies chest pain, SOB, dizziness, abdominal pain, nausea, vomiting, fever, chills. Denies abdominal pain on palpation.     Vital Signs Last 24 Hrs  T(C): 36.6 (25 Mar 2024 04:00), Max: 37.1 (24 Mar 2024 12:38)  T(F): 97.8 (25 Mar 2024 04:00), Max: 98.7 (24 Mar 2024 12:38)  HR: 75 (25 Mar 2024 04:00) (75 - 82)  BP: 121/72 (25 Mar 2024 04:00) (121/72 - 147/66)  BP(mean): --  RR: 18 (25 Mar 2024 04:00) (18 - 18)  SpO2: 94% (25 Mar 2024 04:00) (94% - 99%)    Parameters below as of 25 Mar 2024 04:00  Patient On (Oxygen Delivery Method): room air    PHYSICAL EXAM:    GENERAL: NAD, sitting up in bed  NECK: soft, Supple, No JVD   CHEST/LUNG: Clear to auscultate bilaterally; No wheezing  HEART: S1S2+, Regular rate and rhythm; No murmurs  ABDOMEN: Soft, Nontender, Nondistended; Bowel sounds present  EXTREMITIES:  1+ Peripheral Pulses, No edema  SKIN: No rashes or lesions  NEURO: AAOX3  PSYCH: normal mood      I&O's Detail    24 Mar 2024 07:01  -  25 Mar 2024 07:00  --------------------------------------------------------  IN:    sodium chloride 0.9%: 600 mL  Total IN: 600 mL    OUT:    Indwelling Catheter - Urethral (mL): 2120 mL  Total OUT: 2120 mL    Total NET: -1520 mL                                    8.0    7.40  )-----------( 399      ( 25 Mar 2024 07:23 )             23.8     25 Mar 2024 07:23    129    |  98     |  7.7    ----------------------------<  94     4.4     |  23.0   |  0.32     Ca    8.1        25 Mar 2024 07:23  Phos  2.4       25 Mar 2024 07:23  Mg     2.0       25 Mar 2024 07:23    TPro  5.4    /  Alb  3.3    /  TBili  0.3    /  DBili  x      /  AST  20     /  ALT  27     /  AlkPhos  46     25 Mar 2024 07:23      CAPILLARY BLOOD GLUCOSE        LIVER FUNCTIONS - ( 25 Mar 2024 07:23 )  Alb: 3.3 g/dL / Pro: 5.4 g/dL / ALK PHOS: 46 U/L / ALT: 27 U/L / AST: 20 U/L / GGT: x           Urinalysis Basic - ( 25 Mar 2024 07:23 )    Color: x / Appearance: x / SG: x / pH: x  Gluc: 94 mg/dL / Ketone: x  / Bili: x / Urobili: x   Blood: x / Protein: x / Nitrite: x   Leuk Esterase: x / RBC: x / WBC x   Sq Epi: x / Non Sq Epi: x / Bacteria: x        MEDICATIONS  (STANDING):  enoxaparin Injectable 30 milliGRAM(s) SubCutaneous every 24 hours  lactobacillus acidophilus 1 Tablet(s) Oral daily  lidocaine   4% Patch 1 Patch Transdermal every 24 hours  multivitamin 1 Tablet(s) Oral daily  nystatin Powder 1 Application(s) Topical two times a day  polyethylene glycol 3350 17 Gram(s) Oral daily  QUEtiapine 12.5 milliGRAM(s) Oral at bedtime  simvastatin 10 milliGRAM(s) Oral at bedtime  tamsulosin 0.8 milliGRAM(s) Oral at bedtime    MEDICATIONS  (PRN):  acetaminophen     Tablet .. 650 milliGRAM(s) Oral every 6 hours PRN Temp greater or equal to 38C (100.4F)  hydrALAZINE Injectable 10 milliGRAM(s) IV Push every 2 hours PRN SBP > 160  iohexol 300 mG (iodine)/mL Oral Solution 30 milliLiter(s) Oral once PRN Prior to CT Abdomen & Pelvis per CT protocol, thank you  labetalol Injectable 10 milliGRAM(s) IV Push every 2 hours PRN Systolic blood pressure > 160  ondansetron Injectable 4 milliGRAM(s) IV Push every 6 hours PRN Nausea and/or Vomiting      RADIOLOGY & ADDITIONAL TESTS:    ACC: [id number] EXAM:  CT ABDOMEN AND PELVIS OC   ORDERED BY: DIANA BHATT     PROCEDURE DATE:  03/23/2024          INTERPRETATION:  CLINICAL INFORMATION: Abdominal pain, diarrhea, fever.    COMPARISON: 3/7/2024.    CONTRAST/COMPLICATIONS:  IV Contrast: NONE  Oral Contrast: Gastroview  Complications: None reported at time of study completion    PROCEDURE:  CT of the Abdomen and Pelvis was performed.  Sagittal and coronal reformats were performed.    FINDINGS:  LOWER CHEST: Trace bilateral pleural effusions.    LIVER: Scattered cysts.  BILE DUCTS: Mild intrahepatic and extrahepatic biliary ductal dilatation   with CBD measuring up to 8 to 9 mm.  GALLBLADDER: Contracted.  SPLEEN: Within normal limits.  PANCREAS: Within normal limits.  ADRENALS: Within normal limits.  KIDNEYS/URETERS: Left renal cyst.    BLADDER: Decompressed around an indwelling catheter.  REPRODUCTIVE ORGANS: Uterus and adnexa within normal limits.    BOWEL: No bowel obstruction. Moderate amount of stool. Mild mural   thickening of the rectum with adjacent fatty stranding and trace   presacral free fluid. Appendix is normal.  PERITONEUM: No ascites.  VESSELS: Atherosclerotic changes.  RETROPERITONEUM/LYMPH NODES: No lymphadenopathy.  ABDOMINAL WALL: Within normal limits.  BONES: Osteopenia.    IMPRESSION:  Mild proctitis, new.  Mild biliary ductal dilatation. Correlation with lab markers would be   helpful.      --- End of Report ---            MAINOR RODGERS MD; Attending Radiologist  This document has been electronically signed. Mar 24 2024  5:29PM    ACC: [id number] EXAM:  US ABDOMEN RT UPR QUADRANT   ORDERED BY: DIANA BHATT     PROCEDURE DATE:  03/25/2024          INTERPRETATION:  CLINICAL INFORMATION: Dilated common duct on CT of   3/23/2024    COMPARISON: CT abdomen 3/23/2024    TECHNIQUE: Sonography of the right upper quadrant.    FINDINGS:  Liver: Multiple cysts as noted on CT, largest in the right lobe measuring   up to 2.2 cm.  Bile ducts: Common duct dilated up to 1.0 cm. Correlate with LFTs and MRCP  Gallbladder: Sludge, diffuse wall thickening. Correlate with   symptomatology. If there is concern for cholecystitis HIDA scan can be   obtained.  Pancreas: Visualized portions are within normal limits.  Right kidney: 10.1 cm. No hydronephrosis.  Ascites: None.  IVC: Visualizedportions are within normal limits.    IMPRESSION:  Gallbladder sludge and wall thickening. Correlate with symptomatology. If   there is concern for cholecystitis HIDA scan can be obtained.    Choledochomegaly. Correlate with LFTs and MRCP.        ---End of Report ---   CC: aneurysm embolization     INTERVAL HPI/OVERNIGHT EVENTS: Patient seen and examined. Family at bedside. Seen by SLP and diet can be advanced to regular. Sodium noted to be 129. Patient endorses drinking a large amount of water. Water pitcher with straw is at bedside, with multiple cups of water on bedside tray. RN reports small BM this morning. Denies chest pain, SOB, dizziness, abdominal pain, nausea, vomiting, fever, chills. Denies abdominal pain on palpation.     Vital Signs Last 24 Hrs  T(C): 36.6 (25 Mar 2024 04:00), Max: 37.1 (24 Mar 2024 12:38)  T(F): 97.8 (25 Mar 2024 04:00), Max: 98.7 (24 Mar 2024 12:38)  HR: 75 (25 Mar 2024 04:00) (75 - 82)  BP: 121/72 (25 Mar 2024 04:00) (121/72 - 147/66)  BP(mean): --  RR: 18 (25 Mar 2024 04:00) (18 - 18)  SpO2: 94% (25 Mar 2024 04:00) (94% - 99%)    Parameters below as of 25 Mar 2024 04:00  Patient On (Oxygen Delivery Method): room air    PHYSICAL EXAM:    GENERAL: NAD, sitting up in bed  NECK: soft, Supple, No JVD   CHEST/LUNG: Clear to auscultate bilaterally; No wheezing  HEART: S1S2+, Regular rate and rhythm; No murmurs  ABDOMEN: Soft, Nontender, Nondistended; Bowel sounds present  EXTREMITIES:  1+ Peripheral Pulses, No edema  SKIN: No rashes or lesions  NEURO: AAOX3  PSYCH: normal mood      I&O's Detail    24 Mar 2024 07:01  -  25 Mar 2024 07:00  --------------------------------------------------------  IN:    sodium chloride 0.9%: 600 mL  Total IN: 600 mL    OUT:    Indwelling Catheter - Urethral (mL): 2120 mL  Total OUT: 2120 mL    Total NET: -1520 mL                                    8.0    7.40  )-----------( 399      ( 25 Mar 2024 07:23 )             23.8     25 Mar 2024 07:23    129    |  98     |  7.7    ----------------------------<  94     4.4     |  23.0   |  0.32     Ca    8.1        25 Mar 2024 07:23  Phos  2.4       25 Mar 2024 07:23  Mg     2.0       25 Mar 2024 07:23    TPro  5.4    /  Alb  3.3    /  TBili  0.3    /  DBili  x      /  AST  20     /  ALT  27     /  AlkPhos  46     25 Mar 2024 07:23      CAPILLARY BLOOD GLUCOSE        LIVER FUNCTIONS - ( 25 Mar 2024 07:23 )  Alb: 3.3 g/dL / Pro: 5.4 g/dL / ALK PHOS: 46 U/L / ALT: 27 U/L / AST: 20 U/L / GGT: x           MEDICATIONS  (STANDING):  enoxaparin Injectable 30 milliGRAM(s) SubCutaneous every 24 hours  lactobacillus acidophilus 1 Tablet(s) Oral daily  lidocaine   4% Patch 1 Patch Transdermal every 24 hours  multivitamin 1 Tablet(s) Oral daily  nystatin Powder 1 Application(s) Topical two times a day  polyethylene glycol 3350 17 Gram(s) Oral daily  QUEtiapine 12.5 milliGRAM(s) Oral at bedtime  simvastatin 10 milliGRAM(s) Oral at bedtime  tamsulosin 0.8 milliGRAM(s) Oral at bedtime    MEDICATIONS  (PRN):  acetaminophen     Tablet .. 650 milliGRAM(s) Oral every 6 hours PRN Temp greater or equal to 38C (100.4F)  hydrALAZINE Injectable 10 milliGRAM(s) IV Push every 2 hours PRN SBP > 160  iohexol 300 mG (iodine)/mL Oral Solution 30 milliLiter(s) Oral once PRN Prior to CT Abdomen & Pelvis per CT protocol, thank you  labetalol Injectable 10 milliGRAM(s) IV Push every 2 hours PRN Systolic blood pressure > 160  ondansetron Injectable 4 milliGRAM(s) IV Push every 6 hours PRN Nausea and/or Vomiting      RADIOLOGY & ADDITIONAL TESTS:    ACC: [id number] EXAM:  CT ABDOMEN AND PELVIS OC   ORDERED BY: DIANA BHATT     PROCEDURE DATE:  03/23/2024          INTERPRETATION:  CLINICAL INFORMATION: Abdominal pain, diarrhea, fever.    COMPARISON: 3/7/2024.    CONTRAST/COMPLICATIONS:  IV Contrast: NONE  Oral Contrast: Gastroview  Complications: None reported at time of study completion    PROCEDURE:  CT of the Abdomen and Pelvis was performed.  Sagittal and coronal reformats were performed.    FINDINGS:  LOWER CHEST: Trace bilateral pleural effusions.    LIVER: Scattered cysts.  BILE DUCTS: Mild intrahepatic and extrahepatic biliary ductal dilatation   with CBD measuring up to 8 to 9 mm.  GALLBLADDER: Contracted.  SPLEEN: Within normal limits.  PANCREAS: Within normal limits.  ADRENALS: Within normal limits.  KIDNEYS/URETERS: Left renal cyst.    BLADDER: Decompressed around an indwelling catheter.  REPRODUCTIVE ORGANS: Uterus and adnexa within normal limits.    BOWEL: No bowel obstruction. Moderate amount of stool. Mild mural   thickening of the rectum with adjacent fatty stranding and trace   presacral free fluid. Appendix is normal.  PERITONEUM: No ascites.  VESSELS: Atherosclerotic changes.  RETROPERITONEUM/LYMPH NODES: No lymphadenopathy.  ABDOMINAL WALL: Within normal limits.  BONES: Osteopenia.    IMPRESSION:  Mild proctitis, new.  Mild biliary ductal dilatation. Correlation with lab markers would be   helpful.      --- End of Report ---            MAINOR RODGERS MD; Attending Radiologist  This document has been electronically signed. Mar 24 2024  5:29PM    ACC: [id number] EXAM:  US ABDOMEN RT UPR QUADRANT   ORDERED BY: DIANA BHATT     PROCEDURE DATE:  03/25/2024          INTERPRETATION:  CLINICAL INFORMATION: Dilated common duct on CT of   3/23/2024    COMPARISON: CT abdomen 3/23/2024    TECHNIQUE: Sonography of the right upper quadrant.    FINDINGS:  Liver: Multiple cysts as noted on CT, largest in the right lobe measuring   up to 2.2 cm.  Bile ducts: Common duct dilated up to 1.0 cm. Correlate with LFTs and MRCP  Gallbladder: Sludge, diffuse wall thickening. Correlate with   symptomatology. If there is concern for cholecystitis HIDA scan can be   obtained.  Pancreas: Visualized portions are within normal limits.  Right kidney: 10.1 cm. No hydronephrosis.  Ascites: None.  IVC: Visualizedportions are within normal limits.    IMPRESSION:  Gallbladder sludge and wall thickening. Correlate with symptomatology. If   there is concern for cholecystitis HIDA scan can be obtained.    Choledochomegaly. Correlate with LFTs and MRCP.        ---End of Report ---   A complete review of available medical records and pertinent medical literature, elicitation of history, neurological examination, review of any paraclinical studies including laboratory studies, neuroimaging and electroencephalographic recordings if performed, discussion of diagnostic evaluation and treatment plan with parent(s), and/or care provider(s) and/or house staff was conducted as appropriate.

## 2024-03-25 NOTE — PROGRESS NOTE ADULT - SUBJECTIVE AND OBJECTIVE BOX
Preliminary note, offical recommendations pending attending review/signature   [facility] Stroke Team  Progress Note     HPI:  72 y/o mandarin speaking female with PMH of HLD and osteoporosis presents to PST with complaints of having cerebral aneurysm. States in 10/2023 she started to have B/L hand numbness. At that time she had MRI brain, MRA head and neck. MRA showed an ACOMM aneurysm. She underwent a cerebral angiogram by José Manuel Villaseñor at [facility] on December 7th, 2023, with the intent to treat this month. The aneurysm is described as a 5.5x4.6x3.6mm wide necked acomm aneurysm that projects medio-superiorly. Reports occasional headaches, described as numbness, 5/10 in severity, worse with laying down, relieved with sitting up or standing. Patient underwent diagnostic angiogram 2/14/24 which confirmed acomm aneurysm. On 3/6 patient underwent aneurysm embolization with coiling complicated by aneurysm rupture, controlled with coils and balloon tamponade. NISHANT CT showed SAH b/l frontal lobes and R sylvian fissure along with contrast staining. Patient admitted to NSICU for post op care.. EVD now removed per neurosurgery 3/15. Transferred to neurosurgery service 3/16, stroke team asked to consult, transferred to Stroke service 3/18.     SUBJECTIVE: Patient seen and examined at bedside. Patient sitting up in bed, smiling, appears in NAD. Offers no complaints. No events overnight.  No new neurologic complaints.  ROS reported negative unless otherwise noted.    MEDICATIONS:  acetaminophen     Tablet .. 650 milliGRAM(s) Oral every 6 hours PRN  bromocriptine Capsule 5 milliGRAM(s) Oral daily  enoxaparin Injectable 30 milliGRAM(s) SubCutaneous every 24 hours  hydrALAZINE Injectable 10 milliGRAM(s) IV Push every 2 hours PRN  iohexol 300 mG (iodine)/mL Oral Solution 30 milliLiter(s) Oral once PRN  labetalol Injectable 10 milliGRAM(s) IV Push every 2 hours PRN  lactobacillus acidophilus 1 Tablet(s) Oral daily  lidocaine   4% Patch 1 Patch Transdermal every 24 hours  multivitamin 1 Tablet(s) Oral daily  niMODipine Oral Solution 60 milliGRAM(s) Oral every 4 hours  nystatin Powder 1 Application(s) Topical two times a day  ondansetron Injectable 4 milliGRAM(s) IV Push every 6 hours PRN  pantoprazole  Injectable 40 milliGRAM(s) IV Push two times a day  QUEtiapine 12.5 milliGRAM(s) Oral at bedtime  simvastatin 10 milliGRAM(s) Oral at bedtime  sodium chloride 0.9%. 1000 milliLiter(s) IV Continuous <Continuous>  tamsulosin 0.8 milliGRAM(s) Oral at bedtime      Vital Signs Last 24 Hrs  T(C): 36.6 (25 Mar 2024 04:00), Max: 37.1 (24 Mar 2024 09:03)  T(F): 97.8 (25 Mar 2024 04:00), Max: 98.8 (24 Mar 2024 09:03)  HR: 75 (25 Mar 2024 04:00) (71 - 82)  BP: 121/72 (25 Mar 2024 04:00) (121/72 - 147/66)  BP(mean): --  RR: 18 (25 Mar 2024 04:00) (18 - 18)  SpO2: 94% (25 Mar 2024 04:00) (94% - 99%)    Parameters below as of 25 Mar 2024 04:00  Patient On (Oxygen Delivery Method): room air    PENDING EXAM  PHYSICAL EXAM:  General: No acute distress.   HEENT: Head normocephalic, atraumatic. Conjunctivae clear w/o exudates or hemorrhage. Sclera non-icteric. Nares are patent bilaterally. Mucous membranes pink and moist.  No tonsillar swelling or exudates.    Neck: Supple, no adenopathy. Trachea midline. No JVD.  Cardiac: Normal rate and rhythm. S1, S2 auscultated. No murmurs, gallops, or rubs.     Respiratory: Lung sounds clear in all fields. Chest wall symmetric, nontender.   Abdominal: Soft, nondistended, nontender. Bowel sounds normoactive x 4 quadrants. No masses, hepatomegaly, or splenomegaly.   Skin: Skin is warm, dry and intact without rashes or lesions. Appropriate color for ethnicity. Nailbeds pink with no cyanosis or clubbing.   Extremities: No edema, 2+ peripheral pulses in b/l upper and b/l lower extremities. Full range of motion in all joints.     NEUROLOGICAL EXAM:  Mental status: The patient is awake and alert and has normal attention span.  The patient is fully oriented in 3 spheres. The patient is oriented to current events. The patient is able to name objects, follow commands, repeat sentences.    Cranial nerves: slight left facial palsy, Pupils equal and react symmetrically to light. There is no visual field deficit to confrontation. Extraocular motion is full with no nystagmus. There is no ptosis. Facial sensation is intact.   Motor: There is normal bulk and tone. There is no tremor. LUE pronator drift, strength 5-/5, LLE 5-/5. RUE and bilateral LE strength 5/5- some pain limitation in LE.  Sensation: Intact to light touch in 4 extremities  Slight tremor b/l upper extremities       LABS:                        8.0    7.40  )-----------( 399      ( 25 Mar 2024 07:23 )             23.8    03-25    129<L>  |  98  |  7.7<L>  ----------------------------<  94  4.4   |  23.0  |  0.32<L>    Ca    8.1<L>      25 Mar 2024 07:23  Phos  2.4     03-25  Mg     2.0     03-25    TPro  5.4<L>  /  Alb  3.3  /  TBili  0.3<L>  /  DBili  x   /  AST  20  /  ALT  27  /  AlkPhos  46  03-25        IMAGING:     CT Abdomen and Pelvis w/ Oral Cont (03.23.24 @ 22:02)  BILE DUCTS: Mild intrahepatic and extrahepatic biliary ductal dilatation   with CBD measuring up to 8 to 9 mm.    IMPRESSION:  Mild proctitis, new.  Mild biliary ductal dilatation. Correlation with lab markers would be   helpful.    Xray Sacrum + Coccyx (03.20.24 @ 11:28)  IMPRESSION: Limited study without obvious fracture.    Xray Lumbosacral Spine (03.20.24 @ 11:29)   Findings:    There is no fracture, subluxation or paravertebral soft tissue swelling.  The pedicles and sacroiliac joints are intact.  Straightening of lordosis may be positional versus muscle spasm  The sacrum and visualized coccyx are intact    Large amount of stool in the visualized colon without gross bowel   obstruction.    IMPRESSION: No fracture or subluxation.    US Duplex Venous Lower Ext Complete, Bilateral (03.20.24 @ 12:20)  IMPRESSION:  No evidence of deep venous thrombosis in either lower extremity.    CT Head No Cont (03.16.24 @ 03:22)   IMPRESSION: No change in mild ventricular dilatation with intraventricular hemorrhage and hemorrhage in the frontal lobe midline and corpus callosum after extraventricular drain removal since 3/15/2024.    CT Head No Cont (03.15.24 @ 05:38)   IMPRESSION: Stable follow-up CT study.     TTE W or WO Ultrasound Enhancing Agent (03.13.24 @ 12:03)   CONCLUSIONS:    1. Left ventricular cavity is normal in size. Left ventricular wall thickness is normal. Left ventricular systolic function is normal with an ejection fraction visually estimated at 65 to 70 %.   2. Normal left ventricular diastolic function.   3. Normal right ventricular cavity size and normal systolic function.   4. The left atrium is normal.   5. The right atrium is normal in size.   6. Fibrocalcific aortic valve sclerosis without stenosis.   7. Mild mitral valve leaflet calcification.   8. Trace mitral regurgitation.   9. Estimated pulmonary artery systolic pressure is 23 mmHg, normal pulmonary artery pressure.    US Duplex Venous Lower Ext Complete, Bilateral (03.12.24 @ 15:23) >  IMPRESSION: No evidence of deep venous thrombosis in either lower extremity.     IR Neuro (03.11.24 @ 09:01)   IMPRESSION:  1. 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 No interval aneurysm recanalization or evidence for pseudoaneurysm.  2. No vasospasm.    CT Head No Cont (03.10.24 @ 12:37)   IMPRESSION: Mildly decreased bilateral mesial frontal parenchymal hemorrhages. Similar anterior interhemispheric acute subarachnoid hemorrhage. Mildly decreased intraventricular hemorrhage within the bilateral lateral   and third ventricles. Decreased ventricular size with near resolution of hydrocephalus. No large midline shift. Basal cisterns are more well visualized on the current examination, this may be due to technique.    MR Angio Head w/ IV Cont (03.09.24 @ 18:57)   IMPRESSION:  MR brain: Intraparenchymal hemorrhage within the medial anterior frontal lobes measuring approximately 3.5 cm, unchanged. There is surrounding edema and extension into the genuine body of corpus callosum, unchanged. Hemorrhagic clot within the LEFT lateral ventricle and third ventricle is unchanged. Minimal hemorrhage seen in the dependent portions of the RIGHT lateral ventricle unchanged. Minimal subarachnoid hemorrhage seen along the medial sulci of the BILATERAL frontal and parietal lobes as well as scattered throughout the sulci of the brain. RIGHT frontal ventriculostomy catheter tip is seen extending toward the body of the   LEFT ventricle and third ventricle.  Coil material is seen in the region of the anterior communicating artery on the RIGHT.   Blood products are seen in the region of the known aneurysm possibly indicating partial patency.    MRA intracranial:   Coil material is seen in the region of the anterior communicating artery. There is 1 x 4 mm region of signal seen on the noncontrast but not the postcontrast which may reflect blood products within the aneurysm indicating patency.    CT Head No Cont (03.07.24 @ 05:56)   IMPRESSION:   persistent intracranial hemorrhage within the BILATERAL lateral and third ventricles, LEFT greater than RIGHT, with mild obstructive hydrocephalus slightly increased. RIGHT frontal shunt catheter tip seen in body of RIGHT lateral ventricle unchanged. Subarachnoid hemorrhage again noted in the BILATERAL medial frontal lobes with hemorrhage in the anterior corpus callosum, LEFT greaterthan RIGHT.     CT Head No Cont (03.06.24 @ 19:20)   IMPRESSION: Interval placement of right frontal approach ventriculostomy catheter since the prior study performed 3 hours ago, with improved hydrocephalus and slightly decreased sulcal/cisternal bilateral subarachnoid hemorrhage.    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 Intraventricular hemorrhage is present with hydrocephalus.    IR Neuro (03.06.24 @ 12:13)   IMPRESSION:  1. 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  2. Status postballoon assisted coil embolization, the aneurysm is nearly completely occluded other than a 2.2 mm x 1.4 mm x 4.3 mm aneurysmal neck remnant.    Study Date: 03-19-24  Duration: 22 minutes  EEG Classification / Summary:  Normal routine EEG in the awake state, within the limits of interpretation.  Interpretation significantly limited by continuous muscle artifact.  Clinical Impression:   Technically limited study due to continuous muscle artifact. No obvious abnormalities noted.  Consider repeating EEG if clinically warranted.    Preliminary note, official recommendations pending attending review/signature   PENDING EXAM & VISIT    [facility] Stroke Team  Progress Note       HPI:  72 y/o mandarin speaking female with PMH of HLD and osteoporosis presents to PST with complaints of having cerebral aneurysm. States in 10/2023 she started to have B/L hand numbness. At that time she had MRI brain, MRA head and neck. MRA showed an ACOMM aneurysm. She underwent a cerebral angiogram by José Manuel Villaseñor at [facility] on December 7th, 2023, with the intent to treat this month. The aneurysm is described as a 5.5x4.6x3.6mm wide necked acomm aneurysm that projects medio-superiorly. Reports occasional headaches, described as numbness, 5/10 in severity, worse with laying down, relieved with sitting up or standing. Patient underwent diagnostic angiogram 2/14/24 which confirmed acomm aneurysm. On 3/6 patient underwent aneurysm embolization with coiling complicated by aneurysm rupture, controlled with coils and balloon tamponade. NISHANT CT showed SAH b/l frontal lobes and R sylvian fissure along with contrast staining. Patient admitted to NSICU for post op care.. EVD now removed per neurosurgery 3/15. Transferred to neurosurgery service 3/16, stroke team asked to consult, transferred to Stroke service 3/18.     SUBJECTIVE: Patient seen and examined at bedside. Patient sitting up in bed, smiling, appears in NAD. Offers no complaints. No events overnight.  No new neurologic complaints.  ROS reported negative unless otherwise noted.    MEDICATIONS:  acetaminophen     Tablet .. 650 milliGRAM(s) Oral every 6 hours PRN  bromocriptine Capsule 5 milliGRAM(s) Oral daily  enoxaparin Injectable 30 milliGRAM(s) SubCutaneous every 24 hours  hydrALAZINE Injectable 10 milliGRAM(s) IV Push every 2 hours PRN  iohexol 300 mG (iodine)/mL Oral Solution 30 milliLiter(s) Oral once PRN  labetalol Injectable 10 milliGRAM(s) IV Push every 2 hours PRN  lactobacillus acidophilus 1 Tablet(s) Oral daily  lidocaine   4% Patch 1 Patch Transdermal every 24 hours  multivitamin 1 Tablet(s) Oral daily  niMODipine Oral Solution 60 milliGRAM(s) Oral every 4 hours  nystatin Powder 1 Application(s) Topical two times a day  ondansetron Injectable 4 milliGRAM(s) IV Push every 6 hours PRN  pantoprazole  Injectable 40 milliGRAM(s) IV Push two times a day  QUEtiapine 12.5 milliGRAM(s) Oral at bedtime  simvastatin 10 milliGRAM(s) Oral at bedtime  sodium chloride 0.9%. 1000 milliLiter(s) IV Continuous <Continuous>  tamsulosin 0.8 milliGRAM(s) Oral at bedtime      Vital Signs Last 24 Hrs  T(C): 36.6 (25 Mar 2024 04:00), Max: 37.1 (24 Mar 2024 09:03)  T(F): 97.8 (25 Mar 2024 04:00), Max: 98.8 (24 Mar 2024 09:03)  HR: 75 (25 Mar 2024 04:00) (71 - 82)  BP: 121/72 (25 Mar 2024 04:00) (121/72 - 147/66)  BP(mean): --  RR: 18 (25 Mar 2024 04:00) (18 - 18)  SpO2: 94% (25 Mar 2024 04:00) (94% - 99%)    Parameters below as of 25 Mar 2024 04:00  Patient On (Oxygen Delivery Method): room air    PENDING EXAM  PHYSICAL EXAM:  General: No acute distress.   HEENT: Head normocephalic, atraumatic. Conjunctivae clear w/o exudates or hemorrhage. Sclera non-icteric. Nares are patent bilaterally. Mucous membranes pink and moist.  No tonsillar swelling or exudates.    Neck: Supple, no adenopathy. Trachea midline. No JVD.  Cardiac: Normal rate and rhythm. S1, S2 auscultated. No murmurs, gallops, or rubs.     Respiratory: Lung sounds clear in all fields. Chest wall symmetric, nontender.   Abdominal: Soft, nondistended, nontender. Bowel sounds normoactive x 4 quadrants. No masses, hepatomegaly, or splenomegaly.   Skin: Skin is warm, dry and intact without rashes or lesions. Appropriate color for ethnicity. Nailbeds pink with no cyanosis or clubbing.   Extremities: No edema, 2+ peripheral pulses in b/l upper and b/l lower extremities. Full range of motion in all joints.     NEUROLOGICAL EXAM:  Mental status: The patient is awake and alert and has normal attention span.  The patient is fully oriented in 3 spheres. The patient is oriented to current events. The patient is able to name objects, follow commands, repeat sentences.    Cranial nerves: slight left facial palsy, Pupils equal and react symmetrically to light. There is no visual field deficit to confrontation. Extraocular motion is full with no nystagmus. There is no ptosis. Facial sensation is intact.   Motor: There is normal bulk and tone. There is no tremor. LUE pronator drift, strength 5-/5, LLE 5-/5. RUE and bilateral LE strength 5/5- some pain limitation in LE.  Sensation: Intact to light touch in 4 extremities  Slight tremor b/l upper extremities       LABS:                        8.0    7.40  )-----------( 399      ( 25 Mar 2024 07:23 )             23.8    03-25    129<L>  |  98  |  7.7<L>  ----------------------------<  94  4.4   |  23.0  |  0.32<L>    Ca    8.1<L>      25 Mar 2024 07:23  Phos  2.4     03-25  Mg     2.0     03-25    TPro  5.4<L>  /  Alb  3.3  /  TBili  0.3<L>  /  DBili  x   /  AST  20  /  ALT  27  /  AlkPhos  46  03-25        IMAGING:     CT Abdomen and Pelvis w/ Oral Cont (03.23.24 @ 22:02)  BILE DUCTS: Mild intrahepatic and extrahepatic biliary ductal dilatation   with CBD measuring up to 8 to 9 mm.    IMPRESSION:  Mild proctitis, new.  Mild biliary ductal dilatation. Correlation with lab markers would be   helpful.    Xray Sacrum + Coccyx (03.20.24 @ 11:28)  IMPRESSION: Limited study without obvious fracture.    Xray Lumbosacral Spine (03.20.24 @ 11:29)   Findings:    There is no fracture, subluxation or paravertebral soft tissue swelling.  The pedicles and sacroiliac joints are intact.  Straightening of lordosis may be positional versus muscle spasm  The sacrum and visualized coccyx are intact    Large amount of stool in the visualized colon without gross bowel   obstruction.    IMPRESSION: No fracture or subluxation.    US Duplex Venous Lower Ext Complete, Bilateral (03.20.24 @ 12:20)  IMPRESSION:  No evidence of deep venous thrombosis in either lower extremity.    CT Head No Cont (03.16.24 @ 03:22)   IMPRESSION: No change in mild ventricular dilatation with intraventricular hemorrhage and hemorrhage in the frontal lobe midline and corpus callosum after extraventricular drain removal since 3/15/2024.    CT Head No Cont (03.15.24 @ 05:38)   IMPRESSION: Stable follow-up CT study.     TTE W or WO Ultrasound Enhancing Agent (03.13.24 @ 12:03)   CONCLUSIONS:    1. Left ventricular cavity is normal in size. Left ventricular wall thickness is normal. Left ventricular systolic function is normal with an ejection fraction visually estimated at 65 to 70 %.   2. Normal left ventricular diastolic function.   3. Normal right ventricular cavity size and normal systolic function.   4. The left atrium is normal.   5. The right atrium is normal in size.   6. Fibrocalcific aortic valve sclerosis without stenosis.   7. Mild mitral valve leaflet calcification.   8. Trace mitral regurgitation.   9. Estimated pulmonary artery systolic pressure is 23 mmHg, normal pulmonary artery pressure.    US Duplex Venous Lower Ext Complete, Bilateral (03.12.24 @ 15:23) >  IMPRESSION: No evidence of deep venous thrombosis in either lower extremity.     IR Neuro (03.11.24 @ 09:01)   IMPRESSION:  1. 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 No interval aneurysm recanalization or evidence for pseudoaneurysm.  2. No vasospasm.    CT Head No Cont (03.10.24 @ 12:37)   IMPRESSION: Mildly decreased bilateral mesial frontal parenchymal hemorrhages. Similar anterior interhemispheric acute subarachnoid hemorrhage. Mildly decreased intraventricular hemorrhage within the bilateral lateral   and third ventricles. Decreased ventricular size with near resolution of hydrocephalus. No large midline shift. Basal cisterns are more well visualized on the current examination, this may be due to technique.    MR Angio Head w/ IV Cont (03.09.24 @ 18:57)   IMPRESSION:  MR brain: Intraparenchymal hemorrhage within the medial anterior frontal lobes measuring approximately 3.5 cm, unchanged. There is surrounding edema and extension into the genuine body of corpus callosum, unchanged. Hemorrhagic clot within the LEFT lateral ventricle and third ventricle is unchanged. Minimal hemorrhage seen in the dependent portions of the RIGHT lateral ventricle unchanged. Minimal subarachnoid hemorrhage seen along the medial sulci of the BILATERAL frontal and parietal lobes as well as scattered throughout the sulci of the brain. RIGHT frontal ventriculostomy catheter tip is seen extending toward the body of the   LEFT ventricle and third ventricle.  Coil material is seen in the region of the anterior communicating artery on the RIGHT.   Blood products are seen in the region of the known aneurysm possibly indicating partial patency.    MRA intracranial:   Coil material is seen in the region of the anterior communicating artery. There is 1 x 4 mm region of signal seen on the noncontrast but not the postcontrast which may reflect blood products within the aneurysm indicating patency.    CT Head No Cont (03.07.24 @ 05:56)   IMPRESSION:   persistent intracranial hemorrhage within the BILATERAL lateral and third ventricles, LEFT greater than RIGHT, with mild obstructive hydrocephalus slightly increased. RIGHT frontal shunt catheter tip seen in body of RIGHT lateral ventricle unchanged. Subarachnoid hemorrhage again noted in the BILATERAL medial frontal lobes with hemorrhage in the anterior corpus callosum, LEFT greaterthan RIGHT.     CT Head No Cont (03.06.24 @ 19:20)   IMPRESSION: Interval placement of right frontal approach ventriculostomy catheter since the prior study performed 3 hours ago, with improved hydrocephalus and slightly decreased sulcal/cisternal bilateral subarachnoid hemorrhage.    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 Intraventricular hemorrhage is present with hydrocephalus.    IR Neuro (03.06.24 @ 12:13)   IMPRESSION:  1. 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  2. Status postballoon assisted coil embolization, the aneurysm is nearly completely occluded other than a 2.2 mm x 1.4 mm x 4.3 mm aneurysmal neck remnant.    Study Date: 03-19-24  Duration: 22 minutes  EEG Classification / Summary:  Normal routine EEG in the awake state, within the limits of interpretation.  Interpretation significantly limited by continuous muscle artifact.  Clinical Impression:   Technically limited study due to continuous muscle artifact. No obvious abnormalities noted.  Consider repeating EEG if clinically warranted.    Preliminary note, official recommendations pending attending review/signature     [facility] Stroke Team  Progress Note     : Son at bedside per patient request/preference  HPI:  72 y/o mandarin speaking female with PMH of HLD and osteoporosis presents to PST with complaints of having cerebral aneurysm. States in 10/2023 she started to have B/L hand numbness. At that time she had MRI brain, MRA head and neck. MRA showed an ACOMM aneurysm. She underwent a cerebral angiogram by José Manuel Villaseñor at [facility] on December 7th, 2023, with the intent to treat this month. The aneurysm is described as a 5.5x4.6x3.6mm wide necked acomm aneurysm that projects medio-superiorly. Reports occasional headaches, described as numbness, 5/10 in severity, worse with laying down, relieved with sitting up or standing. Patient underwent diagnostic angiogram 2/14/24 which confirmed acomm aneurysm. On 3/6 patient underwent aneurysm embolization with coiling complicated by aneurysm rupture, controlled with coils and balloon tamponade. NISHANT CT showed SAH b/l frontal lobes and R sylvian fissure along with contrast staining. Patient admitted to NSICU for post op care.. EVD now removed per neurosurgery 3/15. Transferred to neurosurgery service 3/16, stroke team asked to consult, transferred to Stroke service 3/18.     SUBJECTIVE: Patient seen and examined at bedside. Patient sitting up in chair, smiling, appears in NAD. Offers no complaints. No events overnight.  No new neurologic complaints.  ROS reported negative unless otherwise noted.    MEDICATIONS:  acetaminophen     Tablet .. 650 milliGRAM(s) Oral every 6 hours PRN  bromocriptine Capsule 5 milliGRAM(s) Oral daily  enoxaparin Injectable 30 milliGRAM(s) SubCutaneous every 24 hours  hydrALAZINE Injectable 10 milliGRAM(s) IV Push every 2 hours PRN  iohexol 300 mG (iodine)/mL Oral Solution 30 milliLiter(s) Oral once PRN  labetalol Injectable 10 milliGRAM(s) IV Push every 2 hours PRN  lactobacillus acidophilus 1 Tablet(s) Oral daily  lidocaine   4% Patch 1 Patch Transdermal every 24 hours  multivitamin 1 Tablet(s) Oral daily  niMODipine Oral Solution 60 milliGRAM(s) Oral every 4 hours  nystatin Powder 1 Application(s) Topical two times a day  ondansetron Injectable 4 milliGRAM(s) IV Push every 6 hours PRN  pantoprazole  Injectable 40 milliGRAM(s) IV Push two times a day  QUEtiapine 12.5 milliGRAM(s) Oral at bedtime  simvastatin 10 milliGRAM(s) Oral at bedtime  sodium chloride 0.9%. 1000 milliLiter(s) IV Continuous <Continuous>  tamsulosin 0.8 milliGRAM(s) Oral at bedtime      Vital Signs Last 24 Hrs  T(C): 36.6 (25 Mar 2024 04:00), Max: 37.1 (24 Mar 2024 09:03)  T(F): 97.8 (25 Mar 2024 04:00), Max: 98.8 (24 Mar 2024 09:03)  HR: 75 (25 Mar 2024 04:00) (71 - 82)  BP: 121/72 (25 Mar 2024 04:00) (121/72 - 147/66)  BP(mean): --  RR: 18 (25 Mar 2024 04:00) (18 - 18)  SpO2: 94% (25 Mar 2024 04:00) (94% - 99%)    Parameters below as of 25 Mar 2024 04:00  Patient On (Oxygen Delivery Method): room air    PHYSICAL EXAM:  General: No acute distress.   HEENT: Head normocephalic, atraumatic. Conjunctivae clear w/o exudates or hemorrhage. Sclera non-icteric. Nares are patent bilaterally. Mucous membranes pink and moist.  No tonsillar swelling or exudates.    Neck: Supple, no adenopathy. Trachea midline. No JVD.  Cardiac: Normal rate and rhythm. S1, S2 auscultated. No murmurs, gallops, or rubs.     Respiratory: Lung sounds clear in all fields. Chest wall symmetric, nontender.   Abdominal: Soft, nondistended, nontender. Bowel sounds normoactive x 4 quadrants. No masses, hepatomegaly, or splenomegaly.   Skin: Skin is warm, dry and intact without rashes or lesions. Appropriate color for ethnicity. Nailbeds pink with no cyanosis or clubbing.   Extremities: No edema, 2+ peripheral pulses in b/l upper and b/l lower extremities. Full range of motion in all joints.     NEUROLOGICAL EXAM:  Mental status: The patient is awake and alert and has normal attention span.  The patient is fully oriented in 3 spheres. The patient is oriented to current events. The patient is able to name objects, follow commands, repeat sentences.    Cranial nerves: slight left facial palsy, Pupils equal and react symmetrically to light. There is no visual field deficit to confrontation. Extraocular motion is full with no nystagmus. There is no ptosis. Facial sensation is intact.   Motor: There is normal bulk and tone. There is no tremor. LUE pronator drift, strength 5-/5, LLE 5-/5. RUE and bilateral LE strength 5/5- some pain limitation in LE.  Sensation: Intact to light touch in 4 extremities  Slight tremor b/l upper extremities       LABS:                        8.0    7.40  )-----------( 399      ( 25 Mar 2024 07:23 )             23.8    03-25    129<L>  |  98  |  7.7<L>  ----------------------------<  94  4.4   |  23.0  |  0.32<L>    Ca    8.1<L>      25 Mar 2024 07:23  Phos  2.4     03-25  Mg     2.0     03-25    TPro  5.4<L>  /  Alb  3.3  /  TBili  0.3<L>  /  DBili  x   /  AST  20  /  ALT  27  /  AlkPhos  46  03-25        IMAGING:         CT Abdomen and Pelvis w/ Oral Cont (03.23.24 @ 22:02)  BILE DUCTS: Mild intrahepatic and extrahepatic biliary ductal dilatation   with CBD measuring up to 8 to 9 mm.    IMPRESSION:  Mild proctitis, new.  Mild biliary ductal dilatation. Correlation with lab markers would be   helpful.    Xray Sacrum + Coccyx (03.20.24 @ 11:28)  IMPRESSION: Limited study without obvious fracture.    Xray Lumbosacral Spine (03.20.24 @ 11:29)   Findings:    There is no fracture, subluxation or paravertebral soft tissue swelling.  The pedicles and sacroiliac joints are intact.  Straightening of lordosis may be positional versus muscle spasm  The sacrum and visualized coccyx are intact    Large amount of stool in the visualized colon without gross bowel   obstruction.    IMPRESSION: No fracture or subluxation.    US Duplex Venous Lower Ext Complete, Bilateral (03.20.24 @ 12:20)  IMPRESSION:  No evidence of deep venous thrombosis in either lower extremity.    CT Head No Cont (03.16.24 @ 03:22)   IMPRESSION: No change in mild ventricular dilatation with intraventricular hemorrhage and hemorrhage in the frontal lobe midline and corpus callosum after extraventricular drain removal since 3/15/2024.    CT Head No Cont (03.15.24 @ 05:38)   IMPRESSION: Stable follow-up CT study.     TTE W or WO Ultrasound Enhancing Agent (03.13.24 @ 12:03)   CONCLUSIONS:    1. Left ventricular cavity is normal in size. Left ventricular wall thickness is normal. Left ventricular systolic function is normal with an ejection fraction visually estimated at 65 to 70 %.   2. Normal left ventricular diastolic function.   3. Normal right ventricular cavity size and normal systolic function.   4. The left atrium is normal.   5. The right atrium is normal in size.   6. Fibrocalcific aortic valve sclerosis without stenosis.   7. Mild mitral valve leaflet calcification.   8. Trace mitral regurgitation.   9. Estimated pulmonary artery systolic pressure is 23 mmHg, normal pulmonary artery pressure.    US Duplex Venous Lower Ext Complete, Bilateral (03.12.24 @ 15:23) >  IMPRESSION: No evidence of deep venous thrombosis in either lower extremity.     IR Neuro (03.11.24 @ 09:01)   IMPRESSION:  1. 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 No interval aneurysm recanalization or evidence for pseudoaneurysm.  2. No vasospasm.    CT Head No Cont (03.10.24 @ 12:37)   IMPRESSION: Mildly decreased bilateral mesial frontal parenchymal hemorrhages. Similar anterior interhemispheric acute subarachnoid hemorrhage. Mildly decreased intraventricular hemorrhage within the bilateral lateral   and third ventricles. Decreased ventricular size with near resolution of hydrocephalus. No large midline shift. Basal cisterns are more well visualized on the current examination, this may be due to technique.    MR Angio Head w/ IV Cont (03.09.24 @ 18:57)   IMPRESSION:  MR brain: Intraparenchymal hemorrhage within the medial anterior frontal lobes measuring approximately 3.5 cm, unchanged. There is surrounding edema and extension into the genuine body of corpus callosum, unchanged. Hemorrhagic clot within the LEFT lateral ventricle and third ventricle is unchanged. Minimal hemorrhage seen in the dependent portions of the RIGHT lateral ventricle unchanged. Minimal subarachnoid hemorrhage seen along the medial sulci of the BILATERAL frontal and parietal lobes as well as scattered throughout the sulci of the brain. RIGHT frontal ventriculostomy catheter tip is seen extending toward the body of the   LEFT ventricle and third ventricle.  Coil material is seen in the region of the anterior communicating artery on the RIGHT.   Blood products are seen in the region of the known aneurysm possibly indicating partial patency.    MRA intracranial:   Coil material is seen in the region of the anterior communicating artery. There is 1 x 4 mm region of signal seen on the noncontrast but not the postcontrast which may reflect blood products within the aneurysm indicating patency.    CT Head No Cont (03.07.24 @ 05:56)   IMPRESSION:   persistent intracranial hemorrhage within the BILATERAL lateral and third ventricles, LEFT greater than RIGHT, with mild obstructive hydrocephalus slightly increased. RIGHT frontal shunt catheter tip seen in body of RIGHT lateral ventricle unchanged. Subarachnoid hemorrhage again noted in the BILATERAL medial frontal lobes with hemorrhage in the anterior corpus callosum, LEFT greaterthan RIGHT.     CT Head No Cont (03.06.24 @ 19:20)   IMPRESSION: Interval placement of right frontal approach ventriculostomy catheter since the prior study performed 3 hours ago, with improved hydrocephalus and slightly decreased sulcal/cisternal bilateral subarachnoid hemorrhage.    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 Intraventricular hemorrhage is present with hydrocephalus.    IR Neuro (03.06.24 @ 12:13)   IMPRESSION:  1. 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  2. Status postballoon assisted coil embolization, the aneurysm is nearly completely occluded other than a 2.2 mm x 1.4 mm x 4.3 mm aneurysmal neck remnant.    Study Date: 03-19-24  Duration: 22 minutes  EEG Classification / Summary:  Normal routine EEG in the awake state, within the limits of interpretation.  Interpretation significantly limited by continuous muscle artifact.  Clinical Impression:   Technically limited study due to continuous muscle artifact. No obvious abnormalities noted.  Consider repeating EEG if clinically warranted.    Preliminary note, official recommendations pending attending review/signature     [facility] Stroke Team  Progress Note     : Son at bedside per patient request/preference  HPI:  74 y/o mandarin speaking female with PMH of HLD and osteoporosis presents to PST with complaints of having cerebral aneurysm. States in 10/2023 she started to have B/L hand numbness. At that time she had MRI brain, MRA head and neck. MRA showed an ACOMM aneurysm. She underwent a cerebral angiogram by José Manuel Villaseñor at [facility] on December 7th, 2023, with the intent to treat this month. The aneurysm is described as a 5.5x4.6x3.6mm wide necked acomm aneurysm that projects medio-superiorly. Reports occasional headaches, described as numbness, 5/10 in severity, worse with laying down, relieved with sitting up or standing. Patient underwent diagnostic angiogram 2/14/24 which confirmed acomm aneurysm. On 3/6 patient underwent aneurysm embolization with coiling complicated by aneurysm rupture, controlled with coils and balloon tamponade. NISHANT CT showed SAH b/l frontal lobes and R sylvian fissure along with contrast staining. Patient admitted to NSICU for post op care.. EVD now removed per neurosurgery 3/15. Transferred to neurosurgery service 3/16, stroke team asked to consult, transferred to Stroke service 3/18.     SUBJECTIVE: Patient seen and examined at bedside. Patient sitting up in chair, smiling, appears in NAD. Offers no complaints. No events overnight.  No new neurologic complaints.  ROS reported negative unless otherwise noted.    MEDICATIONS:  acetaminophen     Tablet .. 650 milliGRAM(s) Oral every 6 hours PRN  bromocriptine Capsule 5 milliGRAM(s) Oral daily  enoxaparin Injectable 30 milliGRAM(s) SubCutaneous every 24 hours  hydrALAZINE Injectable 10 milliGRAM(s) IV Push every 2 hours PRN  iohexol 300 mG (iodine)/mL Oral Solution 30 milliLiter(s) Oral once PRN  labetalol Injectable 10 milliGRAM(s) IV Push every 2 hours PRN  lactobacillus acidophilus 1 Tablet(s) Oral daily  lidocaine   4% Patch 1 Patch Transdermal every 24 hours  multivitamin 1 Tablet(s) Oral daily  niMODipine Oral Solution 60 milliGRAM(s) Oral every 4 hours  nystatin Powder 1 Application(s) Topical two times a day  ondansetron Injectable 4 milliGRAM(s) IV Push every 6 hours PRN  pantoprazole  Injectable 40 milliGRAM(s) IV Push two times a day  QUEtiapine 12.5 milliGRAM(s) Oral at bedtime  simvastatin 10 milliGRAM(s) Oral at bedtime  sodium chloride 0.9%. 1000 milliLiter(s) IV Continuous <Continuous>  tamsulosin 0.8 milliGRAM(s) Oral at bedtime      Vital Signs Last 24 Hrs  T(C): 36.6 (25 Mar 2024 04:00), Max: 37.1 (24 Mar 2024 09:03)  T(F): 97.8 (25 Mar 2024 04:00), Max: 98.8 (24 Mar 2024 09:03)  HR: 75 (25 Mar 2024 04:00) (71 - 82)  BP: 121/72 (25 Mar 2024 04:00) (121/72 - 147/66)  BP(mean): --  RR: 18 (25 Mar 2024 04:00) (18 - 18)  SpO2: 94% (25 Mar 2024 04:00) (94% - 99%)    Parameters below as of 25 Mar 2024 04:00  Patient On (Oxygen Delivery Method): room air    PHYSICAL EXAM:  General: No acute distress.   HEENT: Head normocephalic, atraumatic. Conjunctivae clear w/o exudates or hemorrhage. Sclera non-icteric. Nares are patent bilaterally. Mucous membranes pink and moist.  No tonsillar swelling or exudates.    Neck: Supple, no adenopathy. Trachea midline. No JVD.  Cardiac: Normal rate and rhythm. S1, S2 auscultated. No murmurs, gallops, or rubs.     Respiratory: Lung sounds clear in all fields. Chest wall symmetric, nontender.   Abdominal: Soft, nondistended, nontender. Bowel sounds normoactive x 4 quadrants. No masses, hepatomegaly, or splenomegaly.   Skin: Skin is warm, dry and intact without rashes or lesions. Appropriate color for ethnicity. Nailbeds pink with no cyanosis or clubbing.   Extremities: No edema, 2+ peripheral pulses in b/l upper and b/l lower extremities. Full range of motion in all joints.     NEUROLOGICAL EXAM:  Mental status: The patient is awake and alert and has normal attention span.  The patient is fully oriented in 3 spheres. The patient is oriented to current events. The patient is able to name objects, follow commands, repeat sentences.    Cranial nerves: slight left facial palsy, Pupils equal and react symmetrically to light. There is no visual field deficit to confrontation. Extraocular motion is full with no nystagmus. There is no ptosis. Facial sensation is intact.   Motor: There is normal bulk and tone. There is no tremor. LUE pronator drift, strength 5-/5, LLE 5-/5. RUE and bilateral LE strength 5/5- some pain limitation in LE.  Sensation: Intact to light touch in 4 extremities  Slight tremor b/l upper extremities       LABS:                        8.0    7.40  )-----------( 399      ( 25 Mar 2024 07:23 )             23.8    03-25    129<L>  |  98  |  7.7<L>  ----------------------------<  94  4.4   |  23.0  |  0.32<L>    Ca    8.1<L>      25 Mar 2024 07:23  Phos  2.4     03-25  Mg     2.0     03-25    TPro  5.4<L>  /  Alb  3.3  /  TBili  0.3<L>  /  DBili  x   /  AST  20  /  ALT  27  /  AlkPhos  46  03-25        IMAGING:     US Abdomen Upper Quadrant Right (03.25.24 @ 09:08)  IMPRESSION:  Gallbladder sludge and wall thickening. Correlate with symptomatology. If   there is concern for cholecystitis HIDA scan can be obtained.    Choledochomegaly. Correlate with LFTs and MRCP.    CT Abdomen and Pelvis w/ Oral Cont (03.23.24 @ 22:02)  BILE DUCTS: Mild intrahepatic and extrahepatic biliary ductal dilatation   with CBD measuring up to 8 to 9 mm.    IMPRESSION:  Mild proctitis, new.  Mild biliary ductal dilatation. Correlation with lab markers would be   helpful.    Xray Sacrum + Coccyx (03.20.24 @ 11:28)  IMPRESSION: Limited study without obvious fracture.    Xray Lumbosacral Spine (03.20.24 @ 11:29)   Findings:    There is no fracture, subluxation or paravertebral soft tissue swelling.  The pedicles and sacroiliac joints are intact.  Straightening of lordosis may be positional versus muscle spasm  The sacrum and visualized coccyx are intact    Large amount of stool in the visualized colon without gross bowel   obstruction.    IMPRESSION: No fracture or subluxation.    US Duplex Venous Lower Ext Complete, Bilateral (03.20.24 @ 12:20)  IMPRESSION:  No evidence of deep venous thrombosis in either lower extremity.    CT Head No Cont (03.16.24 @ 03:22)   IMPRESSION: No change in mild ventricular dilatation with intraventricular hemorrhage and hemorrhage in the frontal lobe midline and corpus callosum after extraventricular drain removal since 3/15/2024.    CT Head No Cont (03.15.24 @ 05:38)   IMPRESSION: Stable follow-up CT study.     TTE W or WO Ultrasound Enhancing Agent (03.13.24 @ 12:03)   CONCLUSIONS:    1. Left ventricular cavity is normal in size. Left ventricular wall thickness is normal. Left ventricular systolic function is normal with an ejection fraction visually estimated at 65 to 70 %.   2. Normal left ventricular diastolic function.   3. Normal right ventricular cavity size and normal systolic function.   4. The left atrium is normal.   5. The right atrium is normal in size.   6. Fibrocalcific aortic valve sclerosis without stenosis.   7. Mild mitral valve leaflet calcification.   8. Trace mitral regurgitation.   9. Estimated pulmonary artery systolic pressure is 23 mmHg, normal pulmonary artery pressure.    US Duplex Venous Lower Ext Complete, Bilateral (03.12.24 @ 15:23) >  IMPRESSION: No evidence of deep venous thrombosis in either lower extremity.     IR Neuro (03.11.24 @ 09:01)   IMPRESSION:  1. 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 No interval aneurysm recanalization or evidence for pseudoaneurysm.  2. No vasospasm.    CT Head No Cont (03.10.24 @ 12:37)   IMPRESSION: Mildly decreased bilateral mesial frontal parenchymal hemorrhages. Similar anterior interhemispheric acute subarachnoid hemorrhage. Mildly decreased intraventricular hemorrhage within the bilateral lateral   and third ventricles. Decreased ventricular size with near resolution of hydrocephalus. No large midline shift. Basal cisterns are more well visualized on the current examination, this may be due to technique.    MR Angio Head w/ IV Cont (03.09.24 @ 18:57)   IMPRESSION:  MR brain: Intraparenchymal hemorrhage within the medial anterior frontal lobes measuring approximately 3.5 cm, unchanged. There is surrounding edema and extension into the genuine body of corpus callosum, unchanged. Hemorrhagic clot within the LEFT lateral ventricle and third ventricle is unchanged. Minimal hemorrhage seen in the dependent portions of the RIGHT lateral ventricle unchanged. Minimal subarachnoid hemorrhage seen along the medial sulci of the BILATERAL frontal and parietal lobes as well as scattered throughout the sulci of the brain. RIGHT frontal ventriculostomy catheter tip is seen extending toward the body of the   LEFT ventricle and third ventricle.  Coil material is seen in the region of the anterior communicating artery on the RIGHT.   Blood products are seen in the region of the known aneurysm possibly indicating partial patency.    MRA intracranial:   Coil material is seen in the region of the anterior communicating artery. There is 1 x 4 mm region of signal seen on the noncontrast but not the postcontrast which may reflect blood products within the aneurysm indicating patency.    CT Head No Cont (03.07.24 @ 05:56)   IMPRESSION:   persistent intracranial hemorrhage within the BILATERAL lateral and third ventricles, LEFT greater than RIGHT, with mild obstructive hydrocephalus slightly increased. RIGHT frontal shunt catheter tip seen in body of RIGHT lateral ventricle unchanged. Subarachnoid hemorrhage again noted in the BILATERAL medial frontal lobes with hemorrhage in the anterior corpus callosum, LEFT greaterthan RIGHT.     CT Head No Cont (03.06.24 @ 19:20)   IMPRESSION: Interval placement of right frontal approach ventriculostomy catheter since the prior study performed 3 hours ago, with improved hydrocephalus and slightly decreased sulcal/cisternal bilateral subarachnoid hemorrhage.    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 Intraventricular hemorrhage is present with hydrocephalus.    IR Neuro (03.06.24 @ 12:13)   IMPRESSION:  1. 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  2. Status postballoon assisted coil embolization, the aneurysm is nearly completely occluded other than a 2.2 mm x 1.4 mm x 4.3 mm aneurysmal neck remnant.    Study Date: 03-19-24  Duration: 22 minutes  EEG Classification / Summary:  Normal routine EEG in the awake state, within the limits of interpretation.  Interpretation significantly limited by continuous muscle artifact.  Clinical Impression:   Technically limited study due to continuous muscle artifact. No obvious abnormalities noted.  Consider repeating EEG if clinically warranted.    [facility] Stroke Team  Progress Note     : Son at bedside per patient request/preference  HPI:  74 y/o mandarin speaking female with PMH of HLD and osteoporosis presents to PST with complaints of having cerebral aneurysm. States in 10/2023 she started to have B/L hand numbness. At that time she had MRI brain, MRA head and neck. MRA showed an ACOMM aneurysm. She underwent a cerebral angiogram by José Manuel Villaseñor at [facility] on December 7th, 2023, with the intent to treat this month. The aneurysm is described as a 5.5x4.6x3.6mm wide necked acomm aneurysm that projects medio-superiorly. Reports occasional headaches, described as numbness, 5/10 in severity, worse with laying down, relieved with sitting up or standing. Patient underwent diagnostic angiogram 2/14/24 which confirmed acomm aneurysm. On 3/6 patient underwent aneurysm embolization with coiling complicated by aneurysm rupture, controlled with coils and balloon tamponade. NISHANT CT showed SAH b/l frontal lobes and R sylvian fissure along with contrast staining. Patient admitted to NSICU for post op care.. EVD now removed per neurosurgery 3/15. Transferred to neurosurgery service 3/16, stroke team asked to consult, transferred to Stroke service 3/18.     SUBJECTIVE: Patient seen and examined at bedside. Patient sitting up in chair, smiling, appears in NAD. Offers no complaints. No events overnight.  No new neurologic complaints.  ROS reported negative unless otherwise noted.    MEDICATIONS:  acetaminophen     Tablet .. 650 milliGRAM(s) Oral every 6 hours PRN  bromocriptine Capsule 5 milliGRAM(s) Oral daily  enoxaparin Injectable 30 milliGRAM(s) SubCutaneous every 24 hours  hydrALAZINE Injectable 10 milliGRAM(s) IV Push every 2 hours PRN  iohexol 300 mG (iodine)/mL Oral Solution 30 milliLiter(s) Oral once PRN  labetalol Injectable 10 milliGRAM(s) IV Push every 2 hours PRN  lactobacillus acidophilus 1 Tablet(s) Oral daily  lidocaine   4% Patch 1 Patch Transdermal every 24 hours  multivitamin 1 Tablet(s) Oral daily  niMODipine Oral Solution 60 milliGRAM(s) Oral every 4 hours  nystatin Powder 1 Application(s) Topical two times a day  ondansetron Injectable 4 milliGRAM(s) IV Push every 6 hours PRN  pantoprazole  Injectable 40 milliGRAM(s) IV Push two times a day  QUEtiapine 12.5 milliGRAM(s) Oral at bedtime  simvastatin 10 milliGRAM(s) Oral at bedtime  sodium chloride 0.9%. 1000 milliLiter(s) IV Continuous <Continuous>  tamsulosin 0.8 milliGRAM(s) Oral at bedtime      Vital Signs Last 24 Hrs  T(C): 36.6 (25 Mar 2024 04:00), Max: 37.1 (24 Mar 2024 09:03)  T(F): 97.8 (25 Mar 2024 04:00), Max: 98.8 (24 Mar 2024 09:03)  HR: 75 (25 Mar 2024 04:00) (71 - 82)  BP: 121/72 (25 Mar 2024 04:00) (121/72 - 147/66)  BP(mean): --  RR: 18 (25 Mar 2024 04:00) (18 - 18)  SpO2: 94% (25 Mar 2024 04:00) (94% - 99%)    Parameters below as of 25 Mar 2024 04:00  Patient On (Oxygen Delivery Method): room air    PHYSICAL EXAM:  General: No acute distress.   HEENT: Head normocephalic, atraumatic. Conjunctivae clear w/o exudates or hemorrhage. Sclera non-icteric. Nares are patent bilaterally. Mucous membranes pink and moist.  No tonsillar swelling or exudates.    Neck: Supple, no adenopathy. Trachea midline. No JVD.  Cardiac: Normal rate and rhythm. S1, S2 auscultated. No murmurs, gallops, or rubs.     Respiratory: Lung sounds clear in all fields. Chest wall symmetric, nontender.   Abdominal: Soft, nondistended, nontender. Bowel sounds normoactive x 4 quadrants. No masses, hepatomegaly, or splenomegaly.   Skin: Skin is warm, dry and intact without rashes or lesions. Appropriate color for ethnicity. Nailbeds pink with no cyanosis or clubbing.   Extremities: No edema, 2+ peripheral pulses in b/l upper and b/l lower extremities. Full range of motion in all joints.     NEUROLOGICAL EXAM:  Mental status: The patient is awake and alert and has normal attention span.  The patient is fully oriented in 3 spheres. The patient is oriented to current events. The patient is able to name objects, follow commands, repeat sentences.    Cranial nerves: slight left facial palsy, Pupils equal and react symmetrically to light. There is no visual field deficit to confrontation. Extraocular motion is full with no nystagmus. There is no ptosis. Facial sensation is intact.   Motor: There is normal bulk and tone. There is no tremor. LUE pronator drift, strength 5-/5, LLE 5-/5. RUE and bilateral LE strength 5/5- some pain limitation in LE.  Sensation: Intact to light touch in 4 extremities  Slight tremor b/l upper extremities       LABS:                        8.0    7.40  )-----------( 399      ( 25 Mar 2024 07:23 )             23.8    03-25    129<L>  |  98  |  7.7<L>  ----------------------------<  94  4.4   |  23.0  |  0.32<L>    Ca    8.1<L>      25 Mar 2024 07:23  Phos  2.4     03-25  Mg     2.0     03-25    TPro  5.4<L>  /  Alb  3.3  /  TBili  0.3<L>  /  DBili  x   /  AST  20  /  ALT  27  /  AlkPhos  46  03-25        IMAGING:     US Abdomen Upper Quadrant Right (03.25.24 @ 09:08)  IMPRESSION:  Gallbladder sludge and wall thickening. Correlate with symptomatology. If   there is concern for cholecystitis HIDA scan can be obtained.    Choledochomegaly. Correlate with LFTs and MRCP.    CT Abdomen and Pelvis w/ Oral Cont (03.23.24 @ 22:02)  BILE DUCTS: Mild intrahepatic and extrahepatic biliary ductal dilatation   with CBD measuring up to 8 to 9 mm.    IMPRESSION:  Mild proctitis, new.  Mild biliary ductal dilatation. Correlation with lab markers would be   helpful.    Xray Sacrum + Coccyx (03.20.24 @ 11:28)  IMPRESSION: Limited study without obvious fracture.    Xray Lumbosacral Spine (03.20.24 @ 11:29)   Findings:    There is no fracture, subluxation or paravertebral soft tissue swelling.  The pedicles and sacroiliac joints are intact.  Straightening of lordosis may be positional versus muscle spasm  The sacrum and visualized coccyx are intact    Large amount of stool in the visualized colon without gross bowel   obstruction.    IMPRESSION: No fracture or subluxation.    US Duplex Venous Lower Ext Complete, Bilateral (03.20.24 @ 12:20)  IMPRESSION:  No evidence of deep venous thrombosis in either lower extremity.    CT Head No Cont (03.16.24 @ 03:22)   IMPRESSION: No change in mild ventricular dilatation with intraventricular hemorrhage and hemorrhage in the frontal lobe midline and corpus callosum after extraventricular drain removal since 3/15/2024.    CT Head No Cont (03.15.24 @ 05:38)   IMPRESSION: Stable follow-up CT study.     TTE W or WO Ultrasound Enhancing Agent (03.13.24 @ 12:03)   CONCLUSIONS:    1. Left ventricular cavity is normal in size. Left ventricular wall thickness is normal. Left ventricular systolic function is normal with an ejection fraction visually estimated at 65 to 70 %.   2. Normal left ventricular diastolic function.   3. Normal right ventricular cavity size and normal systolic function.   4. The left atrium is normal.   5. The right atrium is normal in size.   6. Fibrocalcific aortic valve sclerosis without stenosis.   7. Mild mitral valve leaflet calcification.   8. Trace mitral regurgitation.   9. Estimated pulmonary artery systolic pressure is 23 mmHg, normal pulmonary artery pressure.    US Duplex Venous Lower Ext Complete, Bilateral (03.12.24 @ 15:23) >  IMPRESSION: No evidence of deep venous thrombosis in either lower extremity.     IR Neuro (03.11.24 @ 09:01)   IMPRESSION:  1. 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 No interval aneurysm recanalization or evidence for pseudoaneurysm.  2. No vasospasm.    CT Head No Cont (03.10.24 @ 12:37)   IMPRESSION: Mildly decreased bilateral mesial frontal parenchymal hemorrhages. Similar anterior interhemispheric acute subarachnoid hemorrhage. Mildly decreased intraventricular hemorrhage within the bilateral lateral   and third ventricles. Decreased ventricular size with near resolution of hydrocephalus. No large midline shift. Basal cisterns are more well visualized on the current examination, this may be due to technique.    MR Angio Head w/ IV Cont (03.09.24 @ 18:57)   IMPRESSION:  MR brain: Intraparenchymal hemorrhage within the medial anterior frontal lobes measuring approximately 3.5 cm, unchanged. There is surrounding edema and extension into the genuine body of corpus callosum, unchanged. Hemorrhagic clot within the LEFT lateral ventricle and third ventricle is unchanged. Minimal hemorrhage seen in the dependent portions of the RIGHT lateral ventricle unchanged. Minimal subarachnoid hemorrhage seen along the medial sulci of the BILATERAL frontal and parietal lobes as well as scattered throughout the sulci of the brain. RIGHT frontal ventriculostomy catheter tip is seen extending toward the body of the   LEFT ventricle and third ventricle.  Coil material is seen in the region of the anterior communicating artery on the RIGHT.   Blood products are seen in the region of the known aneurysm possibly indicating partial patency.    MRA intracranial:   Coil material is seen in the region of the anterior communicating artery. There is 1 x 4 mm region of signal seen on the noncontrast but not the postcontrast which may reflect blood products within the aneurysm indicating patency.    CT Head No Cont (03.07.24 @ 05:56)   IMPRESSION:   persistent intracranial hemorrhage within the BILATERAL lateral and third ventricles, LEFT greater than RIGHT, with mild obstructive hydrocephalus slightly increased. RIGHT frontal shunt catheter tip seen in body of RIGHT lateral ventricle unchanged. Subarachnoid hemorrhage again noted in the BILATERAL medial frontal lobes with hemorrhage in the anterior corpus callosum, LEFT greaterthan RIGHT.     CT Head No Cont (03.06.24 @ 19:20)   IMPRESSION: Interval placement of right frontal approach ventriculostomy catheter since the prior study performed 3 hours ago, with improved hydrocephalus and slightly decreased sulcal/cisternal bilateral subarachnoid hemorrhage.    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 Intraventricular hemorrhage is present with hydrocephalus.    IR Neuro (03.06.24 @ 12:13)   IMPRESSION:  1. 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  2. Status postballoon assisted coil embolization, the aneurysm is nearly completely occluded other than a 2.2 mm x 1.4 mm x 4.3 mm aneurysmal neck remnant.    Study Date: 03-19-24  Duration: 22 minutes  EEG Classification / Summary:  Normal routine EEG in the awake state, within the limits of interpretation.  Interpretation significantly limited by continuous muscle artifact.  Clinical Impression:   Technically limited study due to continuous muscle artifact. No obvious abnormalities noted.  Consider repeating EEG if clinically warranted.

## 2024-03-25 NOTE — PROGRESS NOTE ADULT - TIME BILLING
This includes chart review, patient assessment, discussion with Stroke team. Antibiotic dosing and management with clinical pharmacy.
chart review, patient assessment
chart review, patient assessment

## 2024-03-25 NOTE — PROGRESS NOTE ADULT - ASSESSMENT
PENDING  ASSESSMENT: 72 y/o mandarin speaking female with PMH of HLD and osteoporosis presents to [facility] with complaints of having cerebral aneurysm. States in 10/2023 she started to have B/L hand numbness. At that time she had MRI brain, MRA head and neck. MRA showed an ACOMM aneurysm. She underwent a cerebral angiogram by José Manuel Villaseñor at [facility] on December 7th, 2023, with the intent to treat this month. The aneurysm is described as a 5.5x4.6x3.6mm wide necked acomm aneurysm that projects medio-superiorly. Reports occasional headaches, described as numbness, 5/10 in severity, worse with laying down, relieved with sitting up or standing. Patient underwent diagnostic angiogram 2/14/24 which confirmed acomm aneurysm. On 3/6 patient underwent aneurysm embolization with coiling complicated by aneurysm rupture, controlled with coils and balloon tamponade. NISHANT CT showed SAH b/l frontal lobes and R sylvian fissure along with contrast staining. EVD placed  3/6, removed by neurosurgery 3/15. Transferred to Stroke service 3/18.   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       Neurologically intact.  Still having low grade fevers of unclear etiology. Patient reporting decreased abdominal pain today and decreased but continued diarrhea.  Patient encouraged to ambulate in hallway and room and get out of bed to chair.    NEURO:   #ACOMM aneurysm elective procedure complicated by aneurysm rupture controlled with coils/balloon tamponade  #SAH  #EVD 3/6, removed 3/15  #Metabolic encephalopathy  #Tremors  -Neurologically without acute change   -Continue close monitoring for neurologic deterioration    -Stroke neuro checks q4hrs   -EEG without evidence of seizure   -MRI Brain w/o, MRA Head w/o and Neck w/contrast results as above   -Will need repeat MRI brain w/wo contrast in 4-6 weeks (4/3/24 is 4-week anneliese from aneurysm embolization)  -s/p Keppra  -nimodipine 60mg dc'd on 3/21- per Neurosurgery  -bromocriptine tid due to ? central fevers, will continue to titrate.   -Dysphagia screen: pass, on easy to chew diet  -Physical therapy/OT/Speech eval/treatment.            #Stroke prevention:  -SBP goal  per neuro IR recommendations, avoiding rapid fluctuations and hypotension   -ANTITHROMBOTIC THERAPY: n/a   -LDL- 55 , continue home regimen if applicable   -HA1C 5.8  -TTE noted above    CARDIOVASCULAR:   -TTE noted above  -cardiac monitoring w/ telemetry for now, further evaluation pending findings of noted workup                              HEMATOLOGY:   -Acute normocytic anemia, H/H 7.8/23.1  -Repeat Guiac pending  -Heme-onc c/s called, awaiting recs  -Iron studies, B12, Folate WNL  -Pending homocysteine and MMA   -Thromocytosis w/ platelets to 448  -Continue close monitoring, CBC q daily, no active bleeding noted  -Patient should have all age and risk appropriate malignancy screenings with PCP or sooner if clinically suspected   -DVT ppx: Heparin s.c [] LMWH [X]     PULMONARY:   -most recent CXR 3/19 with persistent right base infiltrate  -s/p zosyn for possible pneumonia  -Lung sounds clear on exam today  -Protecting airway, saturating well   -Consider CT chest if fever persists     RENAL/METABOLIC:   #Urinary retention  -Urinary retention s/p void trial- feliciano re-inserted 3/19/24  #Hyponatremia #Hypokalemia #Hypophosphatemia #Hypocalcemia  -Electrolyte derangements likely due to diarrhea, continue to monitor  -IVF in place with gradual improvement in hyponatremia - titrated to 75cc/hr , monitor closely   -Na improving gradually, 129-->138   -Potassium 2.9 --> 3.5  -Phosphorus 1.4, repletion ordered, 1.1 today, additional PO and IV repletion ordered and pending   -Calcium 7.5, albumin 3.1, corrected calcium 8.2, repletion ordered and pending   -BUN/Cr without acute change   -Maintain adequate hydration  -Tamsulosin 0.8mg bedtime   -Na Goal:  135-145    ID:   -ID consulted  -s/p zosyn for possible pneumonia  -Fluctuating fevers - Tmax 101.1,  possible central fevers but will need to screen for alternate causes and have ID follow up.   -Leukocytosis resolved   -Monitor and screen for si/sx of infection   -Abdomen distended, diarrhea- abdominal imaging revealed fecal.  enema and mag citrate as recommended by hospitalist ACP team.     GI:  #diarrhea  -Abdominal XR 3/13 noted  -s/p rectal tube, removed 3/17  -monitor BM, loose stool possible side effect of nimodipine  -PO calcium d/c'd   -CT Abdomen & Pelvis w/ Oral Contrast ordered to rule out infectious process/cause  -GI PCR ordered and pending   -Diet: Easy to Chew, Lactose Restricted, Ensure Clear TID    PSYCH:  -Quetiapine 12.5mg at bedtime  -Delirium precautions    ORTHo;  -XR lumbar /sacral as noted  -MRI L spine d/c'd, patient reports her back feels better today    OTHER: Condition and plan of care d/w patient, questions and concerns addressed. Discussed with family at bedside, medical team at bedside as well present for further evaluation and recommendations.     DISPOSITION: PT/OT recs for Acute Rehab -- patient and family in agreement     CORE MEASURES:        Admission NIHSS: n/a      Tenecteplase : [] YES [X] NO      LDL/HDL/A1C: pending lipid panel/ HA1C 5.8     Depression Screen- if depression hx and/or present      Statin Therapy: simvastatin 10mg     Dysphagia Screen: [X] PASS [] FAIL     Smoking [] YES [X] NO      Afib [] YES [X] NO     Stroke Education [] YES [] NO [X] pending   PENDING  ASSESSMENT: 74 y/o mandarin speaking female with PMH of HLD and osteoporosis presents to [facility] with complaints of having cerebral aneurysm. States in 10/2023 she started to have B/L hand numbness. At that time she had MRI brain, MRA head and neck. MRA showed an ACOMM aneurysm. She underwent a cerebral angiogram by José Manuel Villaseñor at [facility] on December 7th, 2023, with the intent to treat this month. The aneurysm is described as a 5.5x4.6x3.6mm wide necked acomm aneurysm that projects medio-superiorly. Reports occasional headaches, described as numbness, 5/10 in severity, worse with laying down, relieved with sitting up or standing. Patient underwent diagnostic angiogram 2/14/24 which confirmed acomm aneurysm. On 3/6 patient underwent aneurysm embolization with coiling complicated by aneurysm rupture, controlled with coils and balloon tamponade. NISHANT CT showed SAH b/l frontal lobes and R sylvian fissure along with contrast staining. EVD placed  3/6, removed by neurosurgery 3/15. Transferred to Stroke service 3/18.   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       Neurologically intact.  Still having low grade fevers of unclear etiology. Patient reporting decreased abdominal pain today and decreased but continued diarrhea.  Patient encouraged to ambulate in hallway and room and get out of bed to chair.    NEURO:   #ACOMM aneurysm elective procedure complicated by aneurysm rupture controlled with coils/balloon tamponade  #SAH  #EVD 3/6, removed 3/15  #Metabolic encephalopathy  #Tremors  -Neurologically without acute change   -Continue close monitoring for neurologic deterioration    -Stroke neuro checks q4hrs   -EEG without evidence of seizure   -MRI Brain w/o, MRA Head w/o and Neck w/contrast results as above   -Will need repeat MRI brain w/wo contrast in 4-6 weeks (4/3/24 is 4-week anneliese from aneurysm embolization)  -s/p Keppra  -nimodipine 60mg d/c'd today 3/25  -bromocriptine titrated down, d/c'd today 3/25  -Dysphagia screen: pass, on easy to chew diet  -Physical therapy/OT/Speech eval/treatment.            #Stroke prevention:  -SBP goal  per neuro IR recommendations, avoiding rapid fluctuations and hypotension   -ANTITHROMBOTIC THERAPY: n/a   -LDL- 55 , continue home regimen if applicable   -HA1C 5.8  -TTE noted above    CARDIOVASCULAR:   -TTE noted above  -cardiac monitoring w/ telemetry for now, further evaluation pending findings of noted workup                              HEMATOLOGY:   -Acute normocytic anemia, improving -- H/H 7.5/21.9 --> 8.0/23.8   -Guiac +, GI recs appreciated, per GI patient w/o evidence of acute GIB despite positive fecal occult  -Protonix 40mg q daily  -Retic count 6.1  Haptoglobin 250  -Heme-onc recs appreciate: suspect anemia related to suppression from acute illness  -Pending SPEP/MG  -Iron studies, B12, Folate WNL  -Homocysteine 4.7, pending MMA results  -Platelets 399  -Continue close monitoring, CBC q daily, no active bleeding noted  -Patient should have all age and risk appropriate malignancy screenings with PCP or sooner if clinically suspected   -DVT ppx: Heparin s.c [] LMWH [X]     PULMONARY:   -Lung sounds clear on exam today  -Protecting airway, saturating well   -s/p zosyn for possible pneumonia  -Consider CT chest if fever persists     RENAL/METABOLIC:   #Urinary retention  -Urinary retention s/p void trial- feliciano re-inserted 3/19/24  -Trial of void today 3/25/24 -- ENCOURAGE mobilization to bathroom/commode   #Hyponatremia #Hypokalemia #Hypophosphatemia #Hypocalcemia  -Electrolyte derangements likely due to diarrhea, continue to monitor  -Na fluctuating, 129-->138--> 129   -NS increased to 75mL/hour, PO sodium chloride tablets 1g x 1 dose ordered  -Potassium 2.9 --> 4.4  -Phosphorus 1.4-->1.1  -Calcium 8.1, albumin 3.3, corrected calcium 8.7  -BUN/Cr without acute change   -Maintain adequate hydration  -Tamsulosin 0.8mg at bedtime   -Na Goal:  135-145    ID:   -ID consulted  -s/p zosyn for possible pneumonia  -Fluctuating fevers - Tmax 101.1,  possible central fevers but will need to screen for alternate causes and have ID follow up.   -Leukocytosis resolved   -Monitor and screen for si/sx of infection   -Abdomen distended, diarrhea- abdominal imaging revealed fecal.  enema and mag citrate as recommended by hospitalist ACP team.     GI:  #diarrhea  -s/p rectal tube, removed 3/17  -Monitor BM, loose stool possible side effect of nimodipine  -CT Abdomen & Pelvis w/ Oral Contrast: Mild proctitis, new. Mild biliary ductal dilatation. Correlation with lab markers would be helpful CBD measuring up to 8 to 9 mm  -GI PCR cancelled, diarrhea improving   -Lipase 94  -Pending US RUQ  -Diet: Easy to Chew, Lactose Restricted, Ensure Clear TID    PSYCH:  -Quetiapine 12.5mg at bedtime  -Delirium precautions    ORTHO:  -XR lumbar /sacral as noted  -MRI L spine d/c'd, patient reports her back feels better    OTHER:   ENCOURAGE MOBILIZATION AND AMBULATION  Condition and plan of care d/w patient, questions and concerns addressed. Discussed with family at bedside, medical team at bedside as well present for further evaluation and recommendations.     DISPOSITION: PT/OT recs for Acute Rehab -- patient and family in agreement     CORE MEASURES:        Admission NIHSS: n/a      Tenecteplase : [] YES [X] NO      LDL/HDL/A1C: pending lipid panel/ HA1C 5.8     Depression Screen- if depression hx and/or present      Statin Therapy: simvastatin 10mg     Dysphagia Screen: [X] PASS [] FAIL     Smoking [] YES [X] NO      Afib [] YES [X] NO     Stroke Education [] YES [] NO [X] pending   ASSESSMENT: 74 y/o mandarin speaking female with PMH of HLD and osteoporosis presents to [facility] with complaints of having cerebral aneurysm. States in 10/2023 she started to have B/L hand numbness. At that time she had MRI brain, MRA head and neck. MRA showed an ACOMM aneurysm. She underwent a cerebral angiogram by José Manuel Villaseñor at [facility] on December 7th, 2023, with the intent to treat this month. The aneurysm is described as a 5.5x4.6x3.6mm wide necked acomm aneurysm that projects medio-superiorly. Reports occasional headaches, described as numbness, 5/10 in severity, worse with laying down, relieved with sitting up or standing. Patient underwent diagnostic angiogram 2/14/24 which confirmed acomm aneurysm. On 3/6 patient underwent aneurysm embolization with coiling complicated by aneurysm rupture, controlled with coils and balloon tamponade. NISHANT CT showed SAH b/l frontal lobes and R sylvian fissure along with contrast staining. EVD placed  3/6, removed by neurosurgery 3/15. Transferred to Stroke service 3/18.   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       Neurologically intact.  Still having low grade fevers of unclear etiology. Patient reporting decreased abdominal pain today and decreased but continued diarrhea.  Patient encouraged to ambulate in hallway and room and get out of bed to chair.    NEURO:   #ACOMM aneurysm elective procedure complicated by aneurysm rupture controlled with coils/balloon tamponade  #SAH  #EVD 3/6, removed 3/15  #Metabolic encephalopathy  #Tremors  -Neurologically without acute change   -Continue close monitoring for neurologic deterioration    -Stroke neuro checks q4hrs   -EEG without evidence of seizure   -MRI Brain w/o, MRA Head w/o and Neck w/contrast results as above   -Will need repeat MRI brain w/wo contrast in 4-6 weeks (4/3/24 is 4-week anneliese from aneurysm embolization)  -s/p Keppra  -nimodipine 60mg d/c'd today 3/25  -bromocriptine titrated down, d/c'd today 3/25  -Dysphagia screen: pass, on easy to chew diet  -Physical therapy/OT/Speech eval/treatment.            #Stroke prevention:  -SBP goal  per neuro IR recommendations, avoiding rapid fluctuations and hypotension   -ANTITHROMBOTIC THERAPY: n/a   -LDL- 55 , continue home regimen if applicable   -HA1C 5.8  -TTE noted above    CARDIOVASCULAR:   -TTE noted above  -cardiac monitoring w/ telemetry for now, further evaluation pending findings of noted workup                              HEMATOLOGY:   -Acute normocytic anemia, improving -- H/H 7.5/21.9 --> 8.0/23.8   -Guiac +, GI recs appreciated, per GI patient w/o evidence of acute GIB despite positive fecal occult  -Protonix 40mg q daily  -Retic count 6.1  Haptoglobin 250  -Heme-onc recs appreciate: suspect anemia related to suppression from acute illness  -Pending SPEP/MG  -Iron studies, B12, Folate WNL  -Homocysteine 4.7, pending MMA results  -Platelets 399  -Continue close monitoring, CBC q daily, no active bleeding noted  -Patient should have all age and risk appropriate malignancy screenings with PCP or sooner if clinically suspected   -DVT ppx: Heparin s.c [] LMWH [X]     PULMONARY:   -Lung sounds clear on exam today  -Protecting airway, saturating well   -s/p zosyn for possible pneumonia  -Consider CT chest if fever persists     RENAL/METABOLIC:   #Urinary retention  -Urinary retention s/p void trial- feliciano re-inserted 3/19/24  -Trial of void today 3/25/24 -- ENCOURAGE mobilization to bathroom/commode   #Hyponatremia #Hypokalemia #Hypophosphatemia #Hypocalcemia  -Electrolyte derangements likely due to diarrhea, continue to monitor  -Na fluctuating, 129-->138--> 129   -NS increased to 75mL/hour, PO sodium chloride tablets 1g x 1 dose ordered  -Potassium 2.9 --> 4.4  -Phosphorus 1.4-->1.1  -Calcium 8.1, albumin 3.3, corrected calcium 8.7  -BUN/Cr without acute change   -Maintain adequate hydration  -Tamsulosin 0.8mg at bedtime   -Na Goal:  135-145    ID:   -ID consulted  -s/p zosyn for possible pneumonia  -Fluctuating fevers - Tmax 101.1,  possible central fevers but will need to screen for alternate causes and have ID follow up.   -Leukocytosis resolved   -Monitor and screen for si/sx of infection   -Abdomen distended, diarrhea- abdominal imaging revealed fecal.  enema and mag citrate as recommended by hospitalist ACP team.     GI:  #diarrhea  -s/p rectal tube, removed 3/17  -Monitor BM, loose stool possible side effect of nimodipine  -CT Abdomen & Pelvis w/ Oral Contrast: Mild proctitis, new. Mild biliary ductal dilatation. Correlation with lab markers would be helpful CBD measuring up to 8 to 9 mm  -GI PCR cancelled, diarrhea improving   -Lipase 94  -Pending US RUQ  -Diet: Easy to Chew, Lactose Restricted, Ensure Clear TID    PSYCH:  -Quetiapine 12.5mg at bedtime  -Delirium precautions    ORTHO:  -XR lumbar /sacral as noted  -MRI L spine d/c'd, patient reports her back feels better    OTHER:   ENCOURAGE MOBILIZATION AND AMBULATION  Condition and plan of care d/w patient, questions and concerns addressed. Discussed with family at bedside, medical team at bedside as well present for further evaluation and recommendations.     DISPOSITION: PT/OT recs for Acute Rehab -- patient and family in agreement     CORE MEASURES:        Admission NIHSS: n/a      Tenecteplase : [] YES [X] NO      LDL/HDL/A1C: pending lipid panel/ HA1C 5.8     Depression Screen- if depression hx and/or present      Statin Therapy: simvastatin 10mg     Dysphagia Screen: [X] PASS [] FAIL     Smoking [] YES [X] NO      Afib [] YES [X] NO     Stroke Education [] YES [] NO [X] pending   ASSESSMENT: 74 y/o mandarin speaking female with PMH of HLD and osteoporosis presents to [facility] with complaints of having cerebral aneurysm. States in 10/2023 she started to have B/L hand numbness. At that time she had MRI brain, MRA head and neck. MRA showed an ACOMM aneurysm. She underwent a cerebral angiogram by José Manuel Villaseñor at [facility] on December 7th, 2023, with the intent to treat this month. The aneurysm is described as a 5.5x4.6x3.6mm wide necked acomm aneurysm that projects medio-superiorly. Reports occasional headaches, described as numbness, 5/10 in severity, worse with laying down, relieved with sitting up or standing. Patient underwent diagnostic angiogram 2/14/24 which confirmed acomm aneurysm. On 3/6 patient underwent aneurysm embolization with coiling complicated by aneurysm rupture, controlled with coils and balloon tamponade. NISHANT CT showed SAH b/l frontal lobes and R sylvian fissure along with contrast staining. EVD placed  3/6, removed by neurosurgery 3/15. Transferred to Stroke service 3/18.   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       Neurologically intact.  Still having low grade fevers of unclear etiology. Patient reporting decreased abdominal pain today and decreased but continued diarrhea.  Patient encouraged to ambulate in hallway and room and get out of bed to chair.    NEURO:   #ACOMM aneurysm elective procedure complicated by aneurysm rupture controlled with coils/balloon tamponade  #SAH  #EVD 3/6, removed 3/15  #Metabolic encephalopathy  #Tremors  -Neurologically without acute change   -Continue close monitoring for neurologic deterioration    -Stroke neuro checks q4hrs   -EEG without evidence of seizure   -MRI Brain w/o, MRA Head w/o and Neck w/contrast results as above   -Will need repeat MRI brain w/wo contrast in 4-6 weeks (4/3/24 is 4-week anneliese from aneurysm embolization)  -s/p Keppra  -nimodipine 60mg d/c'd today 3/25  -bromocriptine titrated down, d/c'd today 3/25  -Dysphagia screen: pass, on easy to chew diet  -Physical therapy/OT/Speech eval/treatment.            #Stroke prevention:  -SBP goal  per neuro IR recommendations, avoiding rapid fluctuations and hypotension   -ANTITHROMBOTIC THERAPY: n/a   -LDL- 55 , continue home regimen if applicable   -HA1C 5.8  -TTE noted above    CARDIOVASCULAR:   -TTE noted above  -cardiac monitoring w/ telemetry for now, further evaluation pending findings of noted workup                              HEMATOLOGY:   -Acute normocytic anemia, improving -- H/H 7.5/21.9 --> 8.0/23.8   -Guiac +, GI recs appreciated, per GI patient w/o evidence of acute GIB despite positive fecal occult  -Protonix 40mg q daily  -Retic count 6.1  Haptoglobin 250  -Heme-onc recs appreciate: suspect anemia related to suppression from acute illness  -Pending SPEP/MG  -Iron studies, B12, Folate WNL  -Homocysteine 4.7, pending MMA results  -Platelets 399  -Continue close monitoring, CBC q daily, no active bleeding noted  -Patient should have all age and risk appropriate malignancy screenings with PCP or sooner if clinically suspected   -DVT ppx: Heparin s.c [] LMWH [X]     PULMONARY:   -Lung sounds clear on exam today  -Protecting airway, saturating well   -s/p zosyn for possible pneumonia  -Consider CT chest if fever persists     RENAL/METABOLIC:   #Urinary retention  -Urinary retention s/p void trial- feliciano re-inserted 3/19/24  -Trial of void today 3/25/24 -- ENCOURAGE mobilization to bathroom/commode   #Hyponatremia #Hypokalemia #Hypophosphatemia #Hypocalcemia  -Electrolyte derangements likely due to diarrhea, continue to monitor  -Na fluctuating, 129-->138--> 129   -NS increased to 75mL/hour, PO sodium chloride tablets 1g x 1 dose ordered  -Potassium 2.9 --> 4.4  -Phosphorus 1.4-->1.1  -Calcium 8.1, albumin 3.3, corrected calcium 8.7  -BUN/Cr without acute change   -Maintain adequate hydration  -Tamsulosin 0.8mg at bedtime   -Na Goal:  135-145    ID:   -s/p zosyn for possible pneumonia  -Fluctuating fevers - Tmax 101.1, ?possible central fevers, now resolved, ID following  -Leukocytosis resolved   -Monitor and screen for si/sx of infection   -CT Abdomen & Pelvis as noted    GI:  #diarrhea  -s/p rectal tube, removed 3/17  -Monitor BM, loose stool possible side effect of nimodipine (med now dc'd)  -CT Abdomen & Pelvis w/ Oral Contrast: Mild proctitis, new. Mild biliary ductal dilatation. Correlation with lab markers would be helpful CBD measuring up to 8 to 9 mm  -Episodic diarrhea likely 2/2 polypharmacy and ?neurogenic bowel, not emptying bowels -- patient reports improvement, Miralax & Stool Count ordered per medicine recs, patient ambulating to bathroom w/ assistance  -GI PCR cancelled, diarrhea improving   -Lipase 94  -US RUQ w gallbladder sludge and wall thickening, patient asymptomatic, LFTs WNL  -Diet: Easy to Chew, Lactose Restricted, Ensure Clear TID    PSYCH:  -Quetiapine 12.5mg at bedtime  -Delirium precautions    ORTHO:  -XR lumbar /sacral as noted  -MRI L spine d/c'd, patient reports her back feels better    OTHER:   ENCOURAGE MOBILIZATION AND AMBULATION  Condition and plan of care d/w patient, questions and concerns addressed. Discussed with family at bedside, medical team at bedside as well present for further evaluation and recommendations.     DISPOSITION: PT/OT recs for Acute Rehab -- patient and family in agreement     CORE MEASURES:        Admission NIHSS: n/a      Tenecteplase : [] YES [X] NO      LDL/HDL/A1C: pending lipid panel/ HA1C 5.8     Depression Screen- if depression hx and/or present      Statin Therapy: simvastatin 10mg     Dysphagia Screen: [X] PASS [] FAIL     Smoking [] YES [X] NO      Afib [] YES [X] NO     Stroke Education [] YES [] NO [X] pending   ASSESSMENT: 72 y/o mandarin speaking female with PMH of HLD and osteoporosis presents to [facility] with complaints of having cerebral aneurysm. States in 10/2023 she started to have B/L hand numbness. At that time she had MRI brain, MRA head and neck. MRA showed an ACOMM aneurysm. She underwent a cerebral angiogram by José Manuel Villaseñor at [facility] on December 7th, 2023, with the intent to treat this month. The aneurysm is described as a 5.5x4.6x3.6mm wide necked acomm aneurysm that projects medio-superiorly. Reports occasional headaches, described as numbness, 5/10 in severity, worse with laying down, relieved with sitting up or standing. Patient underwent diagnostic angiogram 2/14/24 which confirmed acomm aneurysm. On 3/6 patient underwent aneurysm embolization with coiling complicated by aneurysm rupture, controlled with coils and balloon tamponade. NISHANT CT showed SAH b/l frontal lobes and R sylvian fissure along with contrast staining. EVD placed  3/6, removed by neurosurgery 3/15. Transferred to Stroke service 3/18.   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       Neurologically intact. Patient encouraged to ambulate in hallway and room and get out of bed to chair. Family in agreement with plan.    NEURO:   #ACOMM aneurysm elective procedure complicated by aneurysm rupture controlled with coils/balloon tamponade  #SAH  #EVD 3/6, removed 3/15  #Metabolic encephalopathy  #Tremors  -Neurologically without acute change   -Continue close monitoring for neurologic deterioration    -Stroke neuro checks q4hrs   -EEG without evidence of seizure   -MRI Brain w/o, MRA Head w/o and Neck w/contrast results as above   -Will need repeat MRI brain w/wo contrast in 4-6 weeks (4/3/24 is 4-week anneliese from aneurysm embolization)  -s/p Keppra  -nimodipine 60mg d/c'd today 3/25  -bromocriptine titrated down, d/c'd today 3/25  -Dysphagia screen: pass, on easy to chew diet  -Physical therapy/OT/Speech eval/treatment.            #Stroke prevention:  -SBP goal  per neuro IR recommendations, avoiding rapid fluctuations and hypotension   -ANTITHROMBOTIC THERAPY: n/a   -LDL- 55 , continue home regimen if applicable   -HA1C 5.8  -TTE noted above    CARDIOVASCULAR:   -TTE noted above  -cardiac monitoring w/ telemetry for now, further evaluation pending findings of noted workup                              HEMATOLOGY:   -Acute normocytic anemia, improving -- H/H 7.5/21.9 --> 8.0/23.8   -Guiac +, GI recs appreciated, per GI patient w/o evidence of acute GIB despite positive fecal occult  -Protonix 40mg q daily  -Retic count 6.1  Haptoglobin 250  -Heme-onc recs appreciate: suspect anemia related to suppression from acute illness  -Pending SPEP/MG  -Iron studies, B12, Folate WNL  -Homocysteine 4.7, pending MMA results  -Platelets 399  -Continue close monitoring, CBC q daily, no active bleeding noted  -Patient should have all age and risk appropriate malignancy screenings with PCP or sooner if clinically suspected   -DVT ppx: Heparin s.c [] LMWH [X]     PULMONARY:   -Lung sounds clear on exam today  -Protecting airway, saturating well   -s/p zosyn for possible pneumonia  -Consider CT chest if fever persists     RENAL/METABOLIC:   #Urinary retention  -Urinary retention s/p void trial- feliciano re-inserted 3/19/24  -Trial of void today 3/25/24 -- ENCOURAGE mobilization to bathroom/commode   #Hyponatremia #Hypokalemia #Hypophosphatemia #Hypocalcemia  -Electrolyte derangements likely due to diarrhea, continue to monitor  -Na fluctuating, 129-->138--> 129   -NS increased to 75mL/hour, PO sodium chloride tablets 1g x 1 dose ordered  -Potassium 2.9 --> 4.4  -Phosphorus 1.4-->1.1  -Calcium 8.1, albumin 3.3, corrected calcium 8.7  -BUN/Cr without acute change   -Maintain adequate hydration  -Tamsulosin 0.8mg at bedtime   -Na Goal:  135-145    ID:   -s/p zosyn for possible pneumonia  -Fluctuating fevers - Tmax 101.1, ?possible central fevers, now resolved, ID following  -Leukocytosis resolved   -Monitor and screen for si/sx of infection   -CT Abdomen & Pelvis as noted    GI:  #diarrhea  -s/p rectal tube, removed 3/17  -Monitor BM, loose stool possible side effect of nimodipine (med now dc'd)  -CT Abdomen & Pelvis w/ Oral Contrast: Mild proctitis, new. Mild biliary ductal dilatation. Correlation with lab markers would be helpful CBD measuring up to 8 to 9 mm  -Episodic diarrhea likely 2/2 polypharmacy and ?neurogenic bowel, not emptying bowels -- patient reports improvement, Miralax & Stool Count ordered per medicine recs, patient ambulating to bathroom w/ assistance  -GI PCR cancelled, diarrhea improving   -Lipase 94  -US RUQ w gallbladder sludge and wall thickening, patient asymptomatic, LFTs WNL  -Diet: Easy to Chew, Lactose Restricted, Ensure Clear TID    PSYCH:  -Quetiapine 12.5mg at bedtime  -Delirium precautions    ORTHO:  -XR lumbar /sacral as noted  -MRI L spine d/c'd, patient reports her back feels better    OTHER:   ENCOURAGE MOBILIZATION AND AMBULATION  Condition and plan of care d/w patient, questions and concerns addressed. Discussed with family at bedside, medical team at bedside as well present for further evaluation and recommendations.     DISPOSITION: PT/OT recs for Acute Rehab -- patient and family in agreement -- pending AR bed    CORE MEASURES:        Admission NIHSS: n/a      Tenecteplase : [] YES [X] NO      LDL/HDL/A1C: pending lipid panel/ HA1C 5.8     Depression Screen- if depression hx and/or present      Statin Therapy: simvastatin 10mg     Dysphagia Screen: [X] PASS [] FAIL     Smoking [] YES [X] NO      Afib [] YES [X] NO     Stroke Education [] YES [] NO [X] pending

## 2024-03-25 NOTE — PROGRESS NOTE ADULT - NS PANP OPT1 GEN_ALL_CORE
I independently performed the documented history, exam, and medical decision making.
I attest my time as PA/NP is greater than 50% of the total combined time spent on qualifying patient care activities by the PA/NP and attending.
I independently performed the documented history, exam, and medical decision making.

## 2024-03-25 NOTE — PROGRESS NOTE ADULT - SUBJECTIVE AND OBJECTIVE BOX
[facility]  INFECTIOUS DISEASES at [place] / [place] [facility] [place]  =======================================================                               Carlos Johnson MD#   Mony Amaya MD*                             Josephine Lackey MD*   Layne Cho MD*                              Professor Emeritus:  Dr Cuba Levin MD^            Diplomates American Board of Internal Medicine & Infectious Diseases                # [place] Office - Appt - Tel  [phone] Fax [phone]                * [place] Office - Appt - Tel [phone] Fax [phone]                      ^[place] Office - Tel  [phone] Fax [phone]                                  Hospital Consult line:  [phone]  =======================================================    KASHMIR CARBAJAL [id number]    Follow up: fever  afebrile  denies complaints  translated by son at bedside      Allergies:  lactose (Diarrhea)  No Known Allergies           REVIEW OF SYSTEMS:  CONSTITUTIONAL:  No Fever or chills  HEENT:   No diplopia or blurred vision.  No earache, sore throat or runny nose.  CARDIOVASCULAR:  No Chest Pain  RESPIRATORY:  No cough, shortness of breath  GASTROINTESTINAL:  No nausea, vomiting or diarrhea.  GENITOURINARY:  No dysuria, frequency or urgency. No Blood in urine  MUSCULOSKELETAL:  no joint aches, no muscle pain  SKIN:  No change in skin, hair or nails.  NEUROLOGIC:  No Headaches, seizures   PSYCHIATRIC:  No disorder of thought or mood.  ENDOCRINE:  No heat or cold intolerance  HEMATOLOGICAL:  No easy bruising or bleeding.       Physical Exam:  GEN: NAD  HEENT: normocephalic and atraumatic.   NECK: Supple.   LUNGS: CTA B/L.  HEART: RRR  ABDOMEN: Soft, NT, ND.  +BS.    : No CVA tenderness  EXTREMITIES: Without  edema.  MSK: No joint swelling  SKIN: No rash      Vitals:  T(F): 97.8 (25 Mar 2024 04:00), Max: 98.4 (24 Mar 2024 20:00)  HR: 75 (25 Mar 2024 04:00)  BP: 121/72 (25 Mar 2024 04:00)  RR: 18 (25 Mar 2024 04:00)  SpO2: 94% (25 Mar 2024 04:00) (94% - 98%)  temp max in last 48H T(F): , Max: 99.1 (03-23-24 @ 23:58)    Current Antibiotics:    Other medications:  enoxaparin Injectable 30 milliGRAM(s) SubCutaneous every 24 hours  lactobacillus acidophilus 1 Tablet(s) Oral daily  lidocaine   4% Patch 1 Patch Transdermal every 24 hours  multivitamin 1 Tablet(s) Oral daily  nystatin Powder 1 Application(s) Topical two times a day  polyethylene glycol 3350 17 Gram(s) Oral daily  QUEtiapine 12.5 milliGRAM(s) Oral at bedtime  simvastatin 10 milliGRAM(s) Oral at bedtime  tamsulosin 0.8 milliGRAM(s) Oral at bedtime                            8.0    7.40  )-----------( 399      ( 25 Mar 2024 07:23 )             23.8     03-25    129<L>  |  98  |  7.7<L>  ----------------------------<  94  4.4   |  23.0  |  0.32<L>    Ca    8.1<L>      25 Mar 2024 07:23  Phos  2.4     03-25  Mg     2.0     03-25    TPro  5.4<L>  /  Alb  3.3  /  TBili  0.3<L>  /  DBili  x   /  AST  20  /  ALT  27  /  AlkPhos  46  03-25    RECENT CULTURES:  03-21 @ 14:42 .Blood Blood-Peripheral     No growth at 72 Hours    03-21 @ 14:39 .Blood Blood-Peripheral     No growth at 72 Hours    03-12 @ 12:45 .CSF CSF     No growth at 5 days  No polymorphonuclear cells seen  No organisms seen  by cytocentrifuge    03-12 @ 12:00 .Blood Blood-Peripheral     No growth at 5 days    03-12 @ 11:45 .Blood Blood-Peripheral     No growth at 5 days      WBC Count: 7.40 K/uL (03-25-24 @ 07:23)  WBC Count: 9.00 K/uL (03-24-24 @ 15:10)  WBC Count: 6.61 K/uL (03-24-24 @ 07:00)  WBC Count: 11.16 K/uL (03-23-24 @ 04:41)  WBC Count: 12.50 K/uL (03-21-24 @ 04:40)    Creatinine: 0.32 mg/dL (03-25-24 @ 07:23)  Creatinine: 0.33 mg/dL (03-24-24 @ 15:10)  Creatinine: 0.30 mg/dL (03-24-24 @ 07:00)  Creatinine: 0.27 mg/dL (03-24-24 @ 04:45)  Creatinine: 0.37 mg/dL (03-23-24 @ 04:41)  Creatinine: 0.44 mg/dL (03-22-24 @ 16:45)  Creatinine: 0.32 mg/dL (03-21-24 @ 04:40)  Creatinine: 0.37 mg/dL (03-20-24 @ 17:20)        Procalcitonin, Serum: 0.18 ng/mL (03-12-24 @ 02:45)     SARS-CoV-2: NotDetec (03-12-24 @ 11:45)  SARS-CoV-2: NotDetec (03-09-24 @ 10:13)        < from: US Abdomen Upper Quadrant Right (03.25.24 @ 09:08) >  ACC: [id number] EXAM:  US ABDOMEN RT UPR QUADRANT   ORDERED BY: DIANA BHATT     PROCEDURE DATE:  03/25/2024      INTERPRETATION:  CLINICAL INFORMATION: Dilated common duct on CT of   3/23/2024    COMPARISON: CT abdomen 3/23/2024    TECHNIQUE: Sonography of the right upper quadrant.    FINDINGS:  Liver: Multiple cysts as noted on CT, largest in the right lobe measuring   up to 2.2 cm.  Bile ducts: Common duct dilated up to 1.0 cm. Correlate with LFTs and MRCP  Gallbladder: Sludge, diffuse wall thickening. Correlate with   symptomatology. If there is concern for cholecystitis HIDA scan can be   obtained.  Pancreas: Visualized portions are within normal limits.  Right kidney: 10.1 cm. No hydronephrosis.  Ascites: None.  IVC: Visualizedportions are within normal limits.    IMPRESSION:  Gallbladder sludge and wall thickening. Correlate with symptomatology. If   there is concern for cholecystitis HIDA scan can be obtained.    Choledochomegaly. Correlate with LFTs and MRCP.    ---End of Report ---    < end of copied text >      < from: CT Abdomen and Pelvis w/ Oral Cont (03.23.24 @ 22:02) >  ACC: [id number] EXAM:  CT ABDOMEN AND PELVIS OC   ORDERED BY: DIANA BHATT     PROCEDURE DATE:  03/23/2024          INTERPRETATION:  CLINICAL INFORMATION: Abdominal pain, diarrhea, fever.    COMPARISON: 3/7/2024.    CONTRAST/COMPLICATIONS:  IV Contrast: NONE  Oral Contrast: Gastroview  Complications: None reported at time of study completion    PROCEDURE:  CT of the Abdomen and Pelvis was performed.  Sagittal and coronal reformats were performed.    FINDINGS:  LOWER CHEST: Trace bilateral pleural effusions.    LIVER: Scattered cysts.  BILE DUCTS: Mild intrahepatic and extrahepatic biliary ductal dilatation   with CBD measuring up to 8 to 9 mm.  GALLBLADDER: Contracted.  SPLEEN: Within normal limits.  PANCREAS: Within normal limits.  ADRENALS: Within normal limits.  KIDNEYS/URETERS: Left renal cyst.    BLADDER: Decompressed around an indwelling catheter.  REPRODUCTIVE ORGANS: Uterus and adnexa within normal limits.    BOWEL: No bowel obstruction. Moderate amount of stool. Mild mural   thickening of the rectum with adjacent fatty stranding and trace   presacral free fluid. Appendix is normal.  PERITONEUM: No ascites.  VESSELS: Atherosclerotic changes.  RETROPERITONEUM/LYMPH NODES: No lymphadenopathy.  ABDOMINAL WALL: Within normal limits.  BONES: Osteopenia.    IMPRESSION:  Mild proctitis, new.  Mild biliary ductal dilatation. Correlation with lab markers would be   helpful.      --- End of Report ---    < end of copied text >

## 2024-03-25 NOTE — PROGRESS NOTE ADULT - ASSESSMENT
73y  Female with h/o HLD and osteoporosis. Patient presented  3/6 for cerebral angiogram with aneurysm embolization via balloon assisted coiling. Patient had MRI / MRA brain 2/2 hand numbness which had incidental finding of acomm aneurysm. Patient underwent diagnostic angiogram 2/14/24 which confirmed acomm aneurysm. On 3/6 patient underwent aneurysm embolization with coiling complicated by aneurysm rupture, controlled with coils and balloon tamponade. NISHANT CT showed SAH b/l frontal lobes and R sylvian fissure along with contrast staining. Patient admitted to NSICU for post op care, EVD. PAtient had EVD removed 3/15. Hospital course with fever on 3/8 and was started on Zosyn 3/9 and completed 7 days on 3/16. Transferred to neurosurgery service 3/16, stroke team asked to consult, transferred to Stroke service 3/18. Patient had been having low grade fevers up until fever to 101.3 on 3/20.       Fever  Leukocytosis  SIRS  Back pain       - Blood cultures 3/12 no growth   - Repeat blood cultures 3/21 no growth   - RVP/COVID 19 PCR 3/9 negative   - CXR 3/19 wit no PNA   - RUQ US with Gallbladder sludge and wall thickening  - CT A/P 3/23 with mild billary duct dilation   - GI following   - UA 3/19 with no pyuria   - Procalcitonin level 0.18  - Monitor off antibiotics fo now since clinically non toxic   - Hold off on PICC/Midline for now unless needed for reasons other than infectious diseases  - Follow up cultures  - Trend Fever  - Trend WBC      Will sign off. Please call PRN.

## 2024-03-25 NOTE — PROGRESS NOTE ADULT - ASSESSMENT
Assessment: 73F with PMH HLD who presented for elective acomm aneurysm embolization with balloon-assisted coiling, complicated by aneurysm rupture controlled with coils and balloon tamponade.   - PBD 19, POD 19  - Exam stable  - Fever w/u negative to date, pt remains afebrile   - Pending AR     Plan:  - Discussed with Dr. Dowd  - Q4 hour neuro checks  - SBP goal   - Maintain normothermia  - Maintain euvolemia, strict I&Os  - DVT prophylaxis: SCDs, lovenox  - No AED required  - PM&R recommending acute rehab  - Follow up w/ Dr. Dowd in the office for repeat imaging

## 2024-03-25 NOTE — PROGRESS NOTE ADULT - REASON FOR ADMISSION
ACOMM Aneurysm
aneurysm embolization
cerebral aneurysm
Acomm Aneurysm, SAH/IVH
Aneurysm embolization
ICH, IVH
aneurysm embolization
ICH, IVH
Stroke
ICH with IVH
ICH, IVH
Acomm aneurysm coil with perioperative rupture/ICH
ICH with IVH
ICH, IVH
aneurysm embolization

## 2024-03-25 NOTE — PROGRESS NOTE ADULT - ASSESSMENT
74 y/o mandarin speaking female with PMH of HLD and osteoporosis presents to [facility] with complaints of having cerebral aneurysm. States in 10/2023 she started to have B/L hand numbness. At that time she had MRI brain, MRA head and neck. MRA showed an ACOMM aneurysm. She underwent a cerebral angiogram by José Manuel Villaseñor at [facility] on December 7th, 2023, with the intent to treat this month. The aneurysm is described as a 5.5x4.6x3.6mm wide necked acomm aneurysm that projects medio-superiorly. Reports occasional headaches, described as numbness, S/P  aneurysm embolization with coiling on 3/6  complicated by aneurysm rupture, controlled with coils and balloon tamponade. NISHANT CT showed SAH b/l frontal lobes and R sylvian fissure along with contrast staining. Patient admitted to NSICU for post op care. Downgraded to Step down on 3/16. Medicine consulted for medical mgmt .     Plan:     # ACOMM aneurism , S/P  aneurysm embolization with coiling on 3/6  complicated by aneurysm rupture,   controlled with coils and balloon tamponade.    NISHANT CT showed SAH b/l frontal lobes and R sylvian fissure along with contrast staining.    Patient admitted to NSICU for post op care. Now downgraded to step down and Neuro stroke service  - Neuro checks being done   - Bromocriptine 10mg TID- for likely central fevers, being titrated   - Seroquel 12.5mg nightly  - Pain control- Tylenol, Tramadol  - pt/ot  - Nimodipine 60mg q4hrs for SAH protocol  as per NS   - Avoid hypotension, rapid changes in BP  - New tremors noted: EEG : Clinical Impression:   Technically limited study due to continuous muscle artifact. No obvious abnormalities noted.  Consider repeating EEG if clinically warranted.    serial Imaging and further workup as per Primary team  - Acute rehab      #anemia- probably dilutional, multiple blood draws - Hg -8.6 , FOBT - positive seen by GI, no indication for endoscopic evaluation  transfuse if Hg < 7 , H&H stable.   Hb today is 8.0, stop IV fluids, monitor CBC   GI recommends PPI    # Dilated common duct on CT of   3/23/2024  US right upper quadrant 3/25: Common duct dilated up to 1.0 cm with gall bladder sludge. LFT's not elevated. Patient is asymptomatic.       # HLD - Simvastatin 10mg daily    # Postop urinary retention -  -Flomax 0.8mg  qhs   -Continue Dangelo Cath, failed TOV on 3/18 ( 3rd attempt)  -Ambulate to bathroom  - Reattempt TOV start Edie lax  and ambulate to bathroom to assist in emptying bowels and bladder    # Diarrhea ,likely due to overflow 2/2 constipation    rectal tube  removed ( 3/17/24)  Abdominal XR ( 3/13) noted with  Fecal material   localized to the mid left descending colon.   Repeat KUB ( 3/20/24)  with Constipation, received laxative/enema had multiple BM with formed stool  d/c Calcium to prevent constipation   Please order stool count, have RN's document frequency and consistency of bowel movements  repeat KUB to ensure resolution of constipation.        # Low grade temps till 3/20 with Tmax 101.3 on 3/20 ,  work up NTD  Completed course of Zosyn for possible PNA   CXR - unremarkable   US of LE - no dvt   COVID/RVP  (3/12/24) - negative - REPEAT COVID/ RVP again negative  c/o low back pain , MRI spine performed   Blood cultures repeated 3/21-negative  ID consulted and appreciated, observe off antibiotics  no further fever spikes    #Hypokalemia: Being Supplemented   monitor electrolytes     #Hypophosphatemia - replace as needed    #Hyponatremia  Na Goal: 135-145, now Sodium is 129 today from 136. Patient endorses drinking a lot of free water, would limit free water to less then one liter a day. Can obtain fluids from other sources. Liberalize diet. DC IV fluids.     Leukocytosis: trending down, will monitor     DVT prophylaxis  -on Lovenox    72 y/o mandarin speaking female with PMH of HLD and osteoporosis presents to [facility] with complaints of having cerebral aneurysm. States in 10/2023 she started to have B/L hand numbness. At that time she had MRI brain, MRA head and neck. MRA showed an ACOMM aneurysm. She underwent a cerebral angiogram by José Manuel Villaseñor at [facility] on December 7th, 2023, with the intent to treat this month. The aneurysm is described as a 5.5x4.6x3.6mm wide necked acomm aneurysm that projects medio-superiorly. Reports occasional headaches, described as numbness, S/P  aneurysm embolization with coiling on 3/6  complicated by aneurysm rupture, controlled with coils and balloon tamponade. NISHANT CT showed SAH b/l frontal lobes and R sylvian fissure along with contrast staining. Patient admitted to NSICU for post op care. Downgraded to Step down on 3/16. Medicine consulted for medical mgmt .     Plan:     # ACOMM aneurism , S/P  aneurysm embolization with coiling on 3/6  complicated by aneurysm rupture,   controlled with coils and balloon tamponade.    NISHANT CT showed SAH b/l frontal lobes and R sylvian fissure along with contrast staining.    Patient admitted to NSICU for post op care. Now downgraded to step down and Neuro stroke service  - Neuro checks being done   - Bromocriptine 10mg TID- for likely central fevers, being titrated   - Seroquel 12.5mg nightly  - Pain control- Tylenol, Tramadol  - pt/ot  - Nimodipine 60mg q4hrs for SAH protocol  as per NS   - Avoid hypotension, rapid changes in BP  - New tremors noted: EEG : Clinical Impression:   Technically limited study due to continuous muscle artifact. No obvious abnormalities noted.  Consider repeating EEG if clinically warranted.    serial Imaging and further workup as per Primary team  - Acute rehab      #anemia- probably dilutional, multiple blood draws - Hg -8.6 , FOBT - positive seen by GI, no indication for endoscopic evaluation  transfuse if Hg < 7 , H&H stable.   Hb today is 8.0, stop IV fluids, monitor CBC   GI recommends PPI    # Dilated common duct on CT of   3/23/2024  US right upper quadrant 3/25: Common duct dilated up to 1.0 cm with gall bladder sludge. LFT's not elevated. Patient is asymptomatic.       # HLD - Simvastatin 10mg daily    # Postop urinary retention -  -Flomax 0.8mg  qhs   -Continue Dangelo Cath, failed TOV on 3/18 ( 3rd attempt)  -Ambulate to bathroom  - Reattempt TOV start Edie lax  and ambulate to bathroom to assist in emptying bowels and bladder    # Diarrhea ,likely due to overflow 2/2 constipation    rectal tube  removed ( 3/17/24)  Abdominal XR ( 3/13) noted with  Fecal material   localized to the mid left descending colon.   Repeat KUB ( 3/20/24)  with Constipation, received laxative/enema had multiple BM with formed stool  d/c Calcium to prevent constipation   Please order stool count, have RN's document frequency and consistency of bowel movements  repeat KUB to ensure resolution of constipation.        # Low grade temps till 3/20 with Tmax 101.3 on 3/20 ,  work up NTD  Completed course of Zosyn for possible PNA   CXR - unremarkable   US of LE - no dvt   COVID/RVP  (3/12/24) - negative - REPEAT COVID/ RVP again negative  c/o low back pain , XR lumbar /sacral as noted, pain has resolved with increase mobilization  Blood cultures repeated 3/21-negative  ID consulted and appreciated, observe off antibiotics  no further fever spikes    #Hypokalemia: Being Supplemented   monitor electrolytes     #Hypophosphatemia - replace as needed    #Hyponatremia  Na Goal: 135-145, now Sodium is 129 today from 136. Patient endorses drinking a lot of free water, would limit free water to less then one liter a day. Can obtain fluids from other sources. Liberalize diet. DC IV fluids.     Leukocytosis: trending down, will monitor     DVT prophylaxis  -on Lovenox    74 y/o mandarin speaking female with PMH of HLD and osteoporosis presents to [facility] with complaints of having cerebral aneurysm. States in 10/2023 she started to have B/L hand numbness. At that time she had MRI brain, MRA head and neck. MRA showed an ACOMM aneurysm. She underwent a cerebral angiogram by José Manuel Villaseñor at [facility] on December 7th, 2023, with the intent to treat this month. The aneurysm is described as a 5.5x4.6x3.6mm wide necked acomm aneurysm that projects medio-superiorly. Reports occasional headaches, described as numbness, S/P  aneurysm embolization with coiling on 3/6  complicated by aneurysm rupture, controlled with coils and balloon tamponade. NISHANT CT showed SAH b/l frontal lobes and R sylvian fissure along with contrast staining. Patient admitted to NSICU for post op care. Downgraded to Step down on 3/16. Medicine consulted for medical mgmt .     Plan:     # ACOMM aneurism , S/P  aneurysm embolization with coiling on 3/6  complicated by aneurysm rupture   controlled with coils and balloon tamponade.    NISHANT CT showed SAH b/l frontal lobes and R sylvian fissure along with contrast staining.    Patient admitted to NSICU for post op care. Now downgraded to step down and Neuro stroke service  - Neuro checks being done   - Bromocriptine 10mg TID- for likely central fevers, being titrated   - Seroquel 12.5mg nightly  - Pain control- Tylenol, Tramadol  - pt/ot  - Nimodipine 60mg q4hrs for SAH protocol  as per NS   - Avoid hypotension, rapid changes in BP  - New tremors noted: EEG : Clinical Impression:   Technically limited study due to continuous muscle artifact. No obvious abnormalities noted.  Consider repeating EEG if clinically warranted.    serial Imaging and further workup as per Primary team  - Acute rehab      #Anemia- probably dilutional, multiple blood draws - Hg -8.6 , FOBT - positive seen by GI, no indication for endoscopic evaluation  transfuse if Hg < 7 , H&H stable.   Hb today is 8.0, stop IV fluids, monitor CBC   GI recommends PPI    # Dilated common duct on CT of   3/23/2024  US right upper quadrant 3/25: Common duct dilated up to 1.0 cm with gall bladder sludge. LFT's not elevated. Patient is asymptomatic.       # HLD - Simvastatin 10mg daily    # Postop urinary retention -  -Flomax 0.8mg  qhs   -Continue Dangelo Cath, failed TOV on 3/18 ( 3rd attempt)  -Ambulate to bathroom  - Reattempt TOV start Edie lax  and ambulate to bathroom to assist in emptying bowels and bladder    # Diarrhea ,likely due to overflow 2/2 constipation    rectal tube  removed ( 3/17/24)  Abdominal XR ( 3/13) noted with  Fecal material   localized to the mid left descending colon.   Repeat KUB ( 3/20/24)  with Constipation, received laxative/enema had multiple BM with formed stool  d/c Calcium to prevent constipation   Please order stool count, have RN's document frequency and consistency of bowel movements  repeat KUB to ensure resolution of constipation.        # Low grade temps till 3/20 with Tmax 101.3 on 3/20 ,  work up NTD  Completed course of Zosyn for possible PNA   CXR - unremarkable   US of LE - no dvt   COVID/RVP  (3/12/24) - negative - REPEAT COVID/ RVP again negative  c/o low back pain , XR lumbar /sacral as noted, pain has resolved with increase mobilization  Blood cultures repeated 3/21-negative  ID consulted and appreciated, observe off antibiotics  no further fever spikes    #Hypokalemia: Being Supplemented   monitor electrolytes     #Hypophosphatemia - replace as needed    #Hyponatremia  Na Goal: 135-145, now Sodium is 129 today from 136. Patient endorses drinking a lot of free water, would limit free water to less then one liter a day. Can obtain fluids from other sources. Liberalize diet. DC IV fluids.     Leukocytosis: trending down, will monitor     DVT prophylaxis  -on Lovenox

## 2024-03-25 NOTE — PROGRESS NOTE ADULT - SUBJECTIVE AND OBJECTIVE BOX
Patient feeling well.  Has no pain.   Continues to be pending AR in [place].     FUNCTIONAL PROGRESS  3/20 PT   Therapeutic Interventions    Bed Mobility  Bed Mobility Training Sit-to-Supine: minimum assist (75% patient effort);  1 person assist;  nonverbal cues (demo/gestures);  verbal cues  Bed Mobility Training Supine-to-Sit: minimum assist (75% patient effort);  1 person assist;  nonverbal cues (demo/gestures);  verbal cues  Bed Mobility Training Limitations: decreased strength;  impaired postural control;  impaired coordination    Sit-Stand Transfer Training  Transfer Training Sit-to-Stand Transfer: minimum assist (75% patient effort);  1 person assist;  nonverbal cues (demo/gestures);  verbal cues;  rolling walker  Transfer Training Stand-to-Sit Transfer: minimum assist (75% patient effort);  1 person assist;  nonverbal cues (demo/gestures);  verbal cues;  rolling walker  Sit-to-Stand Transfer Training Transfer Safety Analysis: decreased strength;  impaired balance;  impaired coordination;  impaired motor control;  impaired postural control;  rolling walker    Gait Training  Gait Training: minimum assist (75% patient effort);  1 person assist;  nonverbal cues (demo/gestures);  verbal cues;  rolling walker;  25 feet  Gait Analysis: swing-to gait   decreased gayathri;  crouch;  decreased stride length;  decreased step length;  impaired balance;  impaired coordination;  impaired motor control;  impaired postural control;  decreased strength;  25 feet;  rolling walker    3/20 OT    Bed Mobility  Bed Mobility Training Sit-to-Supine: 1 person assist;  verbal cues;  moderate assist (50% patient effort);  pt with increase in back pain   Bed Mobility Training Supine-to-Sit: minimum assist (75% patient effort);  1 person assist;  verbal cues;  bed rails;  head of bed elevated  Bed Mobility Training Limitations: decreased ability to use legs for bridging/pushing;  decreased ability to use arms for pushing/pulling;  pain;  cognitive, decreased safety awareness;  impaired postural control;  decreased strength;  impaired balance    Sit-Stand Transfer Training  Transfer Training Sit-to-Stand Transfer: minimum assist (75% patient effort);  1 person + 1 person to manage equipment;  weight-bearing as tolerated   rolling walker  Transfer Training Stand-to-Sit Transfer: minimum assist (75% patient effort);  1 person + 1 person to manage equipment;  weight-bearing as tolerated   rolling walker  Sit-to-Stand Transfer Training Transfer Safety Analysis: decreased weight-shifting ability;  decreased balance;  decreased sequencing ability;  decreased ROM;  decreased strength;  impaired balance;  impaired postural control;  pain;  cognitive, decreased safety awareness;  rolling walker    Toilet Transfer Training  Transfer Training Toilet Transfer: minimum assist (75% patient effort);  1 person + 1 person to manage equipment;  verbal cues;  weight-bearing as tolerated   bedside commode. Cues for sequencing of movement and for safety for proper hand placement prior to/during transfer ;  rolling walker;  commode  Toilet Transfer Training Transfer Safety Analysis: decreased weight-shifting ability;  pain;  cognitive, decreased safety awareness;  impaired postural control;  impaired balance;  decreased strength;  decreased ROM;  rolling walker;  commode    Functional Endurance  Functional Endurance Detail: Pt ambulated with RW and min A x 1 + 1 for line management, +external cues short distances around bed area due to decreased balance and strength. Pt educated in energy conservation techniques including proper breathing and activity pacing. Pt required additional assist for negotiating RW and obstacle avoidance.     VITALS  T(C): 36.6 (03-25-24 @ 04:00), Max: 37.1 (03-24-24 @ 09:03)  HR: 75 (03-25-24 @ 04:00) (71 - 82)  BP: 121/72 (03-25-24 @ 04:00) (121/72 - 147/66)  RR: 18 (03-25-24 @ 04:00) (18 - 18)  SpO2: 94% (03-25-24 @ 04:00) (94% - 99%)  Wt(kg): --    MEDICATIONS   acetaminophen     Tablet .. 650 milliGRAM(s) every 6 hours PRN  bromocriptine Capsule 5 milliGRAM(s) daily  enoxaparin Injectable 30 milliGRAM(s) every 24 hours  hydrALAZINE Injectable 10 milliGRAM(s) every 2 hours PRN  iohexol 300 mG (iodine)/mL Oral Solution 30 milliLiter(s) once PRN  labetalol Injectable 10 milliGRAM(s) every 2 hours PRN  lactobacillus acidophilus 1 Tablet(s) daily  lidocaine   4% Patch 1 Patch every 24 hours  multivitamin 1 Tablet(s) daily  niMODipine Oral Solution 60 milliGRAM(s) every 4 hours  nystatin Powder 1 Application(s) two times a day  ondansetron Injectable 4 milliGRAM(s) every 6 hours PRN  QUEtiapine 12.5 milliGRAM(s) at bedtime  simvastatin 10 milliGRAM(s) at bedtime  sodium chloride 1 Gram(s) once  sodium chloride 0.9%. 1000 milliLiter(s) <Continuous>  tamsulosin 0.8 milliGRAM(s) at bedtime      RECENT LABS/IMAGING  - Reviewed Today                        8.0    7.40  )-----------( 399      ( 25 Mar 2024 07:23 )             23.8     03-25    129<L>  |  98  |  7.7<L>  ----------------------------<  94  4.4   |  23.0  |  0.32<L>    Ca    8.1<L>      25 Mar 2024 07:23  Phos  2.4     03-25  Mg     2.0     03-25    TPro  5.4<L>  /  Alb  3.3  /  TBili  0.3<L>  /  DBili  x   /  AST  20  /  ALT  27  /  AlkPhos  46  03-25      Urinalysis Basic - ( 25 Mar 2024 07:23 )    Color: x / Appearance: x / SG: x / pH: x  Gluc: 94 mg/dL / Ketone: x  / Bili: x / Urobili: x   Blood: x / Protein: x / Nitrite: x   Leuk Esterase: x / RBC: x / WBC x   Sq Epi: x / Non Sq Epi: x / Bacteria: x            US Duplex Venous Lower Ext Complete, Bilateral 3/12 - No evidence of deep venous thrombosis in either lower extremity.    CT Head 3/10 - Mildly decreased bilateral mesial frontal parenchymal hemorrhages. Similar anterior interhemispheric acute subarachnoid hemorrhage.Mildly decreased intraventricular hemorrhage within the bilateral lateral    and third ventricles. Decreased ventricular size with near resolution of hydrocephalus.No large midline shift.  Basal cisterns are more well visualized on the current examination, this may be due to technique.    MR Brain, MR Angio Head 3/9 - MR brain: Intraparenchymal hemorrhage within the medial anterior frontal   lobes measuring approximately 3.5 cm, unchanged. There is surrounding edema and extension into the genuine body of corpus callosum, unchanged. Hemorrhagic clot within the LEFT lateral ventricle and third ventricle is unchanged. Minimal hemorrhage seen in the dependent portions of the RIGHT lateral ventricle unchanged. Minimal subarachnoid hemorrhage seen along the medial sulci of the BILATERAL frontal and parietal lobes as well as scattered throughout the sulci of the brain. RIGHT frontal ventriculostomy catheter tip is seen extending toward the body of the LEFT ventricle and third ventricle.  Coil material is seen in the region of the anterior communicating artery on the RIGHT.   Blood products are seen in the region of the known aneurysm possibly indicating partial patency.    CT Head 3/16 -  No change in mild ventricular dilatation with intraventricular hemorrhage and hemorrhage in the frontal lobe midline and corpus callosum after extraventricular drain removal since 3/15/2024.    US Duplex LE 3/20 - No evidence of deep venous thrombosis in either lower extremity.    CT ABD/PEL 3/23 - Mild proctitis, new. Mild biliary ductal dilatation. Correlation with lab markers would be helpful.    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 - RECOMMEND DC Bromocriptine   Delirium - RECOMMEND Seroquel 12.5mg Q6PM PRN, RECOMMEND Melatonin 5mg HS  Pain - Tylenol, Lidocaine patch   Diet - Easy Chew   DVT PPX - SCDs, Lovenox   Rehab/Impaired mobility and function - Patient continues to require hospitalization for the above diagnoses and ongoing active management of comorbid complications (IV meds, pending ABD US) that are substantially impairing functional ability and impairing quality of life necessitating ongoing medical management of these complications necessitating acute rehab.     When medically optimized, based on the patient's diagnosis, current functional status and potential for progress, recommend ACUTE inpatient rehabilitation for the functional deficits consisting of 3 hours of therapy/day & 24 hour RN/daily PMR physician for comorbid medical management. Patient will be able to tolerate 3 hours a day.     Will continue to follow. Rehab recommendations are dependent on how functional progress changes as well as how patient continues to participate and tolerate therapeutic interventions, WHICH MAY CHANGE UPON FOLLOW UP EVALUATIONS. Recommend ongoing mobilization by staff to maintain cardiopulmonary function and prevention of secondary complications related to debility. Discussed the specific management and recommendations above with rehab clinical care team/rehab liaison.

## 2024-03-25 NOTE — PROGRESS NOTE ADULT - SUBJECTIVE AND OBJECTIVE BOX
Patient is a 73y old  Female who presents with a chief complaint of aneurysm embolization (25 Mar 2024 08:17)    HPI:  73F with PMH HLD, osteoporosis who presents for cerebral angiogram with aneurysm embolization. Patient had MRI / MRA brain 2/2 hand numbness which had incidental finding of acomm aneurysm. Patient underwent diagnostic angiogram 2/14/24 which confirmed acomm aneurysm. Patient presents today for cerebral angiogram with aneurysm embolization via balloon assisted coiling. Patient started on ASA 81 in preparation for the procedure. Patient underwent aneurysm embolization with coiling complicated by aneurysm rupture, controlled with coils and balloon tamponade. NISHANT CT showed SAH b/l frontal lobes and R sylvian fissure along with contrast staining. Course complicated by worsening SAH, IVH requiring EVD placement.     Interval history: Pt seen and examined at bedside by Neuro IR team, pt resting comfortably in bed, no complaints offered, fever w/u negative to date, + guaiac no evidence of acute GIB, H/H uptrending, pt on PPI, pending acute rehab placement.     Vital Signs Last 24 Hrs  T(C): 36.7 (25 Mar 2024 13:34), Max: 36.9 (24 Mar 2024 20:00)  T(F): 98 (25 Mar 2024 13:34), Max: 98.4 (24 Mar 2024 20:00)  HR: 82 (25 Mar 2024 13:34) (75 - 82)  BP: 118/70 (25 Mar 2024 13:34) (118/70 - 146/74)  BP(mean): 86 (25 Mar 2024 13:34) (86 - 86)  RR: 18 (25 Mar 2024 13:34) (18 - 18)  SpO2: 97% (25 Mar 2024 13:34) (94% - 97%)    Parameters below as of 25 Mar 2024 13:34  Patient On (Oxygen Delivery Method): room air    Physical Exam:  Constitutional: NAD, lying in bed  Neuro  * Mental Status:  GCS 15: Awake, alert, oriented to conversation. No aphasia or difficulty speaking. No dysarthria.   * Cranial Nerves: Cnii-Cnxii grossly intact. PERRL, EOMI, tongue midline, no gaze deviation  * Motor: RUE 5/5, LUE 5/5, RLE 5/5, LLE 5/5, no drift or dysmetria  * Sensory: Sensation intact to light touch  * Reflexes: not assessed     LABS:                        8.0    7.40  )-----------( 399      ( 25 Mar 2024 07:23 )             23.8     03-25    129<L>  |  98  |  7.7<L>  ----------------------------<  94  4.4   |  23.0  |  0.32<L>    Ca    8.1<L>      25 Mar 2024 07:23  Phos  2.4     03-25  Mg     2.0     03-25    TPro  5.4<L>  /  Alb  3.3  /  TBili  0.3<L>  /  DBili  x   /  AST  20  /  ALT  27  /  AlkPhos  46  03-25    Urinalysis Basic - ( 25 Mar 2024 07:23 )  Color: x / Appearance: x / SG: x / pH: x  Gluc: 94 mg/dL / Ketone: x  / Bili: x / Urobili: x   Blood: x / Protein: x / Nitrite: x   Leuk Esterase: x / RBC: x / WBC x   Sq Epi: x / Non Sq Epi: x / Bacteria: x      CULTURES:  Culture Results:   No growth at 72 Hours (03-21 @ 14:42)  Culture Results:   No growth at 72 Hours (03-21 @ 14:39)    I&O:   I&O's Summary    24 Mar 2024 07:01  -  25 Mar 2024 07:00  --------------------------------------------------------  IN: 600 mL / OUT: 2120 mL / NET: -1520 mL    Medications:   MEDICATIONS  (STANDING):  enoxaparin Injectable 30 milliGRAM(s) SubCutaneous every 24 hours  lactobacillus acidophilus 1 Tablet(s) Oral daily  lidocaine   4% Patch 1 Patch Transdermal every 24 hours  multivitamin 1 Tablet(s) Oral daily  nystatin Powder 1 Application(s) Topical two times a day  polyethylene glycol 3350 17 Gram(s) Oral daily  QUEtiapine 12.5 milliGRAM(s) Oral at bedtime  simvastatin 10 milliGRAM(s) Oral at bedtime  tamsulosin 0.8 milliGRAM(s) Oral at bedtime    MEDICATIONS  (PRN):  acetaminophen     Tablet .. 650 milliGRAM(s) Oral every 6 hours PRN Temp greater or equal to 38C (100.4F)  hydrALAZINE Injectable 10 milliGRAM(s) IV Push every 2 hours PRN SBP > 160  iohexol 300 mG (iodine)/mL Oral Solution 30 milliLiter(s) Oral once PRN Prior to CT Abdomen & Pelvis per CT protocol, thank you  labetalol Injectable 10 milliGRAM(s) IV Push every 2 hours PRN Systolic blood pressure > 160  ondansetron Injectable 4 milliGRAM(s) IV Push every 6 hours PRN Nausea and/or Vomiting    RADIOLOGY & ADDITIONAL STUDIES:  < from: CT Abdomen and Pelvis w/ Oral Cont (03.23.24 @ 22:02) >  IMPRESSION:  Mild proctitis, new.  Mild biliary ductal dilatation. Correlation with lab markers would be   helpful.      --- End of Report ---    MAINOR RODGERS MD; Attending Radiologist  This document has been electronically signed. Mar 24 2024  5:29PM    < end of copied text >

## 2024-03-26 LAB
ALBUMIN SERPL ELPH-MCNC: 3.3 G/DL — SIGNIFICANT CHANGE UP (ref 3.3–5.2)
ALP SERPL-CCNC: 47 U/L — SIGNIFICANT CHANGE UP (ref 40–120)
ALT FLD-CCNC: 27 U/L — SIGNIFICANT CHANGE UP
ANION GAP SERPL CALC-SCNC: 12 MMOL/L — SIGNIFICANT CHANGE UP (ref 5–17)
ANION GAP SERPL CALC-SCNC: 9 MMOL/L — SIGNIFICANT CHANGE UP (ref 5–17)
AST SERPL-CCNC: 23 U/L — SIGNIFICANT CHANGE UP
BILIRUB SERPL-MCNC: 0.2 MG/DL — LOW (ref 0.4–2)
BUN SERPL-MCNC: 12.7 MG/DL — SIGNIFICANT CHANGE UP (ref 8–20)
BUN SERPL-MCNC: 14.8 MG/DL — SIGNIFICANT CHANGE UP (ref 8–20)
CALCIUM SERPL-MCNC: 8.1 MG/DL — LOW (ref 8.4–10.5)
CALCIUM SERPL-MCNC: 8.3 MG/DL — LOW (ref 8.4–10.5)
CHLORIDE SERPL-SCNC: 92 MMOL/L — LOW (ref 96–108)
CHLORIDE SERPL-SCNC: 93 MMOL/L — LOW (ref 96–108)
CO2 SERPL-SCNC: 23 MMOL/L — SIGNIFICANT CHANGE UP (ref 22–29)
CO2 SERPL-SCNC: 25 MMOL/L — SIGNIFICANT CHANGE UP (ref 22–29)
CREAT SERPL-MCNC: 0.37 MG/DL — LOW (ref 0.5–1.3)
CREAT SERPL-MCNC: 0.48 MG/DL — LOW (ref 0.5–1.3)
CULTURE RESULTS: SIGNIFICANT CHANGE UP
CULTURE RESULTS: SIGNIFICANT CHANGE UP
EGFR: 101 ML/MIN/1.73M2 — SIGNIFICANT CHANGE UP
EGFR: 108 ML/MIN/1.73M2 — SIGNIFICANT CHANGE UP
GLUCOSE SERPL-MCNC: 140 MG/DL — HIGH (ref 70–99)
GLUCOSE SERPL-MCNC: 95 MG/DL — SIGNIFICANT CHANGE UP (ref 70–99)
HCT VFR BLD CALC: 24.2 % — LOW (ref 34.5–45)
HGB BLD-MCNC: 8.4 G/DL — LOW (ref 11.5–15.5)
MAGNESIUM SERPL-MCNC: 2 MG/DL — SIGNIFICANT CHANGE UP (ref 1.6–2.6)
MCHC RBC-ENTMCNC: 31.8 PG — SIGNIFICANT CHANGE UP (ref 27–34)
MCHC RBC-ENTMCNC: 34.7 GM/DL — SIGNIFICANT CHANGE UP (ref 32–36)
MCV RBC AUTO: 91.7 FL — SIGNIFICANT CHANGE UP (ref 80–100)
PHOSPHATE SERPL-MCNC: 2.9 MG/DL — SIGNIFICANT CHANGE UP (ref 2.4–4.7)
PLATELET # BLD AUTO: 380 K/UL — SIGNIFICANT CHANGE UP (ref 150–400)
POTASSIUM SERPL-MCNC: 3.9 MMOL/L — SIGNIFICANT CHANGE UP (ref 3.5–5.3)
POTASSIUM SERPL-MCNC: 4.4 MMOL/L — SIGNIFICANT CHANGE UP (ref 3.5–5.3)
POTASSIUM SERPL-SCNC: 3.9 MMOL/L — SIGNIFICANT CHANGE UP (ref 3.5–5.3)
POTASSIUM SERPL-SCNC: 4.4 MMOL/L — SIGNIFICANT CHANGE UP (ref 3.5–5.3)
PROT SERPL-MCNC: 5.3 G/DL — LOW (ref 6–8.3)
PROT SERPL-MCNC: 5.6 G/DL — LOW (ref 6.6–8.7)
RBC # BLD: 2.64 M/UL — LOW (ref 3.8–5.2)
RBC # FLD: 15.2 % — HIGH (ref 10.3–14.5)
SODIUM SERPL-SCNC: 127 MMOL/L — LOW (ref 135–145)
SODIUM SERPL-SCNC: 127 MMOL/L — LOW (ref 135–145)
SPECIMEN SOURCE: SIGNIFICANT CHANGE UP
SPECIMEN SOURCE: SIGNIFICANT CHANGE UP
WBC # BLD: 8.39 K/UL — SIGNIFICANT CHANGE UP (ref 3.8–10.5)
WBC # FLD AUTO: 8.39 K/UL — SIGNIFICANT CHANGE UP (ref 3.8–10.5)

## 2024-03-26 PROCEDURE — 99232 SBSQ HOSP IP/OBS MODERATE 35: CPT

## 2024-03-26 PROCEDURE — 74018 RADEX ABDOMEN 1 VIEW: CPT | Mod: 26

## 2024-03-26 PROCEDURE — 99233 SBSQ HOSP IP/OBS HIGH 50: CPT

## 2024-03-26 RX ORDER — SODIUM CHLORIDE 9 MG/ML
1 INJECTION INTRAMUSCULAR; INTRAVENOUS; SUBCUTANEOUS THREE TIMES A DAY
Refills: 0 | Status: DISCONTINUED | OUTPATIENT
Start: 2024-03-26 | End: 2024-03-27

## 2024-03-26 RX ORDER — SODIUM CHLORIDE 9 MG/ML
1 INJECTION INTRAMUSCULAR; INTRAVENOUS; SUBCUTANEOUS DAILY
Refills: 0 | Status: DISCONTINUED | OUTPATIENT
Start: 2024-03-26 | End: 2024-03-26

## 2024-03-26 RX ADMIN — PANTOPRAZOLE SODIUM 40 MILLIGRAM(S): 20 TABLET, DELAYED RELEASE ORAL at 05:04

## 2024-03-26 RX ADMIN — Medication 650 MILLIGRAM(S): at 23:21

## 2024-03-26 RX ADMIN — ENOXAPARIN SODIUM 30 MILLIGRAM(S): 100 INJECTION SUBCUTANEOUS at 20:15

## 2024-03-26 RX ADMIN — Medication 1 TABLET(S): at 12:32

## 2024-03-26 RX ADMIN — Medication 650 MILLIGRAM(S): at 22:21

## 2024-03-26 RX ADMIN — SIMVASTATIN 10 MILLIGRAM(S): 20 TABLET, FILM COATED ORAL at 20:15

## 2024-03-26 RX ADMIN — LIDOCAINE 1 PATCH: 4 CREAM TOPICAL at 05:30

## 2024-03-26 RX ADMIN — LIDOCAINE 1 PATCH: 4 CREAM TOPICAL at 19:00

## 2024-03-26 RX ADMIN — NYSTATIN CREAM 1 APPLICATION(S): 100000 CREAM TOPICAL at 05:05

## 2024-03-26 RX ADMIN — NYSTATIN CREAM 1 APPLICATION(S): 100000 CREAM TOPICAL at 17:59

## 2024-03-26 RX ADMIN — LIDOCAINE 1 PATCH: 4 CREAM TOPICAL at 17:59

## 2024-03-26 RX ADMIN — QUETIAPINE FUMARATE 12.5 MILLIGRAM(S): 200 TABLET, FILM COATED ORAL at 20:15

## 2024-03-26 RX ADMIN — Medication 1 TABLET(S): at 12:31

## 2024-03-26 RX ADMIN — TAMSULOSIN HYDROCHLORIDE 0.8 MILLIGRAM(S): 0.4 CAPSULE ORAL at 20:16

## 2024-03-26 RX ADMIN — SODIUM CHLORIDE 1 GRAM(S): 9 INJECTION INTRAMUSCULAR; INTRAVENOUS; SUBCUTANEOUS at 20:15

## 2024-03-26 RX ADMIN — POLYETHYLENE GLYCOL 3350 17 GRAM(S): 17 POWDER, FOR SOLUTION ORAL at 12:32

## 2024-03-26 RX ADMIN — SODIUM CHLORIDE 1 GRAM(S): 9 INJECTION INTRAMUSCULAR; INTRAVENOUS; SUBCUTANEOUS at 12:32

## 2024-03-26 NOTE — PROGRESS NOTE ADULT - SUBJECTIVE AND OBJECTIVE BOX
KASHMIR CARBAJAL    [id number]    73y      Female    Patient is a 73y old  Female who presents with a chief complaint of aneurysm embolization (25 Mar 2024 08:17)      INTERVAL HPI/OVERNIGHT EVENTS:    Patient is doing ok, denies fever, chills, chest pain, sob, dizziness       Vital Signs Last 24 Hrs  T(C): 36.6 (26 Mar 2024 04:00), Max: 36.8 (25 Mar 2024 16:10)  T(F): 97.9 (26 Mar 2024 04:00), Max: 98.3 (25 Mar 2024 16:10)  HR: 73 (26 Mar 2024 04:00) (73 - 82)  BP: 135/76 (26 Mar 2024 04:00) (118/70 - 162/79)  BP(mean): 86 (25 Mar 2024 13:34) (86 - 86)  RR: 18 (26 Mar 2024 04:00) (18 - 18)  SpO2: 99% (26 Mar 2024 04:00) (97% - 100%)    Parameters below as of 26 Mar 2024 04:00  Patient On (Oxygen Delivery Method): room air        PHYSICAL EXAM:  GENERAL: Elderly female looking comfortable   NECK: soft, Supple, No JVD   CHEST/LUNG: Clear to auscultate bilaterally; No wheezing  HEART: S1S2+, Regular rate and rhythm; No murmurs  ABDOMEN: Soft, Nontender, Nondistended; Bowel sounds present  EXTREMITIES:  1+ Peripheral Pulses, No edema  SKIN: No rashes or lesions  NEURO: AAOX3  PSYCH: normal mood      LABS:                        8.4    8.39  )-----------( 380      ( 26 Mar 2024 06:59 )             24.2     03-26    127<L>  |  92<L>  |  12.7  ----------------------------<  95  3.9   |  23.0  |  0.37<L>    Ca    8.1<L>      26 Mar 2024 06:59  Phos  2.9     03-26  Mg     2.0     03-26    TPro  5.6<L>  /  Alb  3.3  /  TBili  0.2<L>  /  DBili  x   /  AST  23  /  ALT  27  /  AlkPhos  47  03-26          I&O's Summary    25 Mar 2024 07:01  -  26 Mar 2024 07:00  --------------------------------------------------------  IN: 250 mL / OUT: 700 mL / NET: -450 mL        MEDICATIONS  (STANDING):  enoxaparin Injectable 30 milliGRAM(s) SubCutaneous every 24 hours  lactobacillus acidophilus 1 Tablet(s) Oral daily  lidocaine   4% Patch 1 Patch Transdermal every 24 hours  multivitamin 1 Tablet(s) Oral daily  nystatin Powder 1 Application(s) Topical two times a day  pantoprazole    Tablet 40 milliGRAM(s) Oral before breakfast  polyethylene glycol 3350 17 Gram(s) Oral daily  QUEtiapine 12.5 milliGRAM(s) Oral at bedtime  simvastatin 10 milliGRAM(s) Oral at bedtime  sodium chloride 1 Gram(s) Oral daily  tamsulosin 0.8 milliGRAM(s) Oral at bedtime    MEDICATIONS  (PRN):  acetaminophen     Tablet .. 650 milliGRAM(s) Oral every 6 hours PRN Temp greater or equal to 38C (100.4F)  hydrALAZINE Injectable 10 milliGRAM(s) IV Push every 2 hours PRN SBP > 160  iohexol 300 mG (iodine)/mL Oral Solution 30 milliLiter(s) Oral once PRN Prior to CT Abdomen & Pelvis per CT protocol, thank you  labetalol Injectable 10 milliGRAM(s) IV Push every 2 hours PRN Systolic blood pressure > 160  ondansetron Injectable 4 milliGRAM(s) IV Push every 6 hours PRN Nausea and/or Vomiting

## 2024-03-26 NOTE — PROGRESS NOTE ADULT - SUBJECTIVE AND OBJECTIVE BOX
Infectious Disease Daily Progress Note    Date:  3/4/2021    Miranda Bingham is a 56 year old male who is followed by Infectious Disease for Klebsiella urinary tract infection and bacteremia.  Patient has a history of noncompliance and a history of bladder outlet flow obstruction.  Patient was seen by Urology during this visit had a Baker placed.  C. Difficile was negative.    Repeat blood cultures are pending negative patient has been afebrile over the last 24 hours.  Denies any nausea vomiting constipation, currently on Imodium for diarrhea which has not resolved.      Vital 24 Hour Range Most Recent Value   Temperature Temp  Min: 97.6 °F (36.4 °C)  Max: 99.5 °F (37.5 °C) 98.8 °F (37.1 °C)     Pulse Pulse  Min: 84  Max: 109 (!) 108   Respiratory Resp  Min: 12  Max: 20 20   Blood Pressure BP  Min: 92/57  Max: 165/83 (!) 146/75   Pulse Oximetry SpO2  Min: 96 %  Max: 100 %     O2 No data recorded       Vital Most Recent Value First Value   Weight 94.5 kg Weight: 97.8 kg   Height 5' 7\" (170.2 cm) Height: 5' 7\" (170.2 cm)     I/O last 3 completed shifts:  In: 3747.5 [P.O.:1171; I.V.:1354.5; IV Piggyback:1222]  Out: 2150 [Urine:2150]    Medications:  Medications were reviewed and are as listed in the electronic medical record.  Antibiotics:  Ciprofloxacin    Physical Exam  General - No acute distress.  HEENT - Normocephalic, atraumatic, pupils equal, round, reactive to light and accommodation, extraocular movements intact.   Abdomen - Soft, non-tender and non-distended.  Bowel sounds are normoactive. Baker noted  Extremities - Warm and well perfused.  Neurologic:  Awake, alert, oriented x3, mood and affect appropriate. Sensation grossly intact     Central line:    Available Intravenous Access     PICC Line / CVC Line / PIV Line / Intraosseous Line / Line / UAC Line            PICC Line Double Lumen 02/26/21 Right Basilic 5 days                 Labs:  Recent Labs   Lab 03/04/21  0508 03/03/21  0633 03/02/21  0554  03/01/21  0437 02/28/21  0526 02/27/21  0505 02/26/21  0450   WBC 10.7 11.5* 10.4 8.6 7.2 12.7* 13.5*   HGB 9.0* 10.3* 9.7* 9.2* 9.7* 10.7* 10.1*   HCT 27.1* 30.4* 28.9* 27.6* 29.3* 32.7* 31.4*    254 166 129* 123* 139* 138*     Recent Labs   Lab 03/04/21  0508 03/03/21  0633 03/02/21  0554 03/01/21  0437 02/28/21  0526 02/27/21  0505 02/26/21  1710 02/26/21  0450 02/25/21  1138   SODIUM 139 141 142 144 145 139 138 138 143   POTASSIUM 3.8 3.7 3.8 3.2* 3.6 3.9 4.1 4.5 4.2   CHLORIDE 105 105 108* 107 109* 104 105 105 105   CO2 24 25 24 26 26 24 22 20* 24   BUN 18 14 22* 32* 45* 49* 48* 47* 43*   CREATININE 1.38* 1.28* 1.51* 1.74* 2.42* 3.04* 3.06* 3.22* 3.07*   GLUCOSE 105* 92 94 115* 107* 113* 107* 88 88   CALCIUM 7.7* 8.2* 8.3* 7.9* 8.1* 8.3* 7.5* 7.8* 8.4   MG 1.3* 1.4*  --   --   --  2.2  --  1.1*  --    PHOS 2.3* 2.0* 2.5  --   --   --   --   --   --    ALBUMIN  --  2.2* 2.2*  --   --  2.3*  --  2.3*  --    AST  --   --   --   --   --  29  --  31  --    GPT  --   --   --   --   --  22  --  23  --    ALKPT  --   --   --   --   --  105  --  78  --    BILIRUBIN  --   --   --   --   --  1.1*  --  0.7  --        Procalcitonin (ng/mL)   Date Value   03/02/2021 5.80 (H)   03/01/2021 8.90 (H)   02/28/2021 19.36 (H)   02/27/2021 41.55 (H)   02/26/2021 54.13 (H)     C-Reactive Protein (mg/dL)   Date Value   02/25/2021 22.9 (H)        Microbiology and Serology:  07/02/2020 urine culture greater than 100,000 Klebsiella oxytocin (resistant to penicillin and ceftriaxone, 1st generation cephalosporin)  11/30:2020:  Urine culture:  Greater than 100,000 E faecalis and greater than 100,000 Staph epi  2/25:  Urine culture:  Greater than 100,000 Klebsiella  2/25:  Blood cultures:  2/2 positive for Klebsiella  2/26:  Blood cultures:  2/2 no growth to date    Blood Cultures:   Lab Results   Component Value Date    BLC No Growth 5 Days. 02/26/2021       Assessment:   1. Klebsiella bacteremia  2. Klebsiella urinary tract  Preliminary note, offical recommendations pending attending review/signature   [facility] Stroke Team  Progress Note     : ***    HPI:  72 y/o mandarin speaking female with PMH of HLD and osteoporosis presents to PST with complaints of having cerebral aneurysm. States in 10/2023 she started to have B/L hand numbness. At that time she had MRI brain, MRA head and neck. MRA showed an ACOMM aneurysm. She underwent a cerebral angiogram by José Manuel Villaseñor at [facility] on December 7th, 2023, with the intent to treat this month. The aneurysm is described as a 5.5x4.6x3.6mm wide necked acomm aneurysm that projects medio-superiorly. Reports occasional headaches, described as numbness, 5/10 in severity, worse with laying down, relieved with sitting up or standing. Patient underwent diagnostic angiogram 2/14/24 which confirmed acomm aneurysm. On 3/6 patient underwent aneurysm embolization with coiling complicated by aneurysm rupture, controlled with coils and balloon tamponade. NISHANT CT showed SAH b/l frontal lobes and R sylvian fissure along with contrast staining. Patient admitted to NSICU for post op care.. EVD now removed per neurosurgery 3/15. Transferred to neurosurgery service 3/16, stroke team asked to consult, transferred to Stroke service 3/18.     SUBJECTIVE: No events overnight.  No new neurologic complaints.  ROS reported negative unless otherwise noted.    acetaminophen     Tablet .. 650 milliGRAM(s) Oral every 6 hours PRN  enoxaparin Injectable 30 milliGRAM(s) SubCutaneous every 24 hours  hydrALAZINE Injectable 10 milliGRAM(s) IV Push every 2 hours PRN  iohexol 300 mG (iodine)/mL Oral Solution 30 milliLiter(s) Oral once PRN  labetalol Injectable 10 milliGRAM(s) IV Push every 2 hours PRN  lactobacillus acidophilus 1 Tablet(s) Oral daily  lidocaine   4% Patch 1 Patch Transdermal every 24 hours  multivitamin 1 Tablet(s) Oral daily  nystatin Powder 1 Application(s) Topical two times a day  ondansetron Injectable 4 milliGRAM(s) IV Push every 6 hours PRN  pantoprazole    Tablet 40 milliGRAM(s) Oral before breakfast  polyethylene glycol 3350 17 Gram(s) Oral daily  QUEtiapine 12.5 milliGRAM(s) Oral at bedtime  simvastatin 10 milliGRAM(s) Oral at bedtime  tamsulosin 0.8 milliGRAM(s) Oral at bedtime      PHYSICAL EXAM:   Vital Signs Last 24 Hrs  T(C): 36.6 (26 Mar 2024 04:00), Max: 36.8 (25 Mar 2024 16:10)  T(F): 97.9 (26 Mar 2024 04:00), Max: 98.3 (25 Mar 2024 16:10)  HR: 73 (26 Mar 2024 04:00) (73 - 82)  BP: 135/76 (26 Mar 2024 04:00) (118/70 - 162/79)  BP(mean): 86 (25 Mar 2024 13:34) (86 - 86)  RR: 18 (26 Mar 2024 04:00) (18 - 18)  SpO2: 99% (26 Mar 2024 04:00) (97% - 100%)    Parameters below as of 26 Mar 2024 04:00  Patient On (Oxygen Delivery Method): room air    COMPLETE EXAM PENDING     General: No acute distress.   HEENT: Head normocephalic, atraumatic. Conjunctivae clear w/o exudates or hemorrhage. Sclera non-icteric. Nares are patent bilaterally.   Neck: Supple, no adenopathy.   Cardiac: Normal rate and rhythm. S1, S2 auscultated.   Respiratory: Lung sounds clear in all fields. Chest wall symmetric, nontender.   Abdominal: Soft, nondistended, nontender. Bowel sounds normoactive x 4 quadrants.   Skin: Skin is warm, dry and intact without rashes or lesions. Appropriate color for ethnicity.  Extremities: No edema, Full range of motion in all joints.     NEUROLOGICAL EXAM:  Mental status: Awake, alert, oriented x3, speech fluent, follows commands, no neglect, normal memory   Cranial Nerves: No facial asymmetry, no nystagmus, no dysarthria,  tongue midline  Motor exam: Normal tone, no drift, 5/5 RUE, 5/5 RLE, 5/5 LUE, 5/5 LLE, normal fine finger movements.  Sensation: Intact to light touch   Coordination/ Gait: No dysmetria, gait not tested    LABS:                        8.4    8.39  )-----------( 380      ( 26 Mar 2024 06:59 )             24.2    03-26    127<L>  |  92<L>  |  12.7  ----------------------------<  95  3.9   |  23.0  |  0.37<L>    Ca    8.1<L>      26 Mar 2024 06:59  Phos  2.9     03-26  Mg     2.0     03-26    TPro  5.6<L>  /  Alb  3.3  /  TBili  0.2<L>  /  DBili  x   /  AST  23  /  ALT  27  /  AlkPhos  47  03-26 03-19 Chol 142 LDL =55 HDL 63 Trig 120    A1C: 5.8    IMAGING: Reviewed by me.   US Abdomen Upper Quadrant Right (03.25.24 @ 09:08)  IMPRESSION:  Gallbladder sludge and wall thickening. Correlate with symptomatology. If   there is concern for cholecystitis HIDA scan can be obtained.  Choledochomegaly. Correlate with LFTs and MRCP.    CT Abdomen and Pelvis w/ Oral Cont (03.23.24 @ 22:02)  BILE DUCTS: Mild intrahepatic and extrahepatic biliary ductal dilatation   with CBD measuring up to 8 to 9 mm.  IMPRESSION:  Mild proctitis, new.  Mild biliary ductal dilatation. Correlation with lab markers would be   helpful.    Xray Sacrum + Coccyx (03.20.24 @ 11:28)  IMPRESSION: Limited study without obvious fracture.    Xray Lumbosacral Spine (03.20.24 @ 11:29)   Findings:  There is no fracture, subluxation or paravertebral soft tissue swelling.  The pedicles and sacroiliac joints are intact.  Straightening of lordosis may be positional versus muscle spasm  The sacrum and visualized coccyx are intact    Large amount of stool in the visualized colon without gross bowel   obstruction.    IMPRESSION: No fracture or subluxation.    US Duplex Venous Lower Ext Complete, Bilateral (03.20.24 @ 12:20)  IMPRESSION:  No evidence of deep venous thrombosis in either lower extremity.    CT Head No Cont (03.16.24 @ 03:22)   IMPRESSION: No change in mild ventricular dilatation with intraventricular hemorrhage and hemorrhage in the frontal lobe midline and corpus callosum after extraventricular drain removal since 3/15/2024.    CT Head No Cont (03.15.24 @ 05:38)   IMPRESSION: Stable follow-up CT study.     TTE W or WO Ultrasound Enhancing Agent (03.13.24 @ 12:03)   CONCLUSIONS:    1. Left ventricular cavity is normal in size. Left ventricular wall thickness is normal. Left ventricular systolic function is normal with an ejection fraction visually estimated at 65 to 70 %.   2. Normal left ventricular diastolic function.   3. Normal right ventricular cavity size and normal systolic function.   4. The left atrium is normal.   5. The right atrium is normal in size.   6. Fibrocalcific aortic valve sclerosis without stenosis.   7. Mild mitral valve leaflet calcification.   8. Trace mitral regurgitation.   9. Estimated pulmonary artery systolic pressure is 23 mmHg, normal pulmonary artery pressure.    US Duplex Venous Lower Ext Complete, Bilateral (03.12.24 @ 15:23)   IMPRESSION: No evidence of deep venous thrombosis in either lower extremity.     IR Neuro (03.11.24 @ 09:01)   IMPRESSION:  1. 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 No interval aneurysm recanalization or evidence for pseudoaneurysm.  2. No vasospasm.    CT Head No Cont (03.10.24 @ 12:37)   IMPRESSION: Mildly decreased bilateral mesial frontal parenchymal hemorrhages. Similar anterior interhemispheric acute subarachnoid hemorrhage. Mildly decreased intraventricular hemorrhage within the bilateral lateral   and third ventricles. Decreased ventricular size with near resolution of hydrocephalus. No large midline shift. Basal cisterns are more well visualized on the current examination, this may be due to technique.    MR Angio Head w/ IV Cont (03.09.24 @ 18:57)   IMPRESSION:  MR brain: Intraparenchymal hemorrhage within the medial anterior frontal lobes measuring approximately 3.5 cm, unchanged. There is surrounding edema and extension into the genuine body of corpus callosum, unchanged. Hemorrhagic clot within the LEFT lateral ventricle and third ventricle is unchanged. Minimal hemorrhage seen in the dependent portions of the RIGHT lateral ventricle unchanged. Minimal subarachnoid hemorrhage seen along the medial sulci of the BILATERAL frontal and parietal lobes as well as scattered throughout the sulci of the brain. RIGHT frontal ventriculostomy catheter tip is seen extending toward the body of the   LEFT ventricle and third ventricle.  Coil material is seen in the region of the anterior communicating artery on the RIGHT.   Blood products are seen in the region of the known aneurysm possibly indicating partial patency.    MRA intracranial:   Coil material is seen in the region of the anterior communicating artery. There is 1 x 4 mm region of signal seen on the noncontrast but not the postcontrast which may reflect blood products within the aneurysm indicating patency.    CT Head No Cont (03.07.24 @ 05:56)   IMPRESSION:   persistent intracranial hemorrhage within the BILATERAL lateral and third ventricles, LEFT greater than RIGHT, with mild obstructive hydrocephalus slightly increased. RIGHT frontal shunt catheter tip seen in body of RIGHT lateral ventricle unchanged. Subarachnoid hemorrhage again noted in the BILATERAL medial frontal lobes with hemorrhage in the anterior corpus callosum, LEFT greaterthan RIGHT.     CT Head No Cont (03.06.24 @ 19:20)   IMPRESSION: Interval placement of right frontal approach ventriculostomy catheter since the prior study performed 3 hours ago, with improved hydrocephalus and slightly decreased sulcal/cisternal bilateral subarachnoid hemorrhage.    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 Intraventricular hemorrhage is present with hydrocephalus.    IR Neuro (03.06.24 @ 12:13)   IMPRESSION:  1. 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  2. Status postballoon assisted coil embolization, the aneurysm is nearly completely occluded other than a 2.2 mm x 1.4 mm x 4.3 mm aneurysmal neck remnant.    EEG:  Study Date: 03-19-24  Duration: 22 minutes  EEG Classification / Summary:  Normal routine EEG in the awake state, within the limits of interpretation.  Interpretation significantly limited by continuous muscle artifact.  Clinical Impression:   Technically limited study due to continuous muscle artifact. No obvious abnormalities noted.  Consider repeating EEG if clinically warranted.      infection  3. Urinary obstruction  4. Acute on chronic kidney disease  5. Diarrhea:  Negative for C. Difficile  6. Leukocytosis:  Resolved  7. Hypokalemia:  As per team  8. Recurrent urinary tract infection  9. Penicillin allergy       Plan   This is a pt who is being followed for Klebsiella bacteremia and urinary tract infection.  Patient has a past medical history significant for bladder obstruction.  On this admission was seen by Urology underwent Moeller placement.  As per Urology no patient has a history of noncompliance.    Repeat blood cultures are pending negative at this time patient is tolerating antibiotics.  Antibiotics were switched to Cipro to ease up patient transition.      Pt is aware that he will most likely be discharged with the moeller until he sees urology.    -Cipro 500 mg p.o. b.i.d.            -last dose planned on 03/12  -no need for PICC line upon discharge  -urology will need to determine plan for Moeller.  -continue to follow pending blood cultures  -okay to discharge from Infectious Disease point  -follow-up with Sandra James NP in the office in 2-3 weeks     Case has been discussed with the Team  Will sign off at this time, please feel free to call with any questions.    Ronaldo Mendoza MD MS FACP       Preliminary note, offical recommendations pending attending review/signature   [facility] Stroke Team  Progress Note     : Family member at bedside provided mandarin interpretation services per patient request.     HPI:  72 y/o mandarin speaking female with PMH of HLD and osteoporosis presents to PST with complaints of having cerebral aneurysm. States in 10/2023 she started to have B/L hand numbness. At that time she had MRI brain, MRA head and neck. MRA showed an ACOMM aneurysm. She underwent a cerebral angiogram by José Manuel Villaseñor at [facility] on December 7th, 2023, with the intent to treat this month. The aneurysm is described as a 5.5x4.6x3.6mm wide necked acomm aneurysm that projects medio-superiorly. Reports occasional headaches, described as numbness, 5/10 in severity, worse with laying down, relieved with sitting up or standing. Patient underwent diagnostic angiogram 2/14/24 which confirmed acomm aneurysm. On 3/6 patient underwent aneurysm embolization with coiling complicated by aneurysm rupture, controlled with coils and balloon tamponade. NISHANT CT showed SAH b/l frontal lobes and R sylvian fissure along with contrast staining. Patient admitted to NSICU for post op care.. EVD now removed per neurosurgery 3/15. Transferred to neurosurgery service 3/16, stroke team asked to consult, transferred to Stroke service 3/18.     SUBJECTIVE: No events overnight.  No new neurologic complaints.  ROS reported negative unless otherwise noted.    acetaminophen     Tablet .. 650 milliGRAM(s) Oral every 6 hours PRN  enoxaparin Injectable 30 milliGRAM(s) SubCutaneous every 24 hours  hydrALAZINE Injectable 10 milliGRAM(s) IV Push every 2 hours PRN  iohexol 300 mG (iodine)/mL Oral Solution 30 milliLiter(s) Oral once PRN  labetalol Injectable 10 milliGRAM(s) IV Push every 2 hours PRN  lactobacillus acidophilus 1 Tablet(s) Oral daily  lidocaine   4% Patch 1 Patch Transdermal every 24 hours  multivitamin 1 Tablet(s) Oral daily  nystatin Powder 1 Application(s) Topical two times a day  ondansetron Injectable 4 milliGRAM(s) IV Push every 6 hours PRN  pantoprazole    Tablet 40 milliGRAM(s) Oral before breakfast  polyethylene glycol 3350 17 Gram(s) Oral daily  QUEtiapine 12.5 milliGRAM(s) Oral at bedtime  simvastatin 10 milliGRAM(s) Oral at bedtime  tamsulosin 0.8 milliGRAM(s) Oral at bedtime      PHYSICAL EXAM:   Vital Signs Last 24 Hrs  T(C): 36.6 (26 Mar 2024 04:00), Max: 36.8 (25 Mar 2024 16:10)  T(F): 97.9 (26 Mar 2024 04:00), Max: 98.3 (25 Mar 2024 16:10)  HR: 73 (26 Mar 2024 04:00) (73 - 82)  BP: 135/76 (26 Mar 2024 04:00) (118/70 - 162/79)  BP(mean): 86 (25 Mar 2024 13:34) (86 - 86)  RR: 18 (26 Mar 2024 04:00) (18 - 18)  SpO2: 99% (26 Mar 2024 04:00) (97% - 100%)    Parameters below as of 26 Mar 2024 04:00  Patient On (Oxygen Delivery Method): room air    General: No acute distress.   HEENT: Head normocephalic, atraumatic. Conjunctivae clear w/o exudates or hemorrhage. Sclera non-icteric. Nares are patent bilaterally.   Neck: Supple, no adenopathy.   Cardiac: Normal rate and rhythm. S1, S2 auscultated.   Respiratory: Lung sounds clear in all fields. Chest wall symmetric, nontender.   Abdominal: Soft, nondistended, nontender. Bowel sounds normoactive x 4 quadrants.   Skin: Skin is warm, dry and intact without rashes or lesions. Appropriate color for ethnicity.  Extremities: No edema, Full range of motion in all joints.     NEUROLOGICAL EXAM:  Mental status: The patient is awake and alert and has normal attention span.  The patient is fully oriented in 3 spheres. The patient is oriented to current events. The patient is able to name objects, follow commands, repeat sentences.    Cranial nerves: slight left facial palsy, Pupils equal and react symmetrically to light. There is no visual field deficit to confrontation. Extraocular motion is full with no nystagmus. There is no ptosis. Facial sensation is intact.   Motor: There is normal bulk and tone. There is no tremor. LUE pronator drift, strength 5-/5, LLE 5-/5. RUE and bilateral LE strength 5/5- some pain limitation in LE.  Sensation: Intact to light touch in 4 extremities  Slight tremor b/l upper extremities     LABS:                        8.4    8.39  )-----------( 380      ( 26 Mar 2024 06:59 )             24.2    03-26    127<L>  |  92<L>  |  12.7  ----------------------------<  95  3.9   |  23.0  |  0.37<L>    Ca    8.1<L>      26 Mar 2024 06:59  Phos  2.9     03-26  Mg     2.0     03-26    TPro  5.6<L>  /  Alb  3.3  /  TBili  0.2<L>  /  DBili  x   /  AST  23  /  ALT  27  /  AlkPhos  47  03-26 03-19 Chol 142 LDL =55 HDL 63 Trig 120    A1C: 5.8    IMAGING: Reviewed by me.   US Abdomen Upper Quadrant Right (03.25.24 @ 09:08)  IMPRESSION:  Gallbladder sludge and wall thickening. Correlate with symptomatology. If   there is concern for cholecystitis HIDA scan can be obtained.  Choledochomegaly. Correlate with LFTs and MRCP.    CT Abdomen and Pelvis w/ Oral Cont (03.23.24 @ 22:02)  BILE DUCTS: Mild intrahepatic and extrahepatic biliary ductal dilatation   with CBD measuring up to 8 to 9 mm.  IMPRESSION:  Mild proctitis, new.  Mild biliary ductal dilatation. Correlation with lab markers would be   helpful.    Xray Sacrum + Coccyx (03.20.24 @ 11:28)  IMPRESSION: Limited study without obvious fracture.    Xray Lumbosacral Spine (03.20.24 @ 11:29)   Findings:  There is no fracture, subluxation or paravertebral soft tissue swelling.  The pedicles and sacroiliac joints are intact.  Straightening of lordosis may be positional versus muscle spasm  The sacrum and visualized coccyx are intact    Large amount of stool in the visualized colon without gross bowel   obstruction.    IMPRESSION: No fracture or subluxation.    US Duplex Venous Lower Ext Complete, Bilateral (03.20.24 @ 12:20)  IMPRESSION:  No evidence of deep venous thrombosis in either lower extremity.    CT Head No Cont (03.16.24 @ 03:22)   IMPRESSION: No change in mild ventricular dilatation with intraventricular hemorrhage and hemorrhage in the frontal lobe midline and corpus callosum after extraventricular drain removal since 3/15/2024.    CT Head No Cont (03.15.24 @ 05:38)   IMPRESSION: Stable follow-up CT study.     TTE W or WO Ultrasound Enhancing Agent (03.13.24 @ 12:03)   CONCLUSIONS:    1. Left ventricular cavity is normal in size. Left ventricular wall thickness is normal. Left ventricular systolic function is normal with an ejection fraction visually estimated at 65 to 70 %.   2. Normal left ventricular diastolic function.   3. Normal right ventricular cavity size and normal systolic function.   4. The left atrium is normal.   5. The right atrium is normal in size.   6. Fibrocalcific aortic valve sclerosis without stenosis.   7. Mild mitral valve leaflet calcification.   8. Trace mitral regurgitation.   9. Estimated pulmonary artery systolic pressure is 23 mmHg, normal pulmonary artery pressure.    US Duplex Venous Lower Ext Complete, Bilateral (03.12.24 @ 15:23)   IMPRESSION: No evidence of deep venous thrombosis in either lower extremity.     IR Neuro (03.11.24 @ 09:01)   IMPRESSION:  1. 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 No interval aneurysm recanalization or evidence for pseudoaneurysm.  2. No vasospasm.    CT Head No Cont (03.10.24 @ 12:37)   IMPRESSION: Mildly decreased bilateral mesial frontal parenchymal hemorrhages. Similar anterior interhemispheric acute subarachnoid hemorrhage. Mildly decreased intraventricular hemorrhage within the bilateral lateral   and third ventricles. Decreased ventricular size with near resolution of hydrocephalus. No large midline shift. Basal cisterns are more well visualized on the current examination, this may be due to technique.    MR Angio Head w/ IV Cont (03.09.24 @ 18:57)   IMPRESSION:  MR brain: Intraparenchymal hemorrhage within the medial anterior frontal lobes measuring approximately 3.5 cm, unchanged. There is surrounding edema and extension into the genuine body of corpus callosum, unchanged. Hemorrhagic clot within the LEFT lateral ventricle and third ventricle is unchanged. Minimal hemorrhage seen in the dependent portions of the RIGHT lateral ventricle unchanged. Minimal subarachnoid hemorrhage seen along the medial sulci of the BILATERAL frontal and parietal lobes as well as scattered throughout the sulci of the brain. RIGHT frontal ventriculostomy catheter tip is seen extending toward the body of the   LEFT ventricle and third ventricle.  Coil material is seen in the region of the anterior communicating artery on the RIGHT.   Blood products are seen in the region of the known aneurysm possibly indicating partial patency.    MRA intracranial:   Coil material is seen in the region of the anterior communicating artery. There is 1 x 4 mm region of signal seen on the noncontrast but not the postcontrast which may reflect blood products within the aneurysm indicating patency.    CT Head No Cont (03.07.24 @ 05:56)   IMPRESSION:   persistent intracranial hemorrhage within the BILATERAL lateral and third ventricles, LEFT greater than RIGHT, with mild obstructive hydrocephalus slightly increased. RIGHT frontal shunt catheter tip seen in body of RIGHT lateral ventricle unchanged. Subarachnoid hemorrhage again noted in the BILATERAL medial frontal lobes with hemorrhage in the anterior corpus callosum, LEFT greaterthan RIGHT.     CT Head No Cont (03.06.24 @ 19:20)   IMPRESSION: Interval placement of right frontal approach ventriculostomy catheter since the prior study performed 3 hours ago, with improved hydrocephalus and slightly decreased sulcal/cisternal bilateral subarachnoid hemorrhage.    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 Intraventricular hemorrhage is present with hydrocephalus.    IR Neuro (03.06.24 @ 12:13)   IMPRESSION:  1. 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  2. Status postballoon assisted coil embolization, the aneurysm is nearly completely occluded other than a 2.2 mm x 1.4 mm x 4.3 mm aneurysmal neck remnant.    EEG:  Study Date: 03-19-24  Duration: 22 minutes  EEG Classification / Summary:  Normal routine EEG in the awake state, within the limits of interpretation.  Interpretation significantly limited by continuous muscle artifact.  Clinical Impression:   Technically limited study due to continuous muscle artifact. No obvious abnormalities noted.  Consider repeating EEG if clinically warranted.

## 2024-03-26 NOTE — PROGRESS NOTE ADULT - ASSESSMENT
COMPLETE A/P PENDING  ASSESSMENT: 72 y/o mandarin speaking female with PMH of HLD and osteoporosis presents to [facility] with complaints of having cerebral aneurysm. States in 10/2023 she started to have B/L hand numbness. At that time she had MRI brain, MRA head and neck. MRA showed an ACOMM aneurysm. She underwent a cerebral angiogram by José Manuel Villaseñor at [facility] on December 7th, 2023, with the intent to treat this month. The aneurysm is described as a 5.5x4.6x3.6mm wide necked acomm aneurysm that projects medio-superiorly. Reports occasional headaches, described as numbness, 5/10 in severity, worse with laying down, relieved with sitting up or standing. Patient underwent diagnostic angiogram 2/14/24 which confirmed acomm aneurysm. On 3/6 patient underwent aneurysm embolization with coiling complicated by aneurysm rupture, controlled with coils and balloon tamponade. NISHANT CT showed SAH b/l frontal lobes and R sylvian fissure along with contrast staining. EVD placed  3/6, removed by neurosurgery 3/15. Transferred to Stroke service 3/18.   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       Neurologically intact. Patient encouraged to ambulate in hallway and room and get out of bed to chair. Family in agreement with plan.    NEURO:   #ACOMM aneurysm elective procedure complicated by aneurysm rupture controlled with coils/balloon tamponade  #SAH  #EVD 3/6, removed 3/15  #Metabolic encephalopathy  #Tremors  -Neurologically ***  -Continue close monitoring for neurologic deterioration    -Stroke neuro checks q4hrs   -EEG without evidence of seizure   -MRI Brain w/o, MRA Head w/o and Neck w/contrast results as above   -Will need repeat MRI brain w/wo contrast in 4-6 weeks (4/3/24 is 4-week anneliese from aneurysm embolization)  -s/p Keppra  -nimodipine 60mg d/c'd 3/25/24  -bromocriptine, d/c'd 3/25/24  -Dysphagia screen: pass, on easy to chew diet  -Physical therapy/OT/Speech eval/treatment.            #Stroke prevention:  -SBP goal  per neuro IR recommendations, avoiding rapid fluctuations and hypotension   -ANTITHROMBOTIC THERAPY: n/a   -LDL- 55 , continue home regimen if applicable   -HA1C 5.8  -TTE noted above    CARDIOVASCULAR:   -TTE noted above  -cardiac monitoring w/ telemetry for now, further evaluation pending findings of noted workup                              HEMATOLOGY:   -Acute normocytic anemia, improving -- H/H 8.4/24.2  -Guiac +, GI recs appreciated, per GI patient w/o evidence of acute GIB despite positive fecal occult  -Protonix 40mg q daily  -Retic count 6.1  Haptoglobin 250  -Heme-onc recs appreciate: suspect anemia related to suppression from acute illness, also w/ possible contributing factor of dilution from IVF/PO free water   -Pending SPEP/MG  -Iron studies, B12, Folate WNL  -Homocysteine 4.7, pending MMA results  -Platelets 380  -Continue close monitoring, CBC q daily, no active bleeding noted  -Patient should have all age and risk appropriate malignancy screenings with PCP or sooner if clinically suspected   -DVT ppx: Heparin s.c [] LMWH [X]     PULMONARY:   -Protecting airway, saturating well   -s/p zosyn for possible pneumonia  -Consider CT chest if fever persists     RENAL/METABOLIC:   #Urinary retention  -Patient successfully voided overnight 3/25-3/26/24 s/p removal of feliciano   #Hyponatremia #Hypokalemia #Hypophosphatemia #Hypocalcemia  -Electrolyte derangements likely due to diarrhea, continue to monitor  -Na fluctuating,138--> 129 --> 127  -NS d/c'd, patient placed on 1200mL fluid restriction, salt tablets ordered  -Potassium 3.9  -Phosphorus 2.9  -Calcium 8.1, albumin 3.3, corrected calcium 8.7  -BUN/Cr without acute change   -Maintain adequate hydration  -Tamsulosin 0.8mg at bedtime   -Na Goal:  135-145    ID:   -s/p zosyn for possible pneumonia  -Fluctuating fevers - Tmax 101.1, afebrile overnight; ?possible central fevers, now resolved, ID following  -Leukocytosis resolved   -Monitor and screen for si/sx of infection   -CT Abdomen & Pelvis as noted    GI:  #diarrhea  -s/p rectal tube, removed 3/17  -Monitor BM, loose stool possible side effect of nimodipine (med now dc'd); stool count ordered   -CT Abdomen & Pelvis w/ Oral Contrast: Mild proctitis, new. Mild biliary ductal dilatation. Correlation with lab markers would be helpful CBD measuring up to 8 to 9 mm  -Episodic diarrhea likely 2/2 polypharmacy and ?neurogenic bowel, not emptying bowels -- patient reports improvement, Miralax & Stool Count ordered per medicine recs, patient ambulating to bathroom w/ assistance  -GI PCR cancelled, diarrhea improving   -Lipase 94  -US RUQ w gallbladder sludge and wall thickening, patient asymptomatic, LFTs WNL  -Diet: Easy to Chew, Lactose Restricted, Ensure Clear TID    PSYCH:  -Quetiapine 12.5mg at bedtime  -Delirium precautions    ORTHO:  -XR lumbar /sacral as noted  -MRI L spine d/c'd, patient reports her back feels better    OTHER:   ENCOURAGE MOBILIZATION AND AMBULATION  Condition and plan of care d/w patient, questions and concerns addressed. Discussed with family at bedside, medical team at bedside as well present for further evaluation and recommendations.     DISPOSITION:   -PT/OT recs for Acute Rehab -- patient and family in agreement -- pending AR bed    CORE MEASURES:        Admission NIHSS: n/a      Tenecteplase : [] YES [X] NO      LDL/HDL/A1C 55/63/5.8     Depression Screen- if depression hx and/or present      Statin Therapy: simvastatin 10mg     Dysphagia Screen: [X] PASS [] FAIL     Smoking [] YES [X] NO      Afib [] YES [X] NO     Stroke Education [x] YES 3/9/24 [] NO   ASSESSMENT: 74 y/o mandarin speaking female with PMH of HLD and osteoporosis presents to [facility] with complaints of having cerebral aneurysm. States in 10/2023 she started to have B/L hand numbness. At that time she had MRI brain, MRA head and neck. MRA showed an ACOMM aneurysm. She underwent a cerebral angiogram by José Manuel Villaseñor at [facility] on December 7th, 2023, with the intent to treat this month. The aneurysm is described as a 5.5x4.6x3.6mm wide necked acomm aneurysm that projects medio-superiorly. Reports occasional headaches, described as numbness, 5/10 in severity, worse with laying down, relieved with sitting up or standing. Patient underwent diagnostic angiogram 2/14/24 which confirmed acomm aneurysm. On 3/6 patient underwent aneurysm embolization with coiling complicated by aneurysm rupture, controlled with coils and balloon tamponade. NISHANT CT showed SAH b/l frontal lobes and R sylvian fissure along with contrast staining. EVD placed  3/6, removed by neurosurgery 3/15. Transferred to Stroke service 3/18.   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       Neurologically intact. Patient encouraged to ambulate in hallway and room and get out of bed to chair. Family in agreement with plan.    NEURO:   #ACOMM aneurysm elective procedure complicated by aneurysm rupture controlled with coils/balloon tamponade  #SAH  #EVD 3/6, removed 3/15  #Metabolic encephalopathy  #Tremors  -Neurologically with no acute change from prior exam  -Continue close monitoring for neurologic deterioration    -Stroke neuro checks q4hrs   -EEG without evidence of seizure   -MRI Brain w/o, MRA Head w/o and Neck w/contrast results as above   -Will need repeat MRI brain w/wo contrast in 4-6 weeks (4/3/24 is 4-week anneliese from aneurysm embolization)  -s/p Keppra  -nimodipine 60mg d/c'd 3/25/24  -bromocriptine, d/c'd 3/25/24  -Dysphagia screen: pass, on easy to chew diet  -Physical therapy/OT/Speech eval/treatment.            #Stroke prevention:  -SBP goal  per neuro IR recommendations, avoiding rapid fluctuations and hypotension   -ANTITHROMBOTIC THERAPY: n/a   -LDL- 55 , continue home regimen if applicable   -HA1C 5.8  -TTE noted above    CARDIOVASCULAR:   -TTE noted above  -cardiac monitoring w/ telemetry for now, further evaluation pending findings of noted workup                              HEMATOLOGY:   -Acute normocytic anemia, improving -- H/H 8.4/24.2  -Guiac +, GI recs appreciated, per GI patient w/o evidence of acute GIB despite positive fecal occult  -Protonix 40mg q daily  -Retic count 6.1  Haptoglobin 250  -Heme-onc recs appreciate: suspect anemia related to suppression from acute illness, also w/ possible contributing factor of dilution from IVF/PO free water   -Pending SPEP/MG  -Iron studies, B12, Folate WNL  -Homocysteine 4.7, pending MMA results  -Platelets 380  -Continue close monitoring, CBC q daily, no active bleeding noted  -Patient should have all age and risk appropriate malignancy screenings with PCP or sooner if clinically suspected   -DVT ppx: Heparin s.c [] LMWH [X]     PULMONARY:   -Protecting airway, saturating well   -s/p zosyn for possible pneumonia  -Consider CT chest if fever persists     RENAL/METABOLIC:   #Urinary retention  -Patient successfully voided overnight 3/25-3/26/24 s/p removal of feliciano   #Hyponatremia #Hypokalemia #Hypophosphatemia #Hypocalcemia  -Electrolyte derangements likely due to diarrhea, continue to monitor  -Na fluctuating,138--> 129 --> 127  -NS d/c'd, patient placed on 1200mL fluid restriction, salt tablets TID ordered  -Potassium 3.9  -Phosphorus 2.9  -Calcium 8.1, albumin 3.3, corrected calcium 8.7  -BUN/Cr without acute change   -Maintain adequate hydration  -Tamsulosin 0.8mg at bedtime   -Na Goal:  135-145    ID:   -s/p zosyn for possible pneumonia  -Fluctuating fevers - Tmax 101.1, afebrile overnight; ?possible central fevers, now resolved, ID following  -Leukocytosis resolved   -Monitor and screen for si/sx of infection   -CT Abdomen & Pelvis as noted    GI:  #diarrhea  -s/p rectal tube, removed 3/17  -Monitor BM, loose stool possible side effect of nimodipine (med now dc'd); stool count ordered   -CT Abdomen & Pelvis w/ Oral Contrast: Mild proctitis, new. Mild biliary ductal dilatation. Correlation with lab markers would be helpful CBD measuring up to 8 to 9 mm  -Episodic diarrhea likely 2/2 polypharmacy and ?neurogenic bowel, not emptying bowels -- patient reports improvement, Miralax & Stool Count ordered per medicine recs, patient ambulating to bathroom w/ assistance  -GI PCR cancelled, diarrhea improving   -Lipase 94  -US RUQ w gallbladder sludge and wall thickening, patient asymptomatic, LFTs WNL  -Diet: Easy to Chew, Lactose Restricted, Ensure Clear TID    PSYCH:  -Quetiapine 12.5mg at bedtime  -Delirium precautions    ORTHO:  -XR lumbar /sacral as noted  -MRI L spine d/c'd, patient reports her back feels better    OTHER:   ENCOURAGE MOBILIZATION AND AMBULATION  Condition and plan of care d/w patient, questions and concerns addressed. Discussed with family at bedside, medical team at bedside as well present for further evaluation and recommendations.     DISPOSITION:   -PT/OT recs for Acute Rehab -- patient and family in agreement -- pending AR bed    CORE MEASURES:        Admission NIHSS: n/a      Tenecteplase : [] YES [X] NO      LDL/HDL/A1C 55/63/5.8     Depression Screen- if depression hx and/or present      Statin Therapy: simvastatin 10mg     Dysphagia Screen: [X] PASS [] FAIL     Smoking [] YES [X] NO      Afib [] YES [X] NO     Stroke Education [x] YES 3/9/24 [] NO

## 2024-03-26 NOTE — PROGRESS NOTE ADULT - ASSESSMENT
72 y/o mandarin speaking female with PMH of HLD and osteoporosis presents to [facility] with complaints of having cerebral aneurysm. States in 10/2023 she started to have B/L hand numbness. At that time she had MRI brain, MRA head and neck. MRA showed an ACOMM aneurysm. She underwent a cerebral angiogram by José Manuel Villaseñor at [facility] on December 7th, 2023, with the intent to treat this month. The aneurysm is described as a 5.5x4.6x3.6mm wide necked acomm aneurysm that projects medio-superiorly. Reports occasional headaches, described as numbness, S/P  aneurysm embolization with coiling on 3/6  complicated by aneurysm rupture, controlled with coils and balloon tamponade. NISHANT CT showed SAH b/l frontal lobes and R sylvian fissure along with contrast staining. Patient admitted to NSICU for post op care. Downgraded to Step down on 3/16. Medicine consulted for medical mgmt .     Plan:     # ACOMM aneurism , S/P  aneurysm embolization with coiling on 3/6  complicated by aneurysm rupture   controlled with coils and balloon tamponade.    NISHANT CT showed SAH b/l frontal lobes and R sylvian fissure along with contrast staining.    Patient admitted to NSICU for post op care. Now downgraded to step down and Neuro stroke service  - Neuro checks being done   - Bromocriptine 10mg TID- for likely central fevers, being titrated   - Seroquel 12.5mg nightly  - Pain control- Tylenol, Tramadol  - pt/ot  - Nimodipine 60mg q4hrs for SAH protocol  as per NS   - Avoid hypotension, rapid changes in BP  - New tremors noted: EEG : Clinical Impression:   Technically limited study due to continuous muscle artifact. No obvious abnormalities noted.  Consider repeating EEG if clinically warranted.    serial Imaging and further workup as per Primary team  - Acute rehab      #Anemia- probably dilutional, multiple blood draws - Hg -8.6 , FOBT - positive seen by GI, no indication for endoscopic evaluation  transfuse if Hg < 7 , H&H stable.   Hb today is 8.4, stop IV fluids, monitor CBC   GI recommends PPI    # Dilated common duct on CT of   3/23/2024  US right upper quadrant 3/25: Common duct dilated up to 1.0 cm with gall bladder sludge. LFT's not elevated. Patient is asymptomatic.       # HLD - Simvastatin 10mg daily    # Postop urinary retention:   -Flomax 0.8mg  qhs   -Continue Dangelo Cath, failed TOV on 3/18 ( 3rd attempt)  -Ambulate to bathroom  - Reattempt TOV start Edie lax  and ambulate to bathroom to assist in emptying bowels and bladder    # Diarrhea ,likely due to overflow 2/2 constipation    rectal tube  removed ( 3/17/24)  Abdominal XR ( 3/13) noted with  Fecal material   localized to the mid left descending colon.   Repeat KUB ( 3/20/24)  with Constipation, received laxative/enema had multiple BM with formed stool  d/c Calcium to prevent constipation   Please order stool count, have RN's document frequency and consistency of bowel movements  repeat KUB to ensure resolution of constipation.        # Low grade temps till 3/20 with Tmax 101.3 on 3/20 ,  work up NTD  Completed course of Zosyn for possible PNA   CXR - unremarkable   US of LE - no dvt   COVID/RVP  (3/12/24) - negative - REPEAT COVID/ RVP again negative  c/o low back pain , XR lumbar /sacral as noted, pain has resolved with increase mobilization  Blood cultures repeated 3/21-negative  ID consulted and appreciated, observe off antibiotics  no further fever spikes    #Hypokalemia: Being Supplemented   monitor electrolytes     #Hypophosphatemia - replace as needed    #Hyponatremia  Na Goal: 135-145, now Sodium is 127 today from 136. Patient endorses drinking a lot of free water, would limit free water to 12ml/ salt tabs 1 gram 3 times a day, will monitor BMP today and tomorrow       Leukocytosis: trended down, will monitor     DVT prophylaxis  -on Lovenox.

## 2024-03-26 NOTE — PROGRESS NOTE ADULT - SUBJECTIVE AND OBJECTIVE BOX
Patient pending AR at [facility].     FUNCTIONAL PROGRESS  3/25 SLP  Speech Language Pathology Recommendations: 1. Regular solids/thin liquids as tolerated 2. Aspiration precautions 3. Crush meds PRN 4. This service to follow to check diet tolerance as schedule allows.     3/25 PT  Bed Mobility  Bed Mobility Training Rehab Potential: good, to achieve stated therapy goals  Bed Mobility Training Sit-to-Supine: 1 person assist;  minimum assist (75% patient effort)  Bed Mobility Training Supine-to-Sit: minimum assist (75% patient effort);  1 person assist  Bed Mobility Training Limitations: decreased ability to use arms for pushing/pulling;  decreased ability to use legs for bridging/pushing;  impaired ability to control trunk for mobility;  decreased strength;  impaired balance    Sit-Stand Transfer Training  Sit-to-Stand Transfer Training Rehab Potential: good, to achieve stated therapy goals  Transfer Training Sit-to-Stand Transfer: minimum assist (75% patient effort);  1 person assist;  rolling walker  Transfer Training Stand-to-Sit Transfer: minimum assist (75% patient effort);  1 person assist;  rolling walker  Sit-to-Stand Transfer Training Transfer Safety Analysis: decreased weight-shifting ability;  decreased strength;  impaired balance    Gait Training  Gait Training Rehab Potential: good, to achieve stated therapy goals  Gait Training: minimum assist (75% patient effort);  1 person assist;  rolling walker;  40ft  Gait Analysis: decreased step length;  decreased stride length;  decreased weight-shifting ability;  decreased strength;  impaired balance    3/25 OT  Bed-Chair Transfer Training  Transfer Training Bed-to-Chair Transfer: minimum assist (75% patient effort);  rolling walker  Transfer Training Chair-to-Bed Transfer: minimum assist (75% patient effort);  rolling walker    Sit-Stand Transfer Training  Transfer Training Sit-to-Stand Transfer: minimum assist (75% patient effort);  1 person assist;  rolling walker  Transfer Training Stand-to-Sit Transfer: minimum assist (75% patient effort);  1 person assist;  rolling walker    Toilet Transfer Training  Transfer Training Toilet Transfer: minimum assist (75% patient effort);  1 person assist;  rolling walker;  weight-bearing as tolerated    Lower Body Dressing Training  Lower Body Dressing Training Assistance: moderate assist (50% patient effort)      VITALS  T(C): 36.6 (03-26-24 @ 04:00), Max: 36.8 (03-25-24 @ 16:10)  HR: 73 (03-26-24 @ 04:00) (73 - 82)  BP: 135/76 (03-26-24 @ 04:00) (118/70 - 162/79)  RR: 18 (03-26-24 @ 04:00) (18 - 18)  SpO2: 99% (03-26-24 @ 04:00) (97% - 100%)  Wt(kg): --    MEDICATIONS   acetaminophen     Tablet .. 650 milliGRAM(s) every 6 hours PRN  enoxaparin Injectable 30 milliGRAM(s) every 24 hours  hydrALAZINE Injectable 10 milliGRAM(s) every 2 hours PRN  iohexol 300 mG (iodine)/mL Oral Solution 30 milliLiter(s) once PRN  labetalol Injectable 10 milliGRAM(s) every 2 hours PRN  lactobacillus acidophilus 1 Tablet(s) daily  lidocaine   4% Patch 1 Patch every 24 hours  multivitamin 1 Tablet(s) daily  nystatin Powder 1 Application(s) two times a day  ondansetron Injectable 4 milliGRAM(s) every 6 hours PRN  pantoprazole    Tablet 40 milliGRAM(s) before breakfast  polyethylene glycol 3350 17 Gram(s) daily  QUEtiapine 12.5 milliGRAM(s) at bedtime  simvastatin 10 milliGRAM(s) at bedtime  tamsulosin 0.8 milliGRAM(s) at bedtime      RECENT LABS/IMAGING  - Reviewed Today                        8.4    8.39  )-----------( 380      ( 26 Mar 2024 06:59 )             24.2     03-26    127<L>  |  92<L>  |  12.7  ----------------------------<  95  3.9   |  23.0  |  0.37<L>    Ca    8.1<L>      26 Mar 2024 06:59  Phos  2.9     03-26  Mg     2.0     03-26    TPro  5.6<L>  /  Alb  3.3  /  TBili  0.2<L>  /  DBili  x   /  AST  23  /  ALT  27  /  AlkPhos  47  03-26      Urinalysis Basic - ( 26 Mar 2024 06:59 )    Color: x / Appearance: x / SG: x / pH: x  Gluc: 95 mg/dL / Ketone: x  / Bili: x / Urobili: x   Blood: x / Protein: x / Nitrite: x   Leuk Esterase: x / RBC: x / WBC x   Sq Epi: x / Non Sq Epi: x / Bacteria: x              US Duplex Venous Lower Ext Complete, Bilateral 3/12 - No evidence of deep venous thrombosis in either lower extremity.    CT Head 3/10 - Mildly decreased bilateral mesial frontal parenchymal hemorrhages. Similar anterior interhemispheric acute subarachnoid hemorrhage.Mildly decreased intraventricular hemorrhage within the bilateral lateral    and third ventricles. Decreased ventricular size with near resolution of hydrocephalus.No large midline shift.  Basal cisterns are more well visualized on the current examination, this may be due to technique.    MR Brain, MR Angio Head 3/9 - MR brain: Intraparenchymal hemorrhage within the medial anterior frontal   lobes measuring approximately 3.5 cm, unchanged. There is surrounding edema and extension into the genuine body of corpus callosum, unchanged. Hemorrhagic clot within the LEFT lateral ventricle and third ventricle is unchanged. Minimal hemorrhage seen in the dependent portions of the RIGHT lateral ventricle unchanged. Minimal subarachnoid hemorrhage seen along the medial sulci of the BILATERAL frontal and parietal lobes as well as scattered throughout the sulci of the brain. RIGHT frontal ventriculostomy catheter tip is seen extending toward the body of the LEFT ventricle and third ventricle.  Coil material is seen in the region of the anterior communicating artery on the RIGHT.   Blood products are seen in the region of the known aneurysm possibly indicating partial patency.    CT Head 3/16 -  No change in mild ventricular dilatation with intraventricular hemorrhage and hemorrhage in the frontal lobe midline and corpus callosum after extraventricular drain removal since 3/15/2024.    US Duplex LE 3/20 - No evidence of deep venous thrombosis in either lower extremity.    CT ABD/PEL 3/23 - Mild proctitis, new. Mild biliary ductal dilatation. Correlation with lab markers would be helpful.    ABD US 3/25 - Gallbladder sludge and wall thickening. Correlate with symptomatology. If there is concern for cholecystitis HIDA scan can be obtained. Choledochomegaly. Correlate with LFTs and MRCP.  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     When medically optimized, based on the patient's diagnosis, current functional status and potential for progress, recommend ACUTE inpatient rehabilitation for the functional deficits consisting of 3 hours of therapy/day & 24 hour RN/daily PMR physician for comorbid medical management. Patient will be able to tolerate 3 hours a day.     Will continue to follow. Rehab recommendations are dependent on how functional progress changes as well as how patient continues to participate and tolerate therapeutic interventions, WHICH MAY CHANGE UPON FOLLOW UP EVALUATIONS. Recommend ongoing mobilization by staff to maintain cardiopulmonary function and prevention of secondary complications related to debility. Discussed the specific management and recommendations above with rehab clinical care team/rehab liaison.

## 2024-03-27 VITALS
HEART RATE: 75 BPM | TEMPERATURE: 98 F | OXYGEN SATURATION: 99 % | SYSTOLIC BLOOD PRESSURE: 144 MMHG | RESPIRATION RATE: 18 BRPM | DIASTOLIC BLOOD PRESSURE: 75 MMHG

## 2024-03-27 LAB
ANION GAP SERPL CALC-SCNC: 12 MMOL/L — SIGNIFICANT CHANGE UP (ref 5–17)
BUN SERPL-MCNC: 11.9 MG/DL — SIGNIFICANT CHANGE UP (ref 8–20)
CALCIUM SERPL-MCNC: 8.4 MG/DL — SIGNIFICANT CHANGE UP (ref 8.4–10.5)
CHLORIDE SERPL-SCNC: 98 MMOL/L — SIGNIFICANT CHANGE UP (ref 96–108)
CO2 SERPL-SCNC: 26 MMOL/L — SIGNIFICANT CHANGE UP (ref 22–29)
CREAT SERPL-MCNC: 0.41 MG/DL — LOW (ref 0.5–1.3)
EGFR: 105 ML/MIN/1.73M2 — SIGNIFICANT CHANGE UP
GLUCOSE SERPL-MCNC: 98 MG/DL — SIGNIFICANT CHANGE UP (ref 70–99)
HCT VFR BLD CALC: 24 % — LOW (ref 34.5–45)
HGB BLD-MCNC: 8.1 G/DL — LOW (ref 11.5–15.5)
MAGNESIUM SERPL-MCNC: 2.1 MG/DL — SIGNIFICANT CHANGE UP (ref 1.6–2.6)
MCHC RBC-ENTMCNC: 31.2 PG — SIGNIFICANT CHANGE UP (ref 27–34)
MCHC RBC-ENTMCNC: 33.8 GM/DL — SIGNIFICANT CHANGE UP (ref 32–36)
MCV RBC AUTO: 92.3 FL — SIGNIFICANT CHANGE UP (ref 80–100)
PHOSPHATE SERPL-MCNC: 3.4 MG/DL — SIGNIFICANT CHANGE UP (ref 2.4–4.7)
PLATELET # BLD AUTO: 385 K/UL — SIGNIFICANT CHANGE UP (ref 150–400)
POTASSIUM SERPL-MCNC: 3.9 MMOL/L — SIGNIFICANT CHANGE UP (ref 3.5–5.3)
POTASSIUM SERPL-SCNC: 3.9 MMOL/L — SIGNIFICANT CHANGE UP (ref 3.5–5.3)
RBC # BLD: 2.6 M/UL — LOW (ref 3.8–5.2)
RBC # FLD: 15.9 % — HIGH (ref 10.3–14.5)
SODIUM SERPL-SCNC: 135 MMOL/L — SIGNIFICANT CHANGE UP (ref 135–145)
WBC # BLD: 5.6 K/UL — SIGNIFICANT CHANGE UP (ref 3.8–10.5)
WBC # FLD AUTO: 5.6 K/UL — SIGNIFICANT CHANGE UP (ref 3.8–10.5)

## 2024-03-27 PROCEDURE — 97163 PT EVAL HIGH COMPLEX 45 MIN: CPT

## 2024-03-27 PROCEDURE — 86334 IMMUNOFIX E-PHORESIS SERUM: CPT

## 2024-03-27 PROCEDURE — 93886 INTRACRANIAL COMPLETE STUDY: CPT

## 2024-03-27 PROCEDURE — C1887: CPT

## 2024-03-27 PROCEDURE — 83935 ASSAY OF URINE OSMOLALITY: CPT

## 2024-03-27 PROCEDURE — 86901 BLOOD TYPING SEROLOGIC RH(D): CPT

## 2024-03-27 PROCEDURE — 83550 IRON BINDING TEST: CPT

## 2024-03-27 PROCEDURE — 84165 PROTEIN E-PHORESIS SERUM: CPT

## 2024-03-27 PROCEDURE — 72220 X-RAY EXAM SACRUM TAILBONE: CPT

## 2024-03-27 PROCEDURE — 82330 ASSAY OF CALCIUM: CPT

## 2024-03-27 PROCEDURE — 83521 IG LIGHT CHAINS FREE EACH: CPT

## 2024-03-27 PROCEDURE — 36226 PLACE CATH VERTEBRAL ART: CPT

## 2024-03-27 PROCEDURE — 92526 ORAL FUNCTION THERAPY: CPT

## 2024-03-27 PROCEDURE — 89051 BODY FLUID CELL COUNT: CPT

## 2024-03-27 PROCEDURE — 83605 ASSAY OF LACTIC ACID: CPT

## 2024-03-27 PROCEDURE — 83921 ORGANIC ACID SINGLE QUANT: CPT

## 2024-03-27 PROCEDURE — 84300 ASSAY OF URINE SODIUM: CPT

## 2024-03-27 PROCEDURE — 0225U NFCT DS DNA&RNA 21 SARSCOV2: CPT

## 2024-03-27 PROCEDURE — 82746 ASSAY OF FOLIC ACID SERUM: CPT

## 2024-03-27 PROCEDURE — 81001 URINALYSIS AUTO W/SCOPE: CPT

## 2024-03-27 PROCEDURE — 76705 ECHO EXAM OF ABDOMEN: CPT

## 2024-03-27 PROCEDURE — 76380 CAT SCAN FOLLOW-UP STUDY: CPT

## 2024-03-27 PROCEDURE — 82607 VITAMIN B-12: CPT

## 2024-03-27 PROCEDURE — C1894: CPT

## 2024-03-27 PROCEDURE — 82274 ASSAY TEST FOR BLOOD FECAL: CPT

## 2024-03-27 PROCEDURE — 85610 PROTHROMBIN TIME: CPT

## 2024-03-27 PROCEDURE — 82728 ASSAY OF FERRITIN: CPT

## 2024-03-27 PROCEDURE — 74018 RADEX ABDOMEN 1 VIEW: CPT

## 2024-03-27 PROCEDURE — 84145 PROCALCITONIN (PCT): CPT

## 2024-03-27 PROCEDURE — 85025 COMPLETE CBC W/AUTO DIFF WBC: CPT

## 2024-03-27 PROCEDURE — 70450 CT HEAD/BRAIN W/O DYE: CPT | Mod: MC

## 2024-03-27 PROCEDURE — 97530 THERAPEUTIC ACTIVITIES: CPT

## 2024-03-27 PROCEDURE — 80053 COMPREHEN METABOLIC PANEL: CPT

## 2024-03-27 PROCEDURE — 36224 PLACE CATH CAROTD ART: CPT

## 2024-03-27 PROCEDURE — 83010 ASSAY OF HAPTOGLOBIN QUANT: CPT

## 2024-03-27 PROCEDURE — 85730 THROMBOPLASTIN TIME PARTIAL: CPT

## 2024-03-27 PROCEDURE — 86900 BLOOD TYPING SEROLOGIC ABO: CPT

## 2024-03-27 PROCEDURE — 97110 THERAPEUTIC EXERCISES: CPT

## 2024-03-27 PROCEDURE — 86850 RBC ANTIBODY SCREEN: CPT

## 2024-03-27 PROCEDURE — C2628: CPT

## 2024-03-27 PROCEDURE — 82435 ASSAY OF BLOOD CHLORIDE: CPT

## 2024-03-27 PROCEDURE — 84157 ASSAY OF PROTEIN OTHER: CPT

## 2024-03-27 PROCEDURE — 71045 X-RAY EXAM CHEST 1 VIEW: CPT

## 2024-03-27 PROCEDURE — 82945 GLUCOSE OTHER FLUID: CPT

## 2024-03-27 PROCEDURE — 92610 EVALUATE SWALLOWING FUNCTION: CPT

## 2024-03-27 PROCEDURE — 87641 MR-STAPH DNA AMP PROBE: CPT

## 2024-03-27 PROCEDURE — 83690 ASSAY OF LIPASE: CPT

## 2024-03-27 PROCEDURE — P9037: CPT

## 2024-03-27 PROCEDURE — 84484 ASSAY OF TROPONIN QUANT: CPT

## 2024-03-27 PROCEDURE — 74176 CT ABD & PELVIS W/O CONTRAST: CPT | Mod: MC

## 2024-03-27 PROCEDURE — P9100: CPT

## 2024-03-27 PROCEDURE — 80061 LIPID PANEL: CPT

## 2024-03-27 PROCEDURE — 82436 ASSAY OF URINE CHLORIDE: CPT

## 2024-03-27 PROCEDURE — 83930 ASSAY OF BLOOD OSMOLALITY: CPT

## 2024-03-27 PROCEDURE — C1769: CPT

## 2024-03-27 PROCEDURE — C1889: CPT

## 2024-03-27 PROCEDURE — 83090 ASSAY OF HOMOCYSTEINE: CPT

## 2024-03-27 PROCEDURE — 74178 CT ABD&PLV WO CNTR FLWD CNTR: CPT | Mod: MC

## 2024-03-27 PROCEDURE — C1729: CPT

## 2024-03-27 PROCEDURE — 87640 STAPH A DNA AMP PROBE: CPT

## 2024-03-27 PROCEDURE — 95819 EEG AWAKE AND ASLEEP: CPT

## 2024-03-27 PROCEDURE — 84132 ASSAY OF SERUM POTASSIUM: CPT

## 2024-03-27 PROCEDURE — 82272 OCCULT BLD FECES 1-3 TESTS: CPT

## 2024-03-27 PROCEDURE — 76377 3D RENDER W/INTRP POSTPROCES: CPT

## 2024-03-27 PROCEDURE — 97167 OT EVAL HIGH COMPLEX 60 MIN: CPT

## 2024-03-27 PROCEDURE — 70552 MRI BRAIN STEM W/DYE: CPT | Mod: MC

## 2024-03-27 PROCEDURE — 83615 LACTATE (LD) (LDH) ENZYME: CPT

## 2024-03-27 PROCEDURE — 75898 FOLLOW-UP ANGIOGRAPHY: CPT

## 2024-03-27 PROCEDURE — 84295 ASSAY OF SERUM SODIUM: CPT

## 2024-03-27 PROCEDURE — 93306 TTE W/DOPPLER COMPLETE: CPT

## 2024-03-27 PROCEDURE — C1760: CPT

## 2024-03-27 PROCEDURE — 83735 ASSAY OF MAGNESIUM: CPT

## 2024-03-27 PROCEDURE — 87040 BLOOD CULTURE FOR BACTERIA: CPT

## 2024-03-27 PROCEDURE — 97116 GAIT TRAINING THERAPY: CPT

## 2024-03-27 PROCEDURE — 87070 CULTURE OTHR SPECIMN AEROBIC: CPT

## 2024-03-27 PROCEDURE — 82040 ASSAY OF SERUM ALBUMIN: CPT

## 2024-03-27 PROCEDURE — 83540 ASSAY OF IRON: CPT

## 2024-03-27 PROCEDURE — 93970 EXTREMITY STUDY: CPT

## 2024-03-27 PROCEDURE — 61624 TCAT PERM OCCLS/EMBOLJ CNS: CPT

## 2024-03-27 PROCEDURE — 85014 HEMATOCRIT: CPT

## 2024-03-27 PROCEDURE — 70545 MR ANGIOGRAPHY HEAD W/DYE: CPT | Mod: MC

## 2024-03-27 PROCEDURE — 72110 X-RAY EXAM L-2 SPINE 4/>VWS: CPT

## 2024-03-27 PROCEDURE — 82947 ASSAY GLUCOSE BLOOD QUANT: CPT

## 2024-03-27 PROCEDURE — 99233 SBSQ HOSP IP/OBS HIGH 50: CPT

## 2024-03-27 PROCEDURE — 94760 N-INVAS EAR/PLS OXIMETRY 1: CPT

## 2024-03-27 PROCEDURE — 75894 X-RAYS TRANSCATH THERAPY: CPT

## 2024-03-27 PROCEDURE — 94002 VENT MGMT INPAT INIT DAY: CPT

## 2024-03-27 PROCEDURE — 87205 SMEAR GRAM STAIN: CPT

## 2024-03-27 PROCEDURE — 82784 ASSAY IGA/IGD/IGG/IGM EACH: CPT

## 2024-03-27 PROCEDURE — 93005 ELECTROCARDIOGRAM TRACING: CPT

## 2024-03-27 PROCEDURE — 85027 COMPLETE CBC AUTOMATED: CPT

## 2024-03-27 PROCEDURE — 99232 SBSQ HOSP IP/OBS MODERATE 35: CPT

## 2024-03-27 PROCEDURE — 36415 COLL VENOUS BLD VENIPUNCTURE: CPT

## 2024-03-27 PROCEDURE — 84466 ASSAY OF TRANSFERRIN: CPT

## 2024-03-27 PROCEDURE — 94003 VENT MGMT INPAT SUBQ DAY: CPT

## 2024-03-27 PROCEDURE — 85018 HEMOGLOBIN: CPT

## 2024-03-27 PROCEDURE — 82803 BLOOD GASES ANY COMBINATION: CPT

## 2024-03-27 PROCEDURE — 85045 AUTOMATED RETICULOCYTE COUNT: CPT

## 2024-03-27 PROCEDURE — 87077 CULTURE AEROBIC IDENTIFY: CPT

## 2024-03-27 PROCEDURE — 73521 X-RAY EXAM HIPS BI 2 VIEWS: CPT

## 2024-03-27 PROCEDURE — 36430 TRANSFUSION BLD/BLD COMPNT: CPT

## 2024-03-27 PROCEDURE — 80048 BASIC METABOLIC PNL TOTAL CA: CPT

## 2024-03-27 PROCEDURE — 84100 ASSAY OF PHOSPHORUS: CPT

## 2024-03-27 RX ORDER — POLYETHYLENE GLYCOL 3350 17 G/17G
17 POWDER, FOR SOLUTION ORAL
Qty: 0 | Refills: 0 | DISCHARGE
Start: 2024-03-27

## 2024-03-27 RX ORDER — ACETAMINOPHEN 500 MG
2 TABLET ORAL
Qty: 0 | Refills: 0 | DISCHARGE
Start: 2024-03-27

## 2024-03-27 RX ORDER — TAMSULOSIN HYDROCHLORIDE 0.4 MG/1
2 CAPSULE ORAL
Qty: 0 | Refills: 0 | DISCHARGE
Start: 2024-03-27

## 2024-03-27 RX ORDER — QUETIAPINE FUMARATE 200 MG/1
12.5 TABLET, FILM COATED ORAL
Qty: 0 | Refills: 0 | DISCHARGE
Start: 2024-03-27

## 2024-03-27 RX ORDER — LACTOBACILLUS ACIDOPHILUS 100MM CELL
1 CAPSULE ORAL
Qty: 0 | Refills: 0 | DISCHARGE
Start: 2024-03-27

## 2024-03-27 RX ORDER — PANTOPRAZOLE SODIUM 20 MG/1
1 TABLET, DELAYED RELEASE ORAL
Qty: 0 | Refills: 0 | DISCHARGE
Start: 2024-03-27

## 2024-03-27 RX ORDER — SIMVASTATIN 20 MG/1
1 TABLET, FILM COATED ORAL
Refills: 0 | DISCHARGE

## 2024-03-27 RX ORDER — SIMVASTATIN 20 MG/1
1 TABLET, FILM COATED ORAL
Qty: 0 | Refills: 0 | DISCHARGE
Start: 2024-03-27

## 2024-03-27 RX ADMIN — Medication 1 TABLET(S): at 13:26

## 2024-03-27 RX ADMIN — PANTOPRAZOLE SODIUM 40 MILLIGRAM(S): 20 TABLET, DELAYED RELEASE ORAL at 05:30

## 2024-03-27 RX ADMIN — NYSTATIN CREAM 1 APPLICATION(S): 100000 CREAM TOPICAL at 05:29

## 2024-03-27 RX ADMIN — SODIUM CHLORIDE 1 GRAM(S): 9 INJECTION INTRAMUSCULAR; INTRAVENOUS; SUBCUTANEOUS at 03:14

## 2024-03-27 RX ADMIN — Medication 1 TABLET(S): at 12:24

## 2024-03-27 RX ADMIN — LIDOCAINE 1 PATCH: 4 CREAM TOPICAL at 05:50

## 2024-03-27 RX ADMIN — POLYETHYLENE GLYCOL 3350 17 GRAM(S): 17 POWDER, FOR SOLUTION ORAL at 12:24

## 2024-03-27 NOTE — PROGRESS NOTE ADULT - SUBJECTIVE AND OBJECTIVE BOX
KASHMIR PADMA    [id number]    73y      Female    Patient is a 73y old  Female who presents with a chief complaint of aneurysm embolization (25 Mar 2024 08:17)      INTERVAL HPI/OVERNIGHT EVENTS:    REVIEW OF SYSTEMS:    CONSTITUTIONAL: No fever, fatigue  RESPIRATORY: No cough, No shortness of breath  CARDIOVASCULAR: No chest pain, palpitations  GASTROINTESTINAL: No abdominal, No nausea, vomiting  NEUROLOGICAL: No headaches,  loss of strength.  MISCELLANEOUS: No joint swelling or pain       Vital Signs Last 24 Hrs  T(C): 36.4 (27 Mar 2024 08:00), Max: 37.1 (26 Mar 2024 19:06)  T(F): 97.6 (27 Mar 2024 08:00), Max: 98.7 (26 Mar 2024 19:06)  HR: 75 (27 Mar 2024 08:00) (68 - 97)  BP: 144/75 (27 Mar 2024 08:00) (120/73 - 144/75)  BP(mean): --  RR: 18 (27 Mar 2024 08:00) (18 - 19)  SpO2: 99% (27 Mar 2024 08:00) (97% - 99%)    Parameters below as of 27 Mar 2024 08:00  Patient On (Oxygen Delivery Method): room air        PHYSICAL EXAM:    GENERAL: Elderly male looking   HEENT: PERRL, +EOMI  NECK: soft, Supple, No JVD  CHEST/LUNG: Clear to auscultate bilaterally; No wheezing  HEART: S1S2+, Regular rate and rhythm; No murmurs  ABDOMEN: Soft, Nontender, Nondistended; Bowel sounds present  EXTREMITIES:  1+ Peripheral Pulses, No edema  SKIN: No rashes or lesions  NEURO: AAOX3  PSYCH: normal mood      LABS:                        8.1    5.60  )-----------( 385      ( 27 Mar 2024 04:39 )             24.0     03-27    135  |  98  |  11.9  ----------------------------<  98  3.9   |  26.0  |  0.41<L>    Ca    8.4      27 Mar 2024 04:39  Phos  3.4     03-27  Mg     2.1     03-27    TPro  5.6<L>  /  Alb  3.3  /  TBili  0.2<L>  /  DBili  x   /  AST  23  /  ALT  27  /  AlkPhos  47  03-26      I&O's Summary    26 Mar 2024 07:01  -  27 Mar 2024 07:00  --------------------------------------------------------  IN: 500 mL / OUT: 550 mL / NET: -50 mL      MEDICATIONS  (STANDING):  enoxaparin Injectable 30 milliGRAM(s) SubCutaneous every 24 hours  lactobacillus acidophilus 1 Tablet(s) Oral daily  lidocaine   4% Patch 1 Patch Transdermal every 24 hours  multivitamin 1 Tablet(s) Oral daily  nystatin Powder 1 Application(s) Topical two times a day  pantoprazole    Tablet 40 milliGRAM(s) Oral before breakfast  polyethylene glycol 3350 17 Gram(s) Oral daily  QUEtiapine 12.5 milliGRAM(s) Oral at bedtime  simvastatin 10 milliGRAM(s) Oral at bedtime  sodium chloride 1 Gram(s) Oral three times a day  tamsulosin 0.8 milliGRAM(s) Oral at bedtime    MEDICATIONS  (PRN):  acetaminophen     Tablet .. 650 milliGRAM(s) Oral every 6 hours PRN Temp greater or equal to 38C (100.4F)  hydrALAZINE Injectable 10 milliGRAM(s) IV Push every 2 hours PRN SBP > 160  iohexol 300 mG (iodine)/mL Oral Solution 30 milliLiter(s) Oral once PRN Prior to CT Abdomen & Pelvis per CT protocol, thank you  labetalol Injectable 10 milliGRAM(s) IV Push every 2 hours PRN Systolic blood pressure > 160  ondansetron Injectable 4 milliGRAM(s) IV Push every 6 hours PRN Nausea and/or Vomiting

## 2024-03-27 NOTE — PROGRESS NOTE ADULT - NS ATTEND AMEND GEN_ALL_CORE FT
NSGY Attg:    see above    patient seen and examined    agree with above    plan of care determined for SAH  supportive care per ICU  continue EVD  will follow
Patient seen and examined , c/o headache, low back pain ,   having multiple BM since yesterday, spiked fever yesterday 101.3 ,   now afebrile     Vital Signs Last 24 Hrs  T(C): 37.1 (21 Mar 2024 07:52), Max: 38.3 (20 Mar 2024 15:00)  T(F): 98.8 (21 Mar 2024 07:52), Max: 100.9 (20 Mar 2024 15:00)  HR: 96 (21 Mar 2024 07:52) (92 - 98)  BP: 136/71 (21 Mar 2024 07:52) (125/75 - 147/82)  BP(mean): 103 (20 Mar 2024 15:00) (103 - 103)  RR: 18 (21 Mar 2024 07:52) (17 - 18)  SpO2: 99% (21 Mar 2024 07:52) (96% - 100%)    Parameters below as of 21 Mar 2024 07:52  Patient On (Oxygen Delivery Method): room air    Lungs: CTA B/L   CVS: S1S2 , no murmurs  Neuro : AAOX4 ,   Head : surgical site clean and dry  Low back TTP+ , no erythema   Low grade fever with spike on 3/20/24 - 101.3 ,  fever w/u in progress, ID consulted     Above noted and reviewed with NP     DVT prophylaxis  - on Lovenox    Will follow along with you .   Time spent reviewing the chart documentation, reviewing labs and imaging studies, evaluating the patient , d/w family / nurse plan of care  and documenting - 77 minutes .
Patient seen and examined , comfortable in the bed , AAOX4 ,   had 2 loose stools this am , denies n/v, denies abd pain , tolerating diet     Vital Signs Last 24 Hrs  T(C): 38.4 (20 Mar 2024 13:00), Max: 38.4 (20 Mar 2024 13:00)  T(F): 101.1 (20 Mar 2024 13:00), Max: 101.1 (20 Mar 2024 13:00)  HR: 110 (20 Mar 2024 13:00) (77 - 110)  BP: 160/78 (20 Mar 2024 13:00) (103/71 - 160/78)  BP(mean): 102 (20 Mar 2024 13:00) (73 - 106)  RR: 22 (20 Mar 2024 13:00) (14 - 30)  SpO2: 100% (20 Mar 2024 13:00) (97% - 100%)    Parameters below as of 20 Mar 2024 12:00  Patient On (Oxygen Delivery Method): room air    Lungs: CTA B/L   CVS: S1S2 ,no murmurs   Abd: soft , bs+ nontender , no rebound or guarding   Above noted and reviewed with NP .   Will follow along with you .
Patient seen and examined , doing well , denies n/v , having more firm stools since yesterday since rectal tube removed ,   tolerating diet , Dangelo in place     Vital Signs Last 24 Hrs  T(C): 36.6 (03-18-24 @ 08:00), Max: 38 (03-18-24 @ 04:00)  T(F): 97.9 (03-18-24 @ 08:00), Max: 100.4 (03-18-24 @ 04:00)  HR: 85 (03-18-24 @ 08:00) (75 - 101)  BP: 109/63 (03-18-24 @ 08:00) (109/63 - 152/80)  BP(mean): 78 (03-18-24 @ 08:00) (69 - 101)  RR: 21 (03-18-24 @ 08:00) (12 - 25)  SpO2: 99% (03-18-24 @ 08:00) (97% - 100%)    Head - surgical site covered with dry dressing +   Lungs: CTA B/L   CVS: S1S2, no rubs , no murmurs   Neuro : AAOX4   Above noted and reviewed with NP .   TOV planned for today .     DVT prophylaxis  - as per primary team     Will sign off , please recall if any clinical indications .
Patient seen and examined, c/o mild headache , mild abdominal discomfort ,   last BM last nigh - loose, denies sob/chest pain . Stay no flatus today     Vital Signs Last 24 Hrs  T(C): 37.5 (22 Mar 2024 09:25), Max: 37.6 (22 Mar 2024 08:22)  T(F): 99.5 (22 Mar 2024 09:25), Max: 99.6 (22 Mar 2024 08:22)  HR: 88 (22 Mar 2024 09:25) (76 - 93)  BP: 134/65 (22 Mar 2024 09:25) (107/63 - 146/71)  BP(mean): 86 (22 Mar 2024 03:43) (78 - 86)  RR: 18 (22 Mar 2024 08:22) (16 - 18)  SpO2: 95% (22 Mar 2024 08:22) (95% - 100%)    Parameters below as of 22 Mar 2024 08:22  Patient On (Oxygen Delivery Method): room air    Lungs: CTA B/L   CVS: S1S2 , no murmurs   Abd: mildly distended , mild generalized tenderness+ ,   no rebound , no guarding     Above noted and reviewed with NP .   Leucocytosis noted - WBC - 8.60---> 12. 50  Permit me to suggest :   CT abd with PO contrast r/o colitis ,   r/o C diff , check GI PCR   f/u MRI of low back   Trend fever / trend WBC .     Will follow along with you .     Time spent reviewing the chart documentation, reviewing labs and imaging studies, evaluating the patient, discussing the plan of care with primary team / PA  and documenting.
as above
Agree with above. Imaging reviewed. Discussed with patient. I spent 50 minutes.
Agree with above. Imaging reviewed. Pull EVD today. Discussed with patient and family. I spent 50 minutes.
NSGY Attg:    see above    patient seen and examined    agree with above    plan of care determined for SAH  supportive care per ICU  continue EVD  will follow
Patient is see and evaluated, chart reviewed in detail, agree with assessment and plan of the NP  patient denies headache, has no fever, chills  CTA b/l   AOX3  Na level si 129, would d/c IV fluids and do fluid restriction, she is drinking lots of free wanter   monitor BMP
Patient seen and examined at bedside. Awake and following commands. CT head stable. Agree with above. Imaging reviewed. I spent 50 minutes.
Patient seen and examined , AAOX3 , as per daughter patient with some confusions, noted with tremor ,   still with loose stools since rectal tube removed , but more solid , failed TOV , Dangelo reinserted .      Vital Signs Last 24 Hrs  T(C): 37.2 (19 Mar 2024 14:00), Max: 38.2 (19 Mar 2024 12:30)  T(F): 99 (19 Mar 2024 14:00), Max: 100.8 (19 Mar 2024 12:30)  HR: 77 (19 Mar 2024 14:00) (77 - 107)  BP: 132/73 (19 Mar 2024 14:00) (104/90 - 154/75)  BP(mean): 91 (19 Mar 2024 14:00) (68 - 115)  RR: 15 (19 Mar 2024 14:00) (14 - 24)  SpO2: 100% (19 Mar 2024 14:00) (98% - 100%)    O2 Parameters below as of 19 Mar 2024 12:00  Patient On (Oxygen Delivery Method): room air    Lungs: CTA B/L  CVS: S1S2 ,no murmurs   Neuro : bl UE tremor +   Sacrum - TTP , no erythema   · EEG Report    KASHMIR CARBAJAL [id number]     Study Date: 03-19-24  Duration: 22 minutes  -------------------------------EEG Classification / Summary:  Normal routine EEG in the awake state, within the limits of interpretation.  Interpretation significantly limited by continuous muscle artifact.    Clinical Impression:   Technically limited study due to continuous muscle artifact. No obvious abnormalities noted.  Consider repeating EEG if clinically warranted.         -------------------------------------------------------------------------------------------------------  Alejandro Cerna MD  PGY-6, Pediatric Epilepsy Fellow     Troponin T, High Sensitivity (03.19.24 @ 12:30)    Troponin T, High Sensitivity Result: 12: *      Osmolality, Random Urine (03.19.24 @ 10:35)    Osmolality, Random Urine: 291 mosm/kg    Sodium, Random Urine (03.19.24 @ 10:35)    Sodium, Random Urine: 74    Chloride, Random Urine (03.19.24 @ 10:35)    Chloride, Random Urine: 72    Osmolality, Random Urine (03.19.24 @ 10:35)    Osmolality, Random Urine: 291 mosm/kg    Above noted and reviewed with NP .     Abdominal XR ordered - follow up -   if fecal impaction will recommend to r/o C diff Colitis as patient was on Abx ,    check GI PCR , trend WBC/ fever     Leucocytosis/ episode of confusion - check UA , R/O UTI     Hyponatremia noted - w/u not relevant as patient on NS IVF -   likely due to frequent loose stools - continue ivf , follow up BMP in am ,   check TSH   Will follow along with you .
as above
Agree with above. Imaging reviewed. Discussed with patient and family. I spent 50 minutes.
NSGY Attg:    see above    patient seen and examined    agree with above    plan of care determined for SAH  supportive care per ICU  continue EVD  will follow
NSGY Attg:    see above    patient seen and examined    agree with exam as above    plan of care determined for rupture aneurysm with SAH  continue current meds  continue supportive care   stroke neuro following
as above
Agree with above. Tolerating clamp trial. Discussed with patient and family. I spent 50 minutes.
Attending to bill.    I independently performed the documented:    History, Exam and Medical decision making.    I have made amendments to the documentation where necessary.   Additional comments: Patient was seen and examined by me. History and exam as documented above by PA/NP was confirmed by me.  Agree with plan as outlined above.
NSGY Attg:    see above    patient seen and examined    agree with exam as above    plan of care determined for rupture aneurysm with SAH  continue current meds  continue supportive care   stroke neuro following
Neurologically stable, Sodium improved. likely dc to rehab today
as above

## 2024-03-27 NOTE — PROGRESS NOTE ADULT - NS ATTEND BILL GEN_ALL_CORE
PA/NP to bill
Attending to bill
PA/NP to bill
PA/NP to bill
Attending to bill
PA/NP to bill
Attending to bill
PA/NP to bill
PA/NP to bill
Attending to bill

## 2024-03-27 NOTE — PROGRESS NOTE ADULT - NS ATTEND OPT1 GEN_ALL_CORE

## 2024-03-27 NOTE — PROGRESS NOTE ADULT - SUBJECTIVE AND OBJECTIVE BOX
Preliminary note, official recommendations pending attending review/signature   Seen and examined by Stroke team attending/team, assessment/ plan as discussed with stroke team attending/team as noted.     [facility] Stroke Team  Progress Note     HPI:  72 y/o mandarin speaking female with PMH of HLD and osteoporosis presents to PST with complaints of having cerebral aneurysm. States in 10/2023 she started to have B/L hand numbness. At that time she had MRI brain, MRA head and neck. MRA showed an ACOMM aneurysm. She underwent a cerebral angiogram by José Manuel Villaseñor at [facility] on December 7th, 2023, with the intent to treat this month. The aneurysm is described as a 5.5x4.6x3.6mm wide necked acomm aneurysm that projects medio-superiorly. Reports occasional headaches, described as numbness, 5/10 in severity, worse with laying down, relieved with sitting up or standing. Patient underwent diagnostic angiogram 2/14/24 which confirmed acomm aneurysm. On 3/6 patient underwent aneurysm embolization with coiling complicated by aneurysm rupture, controlled with coils and balloon tamponade. NISHANT CT showed SAH b/l frontal lobes and R sylvian fissure along with contrast staining. Patient admitted to NSICU for post op care.. EVD now removed per neurosurgery 3/15. Transferred to neurosurgery service 3/16, stroke team asked to consult, transferred to Stroke service 3/18.     SUBJECTIVE: No events overnight.  No new neurologic complaints.  ROS reported negative unless otherwise noted.    acetaminophen     Tablet .. 650 milliGRAM(s) Oral every 6 hours PRN  enoxaparin Injectable 30 milliGRAM(s) SubCutaneous every 24 hours  hydrALAZINE Injectable 10 milliGRAM(s) IV Push every 2 hours PRN  iohexol 300 mG (iodine)/mL Oral Solution 30 milliLiter(s) Oral once PRN  labetalol Injectable 10 milliGRAM(s) IV Push every 2 hours PRN  lactobacillus acidophilus 1 Tablet(s) Oral daily  lidocaine   4% Patch 1 Patch Transdermal every 24 hours  multivitamin 1 Tablet(s) Oral daily  nystatin Powder 1 Application(s) Topical two times a day  ondansetron Injectable 4 milliGRAM(s) IV Push every 6 hours PRN  pantoprazole    Tablet 40 milliGRAM(s) Oral before breakfast  polyethylene glycol 3350 17 Gram(s) Oral daily  QUEtiapine 12.5 milliGRAM(s) Oral at bedtime  simvastatin 10 milliGRAM(s) Oral at bedtime  sodium chloride 1 Gram(s) Oral three times a day  tamsulosin 0.8 milliGRAM(s) Oral at bedtime      PHYSICAL EXAM:   Vital Signs Last 24 Hrs  T(C): 36.4 (27 Mar 2024 08:00), Max: 37.1 (26 Mar 2024 19:06)  T(F): 97.6 (27 Mar 2024 08:00), Max: 98.7 (26 Mar 2024 19:06)  HR: 75 (27 Mar 2024 08:00) (68 - 97)  BP: 144/75 (27 Mar 2024 08:00) (120/73 - 144/75)  BP(mean): --  RR: 18 (27 Mar 2024 08:00) (18 - 19)  SpO2: 99% (27 Mar 2024 08:00) (97% - 99%)    Parameters below as of 27 Mar 2024 08:00  Patient On (Oxygen Delivery Method): room air    EXAM PENDING     General: No acute distress.   HEENT: Head normocephalic, atraumatic. Conjunctivae clear w/o exudates or hemorrhage. Sclera non-icteric. Nares are patent bilaterally.   Neck: Supple, no adenopathy.   Cardiac: Normal rate and rhythm. S1, S2 auscultated.   Respiratory: Lung sounds clear in all fields. Chest wall symmetric, nontender.   Abdominal: Soft, nondistended, nontender. Bowel sounds normoactive x 4 quadrants.   Skin: Skin is warm, dry and intact without rashes or lesions. Appropriate color for ethnicity.  Extremities: No edema, Full range of motion in all joints.     NEUROLOGICAL EXAM:  Mental status: The patient is awake and alert and has normal attention span.  The patient is fully oriented in 3 spheres. The patient is oriented to current events. The patient is able to name objects, follow commands, repeat sentences.    Cranial nerves: slight left facial palsy, Pupils equal and react symmetrically to light. There is no visual field deficit to confrontation. Extraocular motion is full with no nystagmus. There is no ptosis. Facial sensation is intact.   Motor: There is normal bulk and tone. There is no tremor. LUE pronator drift, strength 5-/5, LLE 5-/5. RUE and bilateral LE strength 5/5- some pain limitation in LE.  Sensation: Intact to light touch in 4 extremities  Slight tremor b/l upper extremities   LABS:                        8.1    5.60  )-----------( 385      ( 27 Mar 2024 04:39 )             24.0    03-27    135  |  98  |  11.9  ----------------------------<  98  3.9   |  26.0  |  0.41<L>    Ca    8.4      27 Mar 2024 04:39  Phos  3.4     03-27  Mg     2.1     03-27    TPro  5.6<L>  /  Alb  3.3  /  TBili  0.2<L>  /  DBili  x   /  AST  23  /  ALT  27  /  AlkPhos  47  03-26        03-19 Chol 142 LDL =55 HDL 63 Trig 120    A1C: 5.8    ECHO:   TTE W or WO Ultrasound Enhancing Agent (03.13.24 @ 12:03)   CONCLUSIONS:    1. Left ventricular cavity is normal in size. Left ventricular wall thickness is normal. Left ventricular systolic function is normal with an ejection fraction visually estimated at 65 to 70 %.   2. Normal left ventricular diastolic function.   3. Normal right ventricular cavity size and normal systolic function.   4. The left atrium is normal.   5. The right atrium is normal in size.   6. Fibrocalcific aortic valve sclerosis without stenosis.   7. Mild mitral valve leaflet calcification.   8. Trace mitral regurgitation.   9. Estimated pulmonary artery systolic pressure is 23 mmHg, normal pulmonary artery pressure.    IMAGING: Reviewed by me.   US Abdomen Upper Quadrant Right (03.25.24 @ 09:08)  IMPRESSION:  Gallbladder sludge and wall thickening. Correlate with symptomatology. If   there is concern for cholecystitis HIDA scan can be obtained.  Choledochomegaly. Correlate with LFTs and MRCP.    CT Abdomen and Pelvis w/ Oral Cont (03.23.24 @ 22:02)  BILE DUCTS: Mild intrahepatic and extrahepatic biliary ductal dilatation   with CBD measuring up to 8 to 9 mm.  IMPRESSION:  Mild proctitis, new.  Mild biliary ductal dilatation. Correlation with lab markers would be   helpful.    Xray Sacrum + Coccyx (03.20.24 @ 11:28)  IMPRESSION: Limited study without obvious fracture.    Xray Lumbosacral Spine (03.20.24 @ 11:29)   Findings:  There is no fracture, subluxation or paravertebral soft tissue swelling.  The pedicles and sacroiliac joints are intact.  Straightening of lordosis may be positional versus muscle spasm  The sacrum and visualized coccyx are intact    Large amount of stool in the visualized colon without gross bowel   obstruction.    IMPRESSION: No fracture or subluxation.    US Duplex Venous Lower Ext Complete, Bilateral (03.20.24 @ 12:20)  IMPRESSION:  No evidence of deep venous thrombosis in either lower extremity.    CT Head No Cont (03.16.24 @ 03:22)   IMPRESSION: No change in mild ventricular dilatation with intraventricular hemorrhage and hemorrhage in the frontal lobe midline and corpus callosum after extraventricular drain removal since 3/15/2024.    CT Head No Cont (03.15.24 @ 05:38)   IMPRESSION: Stable follow-up CT study.       US Duplex Venous Lower Ext Complete, Bilateral (03.12.24 @ 15:23)   IMPRESSION: No evidence of deep venous thrombosis in either lower extremity.     IR Neuro (03.11.24 @ 09:01)   IMPRESSION:  1. 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 No interval aneurysm recanalization or evidence for pseudoaneurysm.  2. No vasospasm.    CT Head No Cont (03.10.24 @ 12:37)   IMPRESSION: Mildly decreased bilateral mesial frontal parenchymal hemorrhages. Similar anterior interhemispheric acute subarachnoid hemorrhage. Mildly decreased intraventricular hemorrhage within the bilateral lateral   and third ventricles. Decreased ventricular size with near resolution of hydrocephalus. No large midline shift. Basal cisterns are more well visualized on the current examination, this may be due to technique.    MR Angio Head w/ IV Cont (03.09.24 @ 18:57)   IMPRESSION:  MR brain: Intraparenchymal hemorrhage within the medial anterior frontal lobes measuring approximately 3.5 cm, unchanged. There is surrounding edema and extension into the genuine body of corpus callosum, unchanged. Hemorrhagic clot within the LEFT lateral ventricle and third ventricle is unchanged. Minimal hemorrhage seen in the dependent portions of the RIGHT lateral ventricle unchanged. Minimal subarachnoid hemorrhage seen along the medial sulci of the BILATERAL frontal and parietal lobes as well as scattered throughout the sulci of the brain. RIGHT frontal ventriculostomy catheter tip is seen extending toward the body of the   LEFT ventricle and third ventricle.  Coil material is seen in the region of the anterior communicating artery on the RIGHT.   Blood products are seen in the region of the known aneurysm possibly indicating partial patency.    MRA intracranial:   Coil material is seen in the region of the anterior communicating artery. There is 1 x 4 mm region of signal seen on the noncontrast but not the postcontrast which may reflect blood products within the aneurysm indicating patency.    CT Head No Cont (03.07.24 @ 05:56)   IMPRESSION:   persistent intracranial hemorrhage within the BILATERAL lateral and third ventricles, LEFT greater than RIGHT, with mild obstructive hydrocephalus slightly increased. RIGHT frontal shunt catheter tip seen in body of RIGHT lateral ventricle unchanged. Subarachnoid hemorrhage again noted in the BILATERAL medial frontal lobes with hemorrhage in the anterior corpus callosum, LEFT greaterthan RIGHT.     CT Head No Cont (03.06.24 @ 19:20)   IMPRESSION: Interval placement of right frontal approach ventriculostomy catheter since the prior study performed 3 hours ago, with improved hydrocephalus and slightly decreased sulcal/cisternal bilateral subarachnoid hemorrhage.    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 Intraventricular hemorrhage is present with hydrocephalus.    IR Neuro (03.06.24 @ 12:13)   IMPRESSION:  1. 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  2. Status postballoon assisted coil embolization, the aneurysm is nearly completely occluded other than a 2.2 mm x 1.4 mm x 4.3 mm aneurysmal neck remnant.    EEG:  Study Date: 03-19-24  Duration: 22 minutes  EEG Classification / Summary:  Normal routine EEG in the awake state, within the limits of interpretation.  Interpretation significantly limited by continuous muscle artifact.  Clinical Impression:   Technically limited study due to continuous muscle artifact. No obvious abnormalities noted.  Consider repeating EEG if clinically warranted.          Preliminary note, official recommendations pending attending review/signature   Seen and examined by Stroke team attending , assessment/ plan as discussed with stroke team attending/team as noted.     [facility] Stroke Team  Progress Note     HPI:  72 y/o mandarin speaking female with PMH of HLD and osteoporosis presents to PST with complaints of having cerebral aneurysm. States in 10/2023 she started to have B/L hand numbness. At that time she had MRI brain, MRA head and neck. MRA showed an ACOMM aneurysm. She underwent a cerebral angiogram by José Manuel Villaseñor at [facility] on December 7th, 2023, with the intent to treat this month. The aneurysm is described as a 5.5x4.6x3.6mm wide necked acomm aneurysm that projects medio-superiorly. Reports occasional headaches, described as numbness, 5/10 in severity, worse with laying down, relieved with sitting up or standing. Patient underwent diagnostic angiogram 2/14/24 which confirmed acomm aneurysm. On 3/6 patient underwent aneurysm embolization with coiling complicated by aneurysm rupture, controlled with coils and balloon tamponade. NISHANT CT showed SAH b/l frontal lobes and R sylvian fissure along with contrast staining. Patient admitted to NSICU for post op care.. EVD now removed per neurosurgery 3/15. Transferred to neurosurgery service 3/16, stroke team asked to consult, transferred to Stroke service 3/18.     SUBJECTIVE: No events overnight.  No new neurologic complaints. Denies headache/nausea/vomiting, states feels well. + BM and urination is good.  ROS reported negative unless otherwise noted.    acetaminophen     Tablet .. 650 milliGRAM(s) Oral every 6 hours PRN  enoxaparin Injectable 30 milliGRAM(s) SubCutaneous every 24 hours  hydrALAZINE Injectable 10 milliGRAM(s) IV Push every 2 hours PRN  iohexol 300 mG (iodine)/mL Oral Solution 30 milliLiter(s) Oral once PRN  labetalol Injectable 10 milliGRAM(s) IV Push every 2 hours PRN  lactobacillus acidophilus 1 Tablet(s) Oral daily  lidocaine   4% Patch 1 Patch Transdermal every 24 hours  multivitamin 1 Tablet(s) Oral daily  nystatin Powder 1 Application(s) Topical two times a day  ondansetron Injectable 4 milliGRAM(s) IV Push every 6 hours PRN  pantoprazole    Tablet 40 milliGRAM(s) Oral before breakfast  polyethylene glycol 3350 17 Gram(s) Oral daily  QUEtiapine 12.5 milliGRAM(s) Oral at bedtime  simvastatin 10 milliGRAM(s) Oral at bedtime  sodium chloride 1 Gram(s) Oral three times a day  tamsulosin 0.8 milliGRAM(s) Oral at bedtime      PHYSICAL EXAM:   Vital Signs Last 24 Hrs  T(C): 36.4 (27 Mar 2024 08:00), Max: 37.1 (26 Mar 2024 19:06)  T(F): 97.6 (27 Mar 2024 08:00), Max: 98.7 (26 Mar 2024 19:06)  HR: 75 (27 Mar 2024 08:00) (68 - 97)  BP: 144/75 (27 Mar 2024 08:00) (120/73 - 144/75)  BP(mean): --  RR: 18 (27 Mar 2024 08:00) (18 - 19)  SpO2: 99% (27 Mar 2024 08:00) (97% - 99%)    Parameters below as of 27 Mar 2024 08:00  Patient On (Oxygen Delivery Method): room air  [id number]    General: No acute distress.   HEENT: Head normocephalic, atraumatic. Conjunctivae clear w/o exudates or hemorrhage. Sclera non-icteric. Nares are patent bilaterally.   Neck: Supple, no adenopathy.   Cardiac: Normal rate and rhythm.    Respiratory: no use of accessory muscles, respirations unlabored, no audible wheezes   Abdominal: Soft, nondistended, nontender.   Skin: Skin is warm, dry and intact without rashes or lesions. Appropriate color for ethnicity.  Extremities: No edema, Full range of motion in all joints.     NEUROLOGICAL EXAM:  Mental status: The patient is awake and alert and has normal attention span.  The patient is fully oriented in 3 spheres. The patient is oriented to current events. The patient is able to name objects, follow commands, repeat sentences.    Cranial nerves: slight left facial palsy, Pupils equal  There is no visual field deficit to confrontation. Extraocular motion is full with no nystagmus. T   Motor: There is normal bulk and tone. There is no tremor. LUE pronator drift, strength 5-/5, LLE 5-/5. RUE and bilateral LE strength 5/5- some pain limitation in LE.  Sensation: Intact to light touch in 4 extremities  Slight tremor b/l upper extremities   LABS:                        8.1    5.60  )-----------( 385      ( 27 Mar 2024 04:39 )             24.0    03-27    135  |  98  |  11.9  ----------------------------<  98  3.9   |  26.0  |  0.41<L>    Ca    8.4      27 Mar 2024 04:39  Phos  3.4     03-27  Mg     2.1     03-27    TPro  5.6<L>  /  Alb  3.3  /  TBili  0.2<L>  /  DBili  x   /  AST  23  /  ALT  27  /  AlkPhos  47  03-26 03-19 Chol 142 LDL =55 HDL 63 Trig 120    A1C: 5.8    ECHO:   TTE W or WO Ultrasound Enhancing Agent (03.13.24 @ 12:03)   CONCLUSIONS:    1. Left ventricular cavity is normal in size. Left ventricular wall thickness is normal. Left ventricular systolic function is normal with an ejection fraction visually estimated at 65 to 70 %.   2. Normal left ventricular diastolic function.   3. Normal right ventricular cavity size and normal systolic function.   4. The left atrium is normal.   5. The right atrium is normal in size.   6. Fibrocalcific aortic valve sclerosis without stenosis.   7. Mild mitral valve leaflet calcification.   8. Trace mitral regurgitation.   9. Estimated pulmonary artery systolic pressure is 23 mmHg, normal pulmonary artery pressure.    IMAGING: Reviewed by me.   US Abdomen Upper Quadrant Right (03.25.24 @ 09:08)  IMPRESSION:  Gallbladder sludge and wall thickening. Correlate with symptomatology. If   there is concern for cholecystitis HIDA scan can be obtained.  Choledochomegaly. Correlate with LFTs and MRCP.    CT Abdomen and Pelvis w/ Oral Cont (03.23.24 @ 22:02)  BILE DUCTS: Mild intrahepatic and extrahepatic biliary ductal dilatation   with CBD measuring up to 8 to 9 mm.  IMPRESSION:  Mild proctitis, new.  Mild biliary ductal dilatation. Correlation with lab markers would be   helpful.    Xray Sacrum + Coccyx (03.20.24 @ 11:28)  IMPRESSION: Limited study without obvious fracture.    Xray Lumbosacral Spine (03.20.24 @ 11:29)   Findings:  There is no fracture, subluxation or paravertebral soft tissue swelling.  The pedicles and sacroiliac joints are intact.  Straightening of lordosis may be positional versus muscle spasm  The sacrum and visualized coccyx are intact    Large amount of stool in the visualized colon without gross bowel   obstruction.    IMPRESSION: No fracture or subluxation.    US Duplex Venous Lower Ext Complete, Bilateral (03.20.24 @ 12:20)  IMPRESSION:  No evidence of deep venous thrombosis in either lower extremity.    CT Head No Cont (03.16.24 @ 03:22)   IMPRESSION: No change in mild ventricular dilatation with intraventricular hemorrhage and hemorrhage in the frontal lobe midline and corpus callosum after extraventricular drain removal since 3/15/2024.    CT Head No Cont (03.15.24 @ 05:38)   IMPRESSION: Stable follow-up CT study.       US Duplex Venous Lower Ext Complete, Bilateral (03.12.24 @ 15:23)   IMPRESSION: No evidence of deep venous thrombosis in either lower extremity.     IR Neuro (03.11.24 @ 09:01)   IMPRESSION:  1. 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 No interval aneurysm recanalization or evidence for pseudoaneurysm.  2. No vasospasm.    CT Head No Cont (03.10.24 @ 12:37)   IMPRESSION: Mildly decreased bilateral mesial frontal parenchymal hemorrhages. Similar anterior interhemispheric acute subarachnoid hemorrhage. Mildly decreased intraventricular hemorrhage within the bilateral lateral   and third ventricles. Decreased ventricular size with near resolution of hydrocephalus. No large midline shift. Basal cisterns are more well visualized on the current examination, this may be due to technique.    MR Angio Head w/ IV Cont (03.09.24 @ 18:57)   IMPRESSION:  MR brain: Intraparenchymal hemorrhage within the medial anterior frontal lobes measuring approximately 3.5 cm, unchanged. There is surrounding edema and extension into the genuine body of corpus callosum, unchanged. Hemorrhagic clot within the LEFT lateral ventricle and third ventricle is unchanged. Minimal hemorrhage seen in the dependent portions of the RIGHT lateral ventricle unchanged. Minimal subarachnoid hemorrhage seen along the medial sulci of the BILATERAL frontal and parietal lobes as well as scattered throughout the sulci of the brain. RIGHT frontal ventriculostomy catheter tip is seen extending toward the body of the   LEFT ventricle and third ventricle.  Coil material is seen in the region of the anterior communicating artery on the RIGHT.   Blood products are seen in the region of the known aneurysm possibly indicating partial patency.    MRA intracranial:   Coil material is seen in the region of the anterior communicating artery. There is 1 x 4 mm region of signal seen on the noncontrast but not the postcontrast which may reflect blood products within the aneurysm indicating patency.    CT Head No Cont (03.07.24 @ 05:56)   IMPRESSION:   persistent intracranial hemorrhage within the BILATERAL lateral and third ventricles, LEFT greater than RIGHT, with mild obstructive hydrocephalus slightly increased. RIGHT frontal shunt catheter tip seen in body of RIGHT lateral ventricle unchanged. Subarachnoid hemorrhage again noted in the BILATERAL medial frontal lobes with hemorrhage in the anterior corpus callosum, LEFT greaterthan RIGHT.     CT Head No Cont (03.06.24 @ 19:20)   IMPRESSION: Interval placement of right frontal approach ventriculostomy catheter since the prior study performed 3 hours ago, with improved hydrocephalus and slightly decreased sulcal/cisternal bilateral subarachnoid hemorrhage.    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 Intraventricular hemorrhage is present with hydrocephalus.    IR Neuro (03.06.24 @ 12:13)   IMPRESSION:  1. 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  2. Status postballoon assisted coil embolization, the aneurysm is nearly completely occluded other than a 2.2 mm x 1.4 mm x 4.3 mm aneurysmal neck remnant.    EEG:  Study Date: 03-19-24  Duration: 22 minutes  EEG Classification / Summary:  Normal routine EEG in the awake state, within the limits of interpretation.  Interpretation significantly limited by continuous muscle artifact.  Clinical Impression:   Technically limited study due to continuous muscle artifact. No obvious abnormalities noted.  Consider repeating EEG if clinically warranted.            Seen and examined by Stroke team attending , assessment/ plan as discussed with stroke team attending/team as noted.     [facility] Stroke Team  Progress Note     HPI:  74 y/o mandarin speaking female with PMH of HLD and osteoporosis presents to PST with complaints of having cerebral aneurysm. States in 10/2023 she started to have B/L hand numbness. At that time she had MRI brain, MRA head and neck. MRA showed an ACOMM aneurysm. She underwent a cerebral angiogram by José Manuel Villaseñor at [facility] on December 7th, 2023, with the intent to treat this month. The aneurysm is described as a 5.5x4.6x3.6mm wide necked acomm aneurysm that projects medio-superiorly. Reports occasional headaches, described as numbness, 5/10 in severity, worse with laying down, relieved with sitting up or standing. Patient underwent diagnostic angiogram 2/14/24 which confirmed acomm aneurysm. On 3/6 patient underwent aneurysm embolization with coiling complicated by aneurysm rupture, controlled with coils and balloon tamponade. NISHANT CT showed SAH b/l frontal lobes and R sylvian fissure along with contrast staining. Patient admitted to NSICU for post op care.. EVD now removed per neurosurgery 3/15. Transferred to neurosurgery service 3/16, stroke team asked to consult, transferred to Stroke service 3/18.     SUBJECTIVE: No events overnight.  No new neurologic complaints. Denies headache/nausea/vomiting, states feels well. + BM and urination is good.  ROS reported negative unless otherwise noted.    acetaminophen     Tablet .. 650 milliGRAM(s) Oral every 6 hours PRN  enoxaparin Injectable 30 milliGRAM(s) SubCutaneous every 24 hours  hydrALAZINE Injectable 10 milliGRAM(s) IV Push every 2 hours PRN  iohexol 300 mG (iodine)/mL Oral Solution 30 milliLiter(s) Oral once PRN  labetalol Injectable 10 milliGRAM(s) IV Push every 2 hours PRN  lactobacillus acidophilus 1 Tablet(s) Oral daily  lidocaine   4% Patch 1 Patch Transdermal every 24 hours  multivitamin 1 Tablet(s) Oral daily  nystatin Powder 1 Application(s) Topical two times a day  ondansetron Injectable 4 milliGRAM(s) IV Push every 6 hours PRN  pantoprazole    Tablet 40 milliGRAM(s) Oral before breakfast  polyethylene glycol 3350 17 Gram(s) Oral daily  QUEtiapine 12.5 milliGRAM(s) Oral at bedtime  simvastatin 10 milliGRAM(s) Oral at bedtime  sodium chloride 1 Gram(s) Oral three times a day  tamsulosin 0.8 milliGRAM(s) Oral at bedtime      PHYSICAL EXAM:   Vital Signs Last 24 Hrs  T(C): 36.4 (27 Mar 2024 08:00), Max: 37.1 (26 Mar 2024 19:06)  T(F): 97.6 (27 Mar 2024 08:00), Max: 98.7 (26 Mar 2024 19:06)  HR: 75 (27 Mar 2024 08:00) (68 - 97)  BP: 144/75 (27 Mar 2024 08:00) (120/73 - 144/75)  BP(mean): --  RR: 18 (27 Mar 2024 08:00) (18 - 19)  SpO2: 99% (27 Mar 2024 08:00) (97% - 99%)    Parameters below as of 27 Mar 2024 08:00  Patient On (Oxygen Delivery Method): room air  [id number]    General: No acute distress.   HEENT: Head normocephalic, atraumatic. Conjunctivae clear w/o exudates or hemorrhage. Sclera non-icteric. Nares are patent bilaterally.   Neck: Supple, no adenopathy.   Cardiac: Normal rate and rhythm.    Respiratory: no use of accessory muscles, respirations unlabored, no audible wheezes   Abdominal: Soft, nondistended, nontender.   Skin: Skin is warm, dry and intact without rashes or lesions. Appropriate color for ethnicity.  Extremities: No edema, Full range of motion in all joints.     NEUROLOGICAL EXAM:  Mental status: The patient is awake and alert and has normal attention span.  The patient is fully oriented in 3 spheres. The patient is oriented to current events. The patient is able to name objects, follow commands, repeat sentences.    Cranial nerves: slight left facial palsy, Pupils equal  There is no visual field deficit to confrontation. Extraocular motion is full with no nystagmus. T   Motor: There is normal bulk and tone. There is no tremor. LUE pronator drift, strength 5-/5, LLE 5-/5. RUE and bilateral LE strength 5/5- some pain limitation in LE.  Sensation: Intact to light touch in 4 extremities  Slight tremor b/l upper extremities   LABS:                        8.1    5.60  )-----------( 385      ( 27 Mar 2024 04:39 )             24.0    03-27    135  |  98  |  11.9  ----------------------------<  98  3.9   |  26.0  |  0.41<L>    Ca    8.4      27 Mar 2024 04:39  Phos  3.4     03-27  Mg     2.1     03-27    TPro  5.6<L>  /  Alb  3.3  /  TBili  0.2<L>  /  DBili  x   /  AST  23  /  ALT  27  /  AlkPhos  47  03-26 03-19 Chol 142 LDL =55 HDL 63 Trig 120    A1C: 5.8    ECHO:   TTE W or WO Ultrasound Enhancing Agent (03.13.24 @ 12:03)   CONCLUSIONS:    1. Left ventricular cavity is normal in size. Left ventricular wall thickness is normal. Left ventricular systolic function is normal with an ejection fraction visually estimated at 65 to 70 %.   2. Normal left ventricular diastolic function.   3. Normal right ventricular cavity size and normal systolic function.   4. The left atrium is normal.   5. The right atrium is normal in size.   6. Fibrocalcific aortic valve sclerosis without stenosis.   7. Mild mitral valve leaflet calcification.   8. Trace mitral regurgitation.   9. Estimated pulmonary artery systolic pressure is 23 mmHg, normal pulmonary artery pressure.    IMAGING: Reviewed by me.   US Abdomen Upper Quadrant Right (03.25.24 @ 09:08)  IMPRESSION:  Gallbladder sludge and wall thickening. Correlate with symptomatology. If   there is concern for cholecystitis HIDA scan can be obtained.  Choledochomegaly. Correlate with LFTs and MRCP.    CT Abdomen and Pelvis w/ Oral Cont (03.23.24 @ 22:02)  BILE DUCTS: Mild intrahepatic and extrahepatic biliary ductal dilatation   with CBD measuring up to 8 to 9 mm.  IMPRESSION:  Mild proctitis, new.  Mild biliary ductal dilatation. Correlation with lab markers would be   helpful.    Xray Sacrum + Coccyx (03.20.24 @ 11:28)  IMPRESSION: Limited study without obvious fracture.    Xray Lumbosacral Spine (03.20.24 @ 11:29)   Findings:  There is no fracture, subluxation or paravertebral soft tissue swelling.  The pedicles and sacroiliac joints are intact.  Straightening of lordosis may be positional versus muscle spasm  The sacrum and visualized coccyx are intact    Large amount of stool in the visualized colon without gross bowel   obstruction.    IMPRESSION: No fracture or subluxation.    US Duplex Venous Lower Ext Complete, Bilateral (03.20.24 @ 12:20)  IMPRESSION:  No evidence of deep venous thrombosis in either lower extremity.    CT Head No Cont (03.16.24 @ 03:22)   IMPRESSION: No change in mild ventricular dilatation with intraventricular hemorrhage and hemorrhage in the frontal lobe midline and corpus callosum after extraventricular drain removal since 3/15/2024.    CT Head No Cont (03.15.24 @ 05:38)   IMPRESSION: Stable follow-up CT study.       US Duplex Venous Lower Ext Complete, Bilateral (03.12.24 @ 15:23)   IMPRESSION: No evidence of deep venous thrombosis in either lower extremity.     IR Neuro (03.11.24 @ 09:01)   IMPRESSION:  1. 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 No interval aneurysm recanalization or evidence for pseudoaneurysm.  2. No vasospasm.    CT Head No Cont (03.10.24 @ 12:37)   IMPRESSION: Mildly decreased bilateral mesial frontal parenchymal hemorrhages. Similar anterior interhemispheric acute subarachnoid hemorrhage. Mildly decreased intraventricular hemorrhage within the bilateral lateral   and third ventricles. Decreased ventricular size with near resolution of hydrocephalus. No large midline shift. Basal cisterns are more well visualized on the current examination, this may be due to technique.    MR Angio Head w/ IV Cont (03.09.24 @ 18:57)   IMPRESSION:  MR brain: Intraparenchymal hemorrhage within the medial anterior frontal lobes measuring approximately 3.5 cm, unchanged. There is surrounding edema and extension into the genuine body of corpus callosum, unchanged. Hemorrhagic clot within the LEFT lateral ventricle and third ventricle is unchanged. Minimal hemorrhage seen in the dependent portions of the RIGHT lateral ventricle unchanged. Minimal subarachnoid hemorrhage seen along the medial sulci of the BILATERAL frontal and parietal lobes as well as scattered throughout the sulci of the brain. RIGHT frontal ventriculostomy catheter tip is seen extending toward the body of the   LEFT ventricle and third ventricle.  Coil material is seen in the region of the anterior communicating artery on the RIGHT.   Blood products are seen in the region of the known aneurysm possibly indicating partial patency.    MRA intracranial:   Coil material is seen in the region of the anterior communicating artery. There is 1 x 4 mm region of signal seen on the noncontrast but not the postcontrast which may reflect blood products within the aneurysm indicating patency.    CT Head No Cont (03.07.24 @ 05:56)   IMPRESSION:   persistent intracranial hemorrhage within the BILATERAL lateral and third ventricles, LEFT greater than RIGHT, with mild obstructive hydrocephalus slightly increased. RIGHT frontal shunt catheter tip seen in body of RIGHT lateral ventricle unchanged. Subarachnoid hemorrhage again noted in the BILATERAL medial frontal lobes with hemorrhage in the anterior corpus callosum, LEFT greaterthan RIGHT.     CT Head No Cont (03.06.24 @ 19:20)   IMPRESSION: Interval placement of right frontal approach ventriculostomy catheter since the prior study performed 3 hours ago, with improved hydrocephalus and slightly decreased sulcal/cisternal bilateral subarachnoid hemorrhage.    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 Intraventricular hemorrhage is present with hydrocephalus.    IR Neuro (03.06.24 @ 12:13)   IMPRESSION:  1. 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  2. Status postballoon assisted coil embolization, the aneurysm is nearly completely occluded other than a 2.2 mm x 1.4 mm x 4.3 mm aneurysmal neck remnant.    EEG:  Study Date: 03-19-24  Duration: 22 minutes  EEG Classification / Summary:  Normal routine EEG in the awake state, within the limits of interpretation.  Interpretation significantly limited by continuous muscle artifact.  Clinical Impression:   Technically limited study due to continuous muscle artifact. No obvious abnormalities noted.  Consider repeating EEG if clinically warranted.

## 2024-03-27 NOTE — PROGRESS NOTE ADULT - NUTRITIONAL ASSESSMENT
This patient has been assessed with a concern for Malnutrition and has been determined to have a diagnosis/diagnoses of Moderate protein-calorie malnutrition and Underweight (BMI < 19).    This patient is being managed with:   Diet Easy to Chew-  Entered: Mar 13 2024 11:29AM  
This patient has been assessed with a concern for Malnutrition and has been determined to have a diagnosis/diagnoses of Moderate protein-calorie malnutrition and Underweight (BMI < 19).    This patient is being managed with:   Diet Easy to Chew-  Lactose Restricted (Milk Sugar Intoler.)  Supplement Feeding Modality:  Oral  Ensure Clear Cans or Servings Per Day:  1       Frequency:  Three Times a day  Entered: Mar 22 2024  8:04AM  
This patient has been assessed with a concern for Malnutrition and has been determined to have a diagnosis/diagnoses of Moderate protein-calorie malnutrition and Underweight (BMI < 19).    This patient is being managed with:   Diet Regular-  1200mL Fluid Restriction ([id number])  Supplement Feeding Modality:  Oral  Ensure Clear Cans or Servings Per Day:  1       Frequency:  Three Times a day  Entered: Mar 26 2024  7:21AM  
This patient has been assessed with a concern for Malnutrition and has been determined to have a diagnosis/diagnoses of Moderate protein-calorie malnutrition and Underweight (BMI < 19).    This patient is being managed with:   Diet Soft and Bite Sized-  Entered: Mar  8 2024  1:04PM  
This patient has been assessed with a concern for Malnutrition and has been determined to have a diagnosis/diagnoses of Moderate protein-calorie malnutrition and Underweight (BMI < 19).    This patient is being managed with:   Diet Soft and Bite Sized-  Entered: Mar  8 2024  1:04PM  
This patient has been assessed with a concern for Malnutrition and has been determined to have a diagnosis/diagnoses of Moderate protein-calorie malnutrition and Underweight (BMI < 19).    This patient is being managed with:   Diet Soft and Bite Sized-  Entered: Mar  8 2024  1:04PM    This patient has been assessed with a concern for Malnutrition and has been determined to have a diagnosis/diagnoses of Moderate protein-calorie malnutrition and Underweight (BMI < 19).    This patient is being managed with:   Diet Soft and Bite Sized-  Entered: Mar  8 2024  1:04PM  
This patient has been assessed with a concern for Malnutrition and has been determined to have a diagnosis/diagnoses of Moderate protein-calorie malnutrition and Underweight (BMI < 19).    This patient is being managed with:   Diet Easy to Chew-  Entered: Mar 13 2024 11:29AM  
This patient has been assessed with a concern for Malnutrition and has been determined to have a diagnosis/diagnoses of Moderate protein-calorie malnutrition and Underweight (BMI < 19).    This patient is being managed with:   Diet Soft and Bite Sized-  Entered: Mar  8 2024  1:04PM  
This patient has been assessed with a concern for Malnutrition and has been determined to have a diagnosis/diagnoses of Moderate protein-calorie malnutrition and Underweight (BMI < 19).    This patient is being managed with:   Diet Soft and Bite Sized-  Entered: Mar  8 2024  1:04PM  
This patient has been assessed with a concern for Malnutrition and has been determined to have a diagnosis/diagnoses of Moderate protein-calorie malnutrition and Underweight (BMI < 19).    This patient is being managed with:   Diet Easy to Chew-  Entered: Mar 13 2024 11:29AM  
This patient has been assessed with a concern for Malnutrition and has been determined to have a diagnosis/diagnoses of Moderate protein-calorie malnutrition and Underweight (BMI < 19).    This patient is being managed with:   Diet Soft and Bite Sized-  Entered: Mar  8 2024  1:04PM  
This patient has been assessed with a concern for Malnutrition and has been determined to have a diagnosis/diagnoses of Moderate protein-calorie malnutrition and Underweight (BMI < 19).    This patient is being managed with:   Diet Easy to Chew-  Entered: Mar 13 2024 11:29AM  
This patient has been assessed with a concern for Malnutrition and has been determined to have a diagnosis/diagnoses of Moderate protein-calorie malnutrition and Underweight (BMI < 19).    This patient is being managed with:   Diet Easy to Chew-  Entered: Mar 13 2024 11:29AM  
This patient has been assessed with a concern for Malnutrition and has been determined to have a diagnosis/diagnoses of Moderate protein-calorie malnutrition and Underweight (BMI < 19).    This patient is being managed with:   Diet Easy to Chew-  Lactose Restricted (Milk Sugar Intoler.)  Banatrol TF     Qty per Day:  3  Entered: Mar 18 2024 10:23AM  
This patient has been assessed with a concern for Malnutrition and has been determined to have a diagnosis/diagnoses of Moderate protein-calorie malnutrition and Underweight (BMI < 19).    This patient is being managed with:   Diet Easy to Chew-  Lactose Restricted (Milk Sugar Intoler.)  Entered: Mar 20 2024 11:43AM  
This patient has been assessed with a concern for Malnutrition and has been determined to have a diagnosis/diagnoses of Moderate protein-calorie malnutrition and Underweight (BMI < 19).    This patient is being managed with:   Diet Easy to Chew-  Lactose Restricted (Milk Sugar Intoler.)  Supplement Feeding Modality:  Oral  Ensure Clear Cans or Servings Per Day:  1       Frequency:  Three Times a day  Entered: Mar 22 2024  8:04AM  
This patient has been assessed with a concern for Malnutrition and has been determined to have a diagnosis/diagnoses of Moderate protein-calorie malnutrition and Underweight (BMI < 19).    This patient is being managed with:   Diet Regular-  1200mL Fluid Restriction ([id number])  Supplement Feeding Modality:  Oral  Ensure Clear Cans or Servings Per Day:  1       Frequency:  Three Times a day  Entered: Mar 26 2024  7:21AM  
This patient has been assessed with a concern for Malnutrition and has been determined to have a diagnosis/diagnoses of Moderate protein-calorie malnutrition and Underweight (BMI < 19).    This patient is being managed with:   Diet Regular-  Supplement Feeding Modality:  Oral  Ensure Clear Cans or Servings Per Day:  1       Frequency:  Three Times a day  Entered: Mar 25 2024  2:40PM  
This patient has been assessed with a concern for Malnutrition and has been determined to have a diagnosis/diagnoses of Moderate protein-calorie malnutrition and Underweight (BMI < 19).    This patient is being managed with:   Diet Soft and Bite Sized-  Entered: Mar  8 2024  1:04PM  
This patient has been assessed with a concern for Malnutrition and has been determined to have a diagnosis/diagnoses of Moderate protein-calorie malnutrition and Underweight (BMI < 19).    This patient is being managed with:   Diet Easy to Chew-  Lactose Restricted (Milk Sugar Intoler.)  Supplement Feeding Modality:  Oral  Ensure Enlive Cans or Servings Per Day:  1       Frequency:  Three Times a day  Entered: Mar 15 2024  2:55PM  
This patient has been assessed with a concern for Malnutrition and has been determined to have a diagnosis/diagnoses of Moderate protein-calorie malnutrition and Underweight (BMI < 19).    This patient is being managed with:   Diet Easy to Chew-  Entered: Mar 13 2024 11:29AM  
This patient has been assessed with a concern for Malnutrition and has been determined to have a diagnosis/diagnoses of Moderate protein-calorie malnutrition and Underweight (BMI < 19).    This patient is being managed with:   Diet Easy to Chew-  Entered: Mar 13 2024 11:29AM  
This patient has been assessed with a concern for Malnutrition and has been determined to have a diagnosis/diagnoses of Moderate protein-calorie malnutrition and Underweight (BMI < 19).    This patient is being managed with:   Diet Easy to Chew-  Lactose Restricted (Milk Sugar Intoler.)  Supplement Feeding Modality:  Oral  Ensure Clear Cans or Servings Per Day:  1       Frequency:  Three Times a day  Entered: Mar 22 2024  8:04AM  
This patient has been assessed with a concern for Malnutrition and has been determined to have a diagnosis/diagnoses of Moderate protein-calorie malnutrition and Underweight (BMI < 19).    This patient is being managed with:   Diet NPO after Midnight-     NPO Start Date: 10-Mar-2024   NPO Start Time: 23:59  Entered: Mar 10 2024  3:07PM    Diet Soft and Bite Sized-  Entered: Mar  8 2024  1:04PM  
This patient has been assessed with a concern for Malnutrition and has been determined to have a diagnosis/diagnoses of Moderate protein-calorie malnutrition and Underweight (BMI < 19).    This patient is being managed with:   Diet Easy to Chew-  Entered: Mar 13 2024 11:29AM  
This patient has been assessed with a concern for Malnutrition and has been determined to have a diagnosis/diagnoses of Moderate protein-calorie malnutrition and Underweight (BMI < 19).    This patient is being managed with:   Diet NPO after Midnight-     NPO Start Date: 10-Mar-2024   NPO Start Time: 23:59  Entered: Mar 10 2024  3:07PM    Diet Soft and Bite Sized-  Entered: Mar  8 2024  1:04PM  
This patient has been assessed with a concern for Malnutrition and has been determined to have a diagnosis/diagnoses of Moderate protein-calorie malnutrition and Underweight (BMI < 19).    This patient is being managed with:   Diet Soft and Bite Sized-  Entered: Mar  8 2024  1:04PM

## 2024-03-27 NOTE — PROGRESS NOTE ADULT - PROVIDER SPECIALTY LIST ADULT
Brain Injury Medicine
Hospitalist
Infectious Disease
Intervent Radiology
Intervent Radiology
NSICU
NSICU
Neurology
Neurosurgery
Brain Injury Medicine
Hospitalist
Infectious Disease
Intervent Radiology
Neurology
Neurosurgery
Hospitalist
Intervent Radiology
NSICU
Neurology
Neurosurgery
Neurosurgery
Infectious Disease
Intervent Radiology
NSICU
NSICU
Neurology
Neurosurgery
Neurology
Neurology
NSICU
Neurosurgery

## 2024-03-27 NOTE — PROGRESS NOTE ADULT - SUBJECTIVE AND OBJECTIVE BOX
Pending AR.     FUNCTIONAL PROGRESS  3/26 PT  Bed Mobility  Bed Mobility Training Rehab Potential: good, to achieve stated therapy goals  Bed Mobility Training Symptoms Noted During/After Treatment: dizziness  Bed Mobility Training Rolling/Turning: stand-by assist;  verbal cues;  nonverbal cues (demo/gestures);  bed rails  Bed Mobility Training Scooting: minimum assist (75% patient effort);  1 person assist;  nonverbal cues (demo/gestures);  verbal cues  Bed Mobility Training Sit-to-Supine: contact guard;  nonverbal cues (demo/gestures);  verbal cues;  1 person assist  Bed Mobility Training Supine-to-Sit: minimum assist (75% patient effort);  1 person assist;  nonverbal cues (demo/gestures);  verbal cues  Bed Mobility Training Limitations: decreased ability to use arms for pushing/pulling;  decreased ability to use legs for bridging/pushing;  decreased flexibility;  decreased strength    Sit-Stand Transfer Training  Sit-to-Stand Transfer Training Rehab Potential: good, to achieve stated therapy goals  Sit-to-Stand Transfer Training Symptoms Noted During/After Treatment: dizziness  Transfer Training Sit-to-Stand Transfer: minimum assist (75% patient effort);  1 person assist;  nonverbal cues (demo/gestures);  verbal cues;  no weight bearing restrictions    rolling walker  Transfer Training Stand-to-Sit Transfer: minimum assist (75% patient effort);  1 person assist;  nonverbal cues (demo/gestures);  verbal cues;  no weight bearing restrictions    rolling walker  Sit-to-Stand Transfer Training Transfer Safety Analysis: decreased sequencing ability;  impaired balance;  decreased strength;  decreased flexibility;  decreased ROM    Gait Training  Gait Training Rehab Potential: good, to achieve stated therapy goals  Gait Training: minimum assist (75% patient effort);  1 person assist;  nonverbal cues (demo/gestures);  verbal cues;  no weight bearing restrictions    rolling walker;  40 feet   Gait Analysis: 3-point gait   decreased gayathri;  decreased hip/knee flexion;  decreased velocity of limb motion;  impaired balance;  decreased strength;  decreased flexibility    3/25 OT  Bed-Chair Transfer Training  Transfer Training Bed-to-Chair Transfer: minimum assist (75% patient effort);  rolling walker  Transfer Training Chair-to-Bed Transfer: minimum assist (75% patient effort);  rolling walker    Sit-Stand Transfer Training  Transfer Training Sit-to-Stand Transfer: minimum assist (75% patient effort);  1 person assist;  rolling walker  Transfer Training Stand-to-Sit Transfer: minimum assist (75% patient effort);  1 person assist;  rolling walker    Toilet Transfer Training  Transfer Training Toilet Transfer: minimum assist (75% patient effort);  1 person assist;  rolling walker;  weight-bearing as tolerated    Lower Body Dressing Training  Lower Body Dressing Training Assistance: moderate assist (50% patient effort)      VITALS  T(C): 36.4 (03-27-24 @ 08:00), Max: 37.1 (03-26-24 @ 19:06)  HR: 75 (03-27-24 @ 08:00) (68 - 97)  BP: 144/75 (03-27-24 @ 08:00) (120/73 - 144/75)  RR: 18 (03-27-24 @ 08:00) (18 - 19)  SpO2: 99% (03-27-24 @ 08:00) (97% - 99%)  Wt(kg): --    MEDICATIONS   acetaminophen     Tablet .. 650 milliGRAM(s) every 6 hours PRN  enoxaparin Injectable 30 milliGRAM(s) every 24 hours  hydrALAZINE Injectable 10 milliGRAM(s) every 2 hours PRN  iohexol 300 mG (iodine)/mL Oral Solution 30 milliLiter(s) once PRN  labetalol Injectable 10 milliGRAM(s) every 2 hours PRN  lactobacillus acidophilus 1 Tablet(s) daily  lidocaine   4% Patch 1 Patch every 24 hours  multivitamin 1 Tablet(s) daily  nystatin Powder 1 Application(s) two times a day  ondansetron Injectable 4 milliGRAM(s) every 6 hours PRN  pantoprazole    Tablet 40 milliGRAM(s) before breakfast  polyethylene glycol 3350 17 Gram(s) daily  QUEtiapine 12.5 milliGRAM(s) at bedtime  simvastatin 10 milliGRAM(s) at bedtime  sodium chloride 1 Gram(s) three times a day  tamsulosin 0.8 milliGRAM(s) at bedtime      RECENT LABS/IMAGING  - Reviewed Today                        8.1    5.60  )-----------( 385      ( 27 Mar 2024 04:39 )             24.0     03-27    135  |  98  |  11.9  ----------------------------<  98  3.9   |  26.0  |  0.41<L>    Ca    8.4      27 Mar 2024 04:39  Phos  3.4     03-27  Mg     2.1     03-27    TPro  5.6<L>  /  Alb  3.3  /  TBili  0.2<L>  /  DBili  x   /  AST  23  /  ALT  27  /  AlkPhos  47  03-26      Urinalysis Basic - ( 27 Mar 2024 04:39 )    Color: x / Appearance: x / SG: x / pH: x  Gluc: 98 mg/dL / Ketone: x  / Bili: x / Urobili: x   Blood: x / Protein: x / Nitrite: x   Leuk Esterase: x / RBC: x / WBC x   Sq Epi: x / Non Sq Epi: x / Bacteria: x              US Duplex Venous Lower Ext Complete, Bilateral 3/12 - No evidence of deep venous thrombosis in either lower extremity.    CT Head 3/10 - Mildly decreased bilateral mesial frontal parenchymal hemorrhages. Similar anterior interhemispheric acute subarachnoid hemorrhage.Mildly decreased intraventricular hemorrhage within the bilateral lateral    and third ventricles. Decreased ventricular size with near resolution of hydrocephalus.No large midline shift.  Basal cisterns are more well visualized on the current examination, this may be due to technique.    MR Brain, MR Angio Head 3/9 - MR brain: Intraparenchymal hemorrhage within the medial anterior frontal   lobes measuring approximately 3.5 cm, unchanged. There is surrounding edema and extension into the genuine body of corpus callosum, unchanged. Hemorrhagic clot within the LEFT lateral ventricle and third ventricle is unchanged. Minimal hemorrhage seen in the dependent portions of the RIGHT lateral ventricle unchanged. Minimal subarachnoid hemorrhage seen along the medial sulci of the BILATERAL frontal and parietal lobes as well as scattered throughout the sulci of the brain. RIGHT frontal ventriculostomy catheter tip is seen extending toward the body of the LEFT ventricle and third ventricle.  Coil material is seen in the region of the anterior communicating artery on the RIGHT.   Blood products are seen in the region of the known aneurysm possibly indicating partial patency.    CT Head 3/16 -  No change in mild ventricular dilatation with intraventricular hemorrhage and hemorrhage in the frontal lobe midline and corpus callosum after extraventricular drain removal since 3/15/2024.    US Duplex LE 3/20 - No evidence of deep venous thrombosis in either lower extremity.    CT ABD/PEL 3/23 - Mild proctitis, new. Mild biliary ductal dilatation. Correlation with lab markers would be helpful.    ABD US 3/25 - Gallbladder sludge and wall thickening. Correlate with symptomatology. If there is concern for cholecystitis HIDA scan can be obtained. Choledochomegaly. Correlate with LFTs and MRCP.  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     When medically optimized, based on the patient's diagnosis, current functional status and potential for progress, recommend ACUTE inpatient rehabilitation for the functional deficits consisting of 3 hours of therapy/day & 24 hour RN/daily PMR physician for comorbid medical management. Patient will be able to tolerate 3 hours a day.     Will continue to follow. Rehab recommendations are dependent on how functional progress changes as well as how patient continues to participate and tolerate therapeutic interventions, WHICH MAY CHANGE UPON FOLLOW UP EVALUATIONS. Recommend ongoing mobilization by staff to maintain cardiopulmonary function and prevention of secondary complications related to debility. Discussed the specific management and recommendations above with rehab clinical care team/rehab liaison.

## 2024-03-27 NOTE — PROGRESS NOTE ADULT - ASSESSMENT
INCOMPLETE     ASSESSMENT: 72 y/o mandarin speaking female with PMH of HLD and osteoporosis presents to [facility] with complaints of having cerebral aneurysm. States in 10/2023 she started to have B/L hand numbness. At that time she had MRI brain, MRA head and neck. MRA showed an ACOMM aneurysm. She underwent a cerebral angiogram by José Manuel Villaseñor at [facility] on December 7th, 2023, with the intent to treat this month. The aneurysm is described as a 5.5x4.6x3.6mm wide necked acomm aneurysm that projects medio-superiorly. Reports occasional headaches, described as numbness, 5/10 in severity, worse with laying down, relieved with sitting up or standing. Patient underwent diagnostic angiogram 2/14/24 which confirmed acomm aneurysm. On 3/6 patient underwent aneurysm embolization with coiling complicated by aneurysm rupture, controlled with coils and balloon tamponade. NISHANT CT showed SAH b/l frontal lobes and R sylvian fissure along with contrast staining. EVD placed  3/6, removed by neurosurgery 3/15. Transferred to Stroke service 3/18.   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         NEURO:   #ACOMM aneurysm elective procedure complicated by aneurysm rupture controlled with coils/balloon tamponade  #SAH  #EVD 3/6, removed 3/15  #Metabolic encephalopathy of undetermined source   #Tremors    -Neurologically   -Continue close monitoring for neurologic deterioration    -Stroke neuro checks q4hrs   -EEG without evidence of seizure   -MRI Brain w/o, MRA Head w/o and Neck w/contrast results as above   -Will need repeat MRI brain w/wo contrast in 4-6 weeks (4/3/24 is 4-week anneliese from aneurysm embolization)  -s/p Keppra  -nimodipine 60mg d/c'd 3/25/24, bromocriptine, d/c'd 3/25/24  -Dysphagia screen: pass, on easy to chew diet  -Physical therapy/OT/Speech eval/treatment.            #Stroke prevention:  -SBP goal  per neuro IR recommendations, avoiding rapid fluctuations and hypotension   -ANTITHROMBOTIC THERAPY: n/a   -LDL- 55 , continue home regimen if applicable   -HA1C 5.8  -TTE noted above    CARDIOVASCULAR:   -TTE noted above  -cardiac monitoring w/ telemetry for now, further evaluation pending findings of noted workup                              HEMATOLOGY:   -Acute normocytic anemia, improving -- H/H 8.4/24.2  -Guiac +, GI recs appreciated, per GI patient w/o evidence of acute GIB despite positive fecal occult  -Protonix 40mg q daily  -Retic count 6.1  Haptoglobin 250  -Heme-onc recs appreciate: suspect anemia related to suppression from acute illness, also w/ possible contributing factor of dilution from IVF/PO free water   -Pending SPEP/MG  -Iron studies, B12, Folate WNL  -Homocysteine 4.7, pending MMA results  -Platelets 380  -Continue close monitoring, CBC q daily, no active bleeding noted  -Patient should have all age and risk appropriate malignancy screenings with PCP or sooner if clinically suspected   -DVT ppx: Heparin s.c [] LMWH [X]     PULMONARY:   -Protecting airway, saturating well   -s/p zosyn for possible pneumonia  -Consider CT chest if fever persists     RENAL/METABOLIC:   #Urinary retention  -Patient successfully voided overnight 3/25-3/26/24 s/p removal of feliciano   #Hyponatremia #Hypokalemia #Hypophosphatemia #Hypocalcemia  -Electrolyte derangements likely due to diarrhea, continue to monitor  -Na fluctuating,138--> 129 --> 127  -NS d/c'd, patient placed on 1200mL fluid restriction, salt tablets TID ordered  -Potassium 3.9  -Phosphorus 2.9  -Calcium 8.1, albumin 3.3, corrected calcium 8.7  -BUN/Cr without acute change   -Maintain adequate hydration  -Tamsulosin 0.8mg at bedtime   -Na Goal:  135-145    ID:   -s/p zosyn for possible pneumonia  -Fluctuating fevers - Tmax 101.1, afebrile overnight; ?possible central fevers, now resolved, ID following  -Leukocytosis resolved   -Monitor and screen for si/sx of infection   -CT Abdomen & Pelvis as noted    GI:  #diarrhea  -s/p rectal tube, removed 3/17  -Monitor BM, loose stool possible side effect of nimodipine (med now dc'd); stool count ordered   -CT Abdomen & Pelvis w/ Oral Contrast: Mild proctitis, new. Mild biliary ductal dilatation. Correlation with lab markers would be helpful CBD measuring up to 8 to 9 mm  -Episodic diarrhea likely 2/2 polypharmacy and ?neurogenic bowel, not emptying bowels -- patient reports improvement, Miralax & Stool Count ordered per medicine recs, patient ambulating to bathroom w/ assistance  -GI PCR cancelled, diarrhea improving   -Lipase 94  -US RUQ w gallbladder sludge and wall thickening, patient asymptomatic, LFTs WNL  -Diet: Easy to Chew, Lactose Restricted, Ensure Clear TID    PSYCH:  -Quetiapine 12.5mg at bedtime  -Delirium precautions    ORTHO:  -XR lumbar /sacral as noted  -MRI L spine d/c'd, patient reports her back feels better    OTHER:   ENCOURAGE MOBILIZATION AND AMBULATION  Condition and plan of care d/w patient, questions and concerns addressed. Discussed with family at bedside, medical team at bedside as well present for further evaluation and recommendations.     DISPOSITION:   -PT/OT recs for Acute Rehab -- patient and family in agreement -- pending AR bed    CORE MEASURES:        Admission NIHSS: n/a      Tenecteplase : [] YES [X] NO      LDL/HDL/A1C 55/63/5.8     Depression Screen- if depression hx and/or present      Statin Therapy: simvastatin 10mg     Dysphagia Screen: [X] PASS [] FAIL     Smoking [] YES [X] NO      Afib [] YES [X] NO     Stroke Education [x] YES 3/9/24 [] NO   INCOMPLETE     ASSESSMENT: 74 y/o mandarin speaking female with PMH of HLD and osteoporosis presents to [facility] with complaints of having cerebral aneurysm. States in 10/2023 she started to have B/L hand numbness. At that time she had MRI brain, MRA head and neck. MRA showed an ACOMM aneurysm. She underwent a cerebral angiogram by José Manuel Villaseñor at [facility] on December 7th, 2023, with the intent to treat this month. The aneurysm is described as a 5.5x4.6x3.6mm wide necked acomm aneurysm that projects medio-superiorly. Reports occasional headaches, described as numbness, 5/10 in severity, worse with laying down, relieved with sitting up or standing. Patient underwent diagnostic angiogram 2/14/24 which confirmed acomm aneurysm. On 3/6 patient underwent aneurysm embolization with coiling complicated by aneurysm rupture, controlled with coils and balloon tamponade. NISHANT CT showed SAH b/l frontal lobes and R sylvian fissure along with contrast staining. EVD placed  3/6, removed by neurosurgery 3/15. Transferred to Stroke service 3/18.   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         NEURO:   #ACOMM aneurysm, elective procedure  s/p aneurysm rupture controlled with coils/balloon tamponade endovascularly with subsequent:  #SAH requiring EVD 3/6, removed 3/15  #Metabolic encephalopathy  #Tremors    -Neurologically   -Continue close monitoring for neurologic deterioration    -Stroke neuro checks q4hrs   -EEG without evidence of seizure   -MRI Brain w/o, MRA Head w/o and Neck w/contrast results as above   -Will need repeat MRI brain w/wo contrast in 4-6 weeks (4/3/24 is 4-week anneliese from aneurysm embolization)  -s/p Keppra course   -nimodipine 60mg d/c'd 3/25/24, bromocriptine, d/c'd 3/25/24  -Dysphagia screen: pass, on easy to chew diet  -Physical therapy/OT/Speech eval/treatment.            #Stroke prevention:  -SBP goal  per neuro IR recommendations, avoiding rapid fluctuations and hypotension   -ANTITHROMBOTIC THERAPY: n/a: ICH and no acute neurological indication in addition to increased risk of bleeding   -LDL- 55 , continue home regimen if applicable   -HA1C 5.8  -TTE noted above    CARDIOVASCULAR:   -TTE noted above  -cardiac monitoring w/ telemetry for now, further evaluation pending findings of noted workup                              HEMATOLOGY:   -Acute normocytic anemia, trace downtrend- overall improving gradually without acute changes, plt 385  -Guiac +,  GI consulted: recommended GI ppx - PPI in place, no role for endoscopic evaluation at this time - outpatient follow up   -Heme-onc recs appreciate: suspected related to acute illness, suggested LDH, haptoglobin, FPEP/MG, rule out hemolysis/myeloma, guaiac, GI evaluation was completed, suggested CT imaging for recurrent temp, leukocytosis/thrombocytosis reactive, team to follow up.   -Continue close monitoring, CBC q daily, no active bleeding noted  -Patient should have all age and risk appropriate malignancy screenings with PCP or sooner if clinically suspected   -DVT ppx: Heparin s.c [] LMWH [X]     PULMONARY:   -Protecting airway, saturating well   -s/p zosyn for possible pneumonia  -Consider CT chest if fever persists     RENAL/METABOLIC:   #Urinary retention  -Patient noted to be successfully  voiding w/o indwelling catheter   #Hyponatremia #Hypokalemia #Hypophosphatemia #Hypocalcemia -normalizing   -Electrolyte derangements likely due to diarrhea and polydipsia , continue to monitor, overall improving  -NS d/c'd, patient placed on 1200mL fluid restriction, salt tablets TID ordered  -BUN/Cr without acute change   -Maintain adequate and appropriate  hydration   -Tamsulosin 0.8mg at bedtime   -Na Goal:  135-145    ID:   -s/p zosyn for possible pneumonia  -Fluctuating fevers - Tmax 101.1, afebrile overnight; ?possible central fevers, now resolved, ID following  -Leukocytosis resolved   -Monitor and screen for si/sx of infection   -CT Abdomen & Pelvis as noted    GI:  #diarrhea  -s/p rectal tube, removed 3/17  -CT Abdomen & Pelvis w/ Oral Contrast: Mild proctitis, new. Mild biliary ductal dilatation. Correlation with lab markers would be helpful CBD measuring up to 8 to 9 mm  -Episodic diarrhea likely was secondary polypharmacy and ?neurogenic bowel, not emptying bowels -- patient reports improvement, Miralax & Stool Count ordered per medicine recs, patient ambulating to bathroom w/ assistance  -GI PCR cancelled, diarrhea improving   -Lipase 94  -US RUQ w gallbladder sludge and wall thickening, patient asymptomatic, LFTs WNL  -Diet: Easy to Chew, Lactose Restricted, Ensure Clear TID    PSYCH:  -Quetiapine 12.5mg at bedtime  -Delirium precautions    ORTHO:  -XR lumbar /sacral as noted  -MRI L spine d/c'd, patient reports her back feels better    OTHER:   ENCOURAGE MOBILIZATION AND AMBULATION  Condition and plan of care d/w patient, questions and concerns addressed. Discussed with family at bedside, medical team at bedside as well present for further evaluation and recommendations.   - all incidental findings as reported should be followed up and monitored with primary care provider/ appropriate consultants     DISPOSITION:   -PT/OT recs for Acute Rehab -- patient and family in agreement -- pending AR bed    CORE MEASURES:        Admission NIHSS: n/a      Tenecteplase : [] YES [X] NO      LDL/HDL/A1C 55/63/5.8     Depression Screen- if depression hx and/or present      Statin Therapy: simvastatin 10mg     Dysphagia Screen: [X] PASS [] FAIL     Smoking [] YES [X] NO      Afib [] YES [X] NO     Stroke Education [x] YES 3/9/24 [] NO   ASSESSMENT: 72 y/o mandarin speaking female with PMH of HLD and osteoporosis presents to [facility] with complaints of having cerebral aneurysm. States in 10/2023 she started to have B/L hand numbness. At that time she had MRI brain, MRA head and neck. MRA showed an ACOMM aneurysm. She underwent a cerebral angiogram by José Manuel Villaseñor at [facility] on December 7th, 2023, with the intent to treat this month. The aneurysm is described as a 5.5x4.6x3.6mm wide necked acomm aneurysm that projects medio-superiorly. Reports occasional headaches, described as numbness, 5/10 in severity, worse with laying down, relieved with sitting up or standing. Patient underwent diagnostic angiogram 2/14/24 which confirmed acomm aneurysm. On 3/6 patient underwent aneurysm embolization with coiling subsequent  aneurysm rupture, tamponaded with coils and balloon tamponade via endovascular treatment. NISHANT CT showed SAH b/l frontal lobes and R sylvian fissure along with contrast staining. EVD placed  3/6, removed by neurosurgery 3/15.   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       NEURO:   #ACOMM aneurysm, elective procedure  s/p aneurysm rupture controlled with coils/balloon tamponade via endovascular therapy with subsequent:  #SAH requiring EVD 3/6, removed 3/15  #Metabolic encephalopathy  #Tremors    -Neurologically overall improving, reports to feel well, looking forward to rehab.   -Stroke neuro checks q4hrs   -EEG without evidence of seizure, s/p Keppra course.  -MRI Brain w/o, MRA Head w/o and Neck w/contrast results as above   -Will need repeat MRI brain w/wo contrast in 4-6 weeks (4/3/24 is 4-week anneliese from aneurysm embolization)  -nimodipine 60mg d/c'd 3/25/24, bromocriptine, d/c'd 3/25/24  -Dysphagia screen: pass, on easy to chew diet  -Physical therapy/OT/Speech eval/treatment.            #Stroke prevention:  -SBP goal  per neuro IR recommendations, avoiding rapid fluctuations and hypotension   -ANTITHROMBOTIC THERAPY: n/a: ICH and no acute neurological indication in addition to increased risk of bleeding   -LDL- 55 , continue home regimen if applicable   -HA1C 5.8  -TTE noted above  -D/W Dr. Dowd.    CARDIOVASCULAR:   -TTE noted above  -cardiac monitoring w/ telemetry for now, further evaluation pending findings of noted workup                              HEMATOLOGY:   -Acute normocytic anemia, trace downtrend- overall improving gradually without acute changes, plt 385  -Guiac +,  GI consulted: recommended GI ppx - PPI in place, no role for endoscopic evaluation at this time - outpatient follow up   -Heme-onc recs appreciate: suspected related to acute illness, suggested LDH, haptoglobin, FPEP/MG, rule out, awaiting hematology call back and follow up to r/o hemolysis/myeloma, guaiac was + , GI evaluated patient , suggested CT imaging for recurrent temp, leukocytosis/thrombocytosis reactive, team to follow up, this should be followed upon d/c as well.   -Continue close monitoring of CBC  , no active bleeding noted  -Patient should have all age and risk appropriate malignancy screenings with PCP or sooner if clinically suspected   -DVT ppx: Heparin s.c [] LMWH [X]     PULMONARY:   -Protecting airway, saturating well   -s/p zosyn for possible pneumonia    RENAL/METABOLIC:   #Urinary retention  -Patient noted to be successfully  voiding w/o indwelling catheter   #Hyponatremia #Hypokalemia #Hypophosphatemia #Hypocalcemia -normalizing   -Electrolyte derangements likely due to diarrhea and polydipsia , continue to monitor, overall improving  -NS d/c'd, patient placed on 1200mL fluid restriction, salt tablets TID ordered  -BUN/Cr without acute change   -Maintain adequate and appropriate  hydration   -Tamsulosin 0.8mg at bedtime   -Na Goal:  135-145    ID:   -s/p zosyn for possible pneumonia  -Fluctuating fevers - Tmax 101.1, afebrile overnight; ?possible central fevers, now resolved, ID following  -Leukocytosis resolved   -Monitor and screen for si/sx of infection   -CT Abdomen & Pelvis as noted    GI:  #diarrhea  -s/p rectal tube, removed 3/17  -CT Abdomen & Pelvis w/ Oral Contrast: Mild proctitis, new. Mild biliary ductal dilatation. Correlation with lab markers would be helpful CBD measuring up to 8 to 9 mm  -Episodic diarrhea likely was secondary polypharmacy and ?neurogenic bowel, not emptying bowels -- patient reports improvement, Miralax & Stool Count ordered per medicine recs, patient ambulating to bathroom w/ assistance  -GI PCR cancelled, diarrhea improving, patient reports + BM   -Lipase 94  -US RUQ w gallbladder sludge and wall thickening, patient asymptomatic, LFTs WNL. Suggest close GI follow up.   -Diet:  Regular/thin per SLP   PSYCH:  -Quetiapine 12.5mg at bedtime  -Delirium precautions    ORTHO:  -XR lumbar /sacral as noted  -MRI L spine d/c'd, patient reports her back feels better    OTHER:   ENCOURAGE MOBILIZATION AND AMBULATION  Condition and plan of care d/w patient and spouse , questions and concerns addressed. Discussed with family at bedside.   -Imaging should be reviewed and  all incidental findings as reported should be followed up and monitored with primary care provider/ appropriate consultants.     DISPOSITION:   -PT/OT recs for Acute Rehab -- patient and family in agreement -- pending AR bed    CORE MEASURES:        Admission NIHSS: n/a      Tenecteplase : [] YES [X] NO      LDL/HDL/A1C 55/63/5.8     Depression Screen- if depression hx and/or present      Statin Therapy: simvastatin 10mg     Dysphagia Screen: [X] PASS [] FAIL     Smoking [] YES [X] NO      Afib [] YES [X] NO     Stroke Education [x] YES 3/9/24 [] NO

## 2024-03-27 NOTE — PROGRESS NOTE ADULT - ASSESSMENT
72 y/o mandarin speaking female with PMH of HLD and osteoporosis presents to [facility] with complaints of having cerebral aneurysm. States in 10/2023 she started to have B/L hand numbness. At that time she had MRI brain, MRA head and neck. MRA showed an ACOMM aneurysm. She underwent a cerebral angiogram by José Manuel Villaseñor at [facility] on December 7th, 2023, with the intent to treat this month. The aneurysm is described as a 5.5x4.6x3.6mm wide necked acomm aneurysm that projects medio-superiorly. Reports occasional headaches, described as numbness, S/P  aneurysm embolization with coiling on 3/6  complicated by aneurysm rupture, controlled with coils and balloon tamponade. NISHANT CT showed SAH b/l frontal lobes and R sylvian fissure along with contrast staining. Patient admitted to NSICU for post op care. Downgraded to Step down on 3/16. Medicine consulted for medical mgmt.     Plan:     # ACOMM aneurism , S/P  aneurysm embolization with coiling on 3/6  complicated by aneurysm rupture   controlled with coils and balloon tamponade.    NISHANT CT showed SAH b/l frontal lobes and R sylvian fissure along with contrast staining.    Patient admitted to NSICU for post op care. Now downgraded to step down and Neuro stroke service  - Neuro checks being done   - Bromocriptine 10mg TID- for likely central fevers, being titrated   - Seroquel 12.5mg nightly  - Pain control- Tylenol, Tramadol  - pt/ot  - Nimodipine 60mg q4hrs for SAH protocol  as per NS   - Avoid hypotension, rapid changes in BP  - New tremors noted: EEG : Clinical Impression:   Technically limited study due to continuous muscle artifact. No obvious abnormalities noted.  Consider repeating EEG if clinically warranted.    serial Imaging and further workup as per Primary team  Acute rehab      #Anemia- probably dilutional, multiple blood draws - Hg -8.6 , FOBT - positive seen by GI, no indication for endoscopic evaluation  transfuse if Hg < 7 , H&H stable.   Hb today is 8.0, stoped IV fluids, monitor CBC   GI recommends PPI    # Dilated common duct on CT of   3/23/2024  US right upper quadrant 3/25: Common duct dilated up to 1.0 cm with gall bladder sludge. LFT's not elevated. Patient is asymptomatic.       # HLD - Simvastatin 10mg daily    # Postop urinary retention:   -Flomax 0.8mg  qhs   -Continue Dangelo Cath, failed TOV on 3/18 ( 3rd attempt)  -Ambulate to bathroom  - Reattempt TOV start Edie lax  and ambulate to bathroom to assist in emptying bowels and bladder       # Diarrhea ,likely due to overflow 2/2 constipation    rectal tube  removed ( 3/17/24)  Abdominal XR ( 3/13) noted with  Fecal material   localized to the mid left descending colon.   Repeat KUB ( 3/20/24)  with Constipation, received laxative/enema had multiple BM with formed stool  d/c Calcium to prevent constipation   Please order stool count, have RN's document frequency and consistency of bowel movements  repeat KUB to ensure resolution of constipation.   make sure patient has regular bowel movement every day.       # Low grade temps till 3/20 with Tmax 101.3 on 3/20 ,  work up NTD  Completed course of Zosyn for possible PNA   CXR - unremarkable   US of LE - no dvt   COVID/RVP  (3/12/24) - negative - REPEAT COVID/ RVP again negative  c/o low back pain , XR lumbar /sacral as noted, pain has resolved with increase mobilization  Blood cultures repeated 3/21-negative  ID consulted and appreciated, observe off antibiotics  no further fever spikes    #Hypokalemia: Supplemented   monitor electrolytes     #Hypophosphatemia - replace as needed    #Hyponatremia  Na Goal: 135-145, Sodium came up 127 to 135, would d/c salt tabs, will repeat Na level if going up then would give some fluids to stabiliz it to prevent fast correction, will continue with fluid restriction.        #Leukocytosis: trended down, will monitor     DVT prophylaxis  -on Lovenox.

## 2024-03-28 LAB
% ALBUMIN: 54.1 % — SIGNIFICANT CHANGE UP
% ALPHA 1: 5.8 % — SIGNIFICANT CHANGE UP
% ALPHA 2: 14.6 % — SIGNIFICANT CHANGE UP
% BETA: 13.3 % — SIGNIFICANT CHANGE UP
% GAMMA: 12.2 % — SIGNIFICANT CHANGE UP
ALBUMIN SERPL ELPH-MCNC: 2.9 G/DL — LOW (ref 3.6–5.5)
ALBUMIN/GLOB SERPL ELPH: 1.2 RATIO — SIGNIFICANT CHANGE UP
ALPHA1 GLOB SERPL ELPH-MCNC: 0.3 G/DL — SIGNIFICANT CHANGE UP (ref 0.1–0.4)
ALPHA2 GLOB SERPL ELPH-MCNC: 0.8 G/DL — SIGNIFICANT CHANGE UP (ref 0.5–1)
B-GLOBULIN SERPL ELPH-MCNC: 0.7 G/DL — SIGNIFICANT CHANGE UP (ref 0.5–1)
GAMMA GLOBULIN: 0.6 G/DL — SIGNIFICANT CHANGE UP (ref 0.6–1.6)
INTERPRETATION SERPL IFE-IMP: SIGNIFICANT CHANGE UP
PROT PATTERN SERPL ELPH-IMP: SIGNIFICANT CHANGE UP
PROT SERPL-MCNC: 5.3 G/DL — LOW (ref 6–8.3)

## 2024-03-29 LAB — METHYLMALONATE SERPL-SCNC: 103 NMOL/L — SIGNIFICANT CHANGE UP (ref 0–378)

## 2024-04-16 ENCOUNTER — APPOINTMENT (OUTPATIENT)
Dept: NEUROLOGY | Facility: CLINIC | Age: 74
End: 2024-04-16
Payer: MEDICARE

## 2024-04-16 VITALS
HEIGHT: 60 IN | WEIGHT: 96 LBS | BODY MASS INDEX: 18.85 KG/M2 | HEART RATE: 75 BPM | DIASTOLIC BLOOD PRESSURE: 68 MMHG | SYSTOLIC BLOOD PRESSURE: 105 MMHG

## 2024-04-16 PROCEDURE — 99215 OFFICE O/P EST HI 40 MIN: CPT

## 2024-04-16 NOTE — REVIEW OF SYSTEMS
[Leg Weakness] : leg weakness [Feeling Poorly] : not feeling poorly [Feeling Tired] : not feeling tired [Confused or Disoriented] : no confusion [Memory Lapses or Loss] : no memory loss [Decr. Concentrating Ability] : no decrease in concentrating ability [Difficulty with Language] : no ~M difficulty with language [Changed Thought Patterns] : no change in thought patterns [Repeating Questions] : no repeated questioning about recent events [Facial Weakness] : no facial weakness [Arm Weakness] : no arm weakness [Hand Weakness] : no hand weakness [Poor Coordination] : good coordination [Difficulty Writing] : no difficulty writing [Difficulties in Speech] : no speech difficulties [Numbness] : no numbness [Tingling] : no tingling [Seizures] : no convulsions [Dizziness] : no dizziness [Fainting] : no fainting [Lightheadedness] : no lightheadedness [Vertigo] : no vertigo [Tension Headache] : no tension-type headache [Difficulty Walking] : no difficulty walking [Anxiety] : no anxiety [Depression] : no depression [Eyesight Problems] : no eyesight problems [Loss Of Hearing] : no hearing loss [Chest Pain] : no chest pain [Palpitations] : no palpitations [Wheezing] : no wheezing [Vomiting] : no vomiting [Incontinence] : no incontinence [Easy Bleeding] : no tendency for easy bleeding [Easy Bruising] : no tendency for easy bruising

## 2024-04-16 NOTE — HISTORY OF PRESENT ILLNESS
[FreeTextEntry1] : Damian Gonzalez is a 73 year old woman with a PMHX of HLD, osteoporosis referred by Dr. Deleon for an incidental aneurysm. She had imaging done for numbness in the L arm including MRI brain, MRA head and neck. MRA showed an ACOMM aneurysm. She underwent a cerebral angiogram by José Manuel Villaseñor at [facility] on December 7th, 2023, with the intent to treat last month. The aneurysm is described as a 5.5x4.6x3.6mm wide necked acomm aneurysm that projects medio-superiorly. She did not want to be treated at [facility]. Patient underwent a cerebral angiogram 2/14/24 which showed Incidental multilobular 6.9 mm x 3.9 mm x 4.2 mm A-comm aneurysm with a 2.4 mm neck that arises from a proximal median artery of the corpus callosum which fills predominantly from the left FABIENNE. The aneurysm projects laterally from this bifurcation and only fills from the left internal carotid artery injection. On 03/06/24 she underwent a cerebral angiogram and coil embolization of an incidental multilobular small A-comm aneurysm complicated by bibasal frontal intracerebral hemorrhage, subarachnoid hemorrhage and intraventricular hemorrhage. EVD was placed and removed on 03/15/24. She had a follow up cerebral angiogram on 03/11/24 which showed persistent complete A-comm aneurysm occlusion post coil embolization with small neck remnant. No pseudoaneurysm. No vasospasm. She was discharged to rehab on 03/27/24 and now back at home and is doing very well. She comes in today for a follow up visit, denies headache. Only complaint is some scalp numbness near the EVD site.

## 2024-04-16 NOTE — DISCUSSION/SUMMARY
[FreeTextEntry1] : Damian Gonzalez is a 73 year old woman with a PMHX of HLD, osteoporosis referred by Dr. Deleon for an incidental aneurysm found while being worked up for left arm pain. Initially, she underwent a cerebral angiogram at [facility] which showed a 5 mm multilobular ACOMM aneurysm but did not want to be treated at [facility]. She is 1 month s/p cerebral angiogram and coil embolization of an incidental multilobular small A-comm aneurysm complicated by bibasal frontal intracerebral hemorrhage, subarachnoid hemorrhage and intraventricular hemorrhage. She is doing exceptionally well and has a normal neurological examination. Plan for MRA HEAD with contrast in May 2024 (3 months post embo) and follow up appointment after. All of their questions and concerns were addressed.

## 2024-04-16 NOTE — PHYSICAL EXAM
[General Appearance - Alert] : alert [General Appearance - Well Nourished] : well nourished [General Appearance - Well Developed] : well developed [Oriented To Time, Place, And Person] : oriented to person, place, and time [Affect] : the affect was normal [Mood] : the mood was normal [Person] : oriented to person [Place] : oriented to place [Time] : oriented to time [Concentration Intact] : normal concentrating ability [Visual Intact] : visual attention was ~T not ~L decreased [Naming Objects] : no difficulty naming common objects [Repeating Phrases] : no difficulty repeating a phrase [Writing A Sentence] : no difficulty writing a sentence [Fluency] : fluency intact [Comprehension] : comprehension intact [Reading] : reading intact [Past History] : adequate knowledge of personal past history [Cranial Nerves Optic (II)] : visual acuity intact bilaterally,  visual fields full to confrontation, pupils equal round and reactive to light [Cranial Nerves Oculomotor (III)] : extraocular motion intact [Cranial Nerves Trigeminal (V)] : facial sensation intact symmetrically [Cranial Nerves Facial (VII)] : face symmetrical [Cranial Nerves Vestibulocochlear (VIII)] : hearing was intact bilaterally [Cranial Nerves Glossopharyngeal (IX)] : tongue and palate midline [Cranial Nerves Accessory (XI - Cranial And Spinal)] : head turning and shoulder shrug symmetric [Cranial Nerves Hypoglossal (XII)] : there was no tongue deviation with protrusion [Motor Tone] : muscle tone was normal in all four extremities [Motor Strength] : muscle strength was normal in all four extremities [No Muscle Atrophy] : normal bulk in all four extremities [Sensation Tactile Decrease] : light touch was intact [Balance] : balance was intact [Full Visual Field] : full visual field [Hearing Threshold Finger Rub Not [place]] : hearing was normal [] : no respiratory distress [Heart Rate And Rhythm] : heart rate was normal and rhythm regular [Abnormal Walk] : normal gait

## 2024-04-26 ENCOUNTER — APPOINTMENT (OUTPATIENT)
Dept: HOME HEALTH SERVICES | Facility: HOME HEALTH | Age: 74
End: 2024-04-26
Payer: MEDICARE

## 2024-04-26 VITALS
HEART RATE: 76 BPM | TEMPERATURE: 97.6 F | DIASTOLIC BLOOD PRESSURE: 60 MMHG | SYSTOLIC BLOOD PRESSURE: 120 MMHG | RESPIRATION RATE: 18 BRPM | OXYGEN SATURATION: 99 %

## 2024-04-26 DIAGNOSIS — I25.10 ATHEROSCLEROTIC HEART DISEASE OF NATIVE CORONARY ARTERY W/OUT ANGINA PECTORIS: ICD-10-CM

## 2024-04-26 DIAGNOSIS — K21.9 GASTRO-ESOPHAGEAL REFLUX DISEASE W/OUT ESOPHAGITIS: ICD-10-CM

## 2024-04-26 DIAGNOSIS — G47.00 INSOMNIA, UNSPECIFIED: ICD-10-CM

## 2024-04-26 DIAGNOSIS — E78.5 HYPERLIPIDEMIA, UNSPECIFIED: ICD-10-CM

## 2024-04-26 DIAGNOSIS — L85.3 XEROSIS CUTIS: ICD-10-CM

## 2024-04-26 DIAGNOSIS — F41.9 ANXIETY DISORDER, UNSPECIFIED: ICD-10-CM

## 2024-04-26 DIAGNOSIS — K59.00 CONSTIPATION, UNSPECIFIED: ICD-10-CM

## 2024-04-26 DIAGNOSIS — R33.9 RETENTION OF URINE, UNSPECIFIED: ICD-10-CM

## 2024-04-26 PROCEDURE — 99344 HOME/RES VST NEW MOD MDM 60: CPT

## 2024-04-26 RX ORDER — HYDROCORTISONE 1 %
12 CREAM (GRAM) TOPICAL TWICE DAILY
Qty: 1 | Refills: 3 | Status: ACTIVE | COMMUNITY
Start: 2024-04-26 | End: 1900-01-01

## 2024-04-26 RX ORDER — ASPIRIN ENTERIC COATED TABLETS 81 MG 81 MG/1
81 TABLET, DELAYED RELEASE ORAL DAILY
Qty: 90 | Refills: 3 | Status: ACTIVE | COMMUNITY
Start: 2024-04-26

## 2024-04-26 RX ORDER — GLUCOSAMINE HCL/CHONDROITIN SU 500-400 MG
3 CAPSULE ORAL
Qty: 30 | Refills: 1 | Status: ACTIVE | COMMUNITY
Start: 2024-04-26

## 2024-04-26 RX ORDER — CHOLECALCIFEROL (VITAMIN D3) 25 MCG
TABLET,CHEWABLE ORAL
Refills: 0 | Status: ACTIVE | COMMUNITY
Start: 2024-04-26

## 2024-04-26 RX ORDER — SENNOSIDES 8.6 MG TABLETS 8.6 MG/1
8.6 TABLET ORAL
Qty: 60 | Refills: 2 | Status: ACTIVE | COMMUNITY
Start: 2024-04-26

## 2024-04-26 RX ORDER — ATORVASTATIN CALCIUM 10 MG/1
10 TABLET, FILM COATED ORAL
Qty: 90 | Refills: 3 | Status: ACTIVE | COMMUNITY
Start: 2024-04-26 | End: 1900-01-01

## 2024-04-26 RX ORDER — QUETIAPINE FUMARATE 25 MG/1
25 TABLET ORAL
Qty: 30 | Refills: 1 | Status: ACTIVE | COMMUNITY
Start: 2024-04-26 | End: 1900-01-01

## 2024-04-26 RX ORDER — SIMVASTATIN 10 MG/1
10 TABLET, FILM COATED ORAL
Qty: 90 | Refills: 0 | Status: COMPLETED | COMMUNITY
Start: 2023-11-27 | End: 2024-04-26

## 2024-04-26 RX ORDER — DOCUSATE SODIUM 100 MG/1
100 CAPSULE, LIQUID FILLED ORAL TWICE DAILY
Qty: 60 | Refills: 3 | Status: ACTIVE | COMMUNITY
Start: 2024-04-26

## 2024-04-26 RX ORDER — MULTIVIT-MIN/IRON FUM/FOLIC AC 19 MG-400
TABLET ORAL DAILY
Refills: 0 | Status: ACTIVE | COMMUNITY
Start: 2024-04-26

## 2024-04-26 RX ORDER — PANTOPRAZOLE 40 MG/1
40 TABLET, DELAYED RELEASE ORAL
Qty: 30 | Refills: 2 | Status: ACTIVE | COMMUNITY
Start: 2024-04-26 | End: 1900-01-01

## 2024-04-26 RX ORDER — ICOSAPENT ETHYL 1000 MG/1
1 CAPSULE ORAL
Refills: 0 | Status: COMPLETED | COMMUNITY
End: 2024-04-26

## 2024-04-26 RX ORDER — TAMSULOSIN HYDROCHLORIDE 0.4 MG/1
0.4 CAPSULE ORAL
Qty: 1 | Refills: 3 | Status: ACTIVE | COMMUNITY
Start: 2024-04-26

## 2024-05-28 ENCOUNTER — APPOINTMENT (OUTPATIENT)
Dept: MRI IMAGING | Facility: CLINIC | Age: 74
End: 2024-05-28
Payer: MEDICARE

## 2024-05-28 ENCOUNTER — OUTPATIENT (OUTPATIENT)
Dept: OUTPATIENT SERVICES | Facility: HOSPITAL | Age: 74
LOS: 1 days | End: 2024-05-28
Payer: COMMERCIAL

## 2024-05-28 DIAGNOSIS — Z98.890 OTHER SPECIFIED POSTPROCEDURAL STATES: Chronic | ICD-10-CM

## 2024-05-28 DIAGNOSIS — I67.1 CEREBRAL ANEURYSM, NONRUPTURED: ICD-10-CM

## 2024-05-28 PROCEDURE — 70546 MR ANGIOGRAPH HEAD W/O&W/DYE: CPT | Mod: 26

## 2024-05-28 PROCEDURE — 70546 MR ANGIOGRAPH HEAD W/O&W/DYE: CPT

## 2024-06-11 ENCOUNTER — APPOINTMENT (OUTPATIENT)
Dept: NEUROLOGY | Facility: CLINIC | Age: 74
End: 2024-06-11
Payer: MEDICARE

## 2024-06-11 VITALS
SYSTOLIC BLOOD PRESSURE: 131 MMHG | HEIGHT: 60 IN | WEIGHT: 96 LBS | DIASTOLIC BLOOD PRESSURE: 76 MMHG | BODY MASS INDEX: 18.85 KG/M2 | HEART RATE: 76 BPM

## 2024-06-11 PROCEDURE — G2211 COMPLEX E/M VISIT ADD ON: CPT

## 2024-06-11 PROCEDURE — 99215 OFFICE O/P EST HI 40 MIN: CPT

## 2024-06-11 NOTE — HISTORY OF PRESENT ILLNESS
[FreeTextEntry1] : Damian Gonzalez is a 73 year old woman with a PMHX of HLD, osteoporosis referred by Dr. Deleon for an incidental aneurysm. She had imaging done for numbness in the L arm including MRI brain, MRA head and neck. MRA showed an ACOMM aneurysm. She underwent a cerebral angiogram by José Manuel Villaseñor at [facility] on December 7th, 2023, with the intent to treat last month. The aneurysm is described as a 5.5x4.6x3.6mm wide necked acomm aneurysm that projects medio-superiorly. She did not want to be treated at [facility]. Patient underwent a cerebral angiogram 2/14/24 which showed Incidental multilobular 6.9 mm x 3.9 mm x 4.2 mm A-comm aneurysm with a 2.4 mm neck that arises from a proximal median artery of the corpus callosum which fills predominantly from the left FABIENNE. The aneurysm projects laterally from this bifurcation and only fills from the left internal carotid artery injection. On 03/06/24 she underwent a cerebral angiogram and coil embolization of an incidental multilobular small A-comm aneurysm complicated by bibasal frontal intracerebral hemorrhage, subarachnoid hemorrhage and intraventricular hemorrhage. EVD was placed and removed on 03/15/24. She had a follow up cerebral angiogram on 03/11/24 which showed persistent complete A-comm aneurysm occlusion post coil embolization with small neck remnant. No pseudoaneurysm. No vasospasm. She was discharged to rehab on 03/27/24 and now back at home and is doing very well. Repeat MRA Head with contrast 05/28/24 was stable. She comes in today for a follow up visit. She reports it is difficult to lift heavy objects and unable to walk far distances and bilateral calf pain.

## 2024-06-11 NOTE — REVIEW OF SYSTEMS
[Feeling Poorly] : not feeling poorly [Feeling Tired] : not feeling tired [Confused or Disoriented] : no confusion [Memory Lapses or Loss] : no memory loss [Decr. Concentrating Ability] : no decrease in concentrating ability [Difficulty with Language] : no ~M difficulty with language [Changed Thought Patterns] : no change in thought patterns [Repeating Questions] : no repeated questioning about recent events [Facial Weakness] : no facial weakness [Arm Weakness] : no arm weakness [Hand Weakness] : no hand weakness [Leg Weakness] : no leg weakness [Poor Coordination] : good coordination [Difficulty Writing] : no difficulty writing [Difficulties in Speech] : no speech difficulties [Numbness] : no numbness [Tingling] : no tingling [Seizures] : no convulsions [Dizziness] : no dizziness [Fainting] : no fainting [Lightheadedness] : no lightheadedness [Vertigo] : no vertigo [Tension Headache] : no tension-type headache [Difficulty Walking] : no difficulty walking [Anxiety] : no anxiety [Depression] : no depression [Shortness Of Breath] : no shortness of breath [Wheezing] : no wheezing [Easy Bleeding] : no tendency for easy bleeding [Easy Bruising] : no tendency for easy bruising

## 2024-06-11 NOTE — PHYSICAL EXAM
[General Appearance - Alert] : alert [General Appearance - Well Nourished] : well nourished [General Appearance - Well Developed] : well developed [Oriented To Time, Place, And Person] : oriented to person, place, and time [Affect] : the affect was normal [Mood] : the mood was normal [Person] : oriented to person [Place] : oriented to place [Time] : oriented to time [Concentration Intact] : normal concentrating ability [Visual Intact] : visual attention was ~T not ~L decreased [Naming Objects] : no difficulty naming common objects [Repeating Phrases] : no difficulty repeating a phrase [Writing A Sentence] : no difficulty writing a sentence [Fluency] : fluency intact [Comprehension] : comprehension intact [Reading] : reading intact [Past History] : adequate knowledge of personal past history [Cranial Nerves Optic (II)] : visual acuity intact bilaterally,  visual fields full to confrontation, pupils equal round and reactive to light [Cranial Nerves Oculomotor (III)] : extraocular motion intact [Cranial Nerves Trigeminal (V)] : facial sensation intact symmetrically [Cranial Nerves Facial (VII)] : face symmetrical [Cranial Nerves Vestibulocochlear (VIII)] : hearing was intact bilaterally [Cranial Nerves Glossopharyngeal (IX)] : tongue and palate midline [Cranial Nerves Accessory (XI - Cranial And Spinal)] : head turning and shoulder shrug symmetric [Cranial Nerves Hypoglossal (XII)] : there was no tongue deviation with protrusion [Motor Tone] : muscle tone was normal in all four extremities [Motor Strength] : muscle strength was normal in all four extremities [No Muscle Atrophy] : normal bulk in all four extremities [Sensation Tactile Decrease] : light touch was intact [Balance] : balance was intact [Full Visual Field] : full visual field [Hearing Threshold Finger Rub Not [place]] : hearing was normal [Neck Appearance] : the appearance of the neck was normal [] : no respiratory distress [Heart Rate And Rhythm] : heart rate was normal and rhythm regular [Edema] : there was no peripheral edema [Abnormal Walk] : normal gait [FreeTextEntry1] : No cords in calfs palpated

## 2024-06-11 NOTE — DISCUSSION/SUMMARY
[FreeTextEntry1] : Damian Gonzalez is a 73 year old woman with a PMHX of HLD, osteoporosis referred by Dr. Deleon for an incidental aneurysm found while being worked up for left arm pain. Initially, she underwent a cerebral angiogram at [facility] which showed a 5 mm multilobular ACOMM aneurysm but did not want to be treated at [facility]. She is 3 months s/p cerebral angiogram and coil embolization of an incidental multilobular small A-comm aneurysm complicated by bibasal frontal intracerebral hemorrhage, subarachnoid hemorrhage and intraventricular hemorrhage. She is doing exceptionally well and has a normal neurological examination. MRA HEAD 05/28 was stable. For the calf pain we ordered a LE Doppler to rule out DVT and the need for this test was explained. Plan for repeat MRA Head with contrast in 3 months, August, 2024. If stable, then we may wait an additional 6 months before repeat angiography. All of their questions and concerns were addressed.

## 2024-06-13 ENCOUNTER — APPOINTMENT (OUTPATIENT)
Dept: ULTRASOUND IMAGING | Facility: CLINIC | Age: 74
End: 2024-06-13
Payer: MEDICARE

## 2024-06-13 PROCEDURE — 93970 EXTREMITY STUDY: CPT

## 2024-06-14 DIAGNOSIS — I72.9 ANEURYSM OF UNSPECIFIED SITE: ICD-10-CM

## 2024-07-19 ENCOUNTER — APPOINTMENT (OUTPATIENT)
Dept: HOME HEALTH SERVICES | Facility: HOME HEALTH | Age: 74
End: 2024-07-19
Payer: MEDICARE

## 2024-07-19 VITALS
RESPIRATION RATE: 18 BRPM | DIASTOLIC BLOOD PRESSURE: 60 MMHG | HEART RATE: 64 BPM | OXYGEN SATURATION: 97 % | TEMPERATURE: 97.6 F | SYSTOLIC BLOOD PRESSURE: 130 MMHG

## 2024-07-19 DIAGNOSIS — K21.9 GASTRO-ESOPHAGEAL REFLUX DISEASE W/OUT ESOPHAGITIS: ICD-10-CM

## 2024-07-19 DIAGNOSIS — R10.13 EPIGASTRIC PAIN: ICD-10-CM

## 2024-07-19 DIAGNOSIS — R14.1 GAS PAIN: ICD-10-CM

## 2024-07-19 PROCEDURE — 99349 HOME/RES VST EST MOD MDM 40: CPT

## 2024-07-19 RX ORDER — PANCRELIPASE 36000; 180000; 114000 [USP'U]/1; [USP'U]/1; [USP'U]/1
36000-114000 CAPSULE, DELAYED RELEASE PELLETS ORAL
Qty: 180 | Refills: 11 | Status: ACTIVE | COMMUNITY
Start: 2024-07-19

## 2024-07-19 RX ORDER — SIMETHICONE 125 MG
125 CAPSULE ORAL 4 TIMES DAILY
Qty: 120 | Refills: 1 | Status: ACTIVE | COMMUNITY
Start: 2024-07-19 | End: 1900-01-01

## 2024-08-28 ENCOUNTER — OUTPATIENT (OUTPATIENT)
Dept: OUTPATIENT SERVICES | Facility: HOSPITAL | Age: 74
LOS: 1 days | End: 2024-08-28
Payer: COMMERCIAL

## 2024-08-28 ENCOUNTER — APPOINTMENT (OUTPATIENT)
Dept: MRI IMAGING | Facility: CLINIC | Age: 74
End: 2024-08-28

## 2024-08-28 DIAGNOSIS — Z98.890 OTHER SPECIFIED POSTPROCEDURAL STATES: Chronic | ICD-10-CM

## 2024-08-28 DIAGNOSIS — I72.9 ANEURYSM OF UNSPECIFIED SITE: ICD-10-CM

## 2024-08-28 PROCEDURE — 70546 MR ANGIOGRAPH HEAD W/O&W/DYE: CPT | Mod: 26

## 2024-08-28 PROCEDURE — 70546 MR ANGIOGRAPH HEAD W/O&W/DYE: CPT

## 2024-08-28 PROCEDURE — A9585: CPT

## 2024-09-04 DIAGNOSIS — I72.9 ANEURYSM OF UNSPECIFIED SITE: ICD-10-CM

## 2024-10-02 ENCOUNTER — APPOINTMENT (OUTPATIENT)
Dept: HOME HEALTH SERVICES | Facility: HOME HEALTH | Age: 74
End: 2024-10-02

## 2024-10-25 ENCOUNTER — APPOINTMENT (OUTPATIENT)
Dept: HOME HEALTH SERVICES | Facility: HOME HEALTH | Age: 74
End: 2024-10-25

## 2024-11-30 ENCOUNTER — APPOINTMENT (OUTPATIENT)
Dept: MRI IMAGING | Facility: CLINIC | Age: 74
End: 2024-11-30

## 2024-12-10 ENCOUNTER — APPOINTMENT (OUTPATIENT)
Dept: NEUROLOGY | Facility: CLINIC | Age: 74
End: 2024-12-10

## 2025-05-19 ENCOUNTER — OUTPATIENT (OUTPATIENT)
Dept: OUTPATIENT SERVICES | Facility: HOSPITAL | Age: 75
LOS: 1 days | End: 2025-05-19
Payer: MEDICAID

## 2025-05-19 ENCOUNTER — APPOINTMENT (OUTPATIENT)
Dept: MRI IMAGING | Facility: CLINIC | Age: 75
End: 2025-05-19
Payer: MEDICARE

## 2025-05-19 DIAGNOSIS — I72.9 ANEURYSM OF UNSPECIFIED SITE: ICD-10-CM

## 2025-05-19 DIAGNOSIS — Z98.890 OTHER SPECIFIED POSTPROCEDURAL STATES: Chronic | ICD-10-CM

## 2025-05-19 PROCEDURE — 70546 MR ANGIOGRAPH HEAD W/O&W/DYE: CPT | Mod: 26

## 2025-05-19 PROCEDURE — 70546 MR ANGIOGRAPH HEAD W/O&W/DYE: CPT

## 2025-05-19 PROCEDURE — A9585: CPT

## 2025-05-28 ENCOUNTER — APPOINTMENT (OUTPATIENT)
Dept: HOME HEALTH SERVICES | Facility: HOME HEALTH | Age: 75
End: 2025-05-28
Payer: MEDICARE

## 2025-05-28 DIAGNOSIS — I72.9 ANEURYSM OF UNSPECIFIED SITE: ICD-10-CM

## 2025-05-28 DIAGNOSIS — F41.9 ANXIETY DISORDER, UNSPECIFIED: ICD-10-CM

## 2025-05-28 DIAGNOSIS — K59.00 CONSTIPATION, UNSPECIFIED: ICD-10-CM

## 2025-05-28 DIAGNOSIS — I67.1 CEREBRAL ANEURYSM, NONRUPTURED: ICD-10-CM

## 2025-05-28 DIAGNOSIS — R10.13 EPIGASTRIC PAIN: ICD-10-CM

## 2025-05-28 DIAGNOSIS — R33.9 RETENTION OF URINE, UNSPECIFIED: ICD-10-CM

## 2025-05-28 DIAGNOSIS — E78.5 HYPERLIPIDEMIA, UNSPECIFIED: ICD-10-CM

## 2025-05-28 DIAGNOSIS — K21.9 GASTRO-ESOPHAGEAL REFLUX DISEASE W/OUT ESOPHAGITIS: ICD-10-CM

## 2025-05-28 DIAGNOSIS — G47.00 INSOMNIA, UNSPECIFIED: ICD-10-CM

## 2025-05-28 DIAGNOSIS — I25.10 ATHEROSCLEROTIC HEART DISEASE OF NATIVE CORONARY ARTERY W/OUT ANGINA PECTORIS: ICD-10-CM

## 2025-05-28 PROCEDURE — 99349 HOME/RES VST EST MOD MDM 40: CPT | Mod: 2W

## 2025-06-10 ENCOUNTER — APPOINTMENT (OUTPATIENT)
Facility: CLINIC | Age: 75
End: 2025-06-10
Payer: MEDICARE

## 2025-06-10 PROCEDURE — 99215 OFFICE O/P EST HI 40 MIN: CPT

## 2025-06-10 PROCEDURE — G2211 COMPLEX E/M VISIT ADD ON: CPT

## 2025-07-26 ENCOUNTER — OUTPATIENT (OUTPATIENT)
Dept: OUTPATIENT SERVICES | Facility: HOSPITAL | Age: 75
LOS: 1 days | End: 2025-07-26
Payer: COMMERCIAL

## 2025-07-26 VITALS
RESPIRATION RATE: 16 BRPM | HEIGHT: 60 IN | SYSTOLIC BLOOD PRESSURE: 127 MMHG | WEIGHT: 91.27 LBS | DIASTOLIC BLOOD PRESSURE: 71 MMHG | HEART RATE: 70 BPM | OXYGEN SATURATION: 99 % | TEMPERATURE: 98 F

## 2025-07-26 DIAGNOSIS — Z29.9 ENCOUNTER FOR PROPHYLACTIC MEASURES, UNSPECIFIED: ICD-10-CM

## 2025-07-26 DIAGNOSIS — Z01.818 ENCOUNTER FOR OTHER PREPROCEDURAL EXAMINATION: ICD-10-CM

## 2025-07-26 DIAGNOSIS — I67.1 CEREBRAL ANEURYSM, NONRUPTURED: ICD-10-CM

## 2025-07-26 DIAGNOSIS — Z91.89 OTHER SPECIFIED PERSONAL RISK FACTORS, NOT ELSEWHERE CLASSIFIED: ICD-10-CM

## 2025-07-26 DIAGNOSIS — Z98.890 OTHER SPECIFIED POSTPROCEDURAL STATES: Chronic | ICD-10-CM

## 2025-07-26 LAB
A1C WITH ESTIMATED AVERAGE GLUCOSE RESULT: 6 % — HIGH (ref 4–5.6)
ALBUMIN SERPL ELPH-MCNC: 4.3 G/DL — SIGNIFICANT CHANGE UP (ref 3.3–5.2)
ALP SERPL-CCNC: 40 U/L — SIGNIFICANT CHANGE UP (ref 40–120)
ALT FLD-CCNC: 16 U/L — SIGNIFICANT CHANGE UP
ANION GAP SERPL CALC-SCNC: 9 MMOL/L — SIGNIFICANT CHANGE UP (ref 5–17)
APTT BLD: 32.7 SEC — SIGNIFICANT CHANGE UP (ref 26.1–36.8)
AST SERPL-CCNC: 27 U/L — SIGNIFICANT CHANGE UP
BASOPHILS # BLD AUTO: 0.04 K/UL — SIGNIFICANT CHANGE UP (ref 0–0.2)
BASOPHILS NFR BLD AUTO: 0.8 % — SIGNIFICANT CHANGE UP (ref 0–2)
BILIRUB SERPL-MCNC: 0.3 MG/DL — LOW (ref 0.4–2)
BLD GP AB SCN SERPL QL: SIGNIFICANT CHANGE UP
BUN SERPL-MCNC: 14.4 MG/DL — SIGNIFICANT CHANGE UP (ref 8–20)
CALCIUM SERPL-MCNC: 9.4 MG/DL — SIGNIFICANT CHANGE UP (ref 8.4–10.5)
CHLORIDE SERPL-SCNC: 105 MMOL/L — SIGNIFICANT CHANGE UP (ref 96–108)
CO2 SERPL-SCNC: 28 MMOL/L — SIGNIFICANT CHANGE UP (ref 22–29)
CREAT SERPL-MCNC: 0.67 MG/DL — SIGNIFICANT CHANGE UP (ref 0.5–1.3)
EGFR: 93 ML/MIN/1.73M2 — SIGNIFICANT CHANGE UP
EGFR: 93 ML/MIN/1.73M2 — SIGNIFICANT CHANGE UP
EOSINOPHIL # BLD AUTO: 0.1 K/UL — SIGNIFICANT CHANGE UP (ref 0–0.5)
EOSINOPHIL NFR BLD AUTO: 1.9 % — SIGNIFICANT CHANGE UP (ref 0–6)
ESTIMATED AVERAGE GLUCOSE: 126 MG/DL — HIGH (ref 68–114)
GLUCOSE SERPL-MCNC: 81 MG/DL — SIGNIFICANT CHANGE UP (ref 70–99)
HCT VFR BLD CALC: 39.4 % — SIGNIFICANT CHANGE UP (ref 34.5–45)
HGB BLD-MCNC: 12.7 G/DL — SIGNIFICANT CHANGE UP (ref 11.5–15.5)
IMM GRANULOCYTES # BLD AUTO: 0 K/UL — SIGNIFICANT CHANGE UP (ref 0–0.07)
IMM GRANULOCYTES NFR BLD AUTO: 0 % — SIGNIFICANT CHANGE UP (ref 0–0.9)
INR BLD: 1.06 RATIO — SIGNIFICANT CHANGE UP (ref 0.85–1.16)
LYMPHOCYTES # BLD AUTO: 2.43 K/UL — SIGNIFICANT CHANGE UP (ref 1–3.3)
LYMPHOCYTES NFR BLD AUTO: 47.1 % — HIGH (ref 13–44)
MCHC RBC-ENTMCNC: 30.7 PG — SIGNIFICANT CHANGE UP (ref 27–34)
MCHC RBC-ENTMCNC: 32.2 G/DL — SIGNIFICANT CHANGE UP (ref 32–36)
MCV RBC AUTO: 95.2 FL — SIGNIFICANT CHANGE UP (ref 80–100)
MONOCYTES # BLD AUTO: 0.56 K/UL — SIGNIFICANT CHANGE UP (ref 0–0.9)
MONOCYTES NFR BLD AUTO: 10.9 % — SIGNIFICANT CHANGE UP (ref 2–14)
MRSA PCR RESULT.: SIGNIFICANT CHANGE UP
NEUTROPHILS # BLD AUTO: 2.03 K/UL — SIGNIFICANT CHANGE UP (ref 1.8–7.4)
NEUTROPHILS NFR BLD AUTO: 39.3 % — LOW (ref 43–77)
NRBC # BLD AUTO: 0 K/UL — SIGNIFICANT CHANGE UP (ref 0–0)
NRBC # FLD: 0 K/UL — SIGNIFICANT CHANGE UP (ref 0–0)
NRBC BLD AUTO-RTO: 0 /100 WBCS — SIGNIFICANT CHANGE UP (ref 0–0)
PLATELET # BLD AUTO: 246 K/UL — SIGNIFICANT CHANGE UP (ref 150–400)
PMV BLD: 9.6 FL — SIGNIFICANT CHANGE UP (ref 7–13)
POTASSIUM SERPL-MCNC: 4.3 MMOL/L — SIGNIFICANT CHANGE UP (ref 3.5–5.3)
POTASSIUM SERPL-SCNC: 4.3 MMOL/L — SIGNIFICANT CHANGE UP (ref 3.5–5.3)
PROT SERPL-MCNC: 7 G/DL — SIGNIFICANT CHANGE UP (ref 6.6–8.7)
PROTHROM AB SERPL-ACNC: 12 SEC — SIGNIFICANT CHANGE UP (ref 9.9–13.4)
RBC # BLD: 4.14 M/UL — SIGNIFICANT CHANGE UP (ref 3.8–5.2)
RBC # FLD: 12.1 % — SIGNIFICANT CHANGE UP (ref 10.3–14.5)
S AUREUS DNA NOSE QL NAA+PROBE: SIGNIFICANT CHANGE UP
SODIUM SERPL-SCNC: 142 MMOL/L — SIGNIFICANT CHANGE UP (ref 135–145)
WBC # BLD: 5.16 K/UL — SIGNIFICANT CHANGE UP (ref 3.8–10.5)
WBC # FLD AUTO: 5.16 K/UL — SIGNIFICANT CHANGE UP (ref 3.8–10.5)

## 2025-07-26 PROCEDURE — 86901 BLOOD TYPING SEROLOGIC RH(D): CPT

## 2025-07-26 PROCEDURE — 86900 BLOOD TYPING SEROLOGIC ABO: CPT

## 2025-07-26 PROCEDURE — 80053 COMPREHEN METABOLIC PANEL: CPT

## 2025-07-26 PROCEDURE — 87641 MR-STAPH DNA AMP PROBE: CPT

## 2025-07-26 PROCEDURE — 71046 X-RAY EXAM CHEST 2 VIEWS: CPT | Mod: 26

## 2025-07-26 PROCEDURE — 71046 X-RAY EXAM CHEST 2 VIEWS: CPT

## 2025-07-26 PROCEDURE — G0463: CPT

## 2025-07-26 PROCEDURE — 86850 RBC ANTIBODY SCREEN: CPT

## 2025-07-26 PROCEDURE — 93010 ELECTROCARDIOGRAM REPORT: CPT

## 2025-07-26 PROCEDURE — 85730 THROMBOPLASTIN TIME PARTIAL: CPT

## 2025-07-26 PROCEDURE — 87640 STAPH A DNA AMP PROBE: CPT

## 2025-07-26 PROCEDURE — 85610 PROTHROMBIN TIME: CPT

## 2025-07-26 PROCEDURE — 36415 COLL VENOUS BLD VENIPUNCTURE: CPT

## 2025-07-26 PROCEDURE — 85025 COMPLETE CBC W/AUTO DIFF WBC: CPT

## 2025-07-26 PROCEDURE — 83036 HEMOGLOBIN GLYCOSYLATED A1C: CPT

## 2025-07-26 PROCEDURE — 93005 ELECTROCARDIOGRAM TRACING: CPT

## 2025-07-26 RX ORDER — TRAZODONE HCL 100 MG
0 TABLET ORAL
Refills: 0 | DISCHARGE

## 2025-08-06 ENCOUNTER — APPOINTMENT (OUTPATIENT)
Dept: NEUROLOGY | Facility: HOSPITAL | Age: 75
End: 2025-08-06

## 2025-08-06 ENCOUNTER — OUTPATIENT (OUTPATIENT)
Dept: OUTPATIENT SERVICES | Facility: HOSPITAL | Age: 75
LOS: 1 days | End: 2025-08-06
Payer: COMMERCIAL

## 2025-08-06 ENCOUNTER — TRANSCRIPTION ENCOUNTER (OUTPATIENT)
Age: 75
End: 2025-08-06

## 2025-08-06 VITALS
OXYGEN SATURATION: 99 % | HEART RATE: 60 BPM | RESPIRATION RATE: 12 BRPM | DIASTOLIC BLOOD PRESSURE: 76 MMHG | SYSTOLIC BLOOD PRESSURE: 124 MMHG | TEMPERATURE: 98 F

## 2025-08-06 VITALS
RESPIRATION RATE: 16 BRPM | DIASTOLIC BLOOD PRESSURE: 59 MMHG | HEART RATE: 63 BPM | SYSTOLIC BLOOD PRESSURE: 130 MMHG | OXYGEN SATURATION: 100 %

## 2025-08-06 DIAGNOSIS — Z98.890 OTHER SPECIFIED POSTPROCEDURAL STATES: Chronic | ICD-10-CM

## 2025-08-06 DIAGNOSIS — R55 SYNCOPE AND COLLAPSE: ICD-10-CM

## 2025-08-06 DIAGNOSIS — Z01.818 ENCOUNTER FOR OTHER PREPROCEDURAL EXAMINATION: ICD-10-CM

## 2025-08-06 DIAGNOSIS — I67.1 CEREBRAL ANEURYSM, NONRUPTURED: ICD-10-CM

## 2025-08-06 PROCEDURE — 76377 3D RENDER W/INTRP POSTPROCES: CPT | Mod: 26

## 2025-08-06 PROCEDURE — 36224 PLACE CATH CAROTD ART: CPT | Mod: LT

## 2025-08-06 PROCEDURE — 36415 COLL VENOUS BLD VENIPUNCTURE: CPT

## 2025-08-06 PROCEDURE — C1894: CPT

## 2025-08-06 PROCEDURE — 36224 PLACE CATH CAROTD ART: CPT

## 2025-08-06 PROCEDURE — C1769: CPT

## 2025-08-06 PROCEDURE — C1887: CPT

## 2025-08-06 RX ORDER — CITALOPRAM 20 MG/1
1 TABLET ORAL
Refills: 0 | DISCHARGE

## 2025-08-06 RX ORDER — CEFAZOLIN SODIUM IN 0.9 % NACL 3 G/100 ML
2000 INTRAVENOUS SOLUTION, PIGGYBACK (ML) INTRAVENOUS ONCE
Refills: 0 | Status: DISCONTINUED | OUTPATIENT
Start: 2025-08-06 | End: 2025-08-06

## 2025-08-06 RX ORDER — MONTELUKAST SODIUM 10 MG/1
1 TABLET ORAL
Refills: 0 | DISCHARGE

## 2025-08-18 ENCOUNTER — NON-APPOINTMENT (OUTPATIENT)
Age: 75
End: 2025-08-18

## 2025-08-19 ENCOUNTER — APPOINTMENT (OUTPATIENT)
Dept: NEUROLOGY | Facility: CLINIC | Age: 75
End: 2025-08-19
Payer: MEDICARE

## 2025-08-19 VITALS
DIASTOLIC BLOOD PRESSURE: 70 MMHG | SYSTOLIC BLOOD PRESSURE: 127 MMHG | HEIGHT: 60 IN | BODY MASS INDEX: 18.26 KG/M2 | WEIGHT: 93 LBS | HEART RATE: 56 BPM

## 2025-08-19 DIAGNOSIS — I72.9 ANEURYSM OF UNSPECIFIED SITE: ICD-10-CM

## 2025-08-19 PROCEDURE — G2211 COMPLEX E/M VISIT ADD ON: CPT

## 2025-08-19 PROCEDURE — 99215 OFFICE O/P EST HI 40 MIN: CPT

## 2025-08-29 ENCOUNTER — APPOINTMENT (OUTPATIENT)
Dept: HOME HEALTH SERVICES | Facility: HOME HEALTH | Age: 75
End: 2025-08-29

## 2025-08-29 PROBLEM — F41.9 ANXIETY DISORDER, UNSPECIFIED: Chronic | Status: ACTIVE | Noted: 2025-07-26
